# Patient Record
Sex: FEMALE | Race: WHITE | Employment: OTHER | URBAN - METROPOLITAN AREA
[De-identification: names, ages, dates, MRNs, and addresses within clinical notes are randomized per-mention and may not be internally consistent; named-entity substitution may affect disease eponyms.]

---

## 2017-01-02 DIAGNOSIS — N39.0 URINARY TRACT INFECTION, SITE UNSPECIFIED: Primary | ICD-10-CM

## 2017-01-02 RX ORDER — NITROFURANTOIN 25; 75 MG/1; MG/1
100 CAPSULE ORAL 2 TIMES DAILY
Qty: 14 CAPSULE | Refills: 0 | Status: SHIPPED | OUTPATIENT
Start: 2017-01-02 | End: 2017-08-09

## 2017-01-05 ENCOUNTER — TELEPHONE (OUTPATIENT)
Dept: FAMILY MEDICINE | Facility: CLINIC | Age: 77
End: 2017-01-05

## 2017-01-05 DIAGNOSIS — N39.0 URINARY TRACT INFECTION: Primary | ICD-10-CM

## 2017-01-05 RX ORDER — AMOXICILLIN 500 MG/1
1000 CAPSULE ORAL 2 TIMES DAILY
Qty: 28 CAPSULE | Refills: 0 | Status: SHIPPED | OUTPATIENT
Start: 2017-01-05 | End: 2017-01-12

## 2017-01-05 NOTE — TELEPHONE ENCOUNTER
Pt reports being seen for bladder infection and is getting very sick to their stomach, has barely eaten anything since Monday.     Pt vomited the medication out today. Pt started Macrobid on Monday.    Pt notes intermittent nausea as well.     [phone], ok to leave message  [facility] pharmacy.    Will huddle with provider today.  Zena Powell RN

## 2017-01-05 NOTE — TELEPHONE ENCOUNTER
Attempt #1.    Huddle with JV. Advised amoxicillin 1000mg BID x 7 days.    The patient indicates understanding of these issues and agrees with the plan.  Zena Powell RN

## 2017-01-31 ENCOUNTER — TELEPHONE (OUTPATIENT)
Dept: FAMILY MEDICINE | Facility: CLINIC | Age: 77
End: 2017-01-31

## 2017-01-31 DIAGNOSIS — R30.0 DYSURIA: Primary | ICD-10-CM

## 2017-01-31 NOTE — TELEPHONE ENCOUNTER
.  Name of caller:   Aruna  Relationship to pt:  self  Reason for call:   Pt calling she is wondering if she can have recheck of her ua/uc  Best phone number to reach pt at is:   [phone]  Ok to leave a message with medical info?   yes

## 2017-01-31 NOTE — TELEPHONE ENCOUNTER
Called # below     Wanted to make sure her urine is clear of the infection   She has been very tired and running fevers and she is worried the UTI is back     Orders  Placed     Waleska Shah RN, BSN  [facility]

## 2017-02-01 DIAGNOSIS — R30.0 DYSURIA: ICD-10-CM

## 2017-02-01 LAB
ALBUMIN UR-MCNC: NEGATIVE MG/DL
APPEARANCE UR: CLEAR
BILIRUB UR QL STRIP: NEGATIVE
COLOR UR AUTO: YELLOW
GLUCOSE UR STRIP-MCNC: NEGATIVE MG/DL
HGB UR QL STRIP: NEGATIVE
KETONES UR STRIP-MCNC: NEGATIVE MG/DL
LEUKOCYTE ESTERASE UR QL STRIP: NEGATIVE
NITRATE UR QL: NEGATIVE
NON-SQ EPI CELLS #/AREA URNS LPF: NORMAL /LPF
PH UR STRIP: 7 PH (ref 5–7)
RBC #/AREA URNS AUTO: NORMAL /HPF (ref 0–2)
SP GR UR STRIP: 1.01 (ref 1–1.03)
URN SPEC COLLECT METH UR: NORMAL
UROBILINOGEN UR STRIP-ACNC: 0.2 EU/DL (ref 0.2–1)
WBC #/AREA URNS AUTO: NORMAL /HPF (ref 0–2)

## 2017-02-01 PROCEDURE — 81001 URINALYSIS AUTO W/SCOPE: CPT | Performed by: FAMILY MEDICINE

## 2017-02-01 PROCEDURE — 87086 URINE CULTURE/COLONY COUNT: CPT | Performed by: FAMILY MEDICINE

## 2017-02-02 LAB
BACTERIA SPEC CULT: NORMAL
MICRO REPORT STATUS: NORMAL
SPECIMEN SOURCE: NORMAL

## 2017-02-20 DIAGNOSIS — I10 ESSENTIAL HYPERTENSION WITH GOAL BLOOD PRESSURE LESS THAN 140/90: ICD-10-CM

## 2017-02-20 DIAGNOSIS — E03.8 OTHER SPECIFIED HYPOTHYROIDISM: ICD-10-CM

## 2017-02-20 RX ORDER — TRIAMTERENE/HYDROCHLOROTHIAZID 37.5-25 MG
TABLET ORAL
Qty: 90 TABLET | Refills: 0 | Status: SHIPPED | OUTPATIENT
Start: 2017-02-20 | End: 2017-07-10

## 2017-02-20 RX ORDER — LEVOTHYROXINE SODIUM 75 UG/1
TABLET ORAL
Qty: 90 TABLET | Refills: 1 | Status: SHIPPED | OUTPATIENT
Start: 2017-02-20 | End: 2017-08-09

## 2017-02-20 NOTE — TELEPHONE ENCOUNTER
Reason for Call: Patient called to get a refill for her levothyroxine (SYNTHROID, LEVOTHROID) 75 MCG tablet, triamterene-hydrochlorothiazide (MAXZIDE-25) 37.5-25 MG per tablet    Best phone number to reach pt at is: [phone]   Ok to leave a message with medical info? YES    Pharmacy preferred (if calling for a refill): [facility]    Saige Clancy [employer] [facility]-Patient Representative

## 2017-02-20 NOTE — TELEPHONE ENCOUNTER
Levothyroxine     Last Written Prescription Date: 07/28/2016  Last Quantity: 90, # refills: 3  Last Office Visit with [facility], [facility] or [facility] prescribing provider: 12/29/2016   Next 5 appointments (look out 90 days)     Mar 03, 2017 11:15 AM CST   [facility]    [address] [place] [id number]   [phone]                   TSH   Date Value Ref Range Status   07/25/2016 3.17 0.40 - 4.00 mU/L Final       Maxide      Last Written Prescription Date: 07/28/2016  Last Fill Quantity: 90, # refills: 1  Last Office Visit with [facility] or [facility] prescribing provider: 12/29/2016  Next 5 appointments (look out 90 days)     Mar 03, 2017 11:15 AM CST   [facility]   [facility]    [address] [place] [id number] [phone]                   Potassium   Date Value Ref Range Status   07/29/2016 3.7 3.4 - 5.3 mmol/L Final     Creatinine   Date Value Ref Range Status   07/29/2016 1.02 0.52 - 1.04 mg/dL Final     BP Readings from Last 3 Encounters:   12/29/16 122/64   07/29/16 143/63   07/28/16 130/70

## 2017-07-10 DIAGNOSIS — I10 ESSENTIAL HYPERTENSION WITH GOAL BLOOD PRESSURE LESS THAN 140/90: ICD-10-CM

## 2017-07-10 NOTE — TELEPHONE ENCOUNTER
Name of caller: Aruna  Relationship of Patient: Self    Reason for Call: Patient would like a refill on her triamterene-hydrochlorothiazide (MAXZIDE-25) 37.5-25 MG per tablet, and metoprolol (TOPROL-XL) 50 MG 24 hr tablet    Best phone number to reach pt at is: [phone]  Ok to leave a message with medical info? Yes    Pharmacy preferred (if calling for a refill): [facility]    Saige Clancy [employer] [facility]-Patient Representative

## 2017-07-10 NOTE — TELEPHONE ENCOUNTER
Traimterene-Hydrochlorothiazide      Last Written Prescription Date: 2/20/2017  Last Fill Quantity: 90, # refills: 0  Last Office Visit with [facility] prescribing provider: 12/29/2016  Next 5 appointments (look out 90 days)     Aug 09, 2017  9:45 AM CDT   [facility]   [facility]    [address] [place] [id number] [phone]                   Potassium   Date Value Ref Range Status   07/29/2016 3.7 3.4 - 5.3 mmol/L Final     Creatinine   Date Value Ref Range Status   07/29/2016 1.02 0.52 - 1.04 mg/dL Final     BP Readings from Last 3 Encounters:   12/29/16 122/64   07/29/16 143/63   07/28/16 130/70       Metoprolol      Last Written Prescription Date: 7/28/2016  Last Fill Quantity: 90, # refills: 3    Last Office Visit with [facility] or  Health prescribing provider:  12/29/2016   Future Office Visit:    Next 5 appointments (look out 90 days)     Aug 09, 2017  9:45 AM CDT   [facility]   [facility]    [address] [place] [id number] [phone]                    BP Readings from Last 3 Encounters:   12/29/16 122/64   07/29/16 143/63   07/28/16 130/70     Elma Knowles [facility]

## 2017-07-13 RX ORDER — TRIAMTERENE/HYDROCHLOROTHIAZID 37.5-25 MG
TABLET ORAL
Qty: 90 TABLET | Refills: 0 | Status: SHIPPED | OUTPATIENT
Start: 2017-07-13 | End: 2017-08-09

## 2017-07-13 RX ORDER — METOPROLOL SUCCINATE 50 MG/1
TABLET, EXTENDED RELEASE ORAL
Qty: 90 TABLET | Refills: 3 | Status: SHIPPED | OUTPATIENT
Start: 2017-07-13 | End: 2018-04-25

## 2017-07-28 DIAGNOSIS — I10 ESSENTIAL HYPERTENSION WITH GOAL BLOOD PRESSURE LESS THAN 140/90: ICD-10-CM

## 2017-07-28 DIAGNOSIS — E78.5 HYPERLIPIDEMIA LDL GOAL <130: ICD-10-CM

## 2017-07-28 DIAGNOSIS — M85.80 OSTEOPENIA, UNSPECIFIED LOCATION: ICD-10-CM

## 2017-07-28 DIAGNOSIS — R31.9 HEMATURIA: ICD-10-CM

## 2017-07-28 DIAGNOSIS — E03.8 OTHER SPECIFIED HYPOTHYROIDISM: ICD-10-CM

## 2017-07-28 LAB
ALBUMIN SERPL-MCNC: 3.6 G/DL (ref 3.4–5)
ALBUMIN UR-MCNC: NEGATIVE MG/DL
ALP SERPL-CCNC: 42 U/L (ref 40–150)
ALT SERPL W P-5'-P-CCNC: 26 U/L (ref 0–50)
ANION GAP SERPL CALCULATED.3IONS-SCNC: 7 MMOL/L (ref 3–14)
APPEARANCE UR: CLEAR
AST SERPL W P-5'-P-CCNC: 17 U/L (ref 0–45)
BACTERIA #/AREA URNS HPF: ABNORMAL /HPF
BASOPHILS # BLD AUTO: 0 10E9/L (ref 0–0.2)
BASOPHILS NFR BLD AUTO: 0.6 %
BILIRUB SERPL-MCNC: 0.6 MG/DL (ref 0.2–1.3)
BILIRUB UR QL STRIP: NEGATIVE
BUN SERPL-MCNC: 23 MG/DL (ref 7–30)
CALCIUM SERPL-MCNC: 9.6 MG/DL (ref 8.5–10.1)
CHLORIDE SERPL-SCNC: 107 MMOL/L (ref 94–109)
CHOLEST SERPL-MCNC: 198 MG/DL
CO2 SERPL-SCNC: 27 MMOL/L (ref 20–32)
COLOR UR AUTO: YELLOW
CREAT SERPL-MCNC: 1.13 MG/DL (ref 0.52–1.04)
CREAT UR-MCNC: 39 MG/DL
DIFFERENTIAL METHOD BLD: NORMAL
EOSINOPHIL # BLD AUTO: 0.3 10E9/L (ref 0–0.7)
EOSINOPHIL NFR BLD AUTO: 4.6 %
ERYTHROCYTE [DISTWIDTH] IN BLOOD BY AUTOMATED COUNT: 12.5 % (ref 10–15)
GFR SERPL CREATININE-BSD FRML MDRD: 47 ML/MIN/1.7M2
GLUCOSE SERPL-MCNC: 96 MG/DL (ref 70–99)
GLUCOSE UR STRIP-MCNC: NEGATIVE MG/DL
HCT VFR BLD AUTO: 37.2 % (ref 35–47)
HDLC SERPL-MCNC: 48 MG/DL
HGB BLD-MCNC: 12.5 G/DL (ref 11.7–15.7)
HGB UR QL STRIP: NEGATIVE
KETONES UR STRIP-MCNC: NEGATIVE MG/DL
LDLC SERPL CALC-MCNC: 85 MG/DL
LEUKOCYTE ESTERASE UR QL STRIP: ABNORMAL
LYMPHOCYTES # BLD AUTO: 2.4 10E9/L (ref 0.8–5.3)
LYMPHOCYTES NFR BLD AUTO: 33.3 %
MCH RBC QN AUTO: 31.6 PG (ref 26.5–33)
MCHC RBC AUTO-ENTMCNC: 33.6 G/DL (ref 31.5–36.5)
MCV RBC AUTO: 94 FL (ref 78–100)
MICROALBUMIN UR-MCNC: <5 MG/L
MICROALBUMIN/CREAT UR: NORMAL MG/G CR (ref 0–25)
MONOCYTES # BLD AUTO: 0.6 10E9/L (ref 0–1.3)
MONOCYTES NFR BLD AUTO: 8 %
NEUTROPHILS # BLD AUTO: 3.9 10E9/L (ref 1.6–8.3)
NEUTROPHILS NFR BLD AUTO: 53.5 %
NITRATE UR QL: NEGATIVE
NONHDLC SERPL-MCNC: 150 MG/DL
PH UR STRIP: 6.5 PH (ref 5–7)
PLATELET # BLD AUTO: 211 10E9/L (ref 150–450)
POTASSIUM SERPL-SCNC: 5 MMOL/L (ref 3.4–5.3)
PROT SERPL-MCNC: 7 G/DL (ref 6.8–8.8)
RBC # BLD AUTO: 3.95 10E12/L (ref 3.8–5.2)
RBC #/AREA URNS AUTO: ABNORMAL /HPF (ref 0–2)
SODIUM SERPL-SCNC: 141 MMOL/L (ref 133–144)
SP GR UR STRIP: 1.01 (ref 1–1.03)
T3 SERPL-MCNC: 87 NG/DL (ref 60–181)
T4 FREE SERPL-MCNC: 1.02 NG/DL (ref 0.76–1.46)
TRIGL SERPL-MCNC: 323 MG/DL
TSH SERPL DL<=0.05 MIU/L-ACNC: 3.75 MU/L (ref 0.4–4)
URN SPEC COLLECT METH UR: ABNORMAL
UROBILINOGEN UR STRIP-ACNC: 0.2 EU/DL (ref 0.2–1)
WBC # BLD AUTO: 7.2 10E9/L (ref 4–11)
WBC #/AREA URNS AUTO: ABNORMAL /HPF (ref 0–2)

## 2017-07-28 PROCEDURE — 81001 URINALYSIS AUTO W/SCOPE: CPT | Performed by: FAMILY MEDICINE

## 2017-07-28 PROCEDURE — 84480 ASSAY TRIIODOTHYRONINE (T3): CPT | Performed by: FAMILY MEDICINE

## 2017-07-28 PROCEDURE — 80061 LIPID PANEL: CPT | Performed by: FAMILY MEDICINE

## 2017-07-28 PROCEDURE — 82043 UR ALBUMIN QUANTITATIVE: CPT | Performed by: FAMILY MEDICINE

## 2017-07-28 PROCEDURE — 84439 ASSAY OF FREE THYROXINE: CPT | Performed by: FAMILY MEDICINE

## 2017-07-28 PROCEDURE — 36415 COLL VENOUS BLD VENIPUNCTURE: CPT | Performed by: FAMILY MEDICINE

## 2017-07-28 PROCEDURE — 80050 GENERAL HEALTH PANEL: CPT | Performed by: FAMILY MEDICINE

## 2017-08-09 ENCOUNTER — OFFICE VISIT (OUTPATIENT)
Dept: FAMILY MEDICINE | Facility: CLINIC | Age: 77
End: 2017-08-09
Payer: COMMERCIAL

## 2017-08-09 VITALS
TEMPERATURE: 98 F | BODY MASS INDEX: 26.34 KG/M2 | HEART RATE: 60 BPM | SYSTOLIC BLOOD PRESSURE: 134 MMHG | HEIGHT: 67 IN | OXYGEN SATURATION: 100 % | DIASTOLIC BLOOD PRESSURE: 58 MMHG | WEIGHT: 167.8 LBS

## 2017-08-09 DIAGNOSIS — N89.8 ITCHING IN THE VAGINAL AREA: ICD-10-CM

## 2017-08-09 DIAGNOSIS — Z00.00 MEDICARE ANNUAL WELLNESS VISIT, SUBSEQUENT: ICD-10-CM

## 2017-08-09 DIAGNOSIS — R94.4 DECREASED GFR: ICD-10-CM

## 2017-08-09 DIAGNOSIS — M85.80 OSTEOPENIA, UNSPECIFIED LOCATION: ICD-10-CM

## 2017-08-09 DIAGNOSIS — I10 ESSENTIAL HYPERTENSION WITH GOAL BLOOD PRESSURE LESS THAN 140/90: ICD-10-CM

## 2017-08-09 DIAGNOSIS — E78.5 HYPERLIPIDEMIA LDL GOAL <130: ICD-10-CM

## 2017-08-09 DIAGNOSIS — R79.89 ELEVATED SERUM CREATININE: ICD-10-CM

## 2017-08-09 DIAGNOSIS — Z12.31 VISIT FOR SCREENING MAMMOGRAM: ICD-10-CM

## 2017-08-09 DIAGNOSIS — L29.2 VULVAR ITCHING: ICD-10-CM

## 2017-08-09 DIAGNOSIS — Z00.01 ENCOUNTER FOR ROUTINE ADULT HEALTH EXAMINATION WITH ABNORMAL FINDINGS: Primary | ICD-10-CM

## 2017-08-09 DIAGNOSIS — E03.8 OTHER SPECIFIED HYPOTHYROIDISM: ICD-10-CM

## 2017-08-09 LAB
ANION GAP SERPL CALCULATED.3IONS-SCNC: 7 MMOL/L (ref 3–14)
BUN SERPL-MCNC: 28 MG/DL (ref 7–30)
CALCIUM SERPL-MCNC: 9.8 MG/DL (ref 8.5–10.1)
CHLORIDE SERPL-SCNC: 105 MMOL/L (ref 94–109)
CO2 SERPL-SCNC: 28 MMOL/L (ref 20–32)
CREAT SERPL-MCNC: 1.16 MG/DL (ref 0.52–1.04)
GFR SERPL CREATININE-BSD FRML MDRD: 46 ML/MIN/1.7M2
GLUCOSE SERPL-MCNC: 86 MG/DL (ref 70–99)
POTASSIUM SERPL-SCNC: 4.3 MMOL/L (ref 3.4–5.3)
SODIUM SERPL-SCNC: 140 MMOL/L (ref 133–144)

## 2017-08-09 PROCEDURE — 80048 BASIC METABOLIC PNL TOTAL CA: CPT | Performed by: FAMILY MEDICINE

## 2017-08-09 PROCEDURE — 36415 COLL VENOUS BLD VENIPUNCTURE: CPT | Performed by: FAMILY MEDICINE

## 2017-08-09 PROCEDURE — 99397 PER PM REEVAL EST PAT 65+ YR: CPT | Performed by: FAMILY MEDICINE

## 2017-08-09 RX ORDER — LISINOPRIL 30 MG/1
TABLET ORAL
Qty: 90 TABLET | Refills: 2 | Status: SHIPPED | OUTPATIENT
Start: 2017-08-09 | End: 2018-01-04

## 2017-08-09 RX ORDER — SIMVASTATIN 40 MG
40 TABLET ORAL AT BEDTIME
Qty: 90 TABLET | Refills: 3 | Status: SHIPPED | OUTPATIENT
Start: 2017-08-09 | End: 2018-06-28

## 2017-08-09 RX ORDER — LEVOTHYROXINE SODIUM 75 UG/1
TABLET ORAL
Qty: 90 TABLET | Refills: 3 | Status: SHIPPED | OUTPATIENT
Start: 2017-08-09 | End: 2018-06-28

## 2017-08-09 RX ORDER — CLOBETASOL PROPIONATE 0.5 MG/G
OINTMENT TOPICAL
Qty: 60 G | Refills: 1 | Status: SHIPPED | OUTPATIENT
Start: 2017-08-09 | End: 2018-09-27

## 2017-08-09 RX ORDER — TRIAMTERENE/HYDROCHLOROTHIAZID 37.5-25 MG
TABLET ORAL
Qty: 90 TABLET | Refills: 1 | Status: SHIPPED | OUTPATIENT
Start: 2017-08-09 | End: 2018-01-04

## 2017-08-09 ASSESSMENT — PATIENT HEALTH QUESTIONNAIRE - PHQ9
SUM OF ALL RESPONSES TO PHQ QUESTIONS 1-9: 1
5. POOR APPETITE OR OVEREATING: NOT AT ALL

## 2017-08-09 ASSESSMENT — ANXIETY QUESTIONNAIRES
1. FEELING NERVOUS, ANXIOUS, OR ON EDGE: NOT AT ALL
6. BECOMING EASILY ANNOYED OR IRRITABLE: NOT AT ALL
GAD7 TOTAL SCORE: 0
5. BEING SO RESTLESS THAT IT IS HARD TO SIT STILL: NOT AT ALL
2. NOT BEING ABLE TO STOP OR CONTROL WORRYING: NOT AT ALL
3. WORRYING TOO MUCH ABOUT DIFFERENT THINGS: NOT AT ALL
7. FEELING AFRAID AS IF SOMETHING AWFUL MIGHT HAPPEN: NOT AT ALL

## 2017-08-09 NOTE — NURSING NOTE
"Chief Complaint   Patient presents with     Wellness Visit       Initial /70 (BP Location: Right arm, Patient Position: Chair, Cuff Size: Adult Large)  Pulse 60  Temp 98  F (36.7  C) (Oral)  Ht 5' 7\" (1.702 m)  Wt 167 lb 12.8 oz (76.1 kg)  SpO2 100%  BMI 26.28 kg/m2 Estimated body mass index is 26.28 kg/(m^2) as calculated from the following:    Height as of this encounter: 5' 7\" (1.702 m).    Weight as of this encounter: 167 lb 12.8 oz (76.1 kg).  Medication Reconciliation: complete   Elma Knowles CMA  "

## 2017-08-09 NOTE — LETTER
[facility]  [address] [place] [address], [place] [address]                  [phone]   August 14, 2017    Aruna Santos  [address] [facility] [address]      Dear Aruna,    Here is a summary of your recent test results:    -Kidney function (GFR) is decreased in generical , but stable from the last 2 years.  ADVISE: recheck in 3 months (BMP, DX: 593.9 - unspecified disorder of kidney )   -Sodium is normal.   -Potassium is normal.   -Glucose (diabetic screening test) is normal.     Your test results are enclosed.      Please contact me if you have any questions.    In addition, here is a list of due or overdue Health Maintenance reminders.    Health Maintenance Due   Topic Date Due     Mammogram - yearly  08/31/2017       Please call us at [phone] (or use [employer]) to address the above recommendations.            Thank you very much for trusting [facility]..     Healthy regards,       Lisbet Simmons M.D.          Results for orders placed or performed in visit on 08/09/17   Basic metabolic panel   Result Value Ref Range    Sodium 140 133 - 144 mmol/L    Potassium 4.3 3.4 - 5.3 mmol/L    Chloride 105 94 - 109 mmol/L    Carbon Dioxide 28 20 - 32 mmol/L    Anion Gap 7 3 - 14 mmol/L    Glucose 86 70 - 99 mg/dL    Urea Nitrogen 28 7 - 30 mg/dL    Creatinine 1.16 (H) 0.52 - 1.04 mg/dL    GFR Estimate 46 (L) >60 mL/min/1.7m2    GFR Estimate If Black 55 (L) >60 mL/min/1.7m2    Calcium 9.8 8.5 - 10.1 mg/dL

## 2017-08-09 NOTE — PATIENT INSTRUCTIONS
The triglycerides are high. Lowering  the amount of sugar ,alcohol and sweets in the diet helps to control this.Exercise and weight loss helps.  They are not high enough to consider therapy with medicine at this time.   You can also  try citrucel per otc directions or zandra fiber (which you can get at [employer] stores)  for daily fiber therapy to help lower triglycerides.   Exercise and and weight loss can also help.          Recheck blood pressure with me again in 6 months.  Lower triglycerides as well. Recheck fasting labs again in 6 months and see me again right after that.     Preventive Health Recommendations    Female Ages 65 +    Yearly exam:     See your health care provider every year in order to  o Review health changes.   o Discuss preventive care.    o Review your medicines if your doctor has prescribed any.      You no longer need a yearly Pap test unless you've had an abnormal Pap test in the past 10 years. If you have vaginal symptoms, such as bleeding or discharge, be sure to talk with your provider about a Pap test.      Every 1 to 2 years, have a mammogram.  If you are over 69, talk with your health care provider about whether or not you want to continue having screening mammograms.      Every 10 years, have a colonoscopy. Or, have a yearly FIT test (stool test). These exams will check for colon cancer.       Have a cholesterol test every 5 years, or more often if your doctor advises it.       Have a diabetes test (fasting glucose) every three years. If you are at risk for diabetes, you should have this test more often.       At age 65, have a bone density scan (DEXA) to check for osteoporosis (brittle bone disease).    Shots:    Get a flu shot each year.    Get a tetanus shot every 10 years.    Talk to your doctor about your pneumonia vaccines. There are now two you should receive - Pneumovax (PPSV 23) and Prevnar (PCV 13).    Talk to your doctor about the shingles vaccine.    Talk to your  doctor about the hepatitis B vaccine.    Nutrition:     Eat at least 5 servings of fruits and vegetables each day.      Eat whole-grain bread, whole-wheat pasta and brown rice instead of white grains and rice.      Talk to your provider about Calcium and Vitamin D.     Lifestyle    Exercise at least 150 minutes a week (30 minutes a day, 5 days a week). This will help you control your weight and prevent disease.      Limit alcohol to one drink per day.      No smoking.       Wear sunscreen to prevent skin cancer.       See your dentist twice a year for an exam and cleaning.      See your eye doctor every 1 to 2 years to screen for conditions such as glaucoma, macular degeneration and cataracts.               Thank you for choosing [facility]  for your Health Care. It was a pleasure seeing you at your visit today. Please contact us with any questions or concerns you may have.                   Lisbet Simmons MD                                  To reach your [facility] care team after hours call:   [phone]    Our clinic hours are:     Monday- 7:30 am - 7:00 pm                             Tuesday through Friday- 7:30 am - 5:00 pm                                        Saturday- 8:00 am - 12:00 pm                  Phone:  [phone]    Our pharmacy hours are:     Monday  8:00 am to 7:00 pm      Tuesday through Friday 8:00am to 6:00pm                        Saturday - 9:00 am to 1:00 pm      Sunday : Closed.              Phone:  [phone]      There is also information available at our web site:  [email or web address]    If your provider ordered any lab tests and you do not receive the results within 10 business days, please call the clinic.    If you need a medication refill please contact your pharmacy.  Please allow 2 business days for your refill to be completed.    Our clinic offers telephone visits and e visits.  Please ask one of your team members to explain more.       Use [employer] (secure email communication and access to your chart) to send your primary care provider a message or make an appointment. Ask someone on your Team how to sign up for [employer].

## 2017-08-09 NOTE — MR AVS SNAPSHOT
After Visit Summary   8/9/2017    Aruna Santos    MRN: [id number]           Patient Information     Date Of Birth          1940        Visit Information        Provider Department      8/9/2017 9:45 AM Lsibet Simmons MD [facility]        Today's Diagnoses     Encounter for routine adult health examination with abnormal findings    -  1    Medicare annual wellness visit, subsequent        Other specified hypothyroidism        Essential hypertension with goal blood pressure less than 140/90- fairly good control today         Vulvar itching - ? early lichen sclerosis vs. other - superior vulva        Visit for screening mammogram        Osteopenia, unspecified location - was on fosamax, then Boniva secondary to hot flashes - off boniva since 2012        Essential hypertension with goal blood pressure less than 140/90        Hyperlipidemia LDL goal <130        Itching in the vaginal area          Care Instructions    The triglycerides are high. Lowering  the amount of sugar ,alcohol and sweets in the diet helps to control this.Exercise and weight loss helps.  They are not high enough to consider therapy with medicine at this time.   You can also  try citrucel per otc directions or zandra fiber (which you can get at [employer] stores)  for daily fiber therapy to help lower triglycerides.   Exercise and and weight loss can also help.          Recheck blood pressure with me again in 6 months.  Lower triglycerides as well. Recheck fasting labs again in 6 months and see me again right after that.     Preventive Health Recommendations    Female Ages 65 +    Yearly exam:     See your health care provider every year in order to  o Review health changes.   o Discuss preventive care.    o Review your medicines if your doctor has prescribed any.      You no longer need a yearly Pap test unless you've had an abnormal Pap test in the past 10 years. If you have vaginal symptoms, such as  bleeding or discharge, be sure to talk with your provider about a Pap test.      Every 1 to 2 years, have a mammogram.  If you are over 69, talk with your health care provider about whether or not you want to continue having screening mammograms.      Every 10 years, have a colonoscopy. Or, have a yearly FIT test (stool test). These exams will check for colon cancer.       Have a cholesterol test every 5 years, or more often if your doctor advises it.       Have a diabetes test (fasting glucose) every three years. If you are at risk for diabetes, you should have this test more often.       At age 65, have a bone density scan (DEXA) to check for osteoporosis (brittle bone disease).    Shots:    Get a flu shot each year.    Get a tetanus shot every 10 years.    Talk to your doctor about your pneumonia vaccines. There are now two you should receive - Pneumovax (PPSV 23) and Prevnar (PCV 13).    Talk to your doctor about the shingles vaccine.    Talk to your doctor about the hepatitis B vaccine.    Nutrition:     Eat at least 5 servings of fruits and vegetables each day.      Eat whole-grain bread, whole-wheat pasta and brown rice instead of white grains and rice.      Talk to your provider about Calcium and Vitamin D.     Lifestyle    Exercise at least 150 minutes a week (30 minutes a day, 5 days a week). This will help you control your weight and prevent disease.      Limit alcohol to one drink per day.      No smoking.       Wear sunscreen to prevent skin cancer.       See your dentist twice a year for an exam and cleaning.      See your eye doctor every 1 to 2 years to screen for conditions such as glaucoma, macular degeneration and cataracts.               Thank you for choosing [facility]  for your Health Care. It was a pleasure seeing you at your visit today. Please contact us with any questions or concerns you may have.                   Lisbet Simmons MD                                   To reach your [facility] care team after hours call:   [phone]    Our clinic hours are:     Monday- 7:30 am - 7:00 pm                             Tuesday through Friday- 7:30 am - 5:00 pm                                        Saturday- 8:00 am - 12:00 pm                  Phone:  [phone]    Our pharmacy hours are:     Monday  8:00 am to 7:00 pm      Tuesday through Friday 8:00am to 6:00pm                        Saturday - 9:00 am to 1:00 pm      Sunday : Closed.              Phone:  [phone]      There is also information available at our web site:  [email or web address]    If your provider ordered any lab tests and you do not receive the results within 10 business days, please call the clinic.    If you need a medication refill please contact your pharmacy.  Please allow 2 business days for your refill to be completed.    Our clinic offers telephone visits and e visits.  Please ask one of your team members to explain more.      Use PayPlugt (secure email communication and access to your chart) to send your primary care provider a message or make an appointment. Ask someone on your Team how to sign up for [email or web address].                       Follow-ups after your visit        Your next 10 appointments already scheduled     Sep 11, 2017 10:30 AM CDT   MA SCREENING DIGITAL BILATERAL with [id number]   [facility] ([facility])    [address], [address]  [facility] [id number] [phone]           Do not use any powder, lotion or deodorant under your arms or on your breast. If you do, we will ask you to remove it before your exam.  Wear comfortable, two-piece clothing.  If you have any allergies, tell your care team.  Bring any previous mammograms from other facilities or have them mailed to the breast center. Three-dimensional (3D) mammograms are available at [facility] locations in [facility], [facility], and [place]. [place]  "locations include [facility] in [place]. Benefits of 3D mammograms include: - Improved rate of cancer detection - Decreases your chance of having to go back for more tests, which means fewer: - \"False-positive\" results (This means that there is an abnormal area but it isn't cancer.) - Invasive testing procedures, such as a biopsy or surgery - Can provide clearer images of the breast if you have dense breast tissue. 3D mammography is an optional exam that anyone can have with a 2D mammogram. It doesn't replace or take the place of a 2D mammogram. 2D mammograms remain an effective screening test for all women.  Not all insurance companies cover the cost of a 3D mammogram. Check with your insurance.              Future tests that were ordered for you today     Open Future Orders        Priority Expected Expires Ordered    MA SCREENING DIGITAL BILAT - Future  (s+30) Routine  8/9/2018 8/9/2017            Who to contact     If you have questions or need follow up information about today's clinic visit or your schedule please contact [facility] directly at [phone].  Normal or non-critical lab and imaging results will be communicated to you by [employer], letter or phone within 4 business days after the clinic has received the results. If you do not hear from us within 7 days, please contact the clinic through [employer] or phone. If you have a critical or abnormal lab result, we will notify you by phone as soon as possible.  Submit refill requests through Mofang or call your pharmacy and they will forward the refill request to us. Please allow 3 business days for your refill to be completed.          Additional Information About Your Visit        [employer] Information     Mofang lets you send messages to your doctor, view your test results, renew your prescriptions, schedule appointments and more. To sign up, go to [email or web address] . Click on \"Log in\" on the left " "side of the screen, which will take you to the Welcome page. Then click on \"Sign up Now\" on the right side of the page.     You will be asked to enter the access code listed below, as well as some personal information. Please follow the directions to create your username and password.     Your access code is: [email or web address]  Expires: 2017 11:06 AM     Your access code will  in 90 days. If you need help or a new code, please call your [place] clinic or [phone].        Care EveryWhere ID     This is your Care EveryWhere ID. This could be used by other organizations to access your [facility] medical records  [id number]        Your Vitals Were     Pulse Temperature Height Pulse Oximetry BMI (Body Mass Index)       60 98  F (36.7  C) (Oral) 5' 7\" (1.702 m) 100% 26.28 kg/m2        Blood Pressure from Last 3 Encounters:   17 134/58   16 122/64   16 143/63    Weight from Last 3 Encounters:   17 167 lb 12.8 oz (76.1 kg)   16 168 lb (76.2 kg)   16 176 lb (79.8 kg)                 Today's Medication Changes          These changes are accurate as of: 17 11:07 AM.  If you have any questions, ask your nurse or doctor.               These medicines have changed or have updated prescriptions.        Dose/Directions    lisinopril 30 MG tablet   Commonly known as:  PRINIVIL,ZESTRIL   This may have changed:  See the new instructions.   Used for:  Essential hypertension with goal blood pressure less than 140/90   Changed by:  Lisbet Simmons MD        TAKE 1 TABLET EVERY DAY   Quantity:  90 tablet   Refills:  2       simvastatin 40 MG tablet   Commonly known as:  ZOCOR   This may have changed:  See the new instructions.   Used for:  Hyperlipidemia LDL goal <130   Changed by:  Lisbet Simmons MD        Dose:  40 mg   Take 1 tablet (40 mg) by mouth At Bedtime   Quantity:  90 tablet   Refills:  3            Where to get your medicines      These medications were sent to " [facility] - [place], [place] - [address] [place] [address]  [address] [facility] [address]     Phone:  [phone]     clobetasol 0.05 % ointment    levothyroxine 75 MCG tablet    lisinopril 30 MG tablet    simvastatin 40 MG tablet    triamterene-hydrochlorothiazide 37.5-25 MG per tablet                Primary Care Provider Office Phone # Fax #    Lisbet Simmons [phone]       [facility] [address]        Equal Access to Services     [facility]: Hadii aad ku hadasho Soomaali, waaxda luqadaha, qaybta kaalmada adeegyarosa, maura lopez . So [facility] [phone].    ATENCIÓN: Si habla español, tiene a wolf disposición servicios gratuitos de asistencia lingüística. [facility] [phone].    We comply with applicable federal civil rights laws and [place] laws. We do not discriminate on the basis of race, color, national origin, age, disability sex, sexual orientation or gender identity.            Thank you!     Thank you for choosing [facility]  for your care. Our goal is always to provide you with excellent care. Hearing back from our patients is one way we can continue to improve our services. Please take a few minutes to complete the written survey that you may receive in the mail after your visit with us. Thank you!             Your Updated Medication List - Protect others around you: Learn how to safely use, store and throw away your medicines at www.disposemymeds.org.          This list is accurate as of: 8/9/17 11:07 AM.  Always use your most recent med list.                   Brand Name Dispense Instructions for use Diagnosis    aspirin 81 MG tablet      1 TABLET DAILY        brimonidine 0.1 % ophthalmic solution    ALPHAGAN P     Place 1 drop into both eyes 2 times daily        CALCIUM /VITAMIN D TABS   OR      2 qd        clobetasol 0.05 % ointment    TEMOVATE    60 g    Apply sparingly to affected  area of genitals twice weekly at nighttime    Vulvar itching, Itching in the vaginal area       levobunolol 0.5 % ophthalmic solution    BETAGAN     one drop each day        levothyroxine 75 MCG tablet    SYNTHROID/LEVOTHROID    90 tablet    TAKE 1 TABLET EVERY MORNING    Other specified hypothyroidism       lisinopril 30 MG tablet    PRINIVIL,ZESTRIL    90 tablet    TAKE 1 TABLET EVERY DAY    Essential hypertension with goal blood pressure less than 140/90       LORazepam 0.5 MG tablet    ATIVAN    10 tablet    Take 1 tablet (0.5 mg) by mouth 2 times daily Take 30 minutes prior to departure.  Do not operate a vehicle after taking this medication    Anxiety       metoprolol 50 MG 24 hr tablet    TOPROL-XL    90 tablet    TAKE 1 TABLET EVERY DAY    Essential hypertension with goal blood pressure less than 140/90       Multi-vitamin Tabs tablet   Generic drug:  multivitamin, therapeutic with minerals      1 qd        simvastatin 40 MG tablet    ZOCOR    90 tablet    Take 1 tablet (40 mg) by mouth At Bedtime    Hyperlipidemia LDL goal <130       triamterene-hydrochlorothiazide 37.5-25 MG per tablet    MAXZIDE-25    90 tablet    TAKE 1 TABLET EVERY DAY    Essential hypertension with goal blood pressure less than 140/90

## 2017-08-09 NOTE — PROGRESS NOTES
SUBJECTIVE:   Aruna Santos is a 76 year old female who presents for Preventive Visit.  Are you in the first 12 months of your Medicare Part B coverage?  No    Healthy Habits:    Do you get at least three servings of calcium containing foods daily (dairy, green leafy vegetables, etc.)? yes    Amount of exercise or daily activities, outside of work: no routine    Problems taking medications regularly No    Medication side effects: No    Have you had an eye exam in the past two years? yes    Do you see a dentist twice per year? No - once    Do you have sleep apnea, excessive snoring or daytime drowsiness?yes - sleep apnea    COGNITIVE SCREEN  1) Repeat 3 items (Banana, Sunrise, Chair)  2) Clock draw: NORMAL  3) 3 item recall: Recalls 3 objects  Results: 3 items recalled: COGNITIVE IMPAIRMENT LESS LIKELY    Mini-CogTM Copyright S [employer]. Licensed by the author for use in [employer]; reprinted with permission (octavia@[facility]). All rights reserved.      Hyperlipidemia Follow-Up      Rate your low fat/cholesterol diet?: fair    Taking statin?  Yes, no muscle aches from statin    Other lipid medications/supplements?:  none    Cholesterol   Date Value Ref Range Status   07/28/2017 198 <200 mg/dL Final       Hypertension Follow-up      Outpatient blood pressures are being checked at home.  Results are within normal range.    Low Salt Diet: no added salt    Pt did take her medication this am.     BP Readings from Last 5 Encounters:   08/09/17 134/58   12/29/16 122/64   07/29/16 143/63   07/28/16 130/70   05/25/16 130/64   recheck of bp was 134/58 today.   Last Basic Metabolic Panel:  Lab Results   Component Value Date     07/28/2017      Lab Results   Component Value Date    POTASSIUM 5.0 07/28/2017     Lab Results   Component Value Date    CHLORIDE 107 07/28/2017     Lab Results   Component Value Date    KHALIDA 9.6 07/28/2017     Lab Results   Component Value Date    CO2 27 07/28/2017     Lab Results    Component Value Date    BUN 23 07/28/2017     Lab Results   Component Value Date    CR 1.13 07/28/2017     Lab Results   Component Value Date    GLC 96 07/28/2017     GFR Estimate   Date Value Ref Range Status   07/28/2017 47 (L) >60 mL/min/1.7m2 Final     Comment:     Non  GFR Calc   07/29/2016 53 (L) >60 mL/min/1.7m2 Final     Comment:     Non  GFR Calc   07/25/2016 51 (L) >60 mL/min/1.7m2 Final     Comment:     Non  GFR Calc     Need to recheck creatinine and gfr today to recheck kidney function nonfasting today - discussed in detail with pt today.     Anxiety Follow-Up    Status since last visit: No change    Other associated symptoms:None    Complicating factors:   Significant life event: No   Current substance abuse: None  Depression symptoms: No  RISA-7 SCORE 8/9/2017   Total Score 0       GAD7      Hypothyroidism Follow-up      Since last visit, patient describes the following symptoms: Weight stable, no hair loss, no skin changes, no constipation, no loose stools      TSH   Date Value Ref Range Status   07/28/2017 3.75 0.40 - 4.00 mU/L Final   07/25/2016 3.17 0.40 - 4.00 mU/L Final   07/23/2015 2.94 0.40 - 4.00 mU/L Final   06/27/2014 3.10 0.4 - 5.0 mU/L Final   06/24/2013 2.59 0.4 - 5.0 mU/L Final     T4 Free   Date Value Ref Range Status   07/28/2017 1.02 0.76 - 1.46 ng/dL Final   07/25/2016 1.08 0.76 - 1.46 ng/dL Final   07/23/2015 1.21 0.76 - 1.46 ng/dL Final   06/27/2014 1.13 0.70 - 1.85 ng/dL Final   06/24/2013 1.07 0.70 - 1.85 ng/dL Final       Reviewed and updated as needed this visit by clinical staff  Tobacco  Allergies  Meds  Med Hx  Surg Hx  Fam Hx  Soc Hx        Reviewed and updated as needed this visit by Provider  Tobacco        Social History   Substance Use Topics     Smoking status: Never Smoker     Smokeless tobacco: Never Used     Alcohol use No       The patient does not drink >3 drinks per day nor >7 drinks per week.    Today's  PHQ-2 Score:   PHQ-2 ( 1999 [employer]) 8/9/2017 7/28/2016   Q1: Little interest or pleasure in doing things 0 0   Q2: Feeling down, depressed or hopeless 0 0   PHQ-2 Score 0 0       Do you feel safe in your environment - No    Do you have a Health Care Directive?: No: Advance care planning was reviewed with patient; patient declined at this time.      Current providers sharing in care for this patient include: Patient Care Team:  Lisbet Simmons MD as PCP - General (Family Practice)      Hearing impairment: No    Ability to successfully perform activities of daily living: Yes, no assistance needed     Fall risk:  Fallen 2 or more times in the past year?: No  Any fall with injury in the past year?: No      Home safety:  none identified      The following health maintenance items are reviewed in Epic and correct as of today:  Health Maintenance   Topic Date Due     FALL RISK ASSESSMENT  07/28/2017     WELLNESS VISIT Q1 YR  07/28/2017     MAMMO Q1 YR  08/31/2017     INFLUENZA VACCINE (SYSTEM ASSIGNED)  09/01/2017     TSH Q1 YEAR  07/28/2018     BMP Q1 YR  07/28/2018     MICROALBUMIN Q1 YEAR  07/28/2018     ADVANCE DIRECTIVE PLANNING Q5 YRS  07/23/2020     LIPID SCREEN Q5 YR FEMALE (SYSTEM ASSIGNED)  07/28/2022     TETANUS IMMUNIZATION (SYSTEM ASSIGNED)  06/24/2023     COLON CANCER SCREEN (SYSTEM ASSIGNED)  05/16/2024     DEXA SCAN SCREENING (SYSTEM ASSIGNED)  Completed     PNEUMOCOCCAL  Completed     BP Readings from Last 3 Encounters:   08/09/17 134/58   12/29/16 122/64   07/29/16 143/63    Wt Readings from Last 3 Encounters:   08/09/17 167 lb 12.8 oz (76.1 kg)   12/29/16 168 lb (76.2 kg)   07/28/16 176 lb (79.8 kg)                  Patient Active Problem List   Diagnosis     Hematuria     Dyspnea and respiratory abnormality     Hyperlipidemia LDL goal <130     Advanced directives, counseling/discussion     Itching in the vaginal area     Essential hypertension with goal blood pressure less than 140/90     Family  history of colon cancer- mother in her 40's-colonoscopies every 5 years- next one 2019     Osteopenia, unspecified location - was on fosamax, then Boniva secondary to hot flashes - off boniva since 2012     Urgency incontinence     Vulvar itching - ? early lichen sclerosis vs. other - superior vulva     Glaucoma     Anxiety     Other specified hypothyroidism     Tubular adenomas of colon- 2014 - repeat in 2019 - q 5 years      Environmental allergies     Decreased GFR     Elevated serum creatinine - 1.13 -7/28/2017      Past Surgical History:   Procedure Laterality Date     ARTHROSCOPY SHOULDER  1/2013    [facility]     COLONOSCOPY  9/22/94,     Colonoscopies q 5 year -next 2019     CYSTOCELE REPAIR  10/31/1984     HYSTERECTOMY, PAP NO LONGER INDICATED  10/31/84    Hysterectomy, Total Abdominal w ovaries left intact     SURGICAL HISTORY OF -   5/17/73    Cholecystectomy & Incidental appendectomy       Social History   Substance Use Topics     Smoking status: Never Smoker     Smokeless tobacco: Never Used     Alcohol use No     Family History   Problem Relation Age of Onset     Cancer - colorectal Mother      Hypertension Father      CEREBROVASCULAR DISEASE Maternal Grandfather      CEREBROVASCULAR DISEASE Paternal Grandmother          Current Outpatient Prescriptions   Medication Sig Dispense Refill     triamterene-hydrochlorothiazide (MAXZIDE-25) 37.5-25 MG per tablet TAKE 1 TABLET EVERY DAY 90 tablet 1     levothyroxine (SYNTHROID/LEVOTHROID) 75 MCG tablet TAKE 1 TABLET EVERY MORNING 90 tablet 3     simvastatin (ZOCOR) 40 MG tablet Take 1 tablet (40 mg) by mouth At Bedtime 90 tablet 3     lisinopril (PRINIVIL,ZESTRIL) 30 MG tablet TAKE 1 TABLET EVERY DAY 90 tablet 2     clobetasol (TEMOVATE) 0.05 % ointment Apply sparingly to affected area of genitals twice weekly at nighttime 60 g 1     metoprolol (TOPROL-XL) 50 MG 24 hr tablet TAKE 1 TABLET EVERY DAY 90 tablet 3     brimonidine (ALPHAGAN P) 0.1 %  ophthalmic solution Place 1 drop into both eyes 2 times daily        LORazepam (ATIVAN) 0.5 MG tablet Take 1 tablet (0.5 mg) by mouth 2 times daily Take 30 minutes prior to departure.  Do not operate a vehicle after taking this medication 10 tablet 0     LEVOBUNOLOL HCL 0.5 % OP SOLN one drop each day       CALCIUM /VITAMIN D TABS   OR 2 qd       MULTI-VITAMIN OR TABS 1 qd       ASPIRIN 81 MG OR TABS 1 TABLET DAILY       [DISCONTINUED] triamterene-hydrochlorothiazide (MAXZIDE-25) 37.5-25 MG per tablet TAKE 1 TABLET EVERY DAY 90 tablet 0     [DISCONTINUED] levothyroxine (SYNTHROID/LEVOTHROID) 75 MCG tablet TAKE 1 TABLET EVERY MORNING 90 tablet 1     [DISCONTINUED] simvastatin (ZOCOR) 40 MG tablet TAKE 1 TABLET AT BEDTIME 90 tablet 0     [DISCONTINUED] lisinopril (PRINIVIL,ZESTRIL) 30 MG tablet TAKE 1 TABLET EVERY DAY 90 tablet 0     Allergies   Allergen Reactions     Macrobid [Nitrofurantoin] GI Disturbance     Prednisone      Sulfa Drugs      Recent Labs   Lab Test  07/28/17   0733  07/29/16   1150  07/25/16   0730  07/23/15   1016   LDL  85   --   83  91   HDL  48*   --   49*  46*   TRIG  323*   --   284*  285*   ALT  26   --   35  34   CR  1.13*  1.02  1.05*  1.17*   GFRESTIMATED  47*  53*  51*  45*   GFRESTBLACK  57*  64  62  55*   POTASSIUM  5.0  3.7  4.7  4.5   TSH  3.75   --   3.17  2.94      Discussed in detail the above results today.     Pneumonia Vaccine:Adults age 65+ who received Pneumovax (PPSV23) at 65 years or older: Should be given PCV13 > 1 year after their most recent PPSV23    Mammogram Screening: Patient over age 50, mutual decision to screen reflected in health maintenance.  Ma Screening Digital Bilateral    Result Date: 8/20/2015  Narrative: SCREENING MAMMOGRAM, BILATERAL, DIGITAL w/CAD - 8/20/2015 10:21 AM. BREAST SYMPTOMS: No current breast complaints. COMPARISON:  08/18/2014, 08/09/2011. BREAST DENSITY: Scattered fibroglandular densities. COMMENTS: No findings of suspicion for malignancy.           Ma Screening Digital Bilateral    Result Date: 8/18/2014  Narrative: SCREENING MAMMOGRAM, BILATERAL, DIGITAL w/CAD - 8/18/2014 10:16 AM. BREAST SYMPTOMS: No current breast complaints. COMPARISON:  08/14/2013, 08/10/2010. BREAST DENSITY: Scattered fibroglandular densities. COMMENTS: No findings of suspicion for malignancy.   Scattered and loosely clustered benign-appearing calcifications in both breasts. Annual screening mammography will be necessary to confirm stability.     Ma Screening Digital Bilateral    Result Date: 8/14/2013  Narrative:  SCREENING MAMMOGRAM, BILATERAL, DIGITAL w/CAD  BREAST SYMPTOMS/COMPARISON: 8/13/2012, 8/6/2009  BREAST PARENCHYMAL PATTERN: Heterogeneously dense. which could obscure detection of small masses  FINDINGS: Negative. Screening exam in one year recommended.      History of abnormal Pap smear: YES - updated in Problem List and Health Maintenance accordingly    Lab Results   Component Value Date    PAP NIL 05/19/2008     Health Maintenance   Topic Date Due     FALL RISK ASSESSMENT  07/28/2017     WELLNESS VISIT Q1 YR  07/28/2017     MAMMO Q1 YR  08/31/2017     INFLUENZA VACCINE (SYSTEM ASSIGNED)  09/01/2017     TSH Q1 YEAR  07/28/2018     BMP Q1 YR  07/28/2018     MICROALBUMIN Q1 YEAR  07/28/2018     ADVANCE DIRECTIVE PLANNING Q5 YRS  07/23/2020     LIPID SCREEN Q5 YR FEMALE (SYSTEM ASSIGNED)  07/28/2022     TETANUS IMMUNIZATION (SYSTEM ASSIGNED)  06/24/2023     COLON CANCER SCREEN (SYSTEM ASSIGNED)  05/16/2024     DEXA SCAN SCREENING (SYSTEM ASSIGNED)  Completed     PNEUMOCOCCAL  Completed       Patient Active Problem List   Diagnosis     Hematuria     Dyspnea and respiratory abnormality     Hyperlipidemia LDL goal <130     Advanced directives, counseling/discussion     Itching in the vaginal area     Essential hypertension with goal blood pressure less than 140/90     Family history of colon cancer- mother in her 40's-colonoscopies every 5 years- next one 2019      Osteopenia, unspecified location - was on fosamax, then Boniva secondary to hot flashes - off boniva since      Urgency incontinence     Vulvar itching - ? early lichen sclerosis vs. other - superior vulva     Glaucoma     Anxiety     Other specified hypothyroidism     Tubular adenomas of colon-  - repeat in 2019 - q 5 years      Environmental allergies     Decreased GFR     Elevated serum creatinine - 1.13 -2017        Past Medical History:   Diagnosis Date     Acquired cyst of kidney      Diverticulosis of colon (without mention of hemorrhage)      Family history of colon cancer     mother  of colon cancer in her 40's     Hematuria      Hypertension goal BP (blood pressure) < 140/90     difficult to control in past      Osteoporosis, unspecified      Tubular adenoma of colon     2014 - repeat in 2019 - q 5 years      Urgency incontinence        Past Surgical History:   Procedure Laterality Date     ARTHROSCOPY SHOULDER  2013    [facility]     COLONOSCOPY  94,     Colonoscopies q 5 year -next      CYSTOCELE REPAIR  10/31/1984     HYSTERECTOMY, PAP NO LONGER INDICATED  10/31/84    Hysterectomy, Total Abdominal w ovaries left intact     SURGICAL HISTORY OF -   75    Cholecystectomy & Incidental appendectomy       Social History     Social History     Marital status:      Spouse name: Valentin     Number of children: 2     Years of education: N/A     Occupational History     Not on file.     Social History Main Topics     Smoking status: Never Smoker     Smokeless tobacco: Never Used     Alcohol use No     Drug use: No     Sexual activity: Yes     Partners: Male     Other Topics Concern     Parent/Sibling W/ Cabg, Mi Or Angioplasty Before 65f 55m? No     Social History Narrative       Family History   Problem Relation Age of Onset     Cancer - colorectal Mother      Hypertension Father      CEREBROVASCULAR DISEASE Maternal Grandfather      CEREBROVASCULAR DISEASE Paternal  Grandmother      Current Outpatient Prescriptions   Medication Sig Dispense Refill     triamterene-hydrochlorothiazide (MAXZIDE-25) 37.5-25 MG per tablet TAKE 1 TABLET EVERY DAY 90 tablet 1     levothyroxine (SYNTHROID/LEVOTHROID) 75 MCG tablet TAKE 1 TABLET EVERY MORNING 90 tablet 3     simvastatin (ZOCOR) 40 MG tablet Take 1 tablet (40 mg) by mouth At Bedtime 90 tablet 3     lisinopril (PRINIVIL,ZESTRIL) 30 MG tablet TAKE 1 TABLET EVERY DAY 90 tablet 2     clobetasol (TEMOVATE) 0.05 % ointment Apply sparingly to affected area of genitals twice weekly at nighttime 60 g 1     metoprolol (TOPROL-XL) 50 MG 24 hr tablet TAKE 1 TABLET EVERY DAY 90 tablet 3     brimonidine (ALPHAGAN P) 0.1 % ophthalmic solution Place 1 drop into both eyes 2 times daily        LORazepam (ATIVAN) 0.5 MG tablet Take 1 tablet (0.5 mg) by mouth 2 times daily Take 30 minutes prior to departure.  Do not operate a vehicle after taking this medication 10 tablet 0     LEVOBUNOLOL HCL 0.5 % OP SOLN one drop each day       CALCIUM /VITAMIN D TABS   OR 2 qd       MULTI-VITAMIN OR TABS 1 qd       ASPIRIN 81 MG OR TABS 1 TABLET DAILY       [DISCONTINUED] triamterene-hydrochlorothiazide (MAXZIDE-25) 37.5-25 MG per tablet TAKE 1 TABLET EVERY DAY 90 tablet 0     [DISCONTINUED] levothyroxine (SYNTHROID/LEVOTHROID) 75 MCG tablet TAKE 1 TABLET EVERY MORNING 90 tablet 1     [DISCONTINUED] simvastatin (ZOCOR) 40 MG tablet TAKE 1 TABLET AT BEDTIME 90 tablet 0     [DISCONTINUED] lisinopril (PRINIVIL,ZESTRIL) 30 MG tablet TAKE 1 TABLET EVERY DAY 90 tablet 0        Allergies   Allergen Reactions     Macrobid [Nitrofurantoin] GI Disturbance     Prednisone      Sulfa Drugs       ROS:  Constitutional, HEENT, cardiovascular, pulmonary, GI, , musculoskeletal, neuro, skin, endocrine and psych systems are negative, except as otherwise noted.    Had some recent low back spasms after bending over to pick something up from the lower part of the refrigerator- better with  "ice, stretching, and chiropractic care for 1-2 weeks.       OBJECTIVE:   /58  Pulse 60  Temp 98  F (36.7  C) (Oral)  Ht 5' 7\" (1.702 m)  Wt 167 lb 12.8 oz (76.1 kg)  SpO2 100%  BMI 26.28 kg/m2 Estimated body mass index is 26.28 kg/(m^2) as calculated from the following:    Height as of this encounter: 5' 7\" (1.702 m).    Weight as of this encounter: 167 lb 12.8 oz (76.1 kg).  EXAM:   GENERAL APPEARANCE: healthy, alert and no distress  EYES: Eyes grossly normal to inspection, PERRL and conjunctivae and sclerae normal  HENT: ear canals and TM's normal, nose and mouth without ulcers or lesions, oropharynx clear and oral mucous membranes moist  NECK: no adenopathy, no asymmetry, masses, or scars and thyroid normal to palpation  RESP: lungs clear to auscultation - no rales, rhonchi or wheezes  BREAST: normal without masses, tenderness or nipple discharge and no palpable axillary masses or adenopathy  CV: regular rate and rhythm, normal S1 S2, no S3 or S4, no murmur, click or rub, no peripheral edema and peripheral pulses strong.   ABDOMEN: soft, nontender, no hepatosplenomegaly, no masses and bowel sounds normal  Vagina and vulva are normal have some mild signs of stable lichen sclerosus compared with previous ;  no discharge is noted.  No further exam done today secondary to age. MS: no musculoskeletal defects are noted and gait is age appropriate without ataxia  SKIN: no suspicious lesions or rashes  NEURO: Normal strength and tone, sensory exam grossly normal, mentation intact and speech normal  PSYCH: mentation appears normal and affect normal/bright.     ASSESSMENT / PLAN:       ICD-10-CM    1. Encounter for routine adult health examination with abnormal findings Z00.01    2. Medicare annual wellness visit, subsequent Z00.00    3. Other specified hypothyroidism E03.8 levothyroxine (SYNTHROID/LEVOTHROID) 75 MCG tablet   4. Essential hypertension with goal blood pressure less than 140/90- fairly good " "control today  I10    5. Vulvar itching - ? early lichen sclerosis vs. other - superior vulva L29.2 clobetasol (TEMOVATE) 0.05 % ointment   6. Visit for screening mammogram Z12.31 MA SCREENING DIGITAL BILAT - Future  (s+30)   7. Osteopenia, unspecified location - was on fosamax, then Boniva secondary to hot flashes - off boniva since 2012 M85.80    8. Essential hypertension with goal blood pressure less than 140/90 I10 triamterene-hydrochlorothiazide (MAXZIDE-25) 37.5-25 MG per tablet     lisinopril (PRINIVIL,ZESTRIL) 30 MG tablet   9. Hyperlipidemia LDL goal <130 E78.5 simvastatin (ZOCOR) 40 MG tablet   10. Itching in the vaginal area L29.8 clobetasol (TEMOVATE) 0.05 % ointment   11. Decreased GFR R94.4 Basic metabolic panel   12. Elevated serum creatinine R79.89 Basic metabolic panel       End of Life Planning:  Patient currently has an advanced directive: Yes.  Practitioner is supportive of decision.    COUNSELING:  Reviewed preventive health counseling, as reflected in patient instructions    BP Screening:   Last 3 BP Readings:    BP Readings from Last 3 Encounters:   08/09/17 134/58   12/29/16 122/64   07/29/16 143/63       The following was recommended to the patient:  Re-screen BP within a year and recommended lifestyle modifications    Estimated body mass index is 26.28 kg/(m^2) as calculated from the following:    Height as of this encounter: 5' 7\" (1.702 m).    Weight as of this encounter: 167 lb 12.8 oz (76.1 kg).     reports that she has never smoked. She has never used smokeless tobacco.        Appropriate preventive services were discussed with this patient, including applicable screening as appropriate for cardiovascular disease, diabetes, osteopenia/osteoporosis, and glaucoma.  As appropriate for age/gender, discussed screening for colorectal cancer, prostate cancer, breast cancer, and cervical cancer. Checklist reviewing preventive services available has been given to the patient.    Reviewed " patients plan of care and provided an AVS. The Intermediate Care Plan ( asthma action plan, low back pain action plan, and migraine action plan) for Aruna meets the Care Plan requirement. This Care Plan has been established and reviewed with the Patient.  The triglycerides are high. Lowering  the amount of sugar ,alcohol and sweets in the diet helps to control this.Exercise and weight loss helps.  They are not high enough to consider therapy with medicine.    Recheck blood pressure with me again in 6 months.  Lower triglycerides as well. Recheck fasting labs again in 6 months and see me again right after that.         Counseling Resources:  ATP IV Guidelines  Pooled Cohorts Equation Calculator  Breast Cancer Risk Calculator  FRAX Risk Assessment  ICSI Preventive Guidelines  Dietary Guidelines for Americans, 2010  USDA's MyPlate  ASA Prophylaxis  Lung CA Screening    Lisbet Simmons MD  [facility]

## 2017-08-10 ASSESSMENT — ANXIETY QUESTIONNAIRES: GAD7 TOTAL SCORE: 0

## 2017-08-14 DIAGNOSIS — R94.4 DECREASED GFR: Primary | ICD-10-CM

## 2017-09-07 ENCOUNTER — TELEPHONE (OUTPATIENT)
Dept: FAMILY MEDICINE | Facility: CLINIC | Age: 77
End: 2017-09-07

## 2017-09-07 NOTE — TELEPHONE ENCOUNTER
Called [facility] and spoke with Any, she stated the prescriptions are on file but were placed on hold as they were waiting for a call from the patient.  Any stated she would go ahead and process the prescriptions, but to let her know that when she needs refills to call [facility] to have them filled.    Called patient and informed her of the above, no questions or concerns.    Elma Knowles, CMA

## 2017-09-07 NOTE — TELEPHONE ENCOUNTER
Reason for Call:  Medication or medication refill:    Do you use a [facility]?  Name of the pharmacy and phone number for the current request: [facility]    Name of the medication requested: Patient just had a physical and says she hasn't received her prescriptions through the mail yet. Did they not get sent through? Simvastatin, metoprolol, a blood pressure med and her thyroid medications (she didn't remember the names of the last two).    Can we leave a detailed message on this number? YES    Phone number patient can be reached at: Home number on file [phone] (home)    Best Time: Anytime    Call taken on 9/7/2017 at 1:23 PM by Safia Garcia

## 2017-09-11 ENCOUNTER — HOSPITAL ENCOUNTER (OUTPATIENT)
Dept: MAMMOGRAPHY | Facility: CLINIC | Age: 77
Discharge: HOME | End: 2017-09-11
Attending: FAMILY MEDICINE | Admitting: FAMILY MEDICINE
Payer: MEDICARE

## 2017-09-11 DIAGNOSIS — Z12.31 VISIT FOR SCREENING MAMMOGRAM: ICD-10-CM

## 2017-09-11 PROCEDURE — G0202 SCR MAMMO BI INCL CAD: HCPCS

## 2017-11-14 DIAGNOSIS — R94.4 DECREASED GFR: ICD-10-CM

## 2017-11-14 LAB
ANION GAP SERPL CALCULATED.3IONS-SCNC: 12 MMOL/L (ref 3–14)
BUN SERPL-MCNC: 24 MG/DL (ref 7–30)
CALCIUM SERPL-MCNC: 9.8 MG/DL (ref 8.5–10.1)
CHLORIDE SERPL-SCNC: 101 MMOL/L (ref 94–109)
CO2 SERPL-SCNC: 25 MMOL/L (ref 20–32)
CREAT SERPL-MCNC: 1.11 MG/DL (ref 0.52–1.04)
GFR SERPL CREATININE-BSD FRML MDRD: 48 ML/MIN/1.7M2
GLUCOSE SERPL-MCNC: 112 MG/DL (ref 70–99)
POTASSIUM SERPL-SCNC: 4 MMOL/L (ref 3.4–5.3)
SODIUM SERPL-SCNC: 138 MMOL/L (ref 133–144)

## 2017-11-14 PROCEDURE — 80048 BASIC METABOLIC PNL TOTAL CA: CPT | Performed by: FAMILY MEDICINE

## 2017-11-14 PROCEDURE — 36415 COLL VENOUS BLD VENIPUNCTURE: CPT | Performed by: FAMILY MEDICINE

## 2018-01-04 ENCOUNTER — OFFICE VISIT (OUTPATIENT)
Dept: FAMILY MEDICINE | Facility: CLINIC | Age: 78
End: 2018-01-04
Payer: COMMERCIAL

## 2018-01-04 VITALS
SYSTOLIC BLOOD PRESSURE: 136 MMHG | DIASTOLIC BLOOD PRESSURE: 58 MMHG | HEART RATE: 71 BPM | OXYGEN SATURATION: 98 % | TEMPERATURE: 97.8 F | WEIGHT: 167 LBS | BODY MASS INDEX: 26.21 KG/M2 | HEIGHT: 67 IN

## 2018-01-04 DIAGNOSIS — L98.9 SCALP LESION: ICD-10-CM

## 2018-01-04 DIAGNOSIS — R79.89 ELEVATED SERUM CREATININE: ICD-10-CM

## 2018-01-04 DIAGNOSIS — N18.30 CKD (CHRONIC KIDNEY DISEASE) STAGE 3, GFR 30-59 ML/MIN (H): ICD-10-CM

## 2018-01-04 DIAGNOSIS — I10 ESSENTIAL HYPERTENSION WITH GOAL BLOOD PRESSURE LESS THAN 140/90: ICD-10-CM

## 2018-01-04 DIAGNOSIS — E78.5 HYPERLIPIDEMIA LDL GOAL <130: ICD-10-CM

## 2018-01-04 DIAGNOSIS — F41.9 ANXIETY: ICD-10-CM

## 2018-01-04 DIAGNOSIS — L57.0 ACTINIC KERATOSIS: Primary | ICD-10-CM

## 2018-01-04 PROCEDURE — 99214 OFFICE O/P EST MOD 30 MIN: CPT | Performed by: FAMILY MEDICINE

## 2018-01-04 RX ORDER — LORAZEPAM 0.5 MG/1
0.5 TABLET ORAL 2 TIMES DAILY
Qty: 10 TABLET | Refills: 0 | Status: SHIPPED | OUTPATIENT
Start: 2018-01-04 | End: 2021-03-23

## 2018-01-04 RX ORDER — LISINOPRIL 30 MG/1
TABLET ORAL
Qty: 90 TABLET | Refills: 2 | Status: SHIPPED | OUTPATIENT
Start: 2018-01-04 | End: 2018-09-27

## 2018-01-04 RX ORDER — TRIAMTERENE/HYDROCHLOROTHIAZID 37.5-25 MG
TABLET ORAL
Qty: 90 TABLET | Refills: 1 | Status: SHIPPED | OUTPATIENT
Start: 2018-01-04 | End: 2018-07-02

## 2018-01-04 NOTE — PROGRESS NOTES
SUBJECTIVE:                                                    Aruna Santos is a 77 year old female who presents to clinic today for the following health issues:    Rash  Onset: for years    Description:   Location: left side of nose  Character: crusty feeling and red on left bridge of nose- we froze it previously, but it has come back - gets rough and crusty. Doesn't weep at all.   Itching (Pruritis): no     Progression of Symptoms:  Sometimes improving and sometimes worsening    Accompanying Signs & Symptoms:  Fever: no   Body aches or joint pain: no   Sore throat symptoms: no   Recent cold symptoms: no     History:   Previous similar rash: YES- years ago    Precipitating factors:   Exposure to similar rash: no   New exposures: None   Recent travel: no     Alleviating factors:  None    Noticed a sore bump on her scalp - hurts if she bumps it with her comb.   Therapies Tried and outcome: face wash - doesn't notice a difference    Also has a bump on scalp that is sore, hurts when brushing hair, notices a few months ago.      Problem list and histories reviewed & adjusted, as indicated.  Additional history: as documented    Reviewed and updated as needed this visit by clinical staffTobacco  Allergies  Meds  Med Hx  Surg Hx  Fam Hx  Soc Hx      Reviewed and updated as needed this visit by Provider        Patient Active Problem List   Diagnosis     Hematuria     Dyspnea and respiratory abnormality     Hyperlipidemia LDL goal <130     Advanced directives, counseling/discussion     Itching in the vaginal area     Essential hypertension with goal blood pressure less than 140/90     Family history of colon cancer- mother in her 40's-colonoscopies every 5 years- next one 2019     Osteopenia, unspecified location - was on fosamax, then Boniva secondary to hot flashes - off boniva since 2012     Urgency incontinence     Vulvar itching - ? early lichen sclerosis vs. other - superior vulva     Glaucoma     Anxiety      "Other specified hypothyroidism     Tubular adenomas of colon- 2014 - repeat in 2019 - q 5 years      Environmental allergies     Decreased GFR     Elevated serum creatinine - 1.13 -7/28/2017        Current Outpatient Prescriptions   Medication Sig Dispense Refill     triamterene-hydrochlorothiazide (MAXZIDE-25) 37.5-25 MG per tablet TAKE 1 TABLET EVERY DAY 90 tablet 1     levothyroxine (SYNTHROID/LEVOTHROID) 75 MCG tablet TAKE 1 TABLET EVERY MORNING 90 tablet 3     simvastatin (ZOCOR) 40 MG tablet Take 1 tablet (40 mg) by mouth At Bedtime 90 tablet 3     lisinopril (PRINIVIL,ZESTRIL) 30 MG tablet TAKE 1 TABLET EVERY DAY 90 tablet 2     clobetasol (TEMOVATE) 0.05 % ointment Apply sparingly to affected area of genitals twice weekly at nighttime 60 g 1     metoprolol (TOPROL-XL) 50 MG 24 hr tablet TAKE 1 TABLET EVERY DAY 90 tablet 3     brimonidine (ALPHAGAN P) 0.1 % ophthalmic solution Place 1 drop into both eyes 2 times daily        LORazepam (ATIVAN) 0.5 MG tablet Take 1 tablet (0.5 mg) by mouth 2 times daily Take 30 minutes prior to departure.  Do not operate a vehicle after taking this medication 10 tablet 0     LEVOBUNOLOL HCL 0.5 % OP SOLN one drop each day       CALCIUM /VITAMIN D TABS   OR 2 qd       MULTI-VITAMIN OR TABS 1 qd       ASPIRIN 81 MG OR TABS 1 TABLET DAILY            Allergies   Allergen Reactions     Macrobid [Nitrofurantoin] GI Disturbance     Prednisone      Sulfa Drugs        ROS:   ROS: 12 point ROS neg other than the symptoms noted above.     OBJECTIVE:                                                    /70 (BP Location: Right arm, Patient Position: Chair, Cuff Size: Adult Large)  Pulse 71  Temp 97.8  F (36.6  C) (Oral)  Ht 5' 7\" (1.702 m)  Wt 167 lb (75.8 kg)  SpO2 98%  BMI 26.16 kg/m2  Body mass index is 26.16 kg/(m^2).   GENERAL: healthy, alert, well nourished, well hydrated, no distress  HENT: ear canals- normal; TMs- normal; Nose- normal; Mouth- no ulcers, no lesions  HEENT: " PERRL, EOMI.  TM's clear. Nose normal. Throat normal. Neck supple without lymphadenopathy.   Lungs: CTA bilaterally. No wheezes, rales or rhonchi.   Heart: RRR without murmur, gallop, or rub.  Abdomen: positive bowel sounds, nontender, non-distended, no HSM, no masses.    Skin: 7mm nodular reddish- purple tender lesion on left posterior upper parietal scalp.   Rough, hyperkeratotic/actinic flat red macule left bridge of nose - 1.5cm x 8mm.     Creatinine   Date Value Ref Range Status   11/14/2017 1.11 (H) 0.52 - 1.04 mg/dL Final   08/09/2017 1.16 (H) 0.52 - 1.04 mg/dL Final   07/28/2017 1.13 (H) 0.52 - 1.04 mg/dL Final   07/29/2016 1.02 0.52 - 1.04 mg/dL Final   07/25/2016 1.05 (H) 0.52 - 1.04 mg/dL Final     GFR Estimate   Date Value Ref Range Status   11/14/2017 48 (L) >60 mL/min/1.7m2 Final     Comment:     Non  GFR Calc   08/09/2017 46 (L) >60 mL/min/1.7m2 Final     Comment:     Non  GFR Calc   07/28/2017 47 (L) >60 mL/min/1.7m2 Final     Comment:     Non  GFR Calc     Recent Labs   Lab Test  07/28/17   0733  07/25/16   0730  07/23/15   1016  06/27/14   0929   CHOL  198  189  194  212*   HDL  48*  49*  46*  48*   LDL  85  83  91  114   TRIG  323*  284*  285*  251*   CHOLHDLRATIO   --    --   4.2  4.4          Diagnostic test results:  [facility]        ASSESSMENT/PLAN:                                                        ICD-10-CM    1. Actinic keratosis L57.0 DERMATOLOGY REFERRAL   2. Scalp lesion- ? nodule vs. cyst - can't see central pore L98.9 DERMATOLOGY REFERRAL   3. CKD (chronic kidney disease) stage 3, GFR 30-59 ml/min N18.3    4. Elevated serum creatinine - 1.13 -7/28/2017  R79.89    5. Hyperlipidemia LDL goal <130 E78.5 Lipid panel reflex to direct LDL Fasting     Hepatic panel   6. Essential hypertension with goal blood pressure less than 140/90 I10 triamterene-hydrochlorothiazide (MAXZIDE-25) 37.5-25 MG per tablet     lisinopril  (PRINIVIL,ZESTRIL) 30 MG tablet   7. Anxiety- fear of flying F41.9 LORazepam (ATIVAN) 0.5 MG tablet     Please, call or return to clinic or go to the ER immediately if signs or symptoms worsen or fail to improve as anticipated.   Avoid Ibuprofen , take tylenol only for your joint pains.   BP Readings from Last 6 Encounters:   01/04/18 142/70   08/09/17 134/58   12/29/16 122/64   07/29/16 143/63   07/28/16 130/70   05/25/16 130/64     CBC RESULTS:   Recent Labs   Lab Test  07/28/17   0733   WBC  7.2   RBC  3.95   HGB  12.5   HCT  37.2   MCV  94   MCH  31.6   MCHC  33.6   RDW  12.5   PLT  211     Please, call or return to clinic or go to the ER immediately if signs or symptoms worsen or fail to improve as anticipated.   See Patient Instructions         Lisbet Simmons MD  [facility]

## 2018-01-04 NOTE — MR AVS SNAPSHOT
After Visit Summary   1/4/2018    Aruna Santos    MRN: [id number]           Patient Information     Date Of Birth          1940        Visit Information        Provider Department      1/4/2018 11:15 AM Lisbet Simmons MD [facility]        Today's Diagnoses     Actinic keratosis    -  1    Scalp lesion- ? nodule vs. cyst - can't see central pore          Care Instructions    Call primary care skin clinic to be seen in the next 2-4 weeks.                Thank you for choosing [facility]  for your Health Care. It was a pleasure seeing you at your visit today. Please contact us with any questions or concerns you may have.                   Lisbet Simmons MD                                  To reach your [facility] care team after hours call:   [phone]    Our clinic hours are:     Monday- 7:30 am - 7:00 pm                             Tuesday through Friday- 7:30 am - 5:00 pm                                        Saturday- 8:00 am - 12:00 pm                  Phone:  [phone]    Our pharmacy hours are:     Monday  8:00 am to 7:00 pm      Tuesday through Friday 8:00am to 6:00pm                        Saturday - 9:00 am to 1:00 pm      Sunday : Closed.              Phone:  [phone]      There is also information available at our web site:  [email or web address]    If your provider ordered any lab tests and you do not receive the results within 10 business days, please call the clinic.    If you need a medication refill please contact your pharmacy.  Please allow 2 business days for your refill to be completed.    Our clinic offers telephone visits and e visits.  Please ask one of your team members to explain more.      Use Pinterestt (secure email communication and access to your chart) to send your primary care provider a message or make an appointment. Ask someone on your Team how to sign up for readfy.                        "Follow-ups after your visit        Additional Services     DERMATOLOGY REFERRAL       Your provider has referred you to: [facility]: [facility] [place] [phone]   [email or web address]    Please be aware that coverage of these services is subject to the terms and limitations of your health insurance plan.  Call member services at your health plan with any benefit or coverage questions.      Please bring the following with you to your appointment:    (1) Any X-Rays, CTs or MRIs which have been performed.  Contact the facility where they were done to arrange for  prior to your scheduled appointment.  Any new CT, MRI or other procedures ordered by your specialist must be performed at a [place] facility or coordinated by your clinic's referral office.  (2) List of current medications  (3) This referral request   (4) Any documents/labs given to you for this referral                  Who to contact     If you have questions or need follow up information about today's clinic visit or your schedule please contact [facility] directly at [phone].  Normal or non-critical lab and imaging results will be communicated to you by MyChart, letter or phone within 4 business days after the clinic has received the results. If you do not hear from us within 7 days, please contact the clinic through MyChart or phone. If you have a critical or abnormal lab result, we will notify you by phone as soon as possible.  Submit refill requests through Pikanote or call your pharmacy and they will forward the refill request to us. Please allow 3 business days for your refill to be completed.          Additional Information About Your Visit        Fresh Dish[employer] Information     Pikanote lets you send messages to your doctor, view your test results, renew your prescriptions, schedule appointments and more. To sign up, go to [email or web address] . Click on \"Log in\" on the " "left side of the screen, which will take you to the Welcome page. Then click on \"Sign up Now\" on the right side of the page.     You will be asked to enter the access code listed below, as well as some personal information. Please follow the directions to create your username and password.     Your access code is: [email or web address]  Expires: 2018 11:53 AM     Your access code will  in 90 days. If you need help or a new code, please call your [place] clinic or [phone].        Care EveryWhere ID     This is your Care EveryWhere ID. This could be used by other organizations to access your [place] medical records  [id number]        Your Vitals Were     Pulse Temperature Height Pulse Oximetry BMI (Body Mass Index)       71 97.8  F (36.6  C) (Oral) 5' 7\" (1.702 m) 98% 26.16 kg/m2        Blood Pressure from Last 3 Encounters:   18 142/70   17 134/58   16 122/64    Weight from Last 3 Encounters:   18 167 lb (75.8 kg)   17 167 lb 12.8 oz (76.1 kg)   16 168 lb (76.2 kg)              We Performed the Following     DERMATOLOGY REFERRAL        Primary Care Provider Office Phone # Fax #    Lisbet Simmons [phone]       [address] [place] [address]        Equal Access to Services     [facility] HospitalJENNYFER AH: Hadii benjamin tejadao Sopaul, waaxda luqadaha, qaybta kaalmada salomon, maura barker. So [facility] [phone].    ATENCIÓN: Si habla español, tiene a wolf disposición servicios gratuitos de asistencia lingüística. Gerardo al [phone].    We comply with applicable federal civil rights laws and [place] laws. We do not discriminate on the basis of race, color, national origin, age, disability, sex, sexual orientation, or gender identity.            Thank you!     Thank you for choosing [facility]  for your care. Our goal is always to provide you with excellent care. Hearing back from our patients is one " way we can continue to improve our services. Please take a few minutes to complete the written survey that you may receive in the mail after your visit with us. Thank you!             Your Updated Medication List - Protect others around you: Learn how to safely use, store and throw away your medicines at www.disposemymeds.org.          This list is accurate as of: 1/4/18 11:53 AM.  Always use your most recent med list.                   Brand Name Dispense Instructions for use Diagnosis    aspirin 81 MG tablet      1 TABLET DAILY        brimonidine 0.1 % ophthalmic solution    ALPHAGAN P     Place 1 drop into both eyes 2 times daily        CALCIUM /VITAMIN D TABS   OR      2 qd        clobetasol 0.05 % ointment    TEMOVATE    60 g    Apply sparingly to affected area of genitals twice weekly at nighttime    Vulvar itching, Itching in the vaginal area       levobunolol 0.5 % ophthalmic solution    BETAGAN     one drop each day        levothyroxine 75 MCG tablet    SYNTHROID/LEVOTHROID    90 tablet    TAKE 1 TABLET EVERY MORNING    Other specified hypothyroidism       lisinopril 30 MG tablet    PRINIVIL,ZESTRIL    90 tablet    TAKE 1 TABLET EVERY DAY    Essential hypertension with goal blood pressure less than 140/90       LORazepam 0.5 MG tablet    ATIVAN    10 tablet    Take 1 tablet (0.5 mg) by mouth 2 times daily Take 30 minutes prior to departure.  Do not operate a vehicle after taking this medication    Anxiety       metoprolol 50 MG 24 hr tablet    TOPROL-XL    90 tablet    TAKE 1 TABLET EVERY DAY    Essential hypertension with goal blood pressure less than 140/90       Multi-vitamin Tabs tablet   Generic drug:  multivitamin, therapeutic with minerals      1 qd        simvastatin 40 MG tablet    ZOCOR    90 tablet    Take 1 tablet (40 mg) by mouth At Bedtime    Hyperlipidemia LDL goal <130       triamterene-hydrochlorothiazide 37.5-25 MG per tablet    MAXZIDE-25    90 tablet    TAKE 1 TABLET EVERY DAY     Essential hypertension with goal blood pressure less than 140/90

## 2018-01-04 NOTE — PATIENT INSTRUCTIONS
Call primary care skin clinic to be seen in the next 2-4 weeks.                Thank you for choosing [facility]  for your Health Care. It was a pleasure seeing you at your visit today. Please contact us with any questions or concerns you may have.                   Lisbet Simmons MD                                  To reach your [facility] care team after hours call:   [phone]    Our clinic hours are:     Monday- 7:30 am - 7:00 pm                             Tuesday through Friday- 7:30 am - 5:00 pm                                        Saturday- 8:00 am - 12:00 pm                  Phone:  [phone]    Our pharmacy hours are:     Monday  8:00 am to 7:00 pm      Tuesday through Friday 8:00am to 6:00pm                        Saturday - 9:00 am to 1:00 pm      Sunday : Closed.              Phone:  [phone]      There is also information available at our web site:  [email or web address]    If your provider ordered any lab tests and you do not receive the results within 10 business days, please call the clinic.    If you need a medication refill please contact your pharmacy.  Please allow 2 business days for your refill to be completed.    Our clinic offers telephone visits and e visits.  Please ask one of your team members to explain more.      Use Embedsterhart (secure email communication and access to your chart) to send your primary care provider a message or make an appointment. Ask someone on your Team how to sign up for Best Before Mediat.

## 2018-01-04 NOTE — NURSING NOTE
"Chief Complaint   Patient presents with     Derm Problem       Initial /70 (BP Location: Right arm, Patient Position: Chair, Cuff Size: Adult Large)  Pulse 71  Temp 97.8  F (36.6  C) (Oral)  Ht 5' 7\" (1.702 m)  Wt 167 lb (75.8 kg)  SpO2 98%  BMI 26.16 kg/m2 Estimated body mass index is 26.16 kg/(m^2) as calculated from the following:    Height as of this encounter: 5' 7\" (1.702 m).    Weight as of this encounter: 167 lb (75.8 kg).  Medication Reconciliation: complete   Elma Knowles CMA  "

## 2018-01-30 ENCOUNTER — OFFICE VISIT (OUTPATIENT)
Dept: FAMILY MEDICINE | Facility: CLINIC | Age: 78
End: 2018-01-30
Payer: COMMERCIAL

## 2018-01-30 DIAGNOSIS — D48.5 NEOPLASM OF UNCERTAIN BEHAVIOR OF SKIN: Primary | ICD-10-CM

## 2018-01-30 DIAGNOSIS — Z80.8 FAMILY HISTORY OF BASAL CELL CARCINOMA: ICD-10-CM

## 2018-01-30 PROCEDURE — 11311 SHAVE SKIN LESION 0.6-1.0 CM: CPT | Mod: 51 | Performed by: FAMILY MEDICINE

## 2018-01-30 PROCEDURE — 99000 SPECIMEN HANDLING OFFICE-LAB: CPT | Performed by: FAMILY MEDICINE

## 2018-01-30 PROCEDURE — 88305 TISSUE EXAM BY PATHOLOGIST: CPT | Performed by: FAMILY MEDICINE

## 2018-01-30 PROCEDURE — 11306 SHAVE SKIN LESION 0.6-1.0 CM: CPT | Performed by: FAMILY MEDICINE

## 2018-01-30 NOTE — PROGRESS NOTES
[facility] - PRIMARY CARE SKIN    CC : Lesion(s)  SUBJECTIVE:                                                    Aruna Santos is a 77 year old female who presents to clinic today with  because of changing lesions on the nose and scalp    Bothersome lesions noticed by the patient or other skin concerns :  Issue One : Scaly patch on left side of nose has been treated with cryotherapy 2-3 years ago. This texture has recurred intermittently. She denies any formation of sore nor weeping from the lesion. It has frequently been irritated by use of eyeglasses.    Issue Two : A bump on scalp has been painful, especially when combing, and has been present for at least 1 year.    Personal history of skin cancer : NO - history of actinic keratoses.  Family history of skin cancer : ?YES - basal cell carcinoma in sister.    Sun Exposure History  Previous history of significant sun exposure:  Blistering sunburns : YES - a few times  Tanning beds : NO.  Sunscreen Use : YES, SPF : 30.  Sun-protective clothing use : No  Wide-brimmed hats : No  Sunglasses : Yes  Seeks shade : No    Refer to electronic medical record (EMR) for past medical history and medications.    INTEGUMENTARY/SKIN: POSITIVE for changing lesion and lumps or bumps  ROS : 14 point review of systems was negative except the symptoms listed above in the HPI.    This document serves as a record of the services and decisions personally performed and made by Argelia Metzger MD. It was created on her behalf by Cam Johnson, a trained medical scribe.  The creation of this document is based on the scribe's personal observations and the provider's statements to the medical scribe.  Cam Johnson, January 30, 2018 10:50 AM      OBJECTIVE:                                                    GENERAL: healthy, alert and no distress  SKIN: Chaudhari Skin Type - II.  Scalp and Face were examined. The dermatoscope was used to help evaluate pigmented lesions.  Skin Pertinent  "Findings:  Left upper bridge of nose : 10 mm in size scaly erythematous sighlty indurated lesion. ? Actinic keratosis ? Squamous cell carcinoma ? Other      Left parietal scalp : 7 x 5 mm in size smooth raised erythematous lesion. ? Cyst ? Syringoma ? Other      Diagnostic Test Results:  none           ASSESSMENT:                                                      Encounter Diagnoses   Name Primary?     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    WOUND CARE INSTRUCTIONS  1. After 24 hours, change dressing daily.  2. Wash hands before every dressing change.  3. Wash the wound area with a mild soap, then rinse  4. Gently pat dry with a sterile gauze or Q-tip.  5. Apply Vaseline or Aquaphor only over entire wound. Do NOT use Neosporin - as many people react to neomycin.  6. Finally, cover with a bandage or sterile non-stick gauze with micropore paper tape.  7. Repeat once daily until wound has healed.    Do not let the wound dry out.  The wound will heal faster and with better cosmetic results if routinely kept moist with step #5. It is a false belief that a wound heals better when it is exposed to air and allowed to dry out.      Soap, water and shampoo will not hurt this area.    Do not go swimming or take baths, but showering is encouraged.    Limit use of the area where the procedure was done for a few days to allow for optimal healing.    If you experience bleeding:  Repeat steps #2-5 and hold firm pressure on the area for 10 minutes without checking to see if the bleeding has stopped. \"Checking\" pulls off the protective wound clot and restarts the bleeding all over again. Re-apply pressure for 10 minutes if necessary to stop bleeding.  Use additional sterile gauze and tape to maintain pressure once bleeding has " stopped.    Signs of Infection:  Infection can occur in any area where skin has been disrupted.  If you notice persistent redness, swelling, colored drainage, increasing pain, fever or other signs of infection, please call us at: [phone] and ask to have me or my colleague paged. We will call you back to discuss.    Pathology Results:  You will be notified, generally via letter or MyChart, in approximately 10 days. If there is anything we need to discuss or further treatment needed, I will call you to discuss it.    Skin tissue can be some of the most challenging tissue for pathologists to examine, therefore we may use a specialist outside the [facility] system to read certain submitted tissue samples. When submitted to these outside specialists, [facility] will notify you that your tissue sample result has returned. However, this only means that the tissue sample has been sent to the specialist. These results are not available in [facility], so I will contact you when I receive the final report.    PATIENT INFORMATION : WOUNDS  During the healing process you will notice a number of changes. All wounds develop a small halo of redness surrounding the wound.  This means healing is occurring. Severe itching with extensive redness usually indicates sensitivity to the ointment or bandage tape used to dress the wound.  You should call our office if this develops.      Swelling  and/or discoloration around your surgical site is common, particularly when performed around the eye.    All wounds normally drain.  The larger the wound the more drainage there will be.  After 7-10 days, you will notice the wound beginning to shrink and new skin will begin to grow.  The wound is healed when you can see skin has formed over the entire area.  A healed wound has a healthy, shiny look to the surface and is red to dark pink in color to normalize.  Wounds may take approximately 4-6 weeks to heal.  Larger wounds may take 6-8 weeks. After  the wound is healed you may discontinue dressing changes.    You may experience a sensation of tightness as your wound heals. This is normal and will gradually subside.    Your healed wound may be sensitive to temperature changes. This sensitivity improves with time, but if you re having a lot of discomfort, try to avoid temperature extremes.    Patients frequently experience itching after their wound appears to have healed because of the continue healing under the skin.  Plain Vaseline will help relieve the itching.        PROCEDURES:                                                    Name : Shave Excision  Indication : Excision of tissue for pathology evaluation.  Location(s) : Left upper bridge of nose : 10 mm in size scaly erythematous sighlty indurated lesion. ? Actinic keratosis ? Squamous cell carcinoma ? Other.  Completed by : Argelia Metzger MD  Photo Taken : yes.  Anesthesia : Patient was anesthetized by infiltrating the area surrounding the lesion with 1% lidocaine.   epinephrine [id number] : Yes.  Buffered with bicarbonate : Yes.  Note : Discussed the risk of pain, infection, scarring, hypo- or hyperpigmentation and recurrence or need for re-treatment. The benefits of treatment and alternative treatments were also discussed.    During this procedure, the universal protocol was utilized. The patient's identity was confirmed by no less than two patient identifiers, correct procedure was verified, correct site was verified and marked as applicable and a final pause was completed.    Sterile technique was used throughout the procedure. The skin was cleaned and prepped with surgical cleanser. Once adequate anesthesia was obtained, the lesion was removed with a deep scallop shave procedure. The specimen was sent to pathology.    Direct pressure and aluminum chloride and monopolar cautery was applied for hemostasis. No bleeding was present upon the completion of the procedure. The wound was coated with  antibacterial ointment. A dry sterile dressing was applied. Patient tolerated the procedure well and left in satisfactory condition.    Primary provider and referring provider will be informed regarding the tissue report when it returns.    Name : Shave Excision  Indication : Excision of tissue for pathology evaluation.  Location(s) : Left parietal scalp : 7 x 5 mm in size smooth raised erythematous lesion. ? Cyst ? cylindroma ? Other.  Completed by : Argelia Metzger MD  Photo Taken : yes.  Anesthesia : Patient was anesthetized by infiltrating the area surrounding the lesion with 1% lidocaine.   epinephrine [id number] : Yes.  Buffered with bicarbonate : Yes.  Note : Discussed the risk of pain, infection, scarring, hypo- or hyperpigmentation and recurrence or need for re-treatment. The benefits of treatment and alternative treatments were also discussed.    During this procedure, the universal protocol was utilized. The patient's identity was confirmed by no less than two patient identifiers, correct procedure was verified, correct site was verified and marked as applicable and a final pause was completed.    Sterile technique was used throughout the procedure. The skin was cleaned and prepped with surgical cleanser. Once adequate anesthesia was obtained, the lesion was removed with a deep scallop shave procedure. The specimen was sent to pathology.    Direct pressure and monsels's was applied for hemostasis. No bleeding was present upon the completion of the procedure. The wound was coated with antibacterial ointment. A dry sterile dressing was applied. Patient tolerated the procedure well and left in satisfactory condition.    Primary provider and referring provider will be informed regarding the tissue report when it returns.        The information in this document, created by the medical scribe for me, accurately reflects the services I personally performed and the decisions made by me. I have reviewed and approved  this document for accuracy prior to leaving the patient care area.  Argelia Metzger MD January 30, 2018 10:50 AM  [facility] [place]

## 2018-01-30 NOTE — LETTER
1/30/2018         RE: Aruna Santos  [address] [facility] [id number]        Dear Colleague,    Thank you for referring your patient, Aruna Santos, to the [facility] [place]. Please see a copy of my visit note below.    [facility] - PRIMARY CARE SKIN    CC : Lesion(s)  SUBJECTIVE:                                                    Aruna Santos is a 77 year old female who presents to clinic today with  because of changing lesions on the nose and scalp    Bothersome lesions noticed by the patient or other skin concerns :  Issue One : Scaly patch on left side of nose has been treated with cryotherapy 2-3 years ago. This texture has recurred intermittently. She denies any formation of sore nor weeping from the lesion. It has frequently been irritated by use of eyeglasses.    Issue Two : A bump on scalp has been painful, especially when combing, and has been present for at least 1 year.    Personal history of skin cancer : NO - history of actinic keratoses.  Family history of skin cancer : ?YES - basal cell carcinoma in sister.    Sun Exposure History  Previous history of significant sun exposure:  Blistering sunburns : YES - a few times  Tanning beds : NO.  Sunscreen Use : YES, SPF : 30.  Sun-protective clothing use : No  Wide-brimmed hats : No  Sunglasses : Yes  Seeks shade : No    Refer to electronic medical record (EMR) for past medical history and medications.    INTEGUMENTARY/SKIN: POSITIVE for changing lesion and lumps or bumps  ROS : 14 point review of systems was negative except the symptoms listed above in the HPI.    This document serves as a record of the services and decisions personally performed and made by Argelia Metzger MD. It was created on her behalf by Cam Johnson, a trained medical scribe.  The creation of this document is based on the scribe's personal observations and the provider's statements to the medical scribe.  Cam Johnson, January 30, 2018 10:50  AM      OBJECTIVE:                                                    GENERAL: healthy, alert and no distress  SKIN: Chaudhari Skin Type - II.  Scalp and Face were examined. The dermatoscope was used to help evaluate pigmented lesions.  Skin Pertinent Findings:  Left upper bridge of nose : 10 mm in size scaly erythematous sighlty indurated lesion. ? Actinic keratosis ? Squamous cell carcinoma ? Other      Left parietal scalp : 7 x 5 mm in size smooth raised erythematous lesion. ? Cyst ? Syringoma ? Other      Diagnostic Test Results:  none           ASSESSMENT:                                                      Encounter Diagnoses   Name Primary?     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    WOUND CARE INSTRUCTIONS  1. After 24 hours, change dressing daily.  2. Wash hands before every dressing change.  3. Wash the wound area with a mild soap, then rinse  4. Gently pat dry with a sterile gauze or Q-tip.  5. Apply Vaseline or Aquaphor only over entire wound. Do NOT use Neosporin - as many people react to neomycin.  6. Finally, cover with a bandage or sterile non-stick gauze with micropore paper tape.  7. Repeat once daily until wound has healed.    Do not let the wound dry out.  The wound will heal faster and with better cosmetic results if routinely kept moist with step #5. It is a false belief that a wound heals better when it is exposed to air and allowed to dry out.      Soap, water and shampoo will not hurt this area.    Do not go swimming or take baths, but showering is encouraged.    Limit use of the area where the procedure was done for a few days to allow for optimal healing.    If you experience bleeding:  Repeat steps #2-5 and hold firm pressure on the area for 10 minutes without checking to see if the bleeding has  "stopped. \"Checking\" pulls off the protective wound clot and restarts the bleeding all over again. Re-apply pressure for 10 minutes if necessary to stop bleeding.  Use additional sterile gauze and tape to maintain pressure once bleeding has stopped.    Signs of Infection:  Infection can occur in any area where skin has been disrupted.  If you notice persistent redness, swelling, colored drainage, increasing pain, fever or other signs of infection, please call us at: [phone] and ask to have me or my colleague paged. We will call you back to discuss.    Pathology Results:  You will be notified, generally via letter or MyChart, in approximately 10 days. If there is anything we need to discuss or further treatment needed, I will call you to discuss it.    Skin tissue can be some of the most challenging tissue for pathologists to examine, therefore we may use a specialist outside the [facility] system to read certain submitted tissue samples. When submitted to these outside specialists, [facility] will notify you that your tissue sample result has returned. However, this only means that the tissue sample has been sent to the specialist. These results are not available in [facility], so I will contact you when I receive the final report.    PATIENT INFORMATION : WOUNDS  During the healing process you will notice a number of changes. All wounds develop a small halo of redness surrounding the wound.  This means healing is occurring. Severe itching with extensive redness usually indicates sensitivity to the ointment or bandage tape used to dress the wound.  You should call our office if this develops.      Swelling  and/or discoloration around your surgical site is common, particularly when performed around the eye.    All wounds normally drain.  The larger the wound the more drainage there will be.  After 7-10 days, you will notice the wound beginning to shrink and new skin will begin to grow.  The wound is healed when you " can see skin has formed over the entire area.  A healed wound has a healthy, shiny look to the surface and is red to dark pink in color to normalize.  Wounds may take approximately 4-6 weeks to heal.  Larger wounds may take 6-8 weeks. After the wound is healed you may discontinue dressing changes.    You may experience a sensation of tightness as your wound heals. This is normal and will gradually subside.    Your healed wound may be sensitive to temperature changes. This sensitivity improves with time, but if you re having a lot of discomfort, try to avoid temperature extremes.    Patients frequently experience itching after their wound appears to have healed because of the continue healing under the skin.  Plain Vaseline will help relieve the itching.        PROCEDURES:                                                    Name : Shave Excision  Indication : Excision of tissue for pathology evaluation.  Location(s) : Left upper bridge of nose : 10 mm in size scaly erythematous sighlty indurated lesion. ? Actinic keratosis ? Squamous cell carcinoma ? Other.  Completed by : Argelia Metzger MD  Photo Taken : yes.  Anesthesia : Patient was anesthetized by infiltrating the area surrounding the lesion with 1% lidocaine.   epinephrine 1:370343 : Yes.  Buffered with bicarbonate : Yes.  Note : Discussed the risk of pain, infection, scarring, hypo- or hyperpigmentation and recurrence or need for re-treatment. The benefits of treatment and alternative treatments were also discussed.    During this procedure, the universal protocol was utilized. The patient's identity was confirmed by no less than two patient identifiers, correct procedure was verified, correct site was verified and marked as applicable and a final pause was completed.    Sterile technique was used throughout the procedure. The skin was cleaned and prepped with surgical cleanser. Once adequate anesthesia was obtained, the lesion was removed with a deep scallop  shave procedure. The specimen was sent to pathology.    Direct pressure and aluminum chloride and monopolar cautery was applied for hemostasis. No bleeding was present upon the completion of the procedure. The wound was coated with antibacterial ointment. A dry sterile dressing was applied. Patient tolerated the procedure well and left in satisfactory condition.    Primary provider and referring provider will be informed regarding the tissue report when it returns.    Name : Shave Excision  Indication : Excision of tissue for pathology evaluation.  Location(s) : Left parietal scalp : 7 x 5 mm in size smooth raised erythematous lesion. ? Cyst ? cylindroma ? Other.  Completed by : Argelia Metzger MD  Photo Taken : yes.  Anesthesia : Patient was anesthetized by infiltrating the area surrounding the lesion with 1% lidocaine.   epinephrine [id number] : Yes.  Buffered with bicarbonate : Yes.  Note : Discussed the risk of pain, infection, scarring, hypo- or hyperpigmentation and recurrence or need for re-treatment. The benefits of treatment and alternative treatments were also discussed.    During this procedure, the universal protocol was utilized. The patient's identity was confirmed by no less than two patient identifiers, correct procedure was verified, correct site was verified and marked as applicable and a final pause was completed.    Sterile technique was used throughout the procedure. The skin was cleaned and prepped with surgical cleanser. Once adequate anesthesia was obtained, the lesion was removed with a deep scallop shave procedure. The specimen was sent to pathology.    Direct pressure and monsels's was applied for hemostasis. No bleeding was present upon the completion of the procedure. The wound was coated with antibacterial ointment. A dry sterile dressing was applied. Patient tolerated the procedure well and left in satisfactory condition.    Primary provider and referring provider will be informed  regarding the tissue report when it returns.        The information in this document, created by the medical scribe for me, accurately reflects the services I personally performed and the decisions made by me. I have reviewed and approved this document for accuracy prior to leaving the patient care area.  Argelia Metzger MD January 30, 2018 10:50 AM  [facility] [place]    Again, thank you for allowing me to participate in the care of your patient.        Sincerely,        Maeve Metzger MD

## 2018-01-30 NOTE — PATIENT INSTRUCTIONS
"FUTURE APPOINTMENTS  Follow up per pathology report.    WOUND CARE INSTRUCTIONS  1. After 24 hours, change dressing daily.  2. Wash hands before every dressing change.  3. Wash the wound area with a mild soap, then rinse  4. Gently pat dry with a sterile gauze or Q-tip.  5. Apply Vaseline or Aquaphor only over entire wound. Do NOT use Neosporin - as many people react to neomycin.  6. Finally, cover with a bandage or sterile non-stick gauze with micropore paper tape.  7. Repeat once daily until wound has healed.    Do not let the wound dry out.  The wound will heal faster and with better cosmetic results if routinely kept moist with step #5. It is a false belief that a wound heals better when it is exposed to air and allowed to dry out.      Soap, water and shampoo will not hurt this area.    Do not go swimming or take baths, but showering is encouraged.    Limit use of the area where the procedure was done for a few days to allow for optimal healing.    If you experience bleeding:  Repeat steps #2-5 and hold firm pressure on the area for 10 minutes without checking to see if the bleeding has stopped. \"Checking\" pulls off the protective wound clot and restarts the bleeding all over again. Re-apply pressure for 10 minutes if necessary to stop bleeding.  Use additional sterile gauze and tape to maintain pressure once bleeding has stopped.    Signs of Infection:  Infection can occur in any area where skin has been disrupted.  If you notice persistent redness, swelling, colored drainage, increasing pain, fever or other signs of infection, please call us at: [phone] and ask to have me or my colleague paged. We will call you back to discuss.    Pathology Results:  You will be notified, generally via letter or MyChart, in approximately 10 days. If there is anything we need to discuss or further treatment needed, I will call you to discuss it.    Skin tissue can be some of the most challenging tissue for pathologists " to examine, therefore we may use a specialist outside the [employer] system to read certain submitted tissue samples. When submitted to these outside specialists, [employer] will notify you that your tissue sample result has returned. However, this only means that the tissue sample has been sent to the specialist. These results are not available in [employer], so I will contact you when I receive the final report.    PATIENT INFORMATION : WOUNDS  During the healing process you will notice a number of changes. All wounds develop a small halo of redness surrounding the wound.  This means healing is occurring. Severe itching with extensive redness usually indicates sensitivity to the ointment or bandage tape used to dress the wound.  You should call our office if this develops.      Swelling  and/or discoloration around your surgical site is common, particularly when performed around the eye.    All wounds normally drain.  The larger the wound the more drainage there will be.  After 7-10 days, you will notice the wound beginning to shrink and new skin will begin to grow.  The wound is healed when you can see skin has formed over the entire area.  A healed wound has a healthy, shiny look to the surface and is red to dark pink in color to normalize.  Wounds may take approximately 4-6 weeks to heal.  Larger wounds may take 6-8 weeks. After the wound is healed you may discontinue dressing changes.    You may experience a sensation of tightness as your wound heals. This is normal and will gradually subside.    Your healed wound may be sensitive to temperature changes. This sensitivity improves with time, but if you re having a lot of discomfort, try to avoid temperature extremes.    Patients frequently experience itching after their wound appears to have healed because of the continue healing under the skin.  Plain Vaseline will help relieve the itching.

## 2018-01-30 NOTE — MR AVS SNAPSHOT
"              After Visit Summary   1/30/2018    Aruna Santos    MRN: [id number]           Patient Information     Date Of Birth          1940        Visit Information        Provider Department      1/30/2018 11:20 AM Maeve Metzger MD [facility]        Today's Diagnoses     Neoplasm of uncertain behavior of skin    -  1    Family history of basal cell carcinoma          Care Instructions    FUTURE APPOINTMENTS  Follow up per pathology report.    WOUND CARE INSTRUCTIONS  1. After 24 hours, change dressing daily.  2. Wash hands before every dressing change.  3. Wash the wound area with a mild soap, then rinse  4. Gently pat dry with a sterile gauze or Q-tip.  5. Apply Vaseline or Aquaphor only over entire wound. Do NOT use Neosporin - as many people react to neomycin.  6. Finally, cover with a bandage or sterile non-stick gauze with micropore paper tape.  7. Repeat once daily until wound has healed.    Do not let the wound dry out.  The wound will heal faster and with better cosmetic results if routinely kept moist with step #5. It is a false belief that a wound heals better when it is exposed to air and allowed to dry out.      Soap, water and shampoo will not hurt this area.    Do not go swimming or take baths, but showering is encouraged.    Limit use of the area where the procedure was done for a few days to allow for optimal healing.    If you experience bleeding:  Repeat steps #2-5 and hold firm pressure on the area for 10 minutes without checking to see if the bleeding has stopped. \"Checking\" pulls off the protective wound clot and restarts the bleeding all over again. Re-apply pressure for 10 minutes if necessary to stop bleeding.  Use additional sterile gauze and tape to maintain pressure once bleeding has stopped.    Signs of Infection:  Infection can occur in any area where skin has been disrupted.  If you notice persistent redness, swelling, colored drainage, " increasing pain, fever or other signs of infection, please call us at: [phone] and ask to have me or my colleague paged. We will call you back to discuss.    Pathology Results:  You will be notified, generally via letter or MyChart, in approximately 10 days. If there is anything we need to discuss or further treatment needed, I will call you to discuss it.    Skin tissue can be some of the most challenging tissue for pathologists to examine, therefore we may use a specialist outside the [employer] system to read certain submitted tissue samples. When submitted to these outside specialists, [employer] will notify you that your tissue sample result has returned. However, this only means that the tissue sample has been sent to the specialist. These results are not available in [employer], so I will contact you when I receive the final report.    PATIENT INFORMATION : WOUNDS  During the healing process you will notice a number of changes. All wounds develop a small halo of redness surrounding the wound.  This means healing is occurring. Severe itching with extensive redness usually indicates sensitivity to the ointment or bandage tape used to dress the wound.  You should call our office if this develops.      Swelling  and/or discoloration around your surgical site is common, particularly when performed around the eye.    All wounds normally drain.  The larger the wound the more drainage there will be.  After 7-10 days, you will notice the wound beginning to shrink and new skin will begin to grow.  The wound is healed when you can see skin has formed over the entire area.  A healed wound has a healthy, shiny look to the surface and is red to dark pink in color to normalize.  Wounds may take approximately 4-6 weeks to heal.  Larger wounds may take 6-8 weeks. After the wound is healed you may discontinue dressing changes.    You may experience a sensation of tightness as your wound heals. This is normal and will gradually  "subside.    Your healed wound may be sensitive to temperature changes. This sensitivity improves with time, but if you re having a lot of discomfort, try to avoid temperature extremes.    Patients frequently experience itching after their wound appears to have healed because of the continue healing under the skin.  Plain Vaseline will help relieve the itching.          Follow-ups after your visit        Your next 10 appointments already scheduled     Jan 30, 2018 11:20 AM CST   Office Visit with Maeve Metzger MD   [facility] ([facility])    [address] [place] [id number]   [phone]           Bring a current list of meds and any records pertaining to this visit. For Physicals, please bring immunization records and any forms needing to be filled out. Please arrive 10 minutes early to complete paperwork.              Who to contact     If you have questions or need follow up information about today's clinic visit or your schedule please contact [facility] directly at [phone].  Normal or non-critical lab and imaging results will be communicated to you by MyChart, letter or phone within 4 business days after the clinic has received the results. If you do not hear from us within 7 days, please contact the clinic through [employer] or phone. If you have a critical or abnormal lab result, we will notify you by phone as soon as possible.  Submit refill requests through [facility] or call your pharmacy and they will forward the refill request to us. Please allow 3 business days for your refill to be completed.          Additional Information About Your Visit        [employer] Information     [facility] lets you send messages to your doctor, view your test results, renew your prescriptions, schedule appointments and more. To sign up, go to [email or web address] . Click on \"Log in\" on the left side of the screen, which will take you to " "the Welcome page. Then click on \"Sign up Now\" on the right side of the page.     You will be asked to enter the access code listed below, as well as some personal information. Please follow the directions to create your username and password.     Your access code is: [email or web address]  Expires: 2018 11:53 AM     Your access code will  in 90 days. If you need help or a new code, please call your [facility] clinic or [phone].        Care EveryWhere ID     This is your Care EveryWhere ID. This could be used by other organizations to access your [facility] medical records  [id number]         Blood Pressure from Last 3 Encounters:   18 136/58   17 134/58   16 122/64    Weight from Last 3 Encounters:   18 167 lb (75.8 kg)   17 167 lb 12.8 oz (76.1 kg)   16 168 lb (76.2 kg)              We Performed the Following     CL SURG PATH [facility] DERM     SHAV SKIN LESION FACE/EARS 0.6-1.0 CM     SHAV SKIN LESION SCLP/NCK/HNDS/FEET/GEN 0.6-1.0 CM        Primary Care Provider Office Phone # Fax #    Lisbetbetsy Simmons [phone]       [address] [place] [address]        Equal Access to Services     [facility] : Hadii benjamin ku hadasho Sopaul, waaxda luqadaha, qaybta kaalmada salomon, maura lopez . So [facility] [phone].    ATENCIÓN: Si habla español, tiene a wolf disposición servicios gratuitos de asistencia lingüística. Llame al [phone].    We comply with applicable federal civil rights laws and [place] laws. We do not discriminate on the basis of race, color, national origin, age, disability, sex, sexual orientation, or gender identity.            Thank you!     Thank you for choosing [facility]  for your care. Our goal is always to provide you with excellent care. Hearing back from our patients is one way we can continue to improve our services. Please take a few minutes to complete the written " survey that you may receive in the mail after your visit with us. Thank you!             Your Updated Medication List - Protect others around you: Learn how to safely use, store and throw away your medicines at www.disposemymeds.org.          This list is accurate as of 1/30/18 11:16 AM.  Always use your most recent med list.                   Brand Name Dispense Instructions for use Diagnosis    aspirin 81 MG tablet      1 TABLET DAILY        brimonidine 0.1 % ophthalmic solution    ALPHAGAN P     Place 1 drop into both eyes 2 times daily        CALCIUM /VITAMIN D TABS   OR      2 qd        clobetasol 0.05 % ointment    TEMOVATE    60 g    Apply sparingly to affected area of genitals twice weekly at nighttime    Vulvar itching, Itching in the vaginal area       levobunolol 0.5 % ophthalmic solution    BETAGAN     one drop each day        levothyroxine 75 MCG tablet    SYNTHROID/LEVOTHROID    90 tablet    TAKE 1 TABLET EVERY MORNING    Other specified hypothyroidism       lisinopril 30 MG tablet    PRINIVIL,ZESTRIL    90 tablet    TAKE 1 TABLET EVERY DAY    Essential hypertension with goal blood pressure less than 140/90       LORazepam 0.5 MG tablet    ATIVAN    10 tablet    Take 1 tablet (0.5 mg) by mouth 2 times daily Take 30 minutes prior to departure.  Do not operate a vehicle after taking this medication    Anxiety       metoprolol succinate 50 MG 24 hr tablet    TOPROL-XL    90 tablet    TAKE 1 TABLET EVERY DAY    Essential hypertension with goal blood pressure less than 140/90       Multi-vitamin Tabs tablet   Generic drug:  multivitamin, therapeutic with minerals      1 qd        simvastatin 40 MG tablet    ZOCOR    90 tablet    Take 1 tablet (40 mg) by mouth At Bedtime    Hyperlipidemia LDL goal <130       triamterene-hydrochlorothiazide 37.5-25 MG per tablet    MAXZIDE-25    90 tablet    TAKE 1 TABLET EVERY DAY    Essential hypertension with goal blood pressure less than 140/90

## 2018-02-12 ENCOUNTER — TELEPHONE (OUTPATIENT)
Dept: FAMILY MEDICINE | Facility: CLINIC | Age: 78
End: 2018-02-12

## 2018-02-12 DIAGNOSIS — C44.90 PRIMARY ADNEXAL CARCINOMA OF SKIN: Primary | ICD-10-CM

## 2018-02-12 NOTE — TELEPHONE ENCOUNTER
Encounter : phonecall  Discussed lab results :       Pathology report : ? Primary adnexal skin cancer or possible metastatic lesion from unknown source      Recommendations :               Referral for Mohs surgery with Dr. Hansen             Referral for Heme- onc for metastatic work up . Her son sees Dr. Farris in [place] at [facility] so she would like to be referred to him .

## 2018-02-12 NOTE — LETTER
[facility]  [address]  [address]  [phone]        2/12/2018       Aruna Santos  [address] [place] [address]      Dear Aruna:    You are scheduled for Mohs Surgery on: Thursday Feb 22 nd 7:30 am.    Please check in at [facility], [address].     You don't need to arrive more than 5-10 minutes prior to your appointment time.     Be sure to eat a good breakfast and bathe and wash your hair prior to surgery.     If you are taking any anti-coagulants that are prescribed by your Doctor (such as Coumadin/Warfarin, Plavix, Aspirin, Ibuprofen), please continue taking them.     However, if you are taking anti-coagulants over the counter without a Doctor's order for a medical condition, please discontinue them 10 days prior to surgery.     Please wear loose comfortable clothing as it could possibly be 4-6 hours until your surgery is completed depending upon how many layers of tissue need to be removed.      Thank you,    SCAR Hansen MD

## 2018-02-12 NOTE — TELEPHONE ENCOUNTER
Appointment scheduled for mohs on 2/22/18  Letter faxed to oncology Dr. Farris at [phone].    Michelle HERNANDEZ RN  [facility]  [phone]  [facility]   [phone]

## 2018-02-14 ENCOUNTER — TELEPHONE (OUTPATIENT)
Dept: FAMILY MEDICINE | Facility: CLINIC | Age: 78
End: 2018-02-14

## 2018-02-14 NOTE — TELEPHONE ENCOUNTER
patient called and states that oncology did not receive records.  re faxed records to oncology  at [phone]    Michelle Alejo RN  [facility]  [phone]

## 2018-02-22 ENCOUNTER — OFFICE VISIT (OUTPATIENT)
Dept: DERMATOLOGY | Facility: CLINIC | Age: 78
End: 2018-02-22
Payer: COMMERCIAL

## 2018-02-22 VITALS — OXYGEN SATURATION: 100 % | HEART RATE: 60 BPM | SYSTOLIC BLOOD PRESSURE: 144 MMHG | DIASTOLIC BLOOD PRESSURE: 73 MMHG

## 2018-02-22 DIAGNOSIS — L82.1 SK (SEBORRHEIC KERATOSIS): Primary | ICD-10-CM

## 2018-02-22 DIAGNOSIS — D18.00 ANGIOMA: ICD-10-CM

## 2018-02-22 DIAGNOSIS — L81.4 LENTIGO: ICD-10-CM

## 2018-02-22 DIAGNOSIS — C44.99 APOCRINE ADENOCARCINOMA OF SKIN: ICD-10-CM

## 2018-02-22 PROCEDURE — 17311 MOHS 1 STAGE H/N/HF/G: CPT | Performed by: DERMATOLOGY

## 2018-02-22 PROCEDURE — 99203 OFFICE O/P NEW LOW 30 MIN: CPT | Mod: 25 | Performed by: DERMATOLOGY

## 2018-02-22 PROCEDURE — 13121 CMPLX RPR S/A/L 2.6-7.5 CM: CPT | Mod: 51 | Performed by: DERMATOLOGY

## 2018-02-22 NOTE — LETTER
2018         RE: Aruna Santos  [address] [facility] [address]        Dear Colleague,    Thank you for referring your patient, Aruna Santos, to the [facility]. Please see a copy of my visit note below.    Aruna Santos , a 77 year old year old female patient, I was asked to see by Dr. Metzger for primary adenocarcinoma of the skin.  Patient states this has been present for ?.  Patient reports the following symptoms:  growing .  Patient reports the following previous treatments none.  Patient reports the following modifying factors none.  Associated symptoms: none.  Last colonoscopy , up todate on mammogram, she has not seen oncology as of yet.   .  Patient has no other skin complaints today.  Remainder of the HPI, Meds, PMH, Allergies, FH, and SH was reviewed in chart.      Past Medical History:   Diagnosis Date     Acquired cyst of kidney      Diverticulosis of colon (without mention of hemorrhage)      Family history of colon cancer     mother  of colon cancer in her 40's     Hematuria      Hypertension goal BP (blood pressure) < 140/90     difficult to control in past      Osteoporosis, unspecified      Skin cancer      Tubular adenoma of colon      - repeat in 2019 - q 5 years      Urgency incontinence        Past Surgical History:   Procedure Laterality Date     ARTHROSCOPY SHOULDER  2013    [facility]     COLONOSCOPY  89,     Colonoscopies q 5 year -next 2019     CYSTOCELE REPAIR  10/31/1984     HYSTERECTOMY, PAP NO LONGER INDICATED  10/31/84    Hysterectomy, Total Abdominal w ovaries left intact     SURGICAL HISTORY OF -   75    Cholecystectomy & Incidental appendectomy        Family History   Problem Relation Age of Onset     Cancer - colorectal Mother      Hypertension Father      CEREBROVASCULAR DISEASE Maternal Grandfather      CEREBROVASCULAR DISEASE Paternal Grandmother        Social History     Social History     Marital status:       Spouse name: Valentin     Number of children: 2     Years of education: N/A     Occupational History      None      Social History Main Topics     Smoking status: Never Smoker     Smokeless tobacco: Never Used     Alcohol use No     Drug use: No     Sexual activity: Yes     Partners: Male     Other Topics Concern     Parent/Sibling W/ Cabg, Mi Or Angioplasty Before 65f 55m? No     Social History Narrative       Outpatient Encounter Prescriptions as of 2/22/2018   Medication Sig Dispense Refill     LORazepam (ATIVAN) 0.5 MG tablet Take 1 tablet (0.5 mg) by mouth 2 times daily Take 30 minutes prior to departure.  Do not operate a vehicle after taking this medication 10 tablet 0     triamterene-hydrochlorothiazide (MAXZIDE-25) 37.5-25 MG per tablet TAKE 1 TABLET EVERY DAY 90 tablet 1     lisinopril (PRINIVIL,ZESTRIL) 30 MG tablet TAKE 1 TABLET EVERY DAY 90 tablet 2     levothyroxine (SYNTHROID/LEVOTHROID) 75 MCG tablet TAKE 1 TABLET EVERY MORNING 90 tablet 3     simvastatin (ZOCOR) 40 MG tablet Take 1 tablet (40 mg) by mouth At Bedtime 90 tablet 3     clobetasol (TEMOVATE) 0.05 % ointment Apply sparingly to affected area of genitals twice weekly at nighttime 60 g 1     metoprolol (TOPROL-XL) 50 MG 24 hr tablet TAKE 1 TABLET EVERY DAY 90 tablet 3     brimonidine (ALPHAGAN P) 0.1 % ophthalmic solution Place 1 drop into both eyes 2 times daily        LEVOBUNOLOL HCL 0.5 % OP SOLN one drop each day       CALCIUM /VITAMIN D TABS   OR 2 qd       MULTI-VITAMIN OR TABS 1 qd       ASPIRIN 81 MG OR TABS 1 TABLET DAILY       No facility-administered encounter medications on file as of 2/22/2018.              Review Of Systems  Skin: As above  Eyes: negative  Ears/Nose/Throat: negative  Respiratory: No shortness of breath, dyspnea on exertion, cough, or hemoptysis  Cardiovascular: negative  Gastrointestinal: negative  Genitourinary: negative  Musculoskeletal: negative  Neurologic: negative  Psychiatric:  negative  Hematologic/Lymphatic/Immunologic: negative  Endocrine: negative      O:   NAD, WDWN, Alert & Oriented, Mood & Affect wnl, Vitals stable   Here today alone   /73  Pulse 60  SpO2 100%   General appearance kelly ii   Vitals stable   Alert, oriented and in no acute distress      Following lymph nodes palpated: Occipital, Cervical, Supraclavicular , axillary no lad   Stuck on papules and brown macules on trunk and ext    Red papules on trunk    L parietal scalp 1.3cm plaque         The remainder of the full exam was unremarkable; the following areas were examined:  conjunctiva/lids, oral mucosa, neck, peripheral vascular system, abdomen, lymph nodes, digits/nails, eccrine and apocrine glands, scalp/hair, face, neck, chest, abdomen, buttocks, back, RUE, LUE, RLE, LLE       Eyes: Conjunctivae/lids:Normal     ENT: Lips, buccal mucosa, tongue: normal    MSK:Normal    Cardiovascular: peripheral edema none    Pulm: Breathing Normal    Lymph Nodes: No Head and Neck Lymphadenopathy     Neuro/Psych: Orientation:Normal; Mood/Affect:Normal      A/P:  1. Seborrheic keratosis, letnigo, angioma, adenocarcinoma of skin   Pathophysiology discussed with pateint   Primary adenocarcinoma and/or mucinous carcinoma very rare  More than 50% of reported cases are located in the tete-orbital region and the hair-bearing scalp  Metastatic lesions from the breast and colon are most likely to mimic mucinous carcinoma of the skin, knowing the fact that 19% of men with colon cancer and 6% of women with breast cancer have metastatic skin disease  I discussed with patient and encrouaged her to get colonoscopy this month  Referralt oncology of second opinion  Lymph node exams discussed with patient  Return to clinic 2 months    BENIGN LESIONS DISCUSSED WITH PATIENT:  I discussed the specifics of tumor, prognosis, and genetics of benign lesions.  I explained that treatment of these lesions would be purely cosmetic and not medically  neccessary.  I discussed with patient different removal options including excision, cautery and /or laser.      Nature and genetics of benign skin lesions dicussed with patient.  Signs and Symptoms of skin cancer discussed with patient.  Patient encouraged to perform monthly skin exams.  UV precautions reviewed with patient.  Skin care regimen reviewed with patient: Eliminate harsh soaps, i.e. Dial, zest, irsih spring; Mild soaps such as Cetaphil or Dove sensitive skin, avoid hot or cold showers, aggressive use of emollients including vanicream, cetaphil or cerave discussed with patient.    Risks of non-melanoma skin cancer discussed with patient   Return to clinic 2 months    PROCEDURE NOTE  Adenocarcinoma of skin  MOHS:   Aggressive histology    After PGACAC discussed with patient, decision for Mohs surgery was made. Indication for Mohs was Aggressive histology. Patient confirmed the site with Dr. Hansen.  After anesthesia with LEC, the tumor was excised using standard Mohs technique in 1 stages(s).  CLEAR MARGINS OBTAINED and Final defect size was 2 cm.       REPAIR COMPLEX: Because of the tightness of the surrounding skin and Because of the size and full thickness nature of the defect, a complex closure was planned. After LEC anesthesia and prep, Burow's triangles were excised in the relaxed skin tension lines. The wound edges were widely undermined by dissection in the subcutaneous plane until adequate tissue mobility was obtained. Hemostasis was obtained. The wound edges were closed in a layered fashion using Vicryl and Fast Absorbing Plain Gut sutures. Postoperative length was 4.3 cm.   EBL minimal; complications none; wound care routine.  The patient was discharged in good condition and will return in one week for wound evaluation.        Again, thank you for allowing me to participate in the care of your patient.        Sincerely,        Anthony Hansen MD

## 2018-02-22 NOTE — NURSING NOTE
Surgical Office Location:  [facility]  [address] [place] [id number]

## 2018-02-22 NOTE — PATIENT INSTRUCTIONS
[facility]   [phone]    [facility] [phone]      Stapled Wound Care      ? No strenuous activity for 48 hours. Resume moderate activity in 48 hours. No heavy exercising until you are seen for follow up in one week.    ? Take Tylenol as needed for discomfort.                                        ? Do not drink alcoholic beverages for 48 hours.    ? Keep the pressure bandage in place for 24 hours. If the bandage becomes blood tinged or loose, reinforce it with gauze and tape.   ? Remove bandage in 24 hours and begin wound care as follows:     1. Rinse the stapled area with tap water. (shower / bathe / shampoo normally)  2. Dry wound with Q tip or gauze pad  3. Apply Polysporin or Bacitracin Ointment to the staples.    Do NOT use Neosporin Ointment *  4. Cover the wound with a bandaid or nonstick gauze pad and paper tape.  5. Repeat wound care once a day until staples are removed.    In case of emergency call: [phone]

## 2018-02-22 NOTE — MR AVS SNAPSHOT
After Visit Summary   2/22/2018    Aruna Santos    MRN: [id number]           Patient Information     Date Of Birth          1940        Visit Information        Provider Department      2/22/2018 7:30 AM Anthony Hansen MD [facility]        Care Instructions    [facility]   [phone]    [facility] [phone]      Stapled Wound Care      ? No strenuous activity for 48 hours. Resume moderate activity in 48 hours. No heavy exercising until you are seen for follow up in one week.    ? Take Tylenol as needed for discomfort.                                        ? Do not drink alcoholic beverages for 48 hours.    ? Keep the pressure bandage in place for 24 hours. If the bandage becomes blood tinged or loose, reinforce it with gauze and tape.   ? Remove bandage in 24 hours and begin wound care as follows:     1. Rinse the stapled area with tap water. (shower / bathe / shampoo normally)  2. Dry wound with Q tip or gauze pad  3. Apply Polysporin or Bacitracin Ointment to the staples.    Do NOT use Neosporin Ointment *  4. Cover the wound with a bandaid or nonstick gauze pad and paper tape.  5. Repeat wound care once a day until staples are removed.    In case of emergency call: [phone]                Follow-ups after your visit        Who to contact     If you have questions or need follow up information about today's clinic visit or your schedule please contact [facility] directly at [phone].  Normal or non-critical lab and imaging results will be communicated to you by Cor[employer], letter or phone within 4 business days after the clinic has received the results. If you do not hear from us within 7 days, please contact the clinic through [employer] or phone. If you have a critical or abnormal lab result, we will notify you by phone as soon as possible.  Submit refill requests through [employer] or call your pharmacy and they will  "forward the refill request to us. Please allow 3 business days for your refill to be completed.          Additional Information About Your Visit        MyChart Information     [employer] lets you send messages to your doctor, view your test results, renew your prescriptions, schedule appointments and more. To sign up, go to [email or web address] . Click on \"Log in\" on the left side of the screen, which will take you to the Welcome page. Then click on \"Sign up Now\" on the right side of the page.     You will be asked to enter the access code listed below, as well as some personal information. Please follow the directions to create your username and password.     Your access code is: [email or web address]  Expires: 2018 11:53 AM     Your access code will  in 90 days. If you need help or a new code, please call your [place] clinic or [phone].        Care EveryWhere ID     This is your Care EveryWhere ID. This could be used by other organizations to access your [place] medical records  [id number]        Your Vitals Were     Pulse Pulse Oximetry                60 100%           Blood Pressure from Last 3 Encounters:   18 144/73   18 136/58   17 134/58    Weight from Last 3 Encounters:   18 75.8 kg (167 lb)   17 76.1 kg (167 lb 12.8 oz)   16 76.2 kg (168 lb)              Today, you had the following     No orders found for display       Primary Care Provider Office Phone # Fax #    Lisbet Simmons [phone] [address] [facility] [address]        Equal Access to Services     LAURIE CARLSON : Hadsandro Faith, maura hodges. So [facility] [phone].    ATENCIÓN: Si habla español, tiene a wolf disposición servicios gratuitos de asistencia lingüística. Llame al [phone].    We comply with applicable federal civil rights laws and [place] laws. We do not " discriminate on the basis of race, color, national origin, age, disability, sex, sexual orientation, or gender identity.            Thank you!     Thank you for choosing [facility]  for your care. Our goal is always to provide you with excellent care. Hearing back from our patients is one way we can continue to improve our services. Please take a few minutes to complete the written survey that you may receive in the mail after your visit with us. Thank you!             Your Updated Medication List - Protect others around you: Learn how to safely use, store and throw away your medicines at www.disposemymeds.org.          This list is accurate as of 2/22/18  9:44 AM.  Always use your most recent med list.                   Brand Name Dispense Instructions for use Diagnosis    aspirin 81 MG tablet      1 TABLET DAILY        brimonidine 0.1 % ophthalmic solution    ALPHAGAN P     Place 1 drop into both eyes 2 times daily        CALCIUM /VITAMIN D TABS   OR      2 qd        clobetasol 0.05 % ointment    TEMOVATE    60 g    Apply sparingly to affected area of genitals twice weekly at nighttime    Vulvar itching, Itching in the vaginal area       levobunolol 0.5 % ophthalmic solution    BETAGAN     one drop each day        levothyroxine 75 MCG tablet    SYNTHROID/LEVOTHROID    90 tablet    TAKE 1 TABLET EVERY MORNING    Other specified hypothyroidism       lisinopril 30 MG tablet    PRINIVIL,ZESTRIL    90 tablet    TAKE 1 TABLET EVERY DAY    Essential hypertension with goal blood pressure less than 140/90       LORazepam 0.5 MG tablet    ATIVAN    10 tablet    Take 1 tablet (0.5 mg) by mouth 2 times daily Take 30 minutes prior to departure.  Do not operate a vehicle after taking this medication    Anxiety       metoprolol succinate 50 MG 24 hr tablet    TOPROL-XL    90 tablet    TAKE 1 TABLET EVERY DAY    Essential hypertension with goal blood pressure less than 140/90       Multi-vitamin Tabs  tablet   Generic drug:  multivitamin, therapeutic with minerals      1 qd        simvastatin 40 MG tablet    ZOCOR    90 tablet    Take 1 tablet (40 mg) by mouth At Bedtime    Hyperlipidemia LDL goal <130       triamterene-hydrochlorothiazide 37.5-25 MG per tablet    MAXZIDE-25    90 tablet    TAKE 1 TABLET EVERY DAY    Essential hypertension with goal blood pressure less than 140/90

## 2018-02-22 NOTE — PROGRESS NOTES
Aruna Santos , a 77 year old year old female patient, I was asked to see by Dr. Metzger for primary adenocarcinoma of the skin.  Patient states this has been present for ?.  Patient reports the following symptoms:  growing .  Patient reports the following previous treatments none.  Patient reports the following modifying factors none.  Associated symptoms: none.  Last colonoscopy , up todate on mammogram, she has not seen oncology as of yet.   .  Patient has no other skin complaints today.  Remainder of the HPI, Meds, PMH, Allergies, FH, and SH was reviewed in chart.      Past Medical History:   Diagnosis Date     Acquired cyst of kidney      Diverticulosis of colon (without mention of hemorrhage)      Family history of colon cancer     mother  of colon cancer in her 40's     Hematuria      Hypertension goal BP (blood pressure) < 140/90     difficult to control in past      Osteoporosis, unspecified      Skin cancer      Tubular adenoma of colon      - repeat in 2019 - q 5 years      Urgency incontinence        Past Surgical History:   Procedure Laterality Date     ARTHROSCOPY SHOULDER  2013    [facility]     COLONOSCOPY  89,     Colonoscopies q 5 year -next      CYSTOCELE REPAIR  10/31/1984     HYSTERECTOMY, PAP NO LONGER INDICATED  10/31/84    Hysterectomy, Total Abdominal w ovaries left intact     SURGICAL HISTORY OF -   75    Cholecystectomy & Incidental appendectomy        Family History   Problem Relation Age of Onset     Cancer - colorectal Mother      Hypertension Father      CEREBROVASCULAR DISEASE Maternal Grandfather      CEREBROVASCULAR DISEASE Paternal Grandmother        Social History     Social History     Marital status:      Spouse name: Valentin     Number of children: 2     Years of education: N/A     Occupational History      None      Social History Main Topics     Smoking status: Never Smoker     Smokeless tobacco: Never Used     Alcohol use No     Drug  use: No     Sexual activity: Yes     Partners: Male     Other Topics Concern     Parent/Sibling W/ Cabg, Mi Or Angioplasty Before 65f 55m? No     Social History Narrative       Outpatient Encounter Prescriptions as of 2/22/2018   Medication Sig Dispense Refill     LORazepam (ATIVAN) 0.5 MG tablet Take 1 tablet (0.5 mg) by mouth 2 times daily Take 30 minutes prior to departure.  Do not operate a vehicle after taking this medication 10 tablet 0     triamterene-hydrochlorothiazide (MAXZIDE-25) 37.5-25 MG per tablet TAKE 1 TABLET EVERY DAY 90 tablet 1     lisinopril (PRINIVIL,ZESTRIL) 30 MG tablet TAKE 1 TABLET EVERY DAY 90 tablet 2     levothyroxine (SYNTHROID/LEVOTHROID) 75 MCG tablet TAKE 1 TABLET EVERY MORNING 90 tablet 3     simvastatin (ZOCOR) 40 MG tablet Take 1 tablet (40 mg) by mouth At Bedtime 90 tablet 3     clobetasol (TEMOVATE) 0.05 % ointment Apply sparingly to affected area of genitals twice weekly at nighttime 60 g 1     metoprolol (TOPROL-XL) 50 MG 24 hr tablet TAKE 1 TABLET EVERY DAY 90 tablet 3     brimonidine (ALPHAGAN P) 0.1 % ophthalmic solution Place 1 drop into both eyes 2 times daily        LEVOBUNOLOL HCL 0.5 % OP SOLN one drop each day       CALCIUM /VITAMIN D TABS   OR 2 qd       MULTI-VITAMIN OR TABS 1 qd       ASPIRIN 81 MG OR TABS 1 TABLET DAILY       No facility-administered encounter medications on file as of 2/22/2018.              Review Of Systems  Skin: As above  Eyes: negative  Ears/Nose/Throat: negative  Respiratory: No shortness of breath, dyspnea on exertion, cough, or hemoptysis  Cardiovascular: negative  Gastrointestinal: negative  Genitourinary: negative  Musculoskeletal: negative  Neurologic: negative  Psychiatric: negative  Hematologic/Lymphatic/Immunologic: negative  Endocrine: negative      O:   NAD, WDWN, Alert & Oriented, Mood & Affect wnl, Vitals stable   Here today alone   /73  Pulse 60  SpO2 100%   General appearance kelly ii   Vitals stable   Alert, oriented  and in no acute distress      Following lymph nodes palpated: Occipital, Cervical, Supraclavicular , axillary no lad   Stuck on papules and brown macules on trunk and ext    Red papules on trunk    L parietal scalp 1.3cm plaque         The remainder of the full exam was unremarkable; the following areas were examined:  conjunctiva/lids, oral mucosa, neck, peripheral vascular system, abdomen, lymph nodes, digits/nails, eccrine and apocrine glands, scalp/hair, face, neck, chest, abdomen, buttocks, back, RUE, LUE, RLE, LLE       Eyes: Conjunctivae/lids:Normal     ENT: Lips, buccal mucosa, tongue: normal    MSK:Normal    Cardiovascular: peripheral edema none    Pulm: Breathing Normal    Lymph Nodes: No Head and Neck Lymphadenopathy     Neuro/Psych: Orientation:Normal; Mood/Affect:Normal      A/P:  1. Seborrheic keratosis, letnigo, angioma, adenocarcinoma of skin   Pathophysiology discussed with pateint   Primary adenocarcinoma and/or mucinous carcinoma very rare  More than 50% of reported cases are located in the tete-orbital region and the hair-bearing scalp  Metastatic lesions from the breast and colon are most likely to mimic mucinous carcinoma of the skin, knowing the fact that 19% of men with colon cancer and 6% of women with breast cancer have metastatic skin disease  I discussed with patient and encrouaged her to get colonoscopy this month  Referralt oncology of second opinion  Lymph node exams discussed with patient  Return to clinic 2 months    BENIGN LESIONS DISCUSSED WITH PATIENT:  I discussed the specifics of tumor, prognosis, and genetics of benign lesions.  I explained that treatment of these lesions would be purely cosmetic and not medically neccessary.  I discussed with patient different removal options including excision, cautery and /or laser.      Nature and genetics of benign skin lesions dicussed with patient.  Signs and Symptoms of skin cancer discussed with patient.  Patient encouraged to perform  monthly skin exams.  UV precautions reviewed with patient.  Skin care regimen reviewed with patient: Eliminate harsh soaps, i.e. Dial, zest, irsih spring; Mild soaps such as Cetaphil or Dove sensitive skin, avoid hot or cold showers, aggressive use of emollients including vanicream, cetaphil or cerave discussed with patient.    Risks of non-melanoma skin cancer discussed with patient   Return to clinic 2 months    PROCEDURE NOTE  Adenocarcinoma of skin  MOHS:   Aggressive histology    After PGACAC discussed with patient, decision for Mohs surgery was made. Indication for Mohs was Aggressive histology. Patient confirmed the site with Dr. Hansen.  After anesthesia with LEC, the tumor was excised using standard Mohs technique in 1 stages(s).  CLEAR MARGINS OBTAINED and Final defect size was 2 cm.       REPAIR COMPLEX: Because of the tightness of the surrounding skin and Because of the size and full thickness nature of the defect, a complex closure was planned. After LEC anesthesia and prep, Burow's triangles were excised in the relaxed skin tension lines. The wound edges were widely undermined by dissection in the subcutaneous plane until adequate tissue mobility was obtained. Hemostasis was obtained. The wound edges were closed in a layered fashion using Vicryl and Fast Absorbing Plain Gut sutures. Postoperative length was 4.3 cm.   EBL minimal; complications none; wound care routine.  The patient was discharged in good condition and will return in one week for wound evaluation.

## 2018-02-22 NOTE — NURSING NOTE
"Initial /73  Pulse 60  SpO2 100% Estimated body mass index is 26.16 kg/(m^2) as calculated from the following:    Height as of 1/4/18: 1.702 m (5' 7\").    Weight as of 1/4/18: 75.8 kg (167 lb). .      "

## 2018-03-02 ENCOUNTER — ALLIED HEALTH/NURSE VISIT (OUTPATIENT)
Dept: DERMATOLOGY | Facility: CLINIC | Age: 78
End: 2018-03-02
Payer: COMMERCIAL

## 2018-03-02 DIAGNOSIS — Z48.02 REMOVAL OF STAPLES: Primary | ICD-10-CM

## 2018-03-02 PROCEDURE — 99207 ZZC NO CHARGE NURSE ONLY: CPT

## 2018-03-02 NOTE — NURSING NOTE
Pt returned to clinic for post surgery 1 week follow up bandage change. Pt has no complaints, denies pain. Staples removed from left scalp, area cleansed with normal saline. Site is healing and wound edges approximating well except in the middle of the wound. Advised per PRADEEP Miranda, to leave the last 2 staples in place in the center of the wound and pt should return next Tuesday, 03/06/18 to remove the last 2 to give it a little more time to heal. Pt agreed.     Advised to watch for signs/sx of infection; spreading redness, drainage, odor, fever. Call or report promptly to clinic. Pt given written instructions and informed to rtc as needed. Patient verbalized understanding.

## 2018-03-02 NOTE — MR AVS SNAPSHOT
After Visit Summary   3/2/2018    Aruna Santos    MRN: [id number]           Patient Information     Date Of Birth          1940        Visit Information        Provider Department      3/2/2018 10:00 AM [facility] NURSE [facility]        Care Instructions    ONE WEEK DRESSING CHANGE  For  STAPLE REMOVAL     The following information has been compiled to offer assistance with the dressing change or wound evaluation. Please feel free to call our office to speak with one of the nurses if you have any questions or concerns about the progress of the wound healing process especially if there are any signs of flap necrosis or infection. We will be happy to answer any questions you might have.                                                 AFTER 24 HOURS YOU SHOULD REMOVE THE BANDAGE AND BEGIN DAILY DRESSING CHANGES AS FOLLOWS:     1) Remove Dressing.     2) Clean and dry the area with tap water using a Q-tip or sterile gauze pad.     3) Apply Vaseline, Polysporin ointment, Aquaphor or Bacitracin ointment over entire wound.  Do NOT use Neosporin ointment.     4) Cover the wound with a band-aid, or a sterile non-stick gauze pad and micropore paper tape      REPEAT THESE INSTRUCTIONS AT LEAST ONCE A DAY UNTIL THE WOUND HAS COMPLETELY HEALED. DO NOT LET THE WOUND SCAB OVER.    It is an old wives tale that a wound heals better when it is exposed to air and allowed to dry out. The wound will heal faster with a better cosmetic result if it is kept moist with ointment and covered with a bandage.     Massaging the wound site hastens the healing process by softening the scar tissue and fading the scar. Begin massaging the area one month after surgery as often as possible. Continue to massage the area for 2-3 months or until they feel the scar tissue has softened. Moisturizers can be used during the massaging but are not necessary. Ultimately it takes six months for the scar to soften and  "blend into the surrounding skin.           Follow-ups after your visit        Your next 10 appointments already scheduled     Mar 02, 2018 10:00 AM CST   Nurse Only with [facility] DERM NURSE   [facility]    [address] [place] [address] [phone]            May 17, 2018 10:00 AM CDT   Return Visit with Anthony Hansen MD   [facility]    [address] [place] [address] [phone]              Who to contact     If you have questions or need follow up information about today's clinic visit or your schedule please contact [facility] directly at [phone].  Normal or non-critical lab and imaging results will be communicated to you by MyChart, letter or phone within 4 business days after the clinic has received the results. If you do not hear from us within 7 days, please contact the clinic through MyChart or phone. If you have a critical or abnormal lab result, we will notify you by phone as soon as possible.  Submit refill requests through [place] [facility] or call your pharmacy and they will forward the refill request to us. Please allow 3 business days for your refill to be completed.          Additional Information About Your Visit        [employer] Information     [facility] lets you send messages to your doctor, view your test results, renew your prescriptions, schedule appointments and more. To sign up, go to [email or web address] . Click on \"Log in\" on the left side of the screen, which will take you to the Welcome page. Then click on \"Sign up Now\" on the right side of the page.     You will be asked to enter the access code listed below, as well as some personal information. Please follow the directions to create your username and password.     Your access code is: [email or web address]  Expires: 4/4/2018 11:53 AM     Your access code will "  in 90 days. If you need help or a new code, please call your [place] clinic or [phone].        Care EveryWhere ID     This is your Care EveryWhere ID. This could be used by other organizations to access your [place] medical records  [id number]         Blood Pressure from Last 3 Encounters:   18 144/73   18 136/58   17 134/58    Weight from Last 3 Encounters:   18 75.8 kg (167 lb)   17 76.1 kg (167 lb 12.8 oz)   16 76.2 kg (168 lb)              Today, you had the following     No orders found for display       Primary Care Provider Office Phone # Fax #    Lisbet Simmons [phone] [address] [facility] [address]        Equal Access to Services     [facility]: Hadii aad ku hadasho Soomaali, waaxda luqadaha, qaybta kaalmada adeegyada, waxay johnin hayaan jerson lopez . So [facility] [phone].    ATENCIÓN: Si habla español, tiene a wolf disposición servicios gratuitos de asistencia lingüística. Llame al [phone].    We comply with applicable federal civil rights laws and [place] laws. We do not discriminate on the basis of race, color, national origin, age, disability, sex, sexual orientation, or gender identity.            Thank you!     Thank you for choosing [facility]  for your care. Our goal is always to provide you with excellent care. Hearing back from our patients is one way we can continue to improve our services. Please take a few minutes to complete the written survey that you may receive in the mail after your visit with us. Thank you!             Your Updated Medication List - Protect others around you: Learn how to safely use, store and throw away your medicines at www.disposemymeds.org.          This list is accurate as of 3/2/18  9:52 AM.  Always use your most recent med list.                   Brand Name Dispense Instructions for use Diagnosis    aspirin 81 MG tablet       1 TABLET DAILY        brimonidine 0.1 % ophthalmic solution    ALPHAGAN P     Place 1 drop into both eyes 2 times daily        CALCIUM /VITAMIN D TABS   OR      2 qd        clobetasol 0.05 % ointment    TEMOVATE    60 g    Apply sparingly to affected area of genitals twice weekly at nighttime    Vulvar itching, Itching in the vaginal area       levobunolol 0.5 % ophthalmic solution    BETAGAN     one drop each day        levothyroxine 75 MCG tablet    SYNTHROID/LEVOTHROID    90 tablet    TAKE 1 TABLET EVERY MORNING    Other specified hypothyroidism       lisinopril 30 MG tablet    PRINIVIL,ZESTRIL    90 tablet    TAKE 1 TABLET EVERY DAY    Essential hypertension with goal blood pressure less than 140/90       LORazepam 0.5 MG tablet    ATIVAN    10 tablet    Take 1 tablet (0.5 mg) by mouth 2 times daily Take 30 minutes prior to departure.  Do not operate a vehicle after taking this medication    Anxiety       metoprolol succinate 50 MG 24 hr tablet    TOPROL-XL    90 tablet    TAKE 1 TABLET EVERY DAY    Essential hypertension with goal blood pressure less than 140/90       Multi-vitamin Tabs tablet   Generic drug:  multivitamin, therapeutic with minerals      1 qd        simvastatin 40 MG tablet    ZOCOR    90 tablet    Take 1 tablet (40 mg) by mouth At Bedtime    Hyperlipidemia LDL goal <130       triamterene-hydrochlorothiazide 37.5-25 MG per tablet    MAXZIDE-25    90 tablet    TAKE 1 TABLET EVERY DAY    Essential hypertension with goal blood pressure less than 140/90

## 2018-03-06 ENCOUNTER — ALLIED HEALTH/NURSE VISIT (OUTPATIENT)
Dept: DERMATOLOGY | Facility: CLINIC | Age: 78
End: 2018-03-06
Payer: COMMERCIAL

## 2018-03-06 DIAGNOSIS — Z48.02 REMOVAL OF STAPLES: Primary | ICD-10-CM

## 2018-03-06 PROCEDURE — 99207 ZZC NO CHARGE NURSE ONLY: CPT

## 2018-03-06 NOTE — MR AVS SNAPSHOT
"              After Visit Summary   3/6/2018    Aruna Santos    MRN: [id number]           Patient Information     Date Of Birth          1940        Visit Information        Provider Department      3/6/2018 1:00 PM OX DERM NURSE [facility]        Today's Diagnoses     Removal of staples    -  1       Follow-ups after your visit        Your next 10 appointments already scheduled     May 17, 2018 10:00 AM CDT   Return Visit with Anthony Hansen MD   [facility] ([facility])    [address] [address] [phone]              Who to contact     If you have questions or need follow up information about today's clinic visit or your schedule please contact [facility] directly at [phone].  Normal or non-critical lab and imaging results will be communicated to you by GoNogginghart, letter or phone within 4 business days after the clinic has received the results. If you do not hear from us within 7 days, please contact the clinic through MyChart or phone. If you have a critical or abnormal lab result, we will notify you by phone as soon as possible.  Submit refill requests through Acertiv or call your pharmacy and they will forward the refill request to us. Please allow 3 business days for your refill to be completed.          Additional Information About Your Visit        MyChart Information     Acertiv lets you send messages to your doctor, view your test results, renew your prescriptions, schedule appointments and more. To sign up, go to [email or web address] . Click on \"Log in\" on the left side of the screen, which will take you to the Welcome page. Then click on \"Sign up Now\" on the right side of the page.     You will be asked to enter the access code listed below, as well as some personal information. Please follow the directions to create your username and password.     Your " access code is: [email or web address]  Expires: 2018 11:53 AM     Your access code will  in 90 days. If you need help or a new code, please call your [facility] clinic or [phone].        Care EveryWhere ID     This is your Care EveryWhere ID. This could be used by other organizations to access your [facility] medical records  [id number]         Blood Pressure from Last 3 Encounters:   18 144/73   18 136/58   17 134/58    Weight from Last 3 Encounters:   18 75.8 kg (167 lb)   17 76.1 kg (167 lb 12.8 oz)   16 76.2 kg (168 lb)              Today, you had the following     No orders found for display       Primary Care Provider Office Phone # Fax #    Lisbet Simmons [phone]       [address] [place] [address]        Equal Access to Services     [facility] Center[facility]ENNYFER : Hadii aad ku hadasho Soomaali, waaxda luqadaha, qaybta kaalmada adeegyada, waxay idiin hayaan adeeg kharaosmany lasabiha . So [facility] [phone].    ATENCIÓN: Si habla español, tiene a wolf disposición servicios gratuitos de asistencia lingüística. Llame al [phone].    We comply with applicable federal civil rights laws and [place] laws. We do not discriminate on the basis of race, color, national origin, age, disability, sex, sexual orientation, or gender identity.            Thank you!     Thank you for choosing [facility]  for your care. Our goal is always to provide you with excellent care. Hearing back from our patients is one way we can continue to improve our services. Please take a few minutes to complete the written survey that you may receive in the mail after your visit with us. Thank you!             Your Updated Medication List - Protect others around you: Learn how to safely use, store and throw away your medicines at www.disposemymeds.org.          This list is accurate as of 3/6/18  4:20 PM.  Always use your most recent med list.                    Brand Name Dispense Instructions for use Diagnosis    aspirin 81 MG tablet      1 TABLET DAILY        brimonidine 0.1 % ophthalmic solution    ALPHAGAN P     Place 1 drop into both eyes 2 times daily        CALCIUM /VITAMIN D TABS   OR      2 qd        clobetasol 0.05 % ointment    TEMOVATE    60 g    Apply sparingly to affected area of genitals twice weekly at nighttime    Vulvar itching, Itching in the vaginal area       levobunolol 0.5 % ophthalmic solution    BETAGAN     one drop each day        levothyroxine 75 MCG tablet    SYNTHROID/LEVOTHROID    90 tablet    TAKE 1 TABLET EVERY MORNING    Other specified hypothyroidism       lisinopril 30 MG tablet    PRINIVIL,ZESTRIL    90 tablet    TAKE 1 TABLET EVERY DAY    Essential hypertension with goal blood pressure less than 140/90       LORazepam 0.5 MG tablet    ATIVAN    10 tablet    Take 1 tablet (0.5 mg) by mouth 2 times daily Take 30 minutes prior to departure.  Do not operate a vehicle after taking this medication    Anxiety       metoprolol succinate 50 MG 24 hr tablet    TOPROL-XL    90 tablet    TAKE 1 TABLET EVERY DAY    Essential hypertension with goal blood pressure less than 140/90       Multi-vitamin Tabs tablet   Generic drug:  multivitamin, therapeutic with minerals      1 qd        simvastatin 40 MG tablet    ZOCOR    90 tablet    Take 1 tablet (40 mg) by mouth At Bedtime    Hyperlipidemia LDL goal <130       triamterene-hydrochlorothiazide 37.5-25 MG per tablet    MAXZIDE-25    90 tablet    TAKE 1 TABLET EVERY DAY    Essential hypertension with goal blood pressure less than 140/90

## 2018-03-06 NOTE — NURSING NOTE
Pt returned to clinic for post surgery for follow up staple removal. Pt has no complaints, denies pain. Area cleansed with normal saline. Site is healing and wound edges approximating well except has a small open area under where the last 2 staples were removed today. Will do open wound cares until area heals completely. The rest of the scar line is closed. Reapplied ointment to open area.    Advised to watch for signs/sx of infection; spreading redness, drainage, odor, fever. Call or report promptly to clinic. Pt given written instructions and informed to rtc as needed. Patient verbalized understanding.

## 2018-03-16 ENCOUNTER — TRANSFERRED RECORDS (OUTPATIENT)
Dept: HEALTH INFORMATION MANAGEMENT | Facility: CLINIC | Age: 78
End: 2018-03-16

## 2018-03-20 DIAGNOSIS — E78.5 HYPERLIPIDEMIA LDL GOAL <130: ICD-10-CM

## 2018-03-20 LAB
CHOLEST SERPL-MCNC: 220 MG/DL
HDLC SERPL-MCNC: 52 MG/DL
LDLC SERPL CALC-MCNC: 113 MG/DL
NONHDLC SERPL-MCNC: 168 MG/DL
TRIGL SERPL-MCNC: 276 MG/DL

## 2018-03-20 PROCEDURE — 80061 LIPID PANEL: CPT | Performed by: FAMILY MEDICINE

## 2018-03-20 PROCEDURE — 80076 HEPATIC FUNCTION PANEL: CPT | Performed by: FAMILY MEDICINE

## 2018-03-20 PROCEDURE — 36415 COLL VENOUS BLD VENIPUNCTURE: CPT | Performed by: FAMILY MEDICINE

## 2018-03-21 LAB
ALBUMIN SERPL-MCNC: 4.1 G/DL (ref 3.4–5)
ALP SERPL-CCNC: 49 U/L (ref 40–150)
ALT SERPL W P-5'-P-CCNC: 24 U/L (ref 0–50)
AST SERPL W P-5'-P-CCNC: 21 U/L (ref 0–45)
BILIRUB DIRECT SERPL-MCNC: 0.2 MG/DL (ref 0–0.2)
BILIRUB SERPL-MCNC: 0.7 MG/DL (ref 0.2–1.3)
PROT SERPL-MCNC: 7.5 G/DL (ref 6.8–8.8)

## 2018-03-22 ENCOUNTER — TRANSFERRED RECORDS (OUTPATIENT)
Dept: HEALTH INFORMATION MANAGEMENT | Facility: CLINIC | Age: 78
End: 2018-03-22

## 2018-04-25 DIAGNOSIS — I10 ESSENTIAL HYPERTENSION WITH GOAL BLOOD PRESSURE LESS THAN 140/90: ICD-10-CM

## 2018-04-25 RX ORDER — METOPROLOL SUCCINATE 50 MG/1
TABLET, EXTENDED RELEASE ORAL
Qty: 90 TABLET | Refills: 1 | Status: SHIPPED | OUTPATIENT
Start: 2018-04-25 | End: 2018-09-27

## 2018-04-25 NOTE — TELEPHONE ENCOUNTER
"Requested Prescriptions   Pending Prescriptions Disp Refills     metoprolol succinate (TOPROL-XL) 50 MG 24 hr tablet [Pharmacy Med Name: METOPROLOL SUCCINATE ER 50 MG Tablet Extended Release 24 Hour] 90 tablet 3      Last Written Prescription Date:  07/13/2017  Last Fill Quantity: 90,  # refills: 3   Last office visit: 1/30/2018   Next 5 appointments (look out 90 days)     May 17, 2018 10:00 AM CDT   [facility]   [facility]    [address] [address] [phone]                  Sig: TAKE 1 TABLET EVERY DAY    Beta-Blockers Protocol Failed    4/25/2018  2:46 PM       Failed - Blood pressure under 140/90 in past 12 months    BP Readings from Last 3 Encounters:   02/22/18 144/73   01/04/18 136/58   08/09/17 134/58                Passed - Patient is age 6 or older       Passed - Recent (12 mo) or future (30 days) visit within the authorizing provider's specialty    Patient had office visit in the last 12 months or has a visit in the next 30 days with authorizing provider or within the authorizing provider's specialty.  See \"Patient Info\" tab in inbasket, or \"Choose Columns\" in Meds & Orders section of the refill encounter.                "

## 2018-04-25 NOTE — TELEPHONE ENCOUNTER
Routing refill request to provider for review/approval because:      BP is not at goal.    FABIANA Layne, RN, N  [facility]) [phone]

## 2018-04-25 NOTE — TELEPHONE ENCOUNTER
BP Readings from Last 6 Encounters:   02/22/18 144/73   01/04/18 136/58   08/09/17 134/58   12/29/16 122/64   07/29/16 143/63   07/28/16 130/70     Recheck your blood pressure in our pharmacy in 1 week or sooner if needed.  Have pharmacy send me their note.    Refilled x 1 .

## 2018-05-01 ENCOUNTER — TRANSFERRED RECORDS (OUTPATIENT)
Dept: HEALTH INFORMATION MANAGEMENT | Facility: CLINIC | Age: 78
End: 2018-05-01

## 2018-05-11 ENCOUNTER — ALLIED HEALTH/NURSE VISIT (OUTPATIENT)
Dept: FAMILY MEDICINE | Facility: CLINIC | Age: 78
End: 2018-05-11
Payer: COMMERCIAL

## 2018-05-11 VITALS — SYSTOLIC BLOOD PRESSURE: 122 MMHG | DIASTOLIC BLOOD PRESSURE: 58 MMHG

## 2018-05-11 DIAGNOSIS — I10 ESSENTIAL HYPERTENSION WITH GOAL BLOOD PRESSURE LESS THAN 140/90: Primary | ICD-10-CM

## 2018-05-11 PROCEDURE — 99207 ZZC NO CHARGE NURSE ONLY: CPT | Performed by: FAMILY MEDICINE

## 2018-05-11 NOTE — PROGRESS NOTES
Aruna Santos is enrolled/participating in the retail pharmacy Blood Pressure Goals Achievement Program (BPGAP).  Aruna Santos was evaluated at [facility] on May 11, 2018 at which time her blood pressure was:    BP Readings from Last 3 Encounters:   05/11/18 122/58   02/22/18 144/73   01/04/18 136/58     Reviewed lifestyle modifications for blood pressure control and reduction: including making healthy food choices, managing weight, getting regular exercise, smoking cessation, reducing alcohol consumption, monitoring blood pressure regularly.     Aruna Santos is not experiencing symptoms.    Follow-Up: BP is at goal of < 140/90mmHg (patient 18+ years of age with or without diabetes).  Recommended follow-up at pharmacy in 6 months.     Recommendation to Provider: No change    Aruna Santos was evaluated for enrollment into the PGEN study today.    Patient eligible for enrollment:  No  Patient interested in enrollment:  No    Completed by: Thank you,  Nichol Harley RPh, Mgr [facility] [phone]

## 2018-05-11 NOTE — MR AVS SNAPSHOT
"              After Visit Summary   5/11/2018    Aruna Santos    MRN: [id number]           Patient Information     Date Of Birth          1940        Visit Information        Provider Department      5/11/2018 2:58 PM Lisbet Simmons MD [facility]        Today's Diagnoses     Essential hypertension with goal blood pressure less than 140/90    -  1       Follow-ups after your visit        Your next 10 appointments already scheduled     May 17, 2018 10:00 AM CDT   Return Visit with Anthony Hansen MD   [facility]    [address] [place] [address] [phone]              Who to contact     If you have questions or need follow up information about today's clinic visit or your schedule please contact [facility] directly at [phone].  Normal or non-critical lab and imaging results will be communicated to you by MyChart, letter or phone within 4 business days after the clinic has received the results. If you do not hear from us within 7 days, please contact the clinic through MyChart or phone. If you have a critical or abnormal lab result, we will notify you by phone as soon as possible.  Submit refill requests through Mindjet or call your pharmacy and they will forward the refill request to us. Please allow 3 business days for your refill to be completed.          Additional Information About Your Visit        MyChart Information     Mindjet lets you send messages to your doctor, view your test results, renew your prescriptions, schedule appointments and more. To sign up, go to [email or web address] . Click on \"Log in\" on the left side of the screen, which will take you to the Welcome page. Then click on \"Sign up Now\" on the right side of the page.     You will be asked to enter the access code listed below, as well as some personal information. Please follow the directions to " create your username and password.     Your access code is: [email or web address]  Expires: 2018  2:59 PM     Your access code will  in 90 days. If you need help or a new code, please call your [facility] clinic or [phone].        Care EveryWhere ID     This is your Care EveryWhere ID. This could be used by other organizations to access your [facility] medical records  [id number]         Blood Pressure from Last 3 Encounters:   18 122/58   18 144/73   18 136/58    Weight from Last 3 Encounters:   18 167 lb (75.8 kg)   17 167 lb 12.8 oz (76.1 kg)   16 168 lb (76.2 kg)              Today, you had the following     No orders found for display       Primary Care Provider Office Phone # Fax #    Lisbet Simmons [phone]       [address] [place] [id number]        Equal Access to Services     [facility] Center[facility]ENNYFER : Hadii benjamin ku hadasho Soomaali, waaxda luqadaha, qaybta kaalmada adeegyada, maura lopez . So [facility] [phone].    ATENCIÓN: Si habla español, tiene a wolf disposición servicios gratuitos de asistencia lingüística. Llame al [phone].    We comply with applicable federal civil rights laws and [place] laws. We do not discriminate on the basis of race, color, national origin, age, disability, sex, sexual orientation, or gender identity.            Thank you!     Thank you for choosing [facility]  for your care. Our goal is always to provide you with excellent care. Hearing back from our patients is one way we can continue to improve our services. Please take a few minutes to complete the written survey that you may receive in the mail after your visit with us. Thank you!             Your Updated Medication List - Protect others around you: Learn how to safely use, store and throw away your medicines at www.disposemymeds.org.          This list is accurate as of 18  2:59 PM.  Always use your  most recent med list.                   Brand Name Dispense Instructions for use Diagnosis    aspirin 81 MG tablet      1 TABLET DAILY        brimonidine 0.1 % ophthalmic solution    ALPHAGAN P     Place 1 drop into both eyes 2 times daily        CALCIUM /VITAMIN D TABS   OR      2 qd        clobetasol 0.05 % ointment    TEMOVATE    60 g    Apply sparingly to affected area of genitals twice weekly at nighttime    Vulvar itching, Itching in the vaginal area       levobunolol 0.5 % ophthalmic solution    BETAGAN     one drop each day        levothyroxine 75 MCG tablet    SYNTHROID/LEVOTHROID    90 tablet    TAKE 1 TABLET EVERY MORNING    Other specified hypothyroidism       lisinopril 30 MG tablet    PRINIVIL,ZESTRIL    90 tablet    TAKE 1 TABLET EVERY DAY    Essential hypertension with goal blood pressure less than 140/90       LORazepam 0.5 MG tablet    ATIVAN    10 tablet    Take 1 tablet (0.5 mg) by mouth 2 times daily Take 30 minutes prior to departure.  Do not operate a vehicle after taking this medication    Anxiety       metoprolol succinate 50 MG 24 hr tablet    TOPROL-XL    90 tablet    TAKE 1 TABLET EVERY DAY    Essential hypertension with goal blood pressure less than 140/90       Multi-vitamin Tabs tablet   Generic drug:  multivitamin, therapeutic with minerals      1 qd        simvastatin 40 MG tablet    ZOCOR    90 tablet    Take 1 tablet (40 mg) by mouth At Bedtime    Hyperlipidemia LDL goal <130       triamterene-hydrochlorothiazide 37.5-25 MG per tablet    MAXZIDE-25    90 tablet    TAKE 1 TABLET EVERY DAY    Essential hypertension with goal blood pressure less than 140/90

## 2018-05-17 ENCOUNTER — OFFICE VISIT (OUTPATIENT)
Dept: DERMATOLOGY | Facility: CLINIC | Age: 78
End: 2018-05-17
Payer: COMMERCIAL

## 2018-05-17 VITALS
TEMPERATURE: 97.8 F | OXYGEN SATURATION: 99 % | HEART RATE: 55 BPM | DIASTOLIC BLOOD PRESSURE: 71 MMHG | SYSTOLIC BLOOD PRESSURE: 137 MMHG

## 2018-05-17 DIAGNOSIS — C44.40 MALIGNANT NEOPLASM OF SKIN OF SCALP: Primary | ICD-10-CM

## 2018-05-17 PROCEDURE — 99212 OFFICE O/P EST SF 10 MIN: CPT | Performed by: DERMATOLOGY

## 2018-05-17 NOTE — PROGRESS NOTES
Aruna Santos is a 77 year old year old female patient here today for f/u primary adeno ca of scalp.  PET scan negative, colonoscopy negative.  No issues.  Patient reports the following previous treatments none.  Patient reports the following modifying factors none.  Associated symptoms: none.  Patient has no other skin complaints today.  Remainder of the HPI, Meds, PMH, Allergies, FH, and SH was reviewed in chart.      Past Medical History:   Diagnosis Date     Acquired cyst of kidney      Diverticulosis of colon (without mention of hemorrhage)      Family history of colon cancer     mother  of colon cancer in her 40's     Hematuria      Hypertension goal BP (blood pressure) < 140/90     difficult to control in past      Osteoporosis, unspecified      Skin cancer      Tubular adenoma of colon      - repeat in 2019 - q 5 years      Urgency incontinence        Past Surgical History:   Procedure Laterality Date     ARTHROSCOPY SHOULDER  2013    [facility]     COLONOSCOPY  89,     Colonoscopies q 5 year -next      CYSTOCELE REPAIR  10/31/1984     HYSTERECTOMY, PAP NO LONGER INDICATED  10/31/84    Hysterectomy, Total Abdominal w ovaries left intact     SURGICAL HISTORY OF -   75    Cholecystectomy & Incidental appendectomy        Family History   Problem Relation Age of Onset     Cancer - colorectal Mother      Hypertension Father      CEREBROVASCULAR DISEASE Maternal Grandfather      CEREBROVASCULAR DISEASE Paternal Grandmother        Social History     Social History     Marital status:      Spouse name: Valentin     Number of children: 2     Years of education: N/A     Occupational History      None      Social History Main Topics     Smoking status: Never Smoker     Smokeless tobacco: Never Used     Alcohol use No     Drug use: No     Sexual activity: Yes     Partners: Male     Other Topics Concern     Parent/Sibling W/ Cabg, Mi Or Angioplasty Before 65f 55m? No     Social History  Narrative       Outpatient Encounter Prescriptions as of 5/17/2018   Medication Sig Dispense Refill     ASPIRIN 81 MG OR TABS 1 TABLET DAILY       brimonidine (ALPHAGAN P) 0.1 % ophthalmic solution Place 1 drop into both eyes 2 times daily        CALCIUM /VITAMIN D TABS   OR 2 qd       clobetasol (TEMOVATE) 0.05 % ointment Apply sparingly to affected area of genitals twice weekly at nighttime 60 g 1     LEVOBUNOLOL HCL 0.5 % OP SOLN one drop each day       levothyroxine (SYNTHROID/LEVOTHROID) 75 MCG tablet TAKE 1 TABLET EVERY MORNING 90 tablet 3     lisinopril (PRINIVIL,ZESTRIL) 30 MG tablet TAKE 1 TABLET EVERY DAY 90 tablet 2     LORazepam (ATIVAN) 0.5 MG tablet Take 1 tablet (0.5 mg) by mouth 2 times daily Take 30 minutes prior to departure.  Do not operate a vehicle after taking this medication 10 tablet 0     metoprolol succinate (TOPROL-XL) 50 MG 24 hr tablet TAKE 1 TABLET EVERY DAY 90 tablet 1     MULTI-VITAMIN OR TABS 1 qd       simvastatin (ZOCOR) 40 MG tablet Take 1 tablet (40 mg) by mouth At Bedtime 90 tablet 3     triamterene-hydrochlorothiazide (MAXZIDE-25) 37.5-25 MG per tablet TAKE 1 TABLET EVERY DAY 90 tablet 1     No facility-administered encounter medications on file as of 5/17/2018.              Review Of Systems  Skin: As above  Eyes: negative  Ears/Nose/Throat: negative  Respiratory: No shortness of breath, dyspnea on exertion, cough, or hemoptysis  Cardiovascular: negative  Gastrointestinal: negative  Genitourinary: negative  Musculoskeletal: negative  Neurologic: negative  Psychiatric: negative  Hematologic/Lymphatic/Immunologic: negative  Endocrine: negative      O:   NAD, WDWN, Alert & Oriented, Mood & Affect wnl, Vitals stable   Here today alone   /71 (Cuff Size: Adult Regular)  Pulse 55  Temp 97.8  F (36.6  C) (Oral)  SpO2 99%   General appearance normal   Vitals stable   Alert, oriented and in no acute distress      Following lymph nodes palpated: Occipital, Cervical,  Supraclavicular no lad   Scalp well helaed        The remainder of expanded problem focused exam was unremarkable; the following areas were examined:  scalp/hair, conjunctiva/lids, face, neck, lips, chest, digits/nails, RUE, LUE.      Eyes: Conjunctivae/lids:Normal     ENT: Lips, buccal mucosa, tongue: normal    MSK:Normal    Cardiovascular: peripheral edema none    Pulm: Breathing Normal    Lymph Nodes: No Head and Neck Lymphadenopathy     Neuro/Psych: Orientation:Normal; Mood/Affect:Normal      A/P:  1. Primary adeno ca of skin   High riks discussed with patient   Lymph node exams discussed with patient   Return to clinic 3 months  Skin care regimen reviewed with patient: Eliminate harsh soaps, i.e. Dial, zest, irsih spring; Mild soaps such as Cetaphil or Dove sensitive skin, avoid hot or cold showers, aggressive use of emollients including vanicream, cetaphil or cerave discussed with patient.

## 2018-05-17 NOTE — LETTER
2018         RE: Aruna Santos  [address] [facility] [address]        Dear Colleague,    Thank you for referring your patient, Aruna Santos, to the [facility]. Please see a copy of my visit note below.    Aruna Santos is a 77 year old year old female patient here today for f/u primary adeno ca of scalp.  PET scan negative, colonoscopy negative.  No issues.  Patient reports the following previous treatments none.  Patient reports the following modifying factors none.  Associated symptoms: none.  Patient has no other skin complaints today.  Remainder of the HPI, Meds, PMH, Allergies, FH, and SH was reviewed in chart.      Past Medical History:   Diagnosis Date     Acquired cyst of kidney      Diverticulosis of colon (without mention of hemorrhage)      Family history of colon cancer     mother  of colon cancer in her 40's     Hematuria      Hypertension goal BP (blood pressure) < 140/90     difficult to control in past      Osteoporosis, unspecified      Skin cancer      Tubular adenoma of colon      - repeat in 2019 - q 5 years      Urgency incontinence        Past Surgical History:   Procedure Laterality Date     ARTHROSCOPY SHOULDER  2013    [facility]     COLONOSCOPY  89,     Colonoscopies q 5 year -next 2019     CYSTOCELE REPAIR  10/31/1984     HYSTERECTOMY, PAP NO LONGER INDICATED  10/31/84    Hysterectomy, Total Abdominal w ovaries left intact     SURGICAL HISTORY OF -   75    Cholecystectomy & Incidental appendectomy        Family History   Problem Relation Age of Onset     Cancer - colorectal Mother      Hypertension Father      CEREBROVASCULAR DISEASE Maternal Grandfather      CEREBROVASCULAR DISEASE Paternal Grandmother        Social History     Social History     Marital status:      Spouse name: Valentin     Number of children: 2     Years of education: N/A     Occupational History      None      Social History Main Topics      Smoking status: Never Smoker     Smokeless tobacco: Never Used     Alcohol use No     Drug use: No     Sexual activity: Yes     Partners: Male     Other Topics Concern     Parent/Sibling W/ Cabg, Mi Or Angioplasty Before 65f 55m? No     Social History Narrative       Outpatient Encounter Prescriptions as of 5/17/2018   Medication Sig Dispense Refill     ASPIRIN 81 MG OR TABS 1 TABLET DAILY       brimonidine (ALPHAGAN P) 0.1 % ophthalmic solution Place 1 drop into both eyes 2 times daily        CALCIUM /VITAMIN D TABS   OR 2 qd       clobetasol (TEMOVATE) 0.05 % ointment Apply sparingly to affected area of genitals twice weekly at nighttime 60 g 1     LEVOBUNOLOL HCL 0.5 % OP SOLN one drop each day       levothyroxine (SYNTHROID/LEVOTHROID) 75 MCG tablet TAKE 1 TABLET EVERY MORNING 90 tablet 3     lisinopril (PRINIVIL,ZESTRIL) 30 MG tablet TAKE 1 TABLET EVERY DAY 90 tablet 2     LORazepam (ATIVAN) 0.5 MG tablet Take 1 tablet (0.5 mg) by mouth 2 times daily Take 30 minutes prior to departure.  Do not operate a vehicle after taking this medication 10 tablet 0     metoprolol succinate (TOPROL-XL) 50 MG 24 hr tablet TAKE 1 TABLET EVERY DAY 90 tablet 1     MULTI-VITAMIN OR TABS 1 qd       simvastatin (ZOCOR) 40 MG tablet Take 1 tablet (40 mg) by mouth At Bedtime 90 tablet 3     triamterene-hydrochlorothiazide (MAXZIDE-25) 37.5-25 MG per tablet TAKE 1 TABLET EVERY DAY 90 tablet 1     No facility-administered encounter medications on file as of 5/17/2018.              Review Of Systems  Skin: As above  Eyes: negative  Ears/Nose/Throat: negative  Respiratory: No shortness of breath, dyspnea on exertion, cough, or hemoptysis  Cardiovascular: negative  Gastrointestinal: negative  Genitourinary: negative  Musculoskeletal: negative  Neurologic: negative  Psychiatric: negative  Hematologic/Lymphatic/Immunologic: negative  Endocrine: negative      O:   NAD, WDWN, Alert & Oriented, Mood & Affect wnl, Vitals stable   Here today  alone   /71 (Cuff Size: Adult Regular)  Pulse 55  Temp 97.8  F (36.6  C) (Oral)  SpO2 99%   General appearance normal   Vitals stable   Alert, oriented and in no acute distress      Following lymph nodes palpated: Occipital, Cervical, Supraclavicular no lad   Scalp well helaed        The remainder of expanded problem focused exam was unremarkable; the following areas were examined:  scalp/hair, conjunctiva/lids, face, neck, lips, chest, digits/nails, RUE, LUE.      Eyes: Conjunctivae/lids:Normal     ENT: Lips, buccal mucosa, tongue: normal    MSK:Normal    Cardiovascular: peripheral edema none    Pulm: Breathing Normal    Lymph Nodes: No Head and Neck Lymphadenopathy     Neuro/Psych: Orientation:Normal; Mood/Affect:Normal      A/P:  1. Primary adeno ca of skin   High riks discussed with patient   Lymph node exams discussed with patient   Return to clinic 3 months  Skin care regimen reviewed with patient: Eliminate harsh soaps, i.e. Dial, zest, irsih spring; Mild soaps such as Cetaphil or Dove sensitive skin, avoid hot or cold showers, aggressive use of emollients including vanicream, cetaphil or cerave discussed with patient.        Again, thank you for allowing me to participate in the care of your patient.        Sincerely,        Anthony Hansen MD

## 2018-05-17 NOTE — MR AVS SNAPSHOT
"              After Visit Summary   5/17/2018    Aruna Santos    MRN: [id number]           Patient Information     Date Of Birth          1940        Visit Information        Provider Department      5/17/2018 10:00 AM Anthony Hansen MD [facility]        Today's Diagnoses     Malignant neoplasm of skin of scalp    -  1       Follow-ups after your visit        Your next 10 appointments already scheduled     Oct 04, 2018  9:45 AM CDT   [facility] with [facility]   [facility]    [address] [place] [id number] [phone]              Who to contact     If you have questions or need follow up information about today's clinic visit or your schedule please contact [facility] directly at [phone].  Normal or non-critical lab and imaging results will be communicated to you by MyChart, letter or phone within 4 business days after the clinic has received the results. If you do not hear from us within 7 days, please contact the clinic through MyChart or phone. If you have a critical or abnormal lab result, we will notify you by phone as soon as possible.  Submit refill requests through [employer] or call your pharmacy and they will forward the refill request to us. Please allow 3 business days for your refill to be completed.          Additional Information About Your Visit        MyChart Information     [employer] lets you send messages to your doctor, view your test results, renew your prescriptions, schedule appointments and more. To sign up, go to [email or web address] . Click on \"Log in\" on the left side of the screen, which will take you to the Welcome page. Then click on \"Sign up Now\" on the right side of the page.     You will be asked to enter the access code listed below, as well as some personal information. Please follow the directions to create your " username and password.     Your access code is: [email or web address]  Expires: 2018  2:59 PM     Your access code will  in 90 days. If you need help or a new code, please call your [place] clinic or [phone].        Care EveryWhere ID     This is your Care EveryWhere ID. This could be used by other organizations to access your [facility] medical records  [id number]        Your Vitals Were     Pulse Temperature Pulse Oximetry             55 97.8  F (36.6  C) (Oral) 99%          Blood Pressure from Last 3 Encounters:   18 137/71   18 122/58   18 144/73    Weight from Last 3 Encounters:   18 75.8 kg (167 lb)   17 76.1 kg (167 lb 12.8 oz)   16 76.2 kg (168 lb)              Today, you had the following     No orders found for display       Primary Care Provider Office Phone # Fax #    Lisbetbetsy Simmnos [phone]       [address] [place] [id number]        Equal Access to Services     [facility]: Hadii aad ku hadasho Soomaali, waaxda luqadaha, qaybta kaalmada adeegyada, maura harrington hayjody lopez . So [facility] [phone].    ATENCIÓN: Si habla español, tiene a wolf disposición servicios gratuitos de asistencia lingüística. Llame al [phone].    We comply with applicable federal civil rights laws and [place] laws. We do not discriminate on the basis of race, color, national origin, age, disability, sex, sexual orientation, or gender identity.            Thank you!     Thank you for choosing [facility]  for your care. Our goal is always to provide you with excellent care. Hearing back from our patients is one way we can continue to improve our services. Please take a few minutes to complete the written survey that you may receive in the mail after your visit with us. Thank you!             Your Updated Medication List - Protect others around you: Learn how to safely use, store and throw away your  medicines at www.disposemymeds.org.          This list is accurate as of 5/17/18 10:35 AM.  Always use your most recent med list.                   Brand Name Dispense Instructions for use Diagnosis    aspirin 81 MG tablet      1 TABLET DAILY        brimonidine 0.1 % ophthalmic solution    ALPHAGAN P     Place 1 drop into both eyes 2 times daily        CALCIUM /VITAMIN D TABS   OR      2 qd        clobetasol 0.05 % ointment    TEMOVATE    60 g    Apply sparingly to affected area of genitals twice weekly at nighttime    Vulvar itching, Itching in the vaginal area       levobunolol 0.5 % ophthalmic solution    BETAGAN     one drop each day        levothyroxine 75 MCG tablet    SYNTHROID/LEVOTHROID    90 tablet    TAKE 1 TABLET EVERY MORNING    Other specified hypothyroidism       lisinopril 30 MG tablet    PRINIVIL,ZESTRIL    90 tablet    TAKE 1 TABLET EVERY DAY    Essential hypertension with goal blood pressure less than 140/90       LORazepam 0.5 MG tablet    ATIVAN    10 tablet    Take 1 tablet (0.5 mg) by mouth 2 times daily Take 30 minutes prior to departure.  Do not operate a vehicle after taking this medication    Anxiety       metoprolol succinate 50 MG 24 hr tablet    TOPROL-XL    90 tablet    TAKE 1 TABLET EVERY DAY    Essential hypertension with goal blood pressure less than 140/90       Multi-vitamin Tabs tablet   Generic drug:  multivitamin, therapeutic with minerals      1 qd        simvastatin 40 MG tablet    ZOCOR    90 tablet    Take 1 tablet (40 mg) by mouth At Bedtime    Hyperlipidemia LDL goal <130       triamterene-hydrochlorothiazide 37.5-25 MG per tablet    MAXZIDE-25    90 tablet    TAKE 1 TABLET EVERY DAY    Essential hypertension with goal blood pressure less than 140/90

## 2018-06-27 ENCOUNTER — TELEPHONE (OUTPATIENT)
Dept: FAMILY MEDICINE | Facility: CLINIC | Age: 78
End: 2018-06-27

## 2018-06-27 NOTE — TELEPHONE ENCOUNTER
Reason for Call:  Same Day Appointment, Requested Provider:  Lisbet Simmons MD    PCP: Lisbet Simmons    Reason for visit: Pre op for caterac Surgery @ [facility] with Dr Pierson on 7/16    Duration of symptoms: ongoing    Have you been treated for this in the past? Yes    Additional comments: Please work her in anytime except July 13th    Can we leave a detailed message on this number? YES    Phone number patient can be reached at: Home number on file [phone] (home)    Best Time: any    Call taken on 6/27/2018 at 10:10 AM by Pili Pavon

## 2018-06-27 NOTE — TELEPHONE ENCOUNTER
Called patient to see if she would be willing to see another provider as Dr. Simmons had limited options for appointments on her schedule.  She was okay with seeing Dr. Mahan as she has seen him in the past.  Scheduled her for a pre-op with Dr. Mahan on 7/10/2018 at 9:20 AM.    Elma Knowles CMA

## 2018-06-28 DIAGNOSIS — E03.8 OTHER SPECIFIED HYPOTHYROIDISM: ICD-10-CM

## 2018-06-28 DIAGNOSIS — E78.5 HYPERLIPIDEMIA LDL GOAL <130: ICD-10-CM

## 2018-06-28 NOTE — TELEPHONE ENCOUNTER
"Requested Prescriptions   Pending Prescriptions Disp Refills     levothyroxine (SYNTHROID/LEVOTHROID) 75 MCG tablet [Pharmacy Med Name: LEVOTHYROXINE SODIUM 75 MCG Tablet]  Last Written Prescription Date:  08/09/2017  Last Fill Quantity: 90 tablet,  # refills: 3   Last office visit: 1/30/2018 with prescribing provider:  Maeve Metzger MD    Future Office Visit:   Next 5 appointments (look out 90 days)     Jul 10, 2018  9:20 AM CDT   Pre-Op physical with Tomas Mahan MD   [facility]    [address] [place] [id number]   [phone]                  90 tablet 3     Sig: TAKE 1 TABLET EVERY MORNING    Thyroid Protocol Passed    6/28/2018  2:59 AM       Passed - Patient is 12 years or older       Passed - Recent (12 mo) or future (30 days) visit within the authorizing provider's specialty    Patient had office visit in the last 12 months or has a visit in the next 30 days with authorizing provider or within the authorizing provider's specialty.  See \"Patient Info\" tab in inbasket, or \"Choose Columns\" in Meds & Orders section of the refill encounter.           Passed - Normal TSH on file in past 12 months    Recent Labs   Lab Test  07/28/17   0733   TSH  3.75             Passed - No active pregnancy on record    If patient is pregnant or has had a positive pregnancy test, please check TSH.         Passed - No positive pregnancy test in past 12 months    If patient is pregnant or has had a positive pregnancy test, please check TSH.          simvastatin (ZOCOR) 40 MG tablet [Pharmacy Med Name: SIMVASTATIN 40 MG Tablet]  Last Written Prescription Date:  08/09/2017  Last Fill Quantity: 90 tablet,  # refills: 3   Last office visit: 1/30/2018 with prescribing provider:  Maeve Metzger MD    Future Office Visit:   Next 5 appointments (look out 90 days)     Jul 10, 2018  9:20 AM CDT   Pre-Op physical with Tomas Mahan MD " "  [facility]    [address] [facility] [phone] [phone]                  90 tablet 3     Sig: TAKE 1 TABLET AT BEDTIME    Statins Protocol Passed    6/28/2018  2:59 AM       Passed - LDL on file in past 12 months    Recent Labs   Lab Test  03/20/18   0929   LDL  113*            Passed - No abnormal creatine kinase in past 12 months    No lab results found.            Passed - Recent (12 mo) or future (30 days) visit within the authorizing provider's specialty    Patient had office visit in the last 12 months or has a visit in the next 30 days with authorizing provider or within the authorizing provider's specialty.  See \"Patient Info\" tab in inbasket, or \"Choose Columns\" in Meds & Orders section of the refill encounter.           Passed - Patient is age 18 or older       Passed - No active pregnancy on record       Passed - No positive pregnancy test in past 12 months          "

## 2018-06-29 RX ORDER — SIMVASTATIN 40 MG
TABLET ORAL
Qty: 90 TABLET | Refills: 0 | Status: SHIPPED | OUTPATIENT
Start: 2018-06-29 | End: 2018-09-27

## 2018-06-29 RX ORDER — LEVOTHYROXINE SODIUM 75 UG/1
TABLET ORAL
Qty: 90 TABLET | Refills: 0 | Status: SHIPPED | OUTPATIENT
Start: 2018-06-29 | End: 2018-09-27

## 2018-06-29 NOTE — TELEPHONE ENCOUNTER
Prescription approved per [facility] Refill Protocol.  Zena Powell RN  [facility]

## 2018-07-02 DIAGNOSIS — I10 ESSENTIAL HYPERTENSION WITH GOAL BLOOD PRESSURE LESS THAN 140/90: ICD-10-CM

## 2018-07-02 RX ORDER — TRIAMTERENE/HYDROCHLOROTHIAZID 37.5-25 MG
TABLET ORAL
Qty: 90 TABLET | Refills: 0 | Status: SHIPPED | OUTPATIENT
Start: 2018-07-02 | End: 2018-09-27

## 2018-07-02 NOTE — TELEPHONE ENCOUNTER
"Requested Prescriptions   Pending Prescriptions Disp Refills     triamterene-hydrochlorothiazide (MAXZIDE-25) 37.5-25 MG per tablet [Pharmacy Med Name: TRIAMTERENE/HYDROCHLOROTHIAZIDE 37.5-25 MG Tablet] 90 tablet 1     Sig: TAKE 1 TABLET EVERY DAY    Diuretics (Including Combos) Protocol Failed    7/2/2018  5:51 PM       Failed - Normal serum creatinine on file in past 12 months    Recent Labs   Lab Test  11/14/17   0906   CR  1.11*             Passed - Blood pressure under 140/90 in past 12 months    BP Readings from Last 3 Encounters:   05/17/18 137/71   05/11/18 122/58   02/22/18 144/73                Passed - Recent (12 mo) or future (30 days) visit within the authorizing provider's specialty    Patient had office visit in the last 12 months or has a visit in the next 30 days with authorizing provider or within the authorizing provider's specialty.  See \"Patient Info\" tab in inbasket, or \"Choose Columns\" in Meds & Orders section of the refill encounter.           Passed - Patient is age 18 or older       Passed - No active pregancy on record       Passed - Normal serum potassium on file in past 12 months    Recent Labs   Lab Test  11/14/17   0906   POTASSIUM  4.0                   Passed - Normal serum sodium on file in past 12 months    Recent Labs   Lab Test  11/14/17   0906   NA  138             Passed - No positive pregnancy test in past 12 months        Per labs on 11/15/17, Cr stable from previous.    Prescription approved per [facility] Refill Protocol.  Zena Powell RN  [facility]    "

## 2018-07-09 NOTE — PROGRESS NOTES
[address] [address]  [phone]  Dept: [phone]    PRE-OP EVALUATION:  Today's date: 7/10/2018    Aruna Santos (: 1940) presents for pre-operative evaluation assessment as requested by Dr. Justin Puentes.  She requires evaluation and anesthesia risk assessment prior to undergoing surgery/procedure for treatment of Cataract left eye .    Proposed Surgery/ Procedure: Cataract Left eye   Date of Surgery/ Procedure: 18  Time of Surgery/ Procedure: 7:30AM  Hospital/Surgical Facility: [facility]   Fax number for surgical facility: [phone]  Primary Physician: Lisbet Simmons  Type of Anesthesia Anticipated: to be determined    Patient has a Health Care Directive or Living Will:  YES     1. NO - Do you have a history of heart attack, stroke, stent, bypass or surgery on an artery in the head, neck, heart or legs?  2. NO - Do you ever have any pain or discomfort in your chest?  3. NO - Do you have a history of  Heart Failure?  4. NO - Are you troubled by shortness of breath when: walking on the level, up a slight hill or at night?  5. NO - Do you currently have a cold, bronchitis or other respiratory infection?  6. NO - Do you have a cough, shortness of breath or wheezing?  7. NO - Do you sometimes get pains in the calves of your legs when you walk?  8. NO - Do you or anyone in your family have previous history of blood clots?  9. NO - Do you or does anyone in your family have a serious bleeding problem such as prolonged bleeding following surgeries or cuts?  10. NO - Have you ever had problems with anemia or been told to take iron pills?  11. NO - Have you had any abnormal blood loss such as black, tarry or bloody stools, or abnormal vaginal bleeding?  12. YES - HAVE YOU EVER HAD A BLOOD TRANSFUSION? Patient in   13. NO - Have you or any of your relatives ever had problems with anesthesia?  14. YES - DO YOU HAVE  SLEEP APNEA, EXCESSIVE SNORING OR DAYTIME DROWSINESS? Sleep apnea, excessive snoring   15. NO - Do you have any prosthetic heart valves?  16. NO - Do you have prosthetic joints?  17. NO - Is there any chance that you may be pregnant?      HPI:     HPI related to upcoming procedure: Pt will be undergoing cataract surgery for treatment of a cataract in her left eye.      HYPERLIPIDEMIA - Patient has a long history of significant Hyperlipidemia requiring medication for treatment with recent good control. Patient reports no problems or side effects with the medication.                                                                                                                                                       .  HYPERTENSION - Patient has longstanding history of HTN , currently denies any symptoms referable to elevated blood pressure. Specifically denies chest pain, palpitations, dyspnea, orthopnea, PND or peripheral edema. Blood pressure readings have been in normal range. Current medication regimen is lisinopril 30 mg & metoprolol 50 mg. Patient denies any side effects of medication.                                                                                                                                                                                          .  HYPOTHYROIDISM - Patient has a longstanding history of chronic Hypothyroidism. Patient has been doing well, noting no tremor, insomnia, hair loss or changes in skin texture. Continues to take medications [levothyroxine] as directed, without adverse reactions or side effects. Last TSH:  Lab Results   Component Value Date    TSH 3.75 07/28/2017                                                                                                                                                   MEDICAL HISTORY:     Patient Active Problem List    Diagnosis Date Noted     Hyperlipidemia LDL goal <130 10/31/2010     Priority: High     Essential hypertension  with goal blood pressure less than 140/90 2005     Priority: High     CKD (chronic kidney disease) stage 3, GFR 30-59 ml/min 2018     Priority: Medium     Decreased GFR 2017     Priority: Medium     Elevated serum creatinine - 1.13 -2017     Priority: Medium     Environmental allergies 2016     Priority: Medium     Tubular adenomas of colon-  - repeat in 2019 - q 5 years       Priority: Medium     Anxiety 2014     Priority: Medium     Glaucoma 2014     Priority: Medium     Vulvar itching - ? early lichen sclerosis vs. other - superior vulva 2014     Priority: Medium     Itching in the vaginal area 2013     Priority: Medium     Osteopenia, unspecified location - was on fosamax, then Boniva secondary to hot flashes - off boniva since 2013     Priority: Medium     Family history of colon cancer- mother in her 40's-colonoscopies every 5 years- next one 2019      Priority: Medium     mother  of colon cancer in her 40's       Urgency incontinence      Priority: Medium     Advanced directives, counseling/discussion 10/24/2011     Priority: Medium     Advance Directive Problem List Overview:   Name Relationship Phone    Primary Health Care Agent            Alternative Health Care Agent          Discussed advance care planning with patient; information given to patient to review. However pt did decline at this time. 10/24/2011          Other specified hypothyroidism 2008     Priority: Medium     Hematuria 2005     Priority: Medium     Problem list name updated by automated process. Provider to review and confirm  [employer] Update utility       Dyspnea and respiratory abnormality 2005     Priority: Medium     Problem list name updated by automated process. 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     Skin cancer      Tubular adenoma of colon     2014 - repeat in 2019 - q 5 years      Urgency incontinence      Past Surgical History:   Procedure Laterality Date     ARTHROSCOPY SHOULDER  2013    [facility]     COLONOSCOPY  89,     Colonoscopies q 5 year -next 2019     CYSTOCELE REPAIR  10/31/1984     HYSTERECTOMY, PAP NO LONGER INDICATED  10/31/84    Hysterectomy, Total Abdominal w ovaries left intact     SURGICAL HISTORY OF -   75    Cholecystectomy & Incidental appendectomy     Current Outpatient Prescriptions   Medication Sig Dispense Refill     ASPIRIN 81 MG OR TABS 1 TABLET DAILY       brimonidine (ALPHAGAN P) 0.1 % ophthalmic solution Place 1 drop into both eyes 2 times daily        CALCIUM /VITAMIN D TABS   OR 2 qd       levothyroxine (SYNTHROID/LEVOTHROID) 75 MCG tablet TAKE 1 TABLET EVERY MORNING 90 tablet 0     lisinopril (PRINIVIL,ZESTRIL) 30 MG tablet TAKE 1 TABLET EVERY DAY 90 tablet 2     metoprolol succinate (TOPROL-XL) 50 MG 24 hr tablet TAKE 1 TABLET EVERY DAY 90 tablet 1     MULTI-VITAMIN OR TABS 1 qd       simvastatin (ZOCOR) 40 MG tablet TAKE 1 TABLET AT BEDTIME 90 tablet 0     triamterene-hydrochlorothiazide (MAXZIDE-25) 37.5-25 MG per tablet TAKE 1 TABLET EVERY DAY 90 tablet 0     clobetasol (TEMOVATE) 0.05 % ointment Apply sparingly to affected area of genitals twice weekly at nighttime 60 g 1     LEVOBUNOLOL HCL 0.5 % OP SOLN one drop each day       LORazepam (ATIVAN) 0.5 MG tablet Take 1 tablet (0.5 mg) by mouth 2 times daily Take 30 minutes prior to departure.  Do not operate a vehicle after taking this medication 10 tablet 0     OTC products: None, except as noted above    Allergies   Allergen Reactions      "Macrobid [Nitrofurantoin] GI Disturbance     Prednisone      Sulfa Drugs       Latex Allergy: NO    Social History   Substance Use Topics     Smoking status: Never Smoker     Smokeless tobacco: Never Used     Alcohol use No     History   Drug Use No       REVIEW OF SYSTEMS:   Constitutional, HEENT, cardiovascular, pulmonary, GI, , musculoskeletal, neuro, skin, endocrine and psych systems are negative, except as otherwise noted.    This document serves as a record of the services and decisions personally performed and made by Tomas Mahan MD. It was created on his behalf by Malia Avilez, a trained medical scribe. The creation of this document is based the provider's statements to the medical scribe.  Scribe Malia Avilez 10:02 AM, July 10, 2018    EXAM:   /68  Pulse 67  Temp 98.2  F (36.8  C) (Oral)  Ht 1.702 m (5' 7\")  Wt 76.2 kg (168 lb)  SpO2 98%  BMI 26.31 kg/m2    GENERAL APPEARANCE: healthy, alert and no distress     EYES: EOMI, PERRL     HENT: ear canals and TM's normal and nose and mouth without ulcers or lesions     NECK: no adenopathy, no asymmetry, masses, or scars and thyroid normal to palpation     RESP: lungs clear to auscultation - no rales, rhonchi or wheezes     CV: regular rates and rhythm, normal S1 S2, no S3 or S4 and no murmur, click or rub     ABDOMEN:  soft, nontender, no HSM or masses and bowel sounds normal     MS: extremities normal- no gross deformities noted, no evidence of inflammation in joints, FROM in all extremities.     SKIN: no suspicious lesions or rashes     NEURO: Normal strength and tone, sensory exam grossly normal, mentation intact and speech normal     PSYCH: mentation appears normal. and affect normal/bright     LYMPHATICS: No cervical adenopathy    DIAGNOSTICS:   No EKG required for low risk surgery (cataract, skin procedure, breast biopsy, etc)    Recent Labs   Lab Test  11/14/17   0906  08/09/17   1111  07/28/17   0733  12/29/16   1520   HGB   --    --   12.5 "  14.1   PLT   --    --   211  276   NA  138  140  141   --    POTASSIUM  4.0  4.3  5.0   --    CR  1.11*  1.16*  1.13*   --     TSH and BMP are pending and in EPIC  IMPRESSION:   Reason for surgery/procedure: Pt will be undergoing cataract surgery for treatment of a cataract in her left eye.    The proposed surgical procedure is considered LOW risk.    REVISED CARDIAC RISK INDEX  The patient has the following serious cardiovascular risks for perioperative complications such as (MI, PE, VFib and 3  AV Block):  No serious cardiac risks  INTERPRETATION: 0 risks: Class I (very low risk - 0.4% complication rate)    The patient has the following additional risks for perioperative complications:  No identified additional risks      ICD-10-CM    1. Preop general physical exam Z01.818    2. Cataract of left eye, unspecified cataract type H26.9    3. Essential hypertension with goal blood pressure less than 140/90 I10    4. Other specified hypothyroidism E03.8        RECOMMENDATIONS:         --Patient is to take all scheduled medications on the day of surgery    APPROVAL GIVEN to proceed with proposed procedure, without further diagnostic evaluation           The information in this document, created by the medical scribe for me, accurately reflects the services I personally performed and the decisions made by me. I have reviewed and approved this document for accuracy prior to leaving the patient care area.  10:02 AM, 07/10/18    Signed Electronically by: Tomas Mahan MD    Copy of this evaluation report is provided to requesting physician.    [facility] Preop Guidelines    Revised Cardiac Risk Index

## 2018-07-10 ENCOUNTER — OFFICE VISIT (OUTPATIENT)
Dept: FAMILY MEDICINE | Facility: CLINIC | Age: 78
End: 2018-07-10
Payer: COMMERCIAL

## 2018-07-10 VITALS
SYSTOLIC BLOOD PRESSURE: 112 MMHG | BODY MASS INDEX: 26.37 KG/M2 | HEIGHT: 67 IN | HEART RATE: 67 BPM | TEMPERATURE: 98.2 F | DIASTOLIC BLOOD PRESSURE: 68 MMHG | OXYGEN SATURATION: 98 % | WEIGHT: 168 LBS

## 2018-07-10 DIAGNOSIS — E03.8 OTHER SPECIFIED HYPOTHYROIDISM: ICD-10-CM

## 2018-07-10 DIAGNOSIS — Z01.818 PREOP GENERAL PHYSICAL EXAM: Primary | ICD-10-CM

## 2018-07-10 DIAGNOSIS — I10 ESSENTIAL HYPERTENSION WITH GOAL BLOOD PRESSURE LESS THAN 140/90: ICD-10-CM

## 2018-07-10 DIAGNOSIS — N18.30 CKD (CHRONIC KIDNEY DISEASE) STAGE 3, GFR 30-59 ML/MIN (H): ICD-10-CM

## 2018-07-10 DIAGNOSIS — H26.9 CATARACT OF LEFT EYE, UNSPECIFIED CATARACT TYPE: ICD-10-CM

## 2018-07-10 PROBLEM — C44.99: Status: RESOLVED | Noted: 2018-07-10 | Resolved: 2018-07-10

## 2018-07-10 PROBLEM — R94.4 DECREASED GFR: Status: RESOLVED | Noted: 2017-08-09 | Resolved: 2018-07-10

## 2018-07-10 PROBLEM — C44.99: Status: ACTIVE | Noted: 2018-07-10

## 2018-07-10 PROBLEM — R79.89 ELEVATED SERUM CREATININE: Status: RESOLVED | Noted: 2017-08-09 | Resolved: 2018-07-10

## 2018-07-10 LAB
ANION GAP SERPL CALCULATED.3IONS-SCNC: 10 MMOL/L (ref 3–14)
BUN SERPL-MCNC: 28 MG/DL (ref 7–30)
CALCIUM SERPL-MCNC: 9.5 MG/DL (ref 8.5–10.1)
CHLORIDE SERPL-SCNC: 105 MMOL/L (ref 94–109)
CO2 SERPL-SCNC: 25 MMOL/L (ref 20–32)
CREAT SERPL-MCNC: 1.19 MG/DL (ref 0.52–1.04)
GFR SERPL CREATININE-BSD FRML MDRD: 44 ML/MIN/1.7M2
GLUCOSE SERPL-MCNC: 104 MG/DL (ref 70–99)
POTASSIUM SERPL-SCNC: 4.4 MMOL/L (ref 3.4–5.3)
SODIUM SERPL-SCNC: 140 MMOL/L (ref 133–144)
TSH SERPL DL<=0.005 MIU/L-ACNC: 2.86 MU/L (ref 0.4–4)

## 2018-07-10 PROCEDURE — 99214 OFFICE O/P EST MOD 30 MIN: CPT | Performed by: FAMILY MEDICINE

## 2018-07-10 PROCEDURE — 80048 BASIC METABOLIC PNL TOTAL CA: CPT | Performed by: FAMILY MEDICINE

## 2018-07-10 PROCEDURE — 36415 COLL VENOUS BLD VENIPUNCTURE: CPT | Performed by: FAMILY MEDICINE

## 2018-07-10 PROCEDURE — 84443 ASSAY THYROID STIM HORMONE: CPT | Performed by: FAMILY MEDICINE

## 2018-07-10 NOTE — MR AVS SNAPSHOT
After Visit Summary   7/10/2018    Aruna Santos    MRN: [id number]           Patient Information     Date Of Birth          1940        Visit Information        Provider Department      7/10/2018 9:20 AM Tomas Mahan MD [facility]        Today's Diagnoses     Preop general physical exam    -  1    Cataract of left eye, unspecified cataract type        Essential hypertension with goal blood pressure less than 140/90        Other specified hypothyroidism        CKD (chronic kidney disease) stage 3, GFR 30-59 ml/min          Care Instructions      Before Your Surgery      Call your surgeon if there is any change in your health. This includes signs of a cold or flu (such as a sore throat, runny nose, cough, rash or fever).    Do not smoke, drink alcohol or take over the counter medicine (unless your surgeon or primary care doctor tells you to) for the 24 hours before and after surgery.    If you take prescribed drugs: Follow your doctor s orders about which medicines to take and which to stop until after surgery.    Eating and drinking prior to surgery: follow the instructions from your surgeon    Take a shower or bath the night before surgery. Use the soap your surgeon gave you to gently clean your skin. If you do not have soap from your surgeon, use your regular soap. Do not shave or scrub the surgery site.  Wear clean pajamas and have clean sheets on your bed.           Follow-ups after your visit        Your next 10 appointments already scheduled     Oct 04, 2018  9:45 AM CDT   [facility]    [address] [place] [address] [phone]              Who to contact     If you have questions or need follow up information about today's clinic visit or your schedule please contact [facility] directly at [phone].  Normal or  "non-critical lab and imaging results will be communicated to you by MyChart, letter or phone within 4 business days after the clinic has received the results. If you do not hear from us within 7 days, please contact the clinic through [email or web address] or phone. If you have a critical or abnormal lab result, we will notify you by phone as soon as possible.  Submit refill requests through [employer] or call your pharmacy and they will forward the refill request to us. Please allow 3 business days for your refill to be completed.          Additional Information About Your Visit        [employer] Information     [employer] lets you send messages to your doctor, view your test results, renew your prescriptions, schedule appointments and more. To sign up, go to [email or web address] . Click on \"Log in\" on the left side of the screen, which will take you to the Welcome page. Then click on \"Sign up Now\" on the right side of the page.     You will be asked to enter the access code listed below, as well as some personal information. Please follow the directions to create your username and password.     Your access code is: [email or web address]  Expires: 2018  2:59 PM     Your access code will  in 90 days. If you need help or a new code, please call your [place] clinic or [phone].        Care EveryWhere ID     This is your Care EveryWhere ID. This could be used by other organizations to access your [place] medical records  [id number]        Your Vitals Were     Pulse Temperature Height Pulse Oximetry BMI (Body Mass Index)       67 98.2  F (36.8  C) (Oral) 5' 7\" (1.702 m) 98% 26.31 kg/m2        Blood Pressure from Last 3 Encounters:   07/10/18 112/68   18 137/71   18 122/58    Weight from Last 3 Encounters:   07/10/18 168 lb (76.2 kg)   18 167 lb (75.8 kg)   17 167 lb 12.8 oz (76.1 kg)              We Performed the Following     Basic metabolic panel  (Ca, Cl, CO2, Creat, Gluc, K, Na, BUN)     TSH with free " T4 reflex        Primary Care Provider Office Phone # Fax #    Lisbet Simmons [phone]       [facility] [address]        Equal Access to Services     JERMAIN CARLSON : Juancho adams terrello Sopaul, walucida luqadaha, qaybta kaalmada adearmando, maura cruz kenjimilvia sparks laAlbertjody barker. So [facility] [phone].    ATENCIÓN: Si habla español, tiene a wolf disposición servicios gratuitos de asistencia lingüística. [facility] [phone].    We comply with applicable federal civil rights laws and [place] laws. We do not discriminate on the basis of race, color, national origin, age, disability, sex, sexual orientation, or gender identity.            Thank you!     Thank you for choosing [facility]  for your care. Our goal is always to provide you with excellent care. Hearing back from our patients is one way we can continue to improve our services. Please take a few minutes to complete the written survey that you may receive in the mail after your visit with us. Thank you!             Your Updated Medication List - Protect others around you: Learn how to safely use, store and throw away your medicines at www.disposemymeds.org.          This list is accurate as of 7/10/18 10:17 AM.  Always use your most recent med list.                   Brand Name Dispense Instructions for use Diagnosis    aspirin 81 MG tablet      1 TABLET DAILY        brimonidine 0.1 % ophthalmic solution    ALPHAGAN P     Place 1 drop into both eyes 2 times daily        CALCIUM /VITAMIN D TABS   OR      2 qd        clobetasol 0.05 % ointment    TEMOVATE    60 g    Apply sparingly to affected area of genitals twice weekly at nighttime    Vulvar itching, Itching in the vaginal area       levobunolol 0.5 % ophthalmic solution    BETAGAN     one drop each day        levothyroxine 75 MCG tablet    SYNTHROID/LEVOTHROID    90 tablet    TAKE 1 TABLET EVERY MORNING    Other specified hypothyroidism        lisinopril 30 MG tablet    PRINIVIL,ZESTRIL    90 tablet    TAKE 1 TABLET EVERY DAY    Essential hypertension with goal blood pressure less than 140/90       LORazepam 0.5 MG tablet    ATIVAN    10 tablet    Take 1 tablet (0.5 mg) by mouth 2 times daily Take 30 minutes prior to departure.  Do not operate a vehicle after taking this medication    Anxiety       metoprolol succinate 50 MG 24 hr tablet    TOPROL-XL    90 tablet    TAKE 1 TABLET EVERY DAY    Essential hypertension with goal blood pressure less than 140/90       Multi-vitamin Tabs tablet   Generic drug:  multivitamin, therapeutic with minerals      1 qd        simvastatin 40 MG tablet    ZOCOR    90 tablet    TAKE 1 TABLET AT BEDTIME    Hyperlipidemia LDL goal <130       triamterene-hydrochlorothiazide 37.5-25 MG per tablet    MAXZIDE-25    90 tablet    TAKE 1 TABLET EVERY DAY    Essential hypertension with goal blood pressure less than 140/90

## 2018-07-10 NOTE — LETTER
[address] [address]                  [phone]   July 18, 2018    Aruna LEONOR Santos  [address] [facility] [address]      Dear Aruna,    Here is a summary of your recent test results:    Kidney function (GFR) is decreased but stable.  ADVISE: recheck in 3 months (BMP, DX: 593.9 - unspecified disorder of kidney )   -Sodium is normal.   -Potassium is normal.   -Glucose is slight elevated and may be sign of early diabetes (prediabetes). ADVISE:: low carbohydrate diet, exercise, try to lose weight (if necessary) and recheck glucose in 12 months.   -TSH (thyroid stimulating hormone) level is normal which indicates appropriate thyroid replacement dosing.  ADVISE: continuing same replacement dose and recheck in 12 months (TSH w/ T4 reflex, DX: hypothyroidism.)     For additional lab test information, labtestsonline.org is an excellent reference.    Your test results are enclosed.      Please contact me if you have any questions.    In addition, here is a list of due or overdue Health Maintenance reminders.    Health Maintenance Due   Topic Date Due     Microalbumin Lab - yearly  07/28/2018     Wellness Visit with your Primary Provider - yearly  08/09/2018       Please call us at [phone] (or use [employer]) to address the above recommendations.            Thank you very much for trusting [facility]..     Healthy regards,       Lisbet Simmons M.D.          Results for orders placed or performed in visit on 07/10/18   Basic metabolic panel  (Ca, Cl, CO2, Creat, Gluc, K, Na, BUN)   Result Value Ref Range    Sodium 140 133 - 144 mmol/L    Potassium 4.4 3.4 - 5.3 mmol/L    Chloride 105 94 - 109 mmol/L    Carbon Dioxide 25 20 - 32 mmol/L    Anion Gap 10 3 - 14 mmol/L    Glucose 104 (H) 70 - 99 mg/dL    Urea Nitrogen 28 7 - 30 mg/dL    Creatinine 1.19 (H) 0.52 - 1.04 mg/dL    GFR Estimate 44 (L) >60 mL/min/1.7m2    GFR  Estimate If Black 53 (L) >60 mL/min/1.7m2    Calcium 9.5 8.5 - 10.1 mg/dL   TSH with free T4 reflex   Result Value Ref Range    TSH 2.86 0.40 - 4.00 mU/L

## 2018-07-18 NOTE — PROGRESS NOTES
Note to Staff: please send a result letter    -Kidney function (GFR) is decreased but stable.  ADVISE: recheck in 3 months (BMP, DX: 593.9 - unspecified disorder of kidney )  -Sodium is normal.  -Potassium is normal.  -Glucose is slight elevated and may be sign of early diabetes (prediabetes). ADVISE:: low carbohydrate diet, exercise, try to lose weight (if necessary) and recheck glucose in 12 months.  -TSH (thyroid stimulating hormone) level is normal which indicates appropriate thyroid replacement dosing.  ADVISE: continuing same replacement dose and recheck in 12 months (TSH w/ T4 reflex, DX: hypothyroidism.)     For additional lab test information, [email or web address].org is an excellent reference.

## 2018-08-27 ENCOUNTER — OFFICE VISIT (OUTPATIENT)
Dept: FAMILY MEDICINE | Facility: CLINIC | Age: 78
End: 2018-08-27
Payer: COMMERCIAL

## 2018-08-27 VITALS
BODY MASS INDEX: 26.37 KG/M2 | HEIGHT: 67 IN | WEIGHT: 168 LBS | HEART RATE: 62 BPM | DIASTOLIC BLOOD PRESSURE: 66 MMHG | SYSTOLIC BLOOD PRESSURE: 132 MMHG | OXYGEN SATURATION: 95 % | TEMPERATURE: 98.4 F

## 2018-08-27 DIAGNOSIS — J02.9 SORE THROAT: ICD-10-CM

## 2018-08-27 DIAGNOSIS — N18.30 CKD (CHRONIC KIDNEY DISEASE) STAGE 3, GFR 30-59 ML/MIN (H): ICD-10-CM

## 2018-08-27 DIAGNOSIS — I10 ESSENTIAL HYPERTENSION WITH GOAL BLOOD PRESSURE LESS THAN 140/90: ICD-10-CM

## 2018-08-27 DIAGNOSIS — Z51.81 MEDICATION MONITORING ENCOUNTER: ICD-10-CM

## 2018-08-27 DIAGNOSIS — J06.9 ACUTE URI: Primary | ICD-10-CM

## 2018-08-27 LAB
DEPRECATED S PYO AG THROAT QL EIA: NORMAL
SPECIMEN SOURCE: NORMAL

## 2018-08-27 PROCEDURE — 87081 CULTURE SCREEN ONLY: CPT | Performed by: FAMILY MEDICINE

## 2018-08-27 PROCEDURE — 99214 OFFICE O/P EST MOD 30 MIN: CPT | Performed by: FAMILY MEDICINE

## 2018-08-27 PROCEDURE — 87880 STREP A ASSAY W/OPTIC: CPT | Performed by: FAMILY MEDICINE

## 2018-08-27 RX ORDER — AZITHROMYCIN 250 MG/1
TABLET, FILM COATED ORAL
Qty: 6 TABLET | Refills: 0 | Status: SHIPPED | OUTPATIENT
Start: 2018-08-27 | End: 2018-09-27

## 2018-08-27 NOTE — PROGRESS NOTES
SUBJECTIVE:   Aruna Santos is a 77 year old female who presents to clinic today for the following health issues:    Acute Illness     Acute illness concerns: cough    Onset: 6 days - worst the last 3 days      Fever: no    Chills/Sweats: no    Headache (location?): no    Sinus Pressure:no    Conjunctivitis:  no    Ear Pain: no    Rhinorrhea: YES - yellow - no pnd    Congestion: YES - nasal, chest    Sore Throat: YES     Cough: YES-non-productive - dry - sounds productives    Wheeze: no    Decreased Appetite: YES    Nausea: no    Vomiting: no    Diarrhea:  no    Dysuria/Freq.: no    Fatigue/Achiness: YES    Sick/Strep Exposure: no    No allergy sx     Therapies Tried and outcome: OTC sinus and cold decongestant    Htn/CKD    BP Readings from Last 3 Encounters:   08/27/18 132/66   07/10/18 112/68   05/17/18 137/71     Creatinine   Date Value Ref Range Status   07/10/2018 1.19 (H) 0.52 - 1.04 mg/dL Final     Recent cataract surgery went well    Problem list and histories reviewed & adjusted, as indicated.  Additional history: as documented    Reviewed and updated as needed this visit by clinical staff  Tobacco  Allergies  Meds  Med Hx  Surg Hx  Fam Hx  Soc Hx      Reviewed and updated as needed this visit by Provider       body mass index is 26.31 kg/(m^2).    Wt Readings from Last 4 Encounters:   08/27/18 168 lb (76.2 kg)   07/10/18 168 lb (76.2 kg)   01/04/18 167 lb (75.8 kg)   08/09/17 167 lb 12.8 oz (76.1 kg)       Health Maintenance    Health Maintenance Due   Topic Date Due     MICROALBUMIN Q1 YEAR  07/28/2018     WELLNESS VISIT Q1 YR  08/09/2018     MAMMO Q1 YR  09/11/2018       Current Problem List    Patient Active Problem List   Diagnosis     Hematuria     Dyspnea and respiratory abnormality     Hyperlipidemia LDL goal <130     Advanced directives, counseling/discussion     Essential hypertension with goal blood pressure less than 140/90     Family history of colon cancer- mother in her  40's-colonoscopies every 5 years- next one      Osteopenia, unspecified location - was on fosamax, then Boniva secondary to hot flashes - off boniva since      Urgency incontinence     Vulvar itching - ? early lichen sclerosis vs. other - superior vulva     Glaucoma     Anxiety     Other specified hypothyroidism     Tubular adenomas of colon- 2014 - repeat in 2019 - q 5 years      Environmental allergies     CKD (chronic kidney disease) stage 3, GFR 30-59 ml/min       Past Medical History    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     Skin cancer      Tubular adenoma of colon     2014 - repeat in 2019 - q 5 years      Urgency incontinence        Past Surgical History    Past Surgical History:   Procedure Laterality Date     ARTHROSCOPY SHOULDER  2013    [facility]     COLONOSCOPY  89,     Colonoscopies q 5 year -next      CYSTOCELE REPAIR  10/31/1984     HYSTERECTOMY, PAP NO LONGER INDICATED  10/31/84    Hysterectomy, Total Abdominal w ovaries left intact     SURGICAL HISTORY OF -   75    Cholecystectomy & Incidental appendectomy       Current Medications    Current Outpatient Prescriptions   Medication Sig Dispense Refill     ASPIRIN 81 MG OR TABS 1 TABLET DAILY       azithromycin (ZITHROMAX) 250 MG tablet 2 tabs day 1 and the 1 tab daily for 4 more days 6 tablet 0     brimonidine (ALPHAGAN P) 0.1 % ophthalmic solution Place 1 drop into both eyes 2 times daily        CALCIUM /VITAMIN D TABS   OR 2 qd       clobetasol (TEMOVATE) 0.05 % ointment Apply sparingly to affected area of genitals twice weekly at nighttime 60 g 1      LEVOBUNOLOL HCL 0.5 % OP SOLN one drop each day       levothyroxine (SYNTHROID/LEVOTHROID) 75 MCG tablet TAKE 1 TABLET EVERY MORNING 90 tablet 0     lisinopril (PRINIVIL,ZESTRIL) 30 MG tablet TAKE 1 TABLET EVERY DAY 90 tablet 2     LORazepam (ATIVAN) 0.5 MG tablet Take 1 tablet (0.5 mg) by mouth 2 times daily Take 30 minutes prior to departure.  Do not operate a vehicle after taking this medication 10 tablet 0     metoprolol succinate (TOPROL-XL) 50 MG 24 hr tablet TAKE 1 TABLET EVERY DAY 90 tablet 1     MULTI-VITAMIN OR TABS 1 qd       simvastatin (ZOCOR) 40 MG tablet TAKE 1 TABLET AT BEDTIME 90 tablet 0     triamterene-hydrochlorothiazide (MAXZIDE-25) 37.5-25 MG per tablet TAKE 1 TABLET EVERY DAY 90 tablet 0       Allergies    Allergies   Allergen Reactions     Macrobid [Nitrofurantoin] GI Disturbance     Prednisone      Sulfa Drugs        Immunizations    Immunization History   Administered Date(s) Administered     Influenza (High Dose) 3 valent vaccine 10/09/2012, 10/22/2013, 10/21/2014, 11/09/2015, 10/25/2016, 11/14/2017     Influenza (IIV3) PF 11/09/2007, 11/26/2008, 11/27/2009, 10/13/2010, 10/24/2011     Pneumo Conj 13-V (2010&after) 07/23/2015     Pneumococcal 23 valent 10/24/2011     TDAP Vaccine (Boostrix) 06/24/2013     Zoster vaccine, live 06/03/2011       Family History    Family History   Problem Relation Age of Onset     Cancer - colorectal Mother      Hypertension Father      Cerebrovascular Disease Maternal Grandfather      Cerebrovascular Disease Paternal Grandmother        Social History    Social History     Social History     Marital status:      Spouse name: Valentin     Number of children: 2     Years of education: N/A     Occupational History      None      Social History Main Topics     Smoking status: Never Smoker     Smokeless tobacco: Never Used     Alcohol use No     Drug use: No     Sexual activity: Yes     Partners: Male     Other Topics Concern     Parent/Sibling W/ Cabg, Mi  "Or Angioplasty Before 65f 55m? No     Social History Narrative       All above reviewed and updated, all stable unless otherwise noted    Recent labs reviewed    ROS:  CONSTITUTIONAL: NEGATIVE for fever, chills, change in weight  INTEGUMENTARY/SKIN: NEGATIVE for worrisome rashes, moles or lesions  EYES: NEGATIVE for vision changes or irritation  ENT/MOUTH: NEGATIVE for ear, mouth and throat problems  RESP: NEGATIVE for significant cough or SOB  CV: NEGATIVE for chest pain, palpitations or peripheral edema  GI: NEGATIVE for nausea, abdominal pain, heartburn, or change in bowel habits  : NEGATIVE for frequency, dysuria, or hematuria  MUSCULOSKELETAL: NEGATIVE for significant arthralgias or myalgia  NEURO: NEGATIVE for weakness, dizziness or paresthesias  ENDOCRINE: NEGATIVE for temperature intolerance, skin/hair changes  HEME: NEGATIVE for bleeding problems  PSYCHIATRIC: NEGATIVE for changes in mood or affect    OBJECTIVE:                                                    /66  Pulse 62  Temp 98.4  F (36.9  C) (Oral)  Ht 5' 7\" (1.702 m)  Wt 168 lb (76.2 kg)  SpO2 95%  BMI 26.31 kg/m2  Body mass index is 26.31 kg/(m^2).  GENERAL: healthy, alert and no distress  EYES: Eyes grossly normal to inspection, extraocular movements - intact, and PERRL  HENT: ear canals- normal; TMs- normal; Nose- normal; Mouth- no ulcers, no lesions  NECK: no tenderness, no adenopathy, no asymmetry, no masses, no stiffness; thyroid- normal to palpation  RESP: lungs clear to auscultation - no rales, no rhonchi, no wheezes  CV: regular rates and rhythm, normal S1 S2, no S3 or S4 and no murmur, no click or rub -  ABDOMEN: soft, no tenderness, no  hepatosplenomegaly, no masses, normal bowel sounds  MS: extremities- no gross deformities noted, no edema  SKIN: no suspicious lesions, no rashes  NEURO: strength and tone- normal, sensory exam- grossly normal, mentation- intact, speech- normal, reflexes- symmetric  BACK: no CVA tenderness, " no paralumbar tenderness  PSYCH: Alert and oriented times 3; speech- coherent , normal rate and volume; able to articulate logical thoughts, able to abstract reason, no tangential thoughts, no hallucinations or delusions, affect- normal  LYMPHATICS: no cervical adenopathy    DIAGNOSTICS/PROCEDURES:                                                      Strep screen - Negative     ASSESSMENT/PLAN:                                                        ICD-10-CM    1. Acute URI J06.9 azithromycin (ZITHROMAX) 250 MG tablet   2. Sore throat J02.9 Rapid strep screen     Beta strep group A culture   3. CKD (chronic kidney disease) stage 3, GFR 30-59 ml/min N18.3    4. Essential hypertension with goal blood pressure less than 140/90 I10    5. Medication monitoring encounter Z51.81        Discussed treatment/modality options, including risk and benefits, she desires zithromax, sx cares, cpx scheduled with [facility]ON[facility]. All diagnosis above reviewed and noted above, otherwise stable.  See [facility] orders for further details.  Follow up in 6 week(s) and as needed.    Return in about 6 weeks (around 10/8/2018), or if symptoms worsen or fail to improve, for Complete Physical.    Health Maintenance Due   Topic Date Due     MICROALBUMIN Q1 YEAR  07/28/2018     WELLNESS VISIT Q1 YR  08/09/2018     MAMMO Q1 YR  09/11/2018       See Patient Instructions           Michael Santos MD [address]  [address] [phone] Fax

## 2018-08-27 NOTE — MR AVS SNAPSHOT
After Visit Summary   8/27/2018    Aruna Santos    MRN: [id number]           Patient Information     Date Of Birth          1940        Visit Information        Provider Department      8/27/2018 4:00 PM Michael Santos MD [facility]        Today's Diagnoses     Acute URI    -  1    Sore throat        CKD (chronic kidney disease) stage 3, GFR 30-59 ml/min        Essential hypertension with goal blood pressure less than 140/90        Medication monitoring encounter          Care Instructions    mucinex dm    zithromax    [facility]                        To reach your care team during and after hours:   [phone]  To reach our pharmacy:        [phone]    Clinic Hours                        Our clinic hours are:    Monday   7:30 am to 7:00 pm                  Tuesday through Friday 7:30 am to 5:00 pm                             Saturday   8:00 am to 12:00 pm      Sunday   Closed      Pharmacy Hours                        Our pharmacy hours are:    Monday   8:30 am to 7:00 pm       Tuesday to Friday  8:30 am to 6:00 pm                       Saturday    9:00 am to 1:00 pm              Sunday    Closed              There is also information available at our web site:  [email or web address]    If your provider ordered any lab tests and you do not receive the results within 10 business days, please call the clinic.    If you need a medication refill please contact your pharmacy.  Please allow 2-3 business days for your refill to be completed.    Our clinic offers telephone visits and e visits.  Please ask one of your team members to explain more.      Use [facility] (secure email communication and access to your chart) to send your primary care provider a message or make an appointment. Ask someone on your Team how to sign up for [employer].  Immunizations                      Immunization History   Administered Date(s) Administered     Influenza (High Dose) 3 valent vaccine  10/09/2012, 10/22/2013, 10/21/2014, 11/09/2015, 10/25/2016, 11/14/2017     Influenza (IIV3) PF 11/09/2007, 11/26/2008, 11/27/2009, 10/13/2010, 10/24/2011     Pneumo Conj 13-V (2010&after) 07/23/2015     Pneumococcal 23 valent 10/24/2011     TDAP Vaccine (Boostrix) 06/24/2013     Zoster vaccine, live 06/03/2011        Health Maintenance                         Health Maintenance Due   Topic Date Due     Microalbumin Lab - yearly  07/28/2018     Wellness Visit with your Primary Provider - yearly  08/09/2018     Mammogram - yearly  09/11/2018               Follow-ups after your visit        Follow-up notes from your care team     Return in about 6 weeks (around 10/8/2018), or if symptoms worsen or fail to improve, for Complete Physical.      Your next 10 appointments already scheduled     Sep 27, 2018  1:40 PM CDT   PHYSICAL with Lisbet Simmons MD   [facility] ([facility])    [address] [place] [id number] [phone]            Oct 04, 2018  9:45 AM CDT   [facility]    [address] [place] [id number] [phone]              Who to contact     If you have questions or need follow up information about today's clinic visit or your schedule please contact [facility] directly at [phone].  Normal or non-critical lab and imaging results will be communicated to you by MyChart, letter or phone within 4 business days after the clinic has received the results. If you do not hear from us within 7 days, please contact the clinic through MyChart or phone. If you have a critical or abnormal lab result, we will notify you by phone as soon as possible.  Submit refill requests through dooyoo or call your pharmacy and they will forward the refill request to us. Please allow 3 business days for your refill to be  "completed.          Additional Information About Your Visit        [employer] Information     Plug Apps lets you send messages to your doctor, view your test results, renew your prescriptions, schedule appointments and more. To sign up, go to [email or web address] . Click on \"Log in\" on the left side of the screen, which will take you to the Welcome page. Then click on \"Sign up Now\" on the right side of the page.     You will be asked to enter the access code listed below, as well as some personal information. Please follow the directions to create your username and password.     Your access code is: [email or web address]  Expires: 2018  5:08 PM     Your access code will  in 90 days. If you need help or a new code, please call your [facility] clinic or [phone].        Care EveryWhere ID     This is your Care EveryWhere ID. This could be used by other organizations to access your [facility] medical records  [id number]        Your Vitals Were     Pulse Temperature Height Pulse Oximetry BMI (Body Mass Index)       62 98.4  F (36.9  C) (Oral) 5' 7\" (1.702 m) 95% 26.31 kg/m2        Blood Pressure from Last 3 Encounters:   18 132/66   07/10/18 112/68   18 137/71    Weight from Last 3 Encounters:   18 168 lb (76.2 kg)   07/10/18 168 lb (76.2 kg)   18 167 lb (75.8 kg)              We Performed the Following     Beta strep group A culture     Rapid strep screen          Today's Medication Changes          These changes are accurate as of 18  5:08 PM.  If you have any questions, ask your nurse or doctor.               Start taking these medicines.        Dose/Directions    azithromycin 250 MG tablet   Commonly known as:  ZITHROMAX   Used for:  Acute URI   Started by:  Michael Santos MD        2 tabs day 1 and the 1 tab daily for 4 more days   Quantity:  6 tablet   Refills:  0            Where to get your medicines      These medications were sent to [facility] - [place], [place] - " [address], [facility] [address]     Phone:  [phone]     azithromycin 250 MG tablet                Primary Care Provider Office Phone # Fax #    Lisbet Simmons [phone]       [address] [place] [id number]        Equal Access to Services     JERMAIN CARLSON : Hadii aad ku hadasho Soomaali, waaxda luqadaha, qaybta kaalmada adeegyada, waxay idiin hayaan adeeg kharash la'aan . So [facility] [phone].    ATENCIÓN: Si habla español, tiene a wolf disposición servicios gratuitos de asistencia lingüística. Llame al [phone].    We comply with applicable federal civil rights laws and [place] laws. We do not discriminate on the basis of race, color, national origin, age, disability, sex, sexual orientation, or gender identity.            Thank you!     Thank you for choosing [facility]  for your care. Our goal is always to provide you with excellent care. Hearing back from our patients is one way we can continue to improve our services. Please take a few minutes to complete the written survey that you may receive in the mail after your visit with us. Thank you!             Your Updated Medication List - Protect others around you: Learn how to safely use, store and throw away your medicines at www.disposemymeds.org.          This list is accurate as of 8/27/18  5:08 PM.  Always use your most recent med list.                   Brand Name Dispense Instructions for use Diagnosis    aspirin 81 MG tablet      1 TABLET DAILY        azithromycin 250 MG tablet    ZITHROMAX    6 tablet    2 tabs day 1 and the 1 tab daily for 4 more days    Acute URI       brimonidine 0.1 % ophthalmic solution    ALPHAGAN P     Place 1 drop into both eyes 2 times daily        CALCIUM /VITAMIN D TABS   OR      2 qd        clobetasol 0.05 % ointment    TEMOVATE    60 g    Apply sparingly to affected area of genitals twice weekly at nighttime    Vulvar  itching, Itching in the vaginal area       levobunolol 0.5 % ophthalmic solution    BETAGAN     one drop each day        levothyroxine 75 MCG tablet    SYNTHROID/LEVOTHROID    90 tablet    TAKE 1 TABLET EVERY MORNING    Other specified hypothyroidism       lisinopril 30 MG tablet    PRINIVIL,ZESTRIL    90 tablet    TAKE 1 TABLET EVERY DAY    Essential hypertension with goal blood pressure less than 140/90       LORazepam 0.5 MG tablet    ATIVAN    10 tablet    Take 1 tablet (0.5 mg) by mouth 2 times daily Take 30 minutes prior to departure.  Do not operate a vehicle after taking this medication    Anxiety       metoprolol succinate 50 MG 24 hr tablet    TOPROL-XL    90 tablet    TAKE 1 TABLET EVERY DAY    Essential hypertension with goal blood pressure less than 140/90       Multi-vitamin Tabs tablet   Generic drug:  multivitamin, therapeutic with minerals      1 qd        simvastatin 40 MG tablet    ZOCOR    90 tablet    TAKE 1 TABLET AT BEDTIME    Hyperlipidemia LDL goal <130       triamterene-hydrochlorothiazide 37.5-25 MG per tablet    MAXZIDE-25    90 tablet    TAKE 1 TABLET EVERY DAY    Essential hypertension with goal blood pressure less than 140/90

## 2018-08-27 NOTE — PATIENT INSTRUCTIONS
mucinex dm    zithromax    [facility]                        To reach your care team during and after hours:   [phone]  To reach our pharmacy:        [phone]    Clinic Hours                        Our clinic hours are:    Monday   7:30 am to 7:00 pm                  Tuesday through Friday 7:30 am to 5:00 pm                             Saturday   8:00 am to 12:00 pm      Sunday   Closed      Pharmacy Hours                        Our pharmacy hours are:    Monday   8:30 am to 7:00 pm       Tuesday to Friday  8:30 am to 6:00 pm                       Saturday    9:00 am to 1:00 pm              Sunday    Closed              There is also information available at our web site:  [email or web address]    If your provider ordered any lab tests and you do not receive the results within 10 business days, please call the clinic.    If you need a medication refill please contact your pharmacy.  Please allow 2-3 business days for your refill to be completed.    Our clinic offers telephone visits and e visits.  Please ask one of your team members to explain more.      Use [email or web address] (secure email communication and access to your chart) to send your primary care provider a message or make an appointment. Ask someone on your Team how to sign up for [email or web address].  Immunizations                      Immunization History   Administered Date(s) Administered     Influenza (High Dose) 3 valent vaccine 10/09/2012, 10/22/2013, 10/21/2014, 11/09/2015, 10/25/2016, 11/14/2017     Influenza (IIV3) PF 11/09/2007, 11/26/2008, 11/27/2009, 10/13/2010, 10/24/2011     Pneumo Conj 13-V (2010&after) 07/23/2015     Pneumococcal 23 valent 10/24/2011     TDAP Vaccine (Boostrix) 06/24/2013     Zoster vaccine, live 06/03/2011        Health Maintenance                         Health Maintenance Due   Topic Date Due     Microalbumin Lab - yearly  07/28/2018     Wellness Visit with your Primary Provider - yearly  08/09/2018     Mammogram -  yearly  09/11/2018

## 2018-08-28 LAB
BACTERIA SPEC CULT: NORMAL
SPECIMEN SOURCE: NORMAL

## 2018-09-27 ENCOUNTER — OFFICE VISIT (OUTPATIENT)
Dept: FAMILY MEDICINE | Facility: CLINIC | Age: 78
End: 2018-09-27
Payer: COMMERCIAL

## 2018-09-27 VITALS
DIASTOLIC BLOOD PRESSURE: 62 MMHG | TEMPERATURE: 98.1 F | WEIGHT: 168.8 LBS | SYSTOLIC BLOOD PRESSURE: 122 MMHG | BODY MASS INDEX: 26.49 KG/M2 | HEIGHT: 67 IN | OXYGEN SATURATION: 100 % | HEART RATE: 59 BPM

## 2018-09-27 DIAGNOSIS — L29.2 VULVAR ITCHING: ICD-10-CM

## 2018-09-27 DIAGNOSIS — C44.90 PRIMARY ADNEXAL CARCINOMA OF SKIN: ICD-10-CM

## 2018-09-27 DIAGNOSIS — N18.30 CKD (CHRONIC KIDNEY DISEASE) STAGE 3, GFR 30-59 ML/MIN (H): ICD-10-CM

## 2018-09-27 DIAGNOSIS — N89.8 ITCHING IN THE VAGINAL AREA: ICD-10-CM

## 2018-09-27 DIAGNOSIS — Z00.01 ENCOUNTER FOR GENERAL ADULT MEDICAL EXAMINATION WITH ABNORMAL FINDINGS: Primary | ICD-10-CM

## 2018-09-27 DIAGNOSIS — Z23 NEED FOR SHINGLES VACCINE: ICD-10-CM

## 2018-09-27 DIAGNOSIS — E78.5 HYPERLIPIDEMIA LDL GOAL <130: ICD-10-CM

## 2018-09-27 DIAGNOSIS — I10 ESSENTIAL HYPERTENSION WITH GOAL BLOOD PRESSURE LESS THAN 140/90: ICD-10-CM

## 2018-09-27 DIAGNOSIS — F41.9 ANXIETY: ICD-10-CM

## 2018-09-27 DIAGNOSIS — E03.8 OTHER SPECIFIED HYPOTHYROIDISM: ICD-10-CM

## 2018-09-27 DIAGNOSIS — M85.80 OSTEOPENIA, UNSPECIFIED LOCATION: ICD-10-CM

## 2018-09-27 DIAGNOSIS — D12.6 TUBULAR ADENOMA OF COLON: ICD-10-CM

## 2018-09-27 DIAGNOSIS — Z00.00 MEDICARE ANNUAL WELLNESS VISIT, SUBSEQUENT: ICD-10-CM

## 2018-09-27 DIAGNOSIS — Z23 NEED FOR PROPHYLACTIC VACCINATION AND INOCULATION AGAINST INFLUENZA: ICD-10-CM

## 2018-09-27 DIAGNOSIS — Z12.31 VISIT FOR SCREENING MAMMOGRAM: ICD-10-CM

## 2018-09-27 DIAGNOSIS — Z78.0 ASYMPTOMATIC MENOPAUSAL STATE: ICD-10-CM

## 2018-09-27 DIAGNOSIS — Z80.0 FAMILY HISTORY OF COLON CANCER: ICD-10-CM

## 2018-09-27 PROCEDURE — G0439 PPPS, SUBSEQ VISIT: HCPCS | Performed by: FAMILY MEDICINE

## 2018-09-27 PROCEDURE — 90662 IIV NO PRSV INCREASED AG IM: CPT | Performed by: FAMILY MEDICINE

## 2018-09-27 PROCEDURE — G0008 ADMIN INFLUENZA VIRUS VAC: HCPCS | Performed by: FAMILY MEDICINE

## 2018-09-27 PROCEDURE — 99214 OFFICE O/P EST MOD 30 MIN: CPT | Mod: 25 | Performed by: FAMILY MEDICINE

## 2018-09-27 RX ORDER — LEVOTHYROXINE SODIUM 75 UG/1
TABLET ORAL
Qty: 90 TABLET | Refills: 3 | Status: SHIPPED | OUTPATIENT
Start: 2018-09-27 | End: 2019-05-30

## 2018-09-27 RX ORDER — TRIAMTERENE/HYDROCHLOROTHIAZID 37.5-25 MG
TABLET ORAL
Qty: 90 TABLET | Refills: 1 | Status: SHIPPED | OUTPATIENT
Start: 2018-09-27 | End: 2019-05-13

## 2018-09-27 RX ORDER — CLOBETASOL PROPIONATE 0.5 MG/G
OINTMENT TOPICAL
Qty: 60 G | Refills: 1 | Status: SHIPPED | OUTPATIENT
Start: 2018-09-27 | End: 2018-12-12

## 2018-09-27 RX ORDER — METOPROLOL SUCCINATE 50 MG/1
TABLET, EXTENDED RELEASE ORAL
Qty: 90 TABLET | Refills: 1 | Status: SHIPPED | OUTPATIENT
Start: 2018-09-27 | End: 2019-05-13

## 2018-09-27 RX ORDER — SIMVASTATIN 40 MG
40 TABLET ORAL AT BEDTIME
Qty: 90 TABLET | Refills: 3 | Status: SHIPPED | OUTPATIENT
Start: 2018-09-27 | End: 2019-10-02

## 2018-09-27 RX ORDER — LISINOPRIL 30 MG/1
TABLET ORAL
Qty: 90 TABLET | Refills: 3 | Status: SHIPPED | OUTPATIENT
Start: 2018-09-27 | End: 2019-10-02

## 2018-09-27 ASSESSMENT — ANXIETY QUESTIONNAIRES
GAD7 TOTAL SCORE: 0
5. BEING SO RESTLESS THAT IT IS HARD TO SIT STILL: NOT AT ALL
7. FEELING AFRAID AS IF SOMETHING AWFUL MIGHT HAPPEN: NOT AT ALL
6. BECOMING EASILY ANNOYED OR IRRITABLE: NOT AT ALL
3. WORRYING TOO MUCH ABOUT DIFFERENT THINGS: NOT AT ALL
2. NOT BEING ABLE TO STOP OR CONTROL WORRYING: NOT AT ALL
1. FEELING NERVOUS, ANXIOUS, OR ON EDGE: NOT AT ALL

## 2018-09-27 ASSESSMENT — PATIENT HEALTH QUESTIONNAIRE - PHQ9: 5. POOR APPETITE OR OVEREATING: NOT AT ALL

## 2018-09-27 NOTE — NURSING NOTE

## 2018-09-27 NOTE — PROGRESS NOTES
SUBJECTIVE:   Aruna Santos is a 78 year old female who presents for Preventive Visit.  Are you in the first 12 months of your Medicare Part B coverage?  No    Healthy Habits:    Do you get at least three servings of calcium containing foods daily (dairy, green leafy vegetables, etc.)? Yes and taking supplements    Amount of exercise or daily activities, outside of work: gardening and cleaning    Problems taking medications regularly No    Medication side effects: No    Have you had an eye exam in the past two years? yes    Do you see a dentist twice per year? Once per year    Do you have sleep apnea, excessive snoring or daytime drowsiness? Sleep apnea      Ability to successfully perform activities of daily living: Yes, no assistance needed    Home safety:  none identified     Hearing impairment: No    Fall risk:  Fallen 2 or more times in the past year?: No  Any fall with injury in the past year?: No    COGNITIVE SCREEN  1) Repeat 3 items (Leader, Season, Table)  2) Clock draw: NORMAL  3) 3 item recall: Recalls 3 objects  Results: 3 items recalled: COGNITIVE IMPAIRMENT LESS LIKELY    Mini-CogTM Copyright ISAAK Glaser. Licensed by the author for use in [employer]; reprinted with permission (sogriselda@[facility]). All rights reserved.      Over the last couple weeks she has had more soreness in her hands.  She has gone to [facility] in the past.      Hyperlipidemia Follow-Up      Rate your low fat/cholesterol diet?: fair    Taking statin?  Yes, no muscle aches from statin    Other lipid medications/supplements?:  None    Recent Labs   Lab Test  03/20/18   0929  07/28/17   0733   07/23/15   1016  06/27/14   0929   CHOL  220*  198   < >  194  212*   HDL  52  48*   < >  46*  48*   LDL  113*  85   < >  91  114   TRIG  276*  323*   < >  285*  251*   CHOLHDLRATIO   --    --    --   4.2  4.4    < > = values in this interval not displayed.          Lab Results   Component Value Date    CHOL 220 03/20/2018    CHOL 198  07/28/2017     Lab Results   Component Value Date    HDL 52 03/20/2018    HDL 48 07/28/2017     Lab Results   Component Value Date     03/20/2018    LDL 85 07/28/2017     Lab Results   Component Value Date    TRIG 276 03/20/2018    TRIG 323 07/28/2017     Lab Results   Component Value Date    CHOLHDLRATIO 4.2 07/23/2015    CHOLHDLRATIO 4.4 06/27/2014       Hypertension Follow-up      Outpatient blood pressures are not being checked.    Low Salt Diet: low salt    BP Readings from Last 5 Encounters:   09/27/18 122/62   08/27/18 132/66   07/10/18 112/68   05/17/18 137/71   05/11/18 122/58     Wt Readings from Last 5 Encounters:   09/27/18 168 lb 12.8 oz (76.6 kg)   08/27/18 168 lb (76.2 kg)   07/10/18 168 lb (76.2 kg)   01/04/18 167 lb (75.8 kg)   08/09/17 167 lb 12.8 oz (76.1 kg)       Anxiety Follow-Up    Status since last visit: No change    Other associated symptoms:None    Complicating factors:   Significant life event: No   Current substance abuse: None  Depression symptoms: No  RISA-7 SCORE 8/9/2017 9/27/2018   Total Score 0 0   RISA-7      Chronic Kidney Disease Follow-up:       Current NSAID use?  Yes:   Aspirin 81 mg Daily    Lab Results   Component Value Date    CR 1.19 07/10/2018     GFR Estimate   Date Value Ref Range Status   07/10/2018 44 (L) >60 mL/min/1.7m2 Final     Comment:     Non  GFR Calc   11/14/2017 48 (L) >60 mL/min/1.7m2 Final     Comment:     Non  GFR Calc   08/09/2017 46 (L) >60 mL/min/1.7m2 Final     Comment:     Non  GFR Calc         Hypothyroidism Follow-up:       Since last visit, patient describes the following symptoms: fatigue    TSH   Date Value Ref Range Status   07/10/2018 2.86 0.40 - 4.00 mU/L Final   07/28/2017 3.75 0.40 - 4.00 mU/L Final   07/25/2016 3.17 0.40 - 4.00 mU/L Final   07/23/2015 2.94 0.40 - 4.00 mU/L Final   06/27/2014 3.10 0.4 - 5.0 mU/L Final     T4 Free   Date Value Ref Range Status   07/28/2017 1.02 0.76 - 1.46  ng/dL Final   07/25/2016 1.08 0.76 - 1.46 ng/dL Final   07/23/2015 1.21 0.76 - 1.46 ng/dL Final   06/27/2014 1.13 0.70 - 1.85 ng/dL Final   06/24/2013 1.07 0.70 - 1.85 ng/dL Final       Reviewed and updated as needed this visit by clinical staff  Tobacco  Allergies  Meds  Problems  Med Hx  Surg Hx  Fam Hx  Soc Hx          Reviewed and updated as needed this visit by Provider  Problems        Social History   Substance Use Topics     Smoking status: Never Smoker     Smokeless tobacco: Never Used     Alcohol use No       If you drink alcohol do you typically have >3 drinks per day or >7 drinks per week? No                        Today's PHQ-2 Score:   PHQ-2 ( 1999 [employer]) 9/27/2018 7/10/2018   Q1: Little interest or pleasure in doing things 0 0   Q2: Feeling down, depressed or hopeless 0 0   PHQ-2 Score 0 0       Do you feel safe in your environment - Yes    Do you have a Health Care Directive?: Yes: Advance Directive has been received and scanned.    Current providers sharing in care for this patient include:   Patient Care Team:  Lisbet Simmons MD as PCP - General (Family Practice)    The following health maintenance items are reviewed in Epic and correct as of today:  Health Maintenance   Topic Date Due     MICROALBUMIN Q1 YEAR  07/28/2018     WELLNESS VISIT Q1 YR  08/09/2018     INFLUENZA VACCINE (1) 09/01/2018     MAMMO Q1 YR  09/11/2018     TSH Q1 YEAR  07/10/2019     BMP Q1 YR  07/10/2019     FALL RISK ASSESSMENT  07/10/2019     PHQ-2 Q1 YR  07/10/2019     ADVANCE DIRECTIVE PLANNING Q5 YRS  07/23/2020     LIPID SCREEN Q5 YR FEMALE (SYSTEM ASSIGNED)  03/20/2023     TETANUS IMMUNIZATION (SYSTEM ASSIGNED)  06/24/2023     COLON CANCER SCREEN (SYSTEM ASSIGNED)  05/01/2028     DEXA SCAN SCREENING (SYSTEM ASSIGNED)  Completed     PNEUMOCOCCAL  Completed     BP Readings from Last 3 Encounters:   09/27/18 122/62   08/27/18 132/66   07/10/18 112/68    Wt Readings from Last 3 Encounters:   09/27/18 168 lb  12.8 oz (76.6 kg)   08/27/18 168 lb (76.2 kg)   07/10/18 168 lb (76.2 kg)                  Patient Active Problem List   Diagnosis     Hematuria     Dyspnea and respiratory abnormality     Hyperlipidemia LDL goal <130     Advanced directives, counseling/discussion     Essential hypertension with goal blood pressure less than 140/90     Family history of colon cancer- mother in her 40's-colonoscopies every 5 years- next one 2023     Osteopenia, unspecified location - was on fosamax, then Boniva secondary to hot flashes - off boniva since 2012     Urgency incontinence     Vulvar itching - ? early lichen sclerosis vs. 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      Past Surgical History:   Procedure Laterality Date     ARTHROSCOPY SHOULDER  1/2013    [facility]     COLONOSCOPY  9/22/94,     Colonoscopies q 5 year -next 2019     CYSTOCELE REPAIR  10/31/1984     HYSTERECTOMY, PAP NO LONGER INDICATED  10/31/84    Hysterectomy, Total Abdominal w ovaries left intact     SURGICAL HISTORY OF -   5/17/73    Cholecystectomy & Incidental appendectomy       Social History   Substance Use Topics     Smoking status: Never Smoker     Smokeless tobacco: Never Used     Alcohol use No     Family History   Problem Relation Age of Onset     Cancer - colorectal Mother      Hypertension Father      Cerebrovascular Disease Maternal Grandfather      Cerebrovascular Disease Paternal Grandmother          Current Outpatient Prescriptions   Medication Sig Dispense Refill     ASPIRIN 81 MG OR TABS 1 TABLET DAILY       brimonidine (ALPHAGAN P) 0.1 % ophthalmic solution Place 1 drop into both eyes 2 times daily        CALCIUM /VITAMIN D  TABS   OR 2 qd       clobetasol (TEMOVATE) 0.05 % ointment Apply sparingly to affected area of genitals twice weekly at nighttime 60 g 1     LEVOBUNOLOL HCL 0.5 % OP SOLN one drop each day       levothyroxine (SYNTHROID/LEVOTHROID) 75 MCG tablet TAKE 1 TABLET EVERY MORNING 90 tablet 3     lisinopril (PRINIVIL,ZESTRIL) 30 MG tablet TAKE 1 TABLET EVERY DAY 90 tablet 3     LORazepam (ATIVAN) 0.5 MG tablet Take 1 tablet (0.5 mg) by mouth 2 times daily Take 30 minutes prior to departure.  Do not operate a vehicle after taking this medication 10 tablet 0     metoprolol succinate (TOPROL-XL) 50 MG 24 hr tablet TAKE 1 TABLET EVERY DAY 90 tablet 1     MULTI-VITAMIN OR TABS 1 qd       simvastatin (ZOCOR) 40 MG tablet Take 1 tablet (40 mg) by mouth At Bedtime 90 tablet 3     triamterene-hydrochlorothiazide (MAXZIDE-25) 37.5-25 MG per tablet TAKE 1 TABLET EVERY DAY 90 tablet 1     zoster vaccine recombinant adjuvanted (SHINGRIX) injection Inject 0.5 mLs into the muscle once for 1 dose 0.5 mL 1     [DISCONTINUED] levothyroxine (SYNTHROID/LEVOTHROID) 75 MCG tablet TAKE 1 TABLET EVERY MORNING 90 tablet 0     [DISCONTINUED] lisinopril (PRINIVIL,ZESTRIL) 30 MG tablet TAKE 1 TABLET EVERY DAY 90 tablet 2     [DISCONTINUED] metoprolol succinate (TOPROL-XL) 50 MG 24 hr tablet TAKE 1 TABLET EVERY DAY 90 tablet 1     [DISCONTINUED] simvastatin (ZOCOR) 40 MG tablet TAKE 1 TABLET AT BEDTIME 90 tablet 0     [DISCONTINUED] triamterene-hydrochlorothiazide (MAXZIDE-25) 37.5-25 MG per tablet TAKE 1 TABLET EVERY DAY 90 tablet 0     Allergies   Allergen Reactions     Macrobid [Nitrofurantoin] GI Disturbance     Prednisone      Sulfa Drugs      Recent Labs   Lab Test  07/10/18   1019  03/20/18   1621  03/20/18   0929  11/14/17   0906   07/28/17   0733   07/25/16   0730   LDL   --    --   113*   --    --   85   --   83   HDL   --    --   52   --    --   48*   --   49*   TRIG   --    --   276*   --    --   323*   --   284*   ALT   --   24   --    --   "  --   26   --   35   CR  1.19*   --    --   1.11*   < >  1.13*   < >  1.05*   GFRESTIMATED  44*   --    --   48*   < >  47*   < >  51*   GFRESTBLACK  53*   --    --   58*   < >  57*   < >  62   POTASSIUM  4.4   --    --   4.0   < >  5.0   < >  4.7   TSH  2.86   --    --    --    --   3.75   --   3.17    < > = values in this interval not displayed.      Pneumonia Vaccine:Adults age 65+ who received Pneumovax (PPSV23) at 65 years or older: Should be given PCV13 > 1 year after their most recent PPSV23      Mammogram Screening: Patient over age 75, has elected to continue with mammography screening.    Ma Screening Digital Bilateral    Result Date: 8/20/2015  Narrative: SCREENING MAMMOGRAM, BILATERAL, DIGITAL w/CAD - 8/20/2015 10:21 AM. BREAST SYMPTOMS: No current breast complaints. COMPARISON:  08/18/2014, 08/09/2011. BREAST DENSITY: Scattered fibroglandular densities. COMMENTS: No findings of suspicion for malignancy.            History of abnormal Pap smear: Status post benign hysterectomy. Health Maintenance and Surgical History updated.  Last 3 Pap and HPV Results:   PAP / HPV 5/19/2008   PAP NIL       ROS:  Constitutional, HEENT, cardiovascular, pulmonary, GI, , musculoskeletal, neuro, skin, endocrine and psych systems are negative, except as otherwise noted.    OBJECTIVE:   /62 (BP Location: Left arm, Patient Position: Chair, Cuff Size: Adult Regular)  Pulse 59  Temp 98.1  F (36.7  C) (Oral)  Ht 5' 7\" (1.702 m)  Wt 168 lb 12.8 oz (76.6 kg)  SpO2 100%  BMI 26.44 kg/m2 Estimated body mass index is 26.44 kg/(m^2) as calculated from the following:    Height as of this encounter: 5' 7\" (1.702 m).    Weight as of this encounter: 168 lb 12.8 oz (76.6 kg).  EXAM:   GENERAL APPEARANCE: healthy, alert and no distress  EYES: Eyes grossly normal to inspection, PERRL and conjunctivae and sclerae normal  HENT: ear canals and TM's normal, nose and mouth without ulcers or lesions, oropharynx clear and oral mucous " membranes moist  NECK: no adenopathy, no asymmetry, masses, or scars and thyroid normal to palpation  RESP: lungs clear to auscultation - no rales, rhonchi or wheezes  BREAST: normal without masses, tenderness or nipple discharge and no palpable axillary masses or adenopathy  CV: regular rate and rhythm, normal S1 S2, no S3 or S4, no murmur, click or rub, no peripheral edema and peripheral pulses strong  ABDOMEN: soft, nontender, no hepatosplenomegaly, no masses and bowel sounds normal  MS: no musculoskeletal defects are noted and gait is age appropriate without ataxia  SKIN: no suspicious lesions or rashes  NEURO: Normal strength and tone, sensory exam grossly normal, mentation intact and speech normal  PSYCH: mentation appears normal and affect normal/bright    Diagnostic Test Results:  See [facility] orders.     ASSESSMENT / PLAN:       ICD-10-CM    1. Encounter for general adult medical examination with abnormal findings Z00.01    2. Medicare annual wellness visit, subsequent Z00.00    3. Hyperlipidemia LDL goal <130- needs fasting labs done - lab only appt made  E78.5 Lipid panel reflex to direct LDL Fasting     Comprehensive metabolic panel     CBC with platelets     OFFICE/OUTPT VISIT,EST,LEVL IV   4. Essential hypertension with goal blood pressure less than 140/90- well controlled - needs lab f/u  I10 Albumin Random Urine Quantitative with Creat Ratio     lisinopril (PRINIVIL,ZESTRIL) 30 MG tablet     metoprolol succinate (TOPROL-XL) 50 MG 24 hr tablet     triamterene-hydrochlorothiazide (MAXZIDE-25) 37.5-25 MG per tablet     OFFICE/OUTPT VISIT,EST,LEVL IV   5. Anxiety F41.9 Comprehensive metabolic panel     OFFICE/OUTPT VISIT,EST,LEVL IV   6. CKD (chronic kidney disease) stage 3, GFR 30-59 ml/min- needs lab f/u  N18.3 OFFICE/OUTPT VISIT,EST,LEVL IV   7. Other specified hypothyroidism E03.8 TSH     T4 free     T3, total     levothyroxine (SYNTHROID/LEVOTHROID) 75 MCG tablet     OFFICE/OUTPT VISIT,EST,LEVL IV  "  8. Visit for screening mammogram Z12.31    9. Need for prophylactic vaccination and inoculation against influenza Z23 FLU VACCINE, INCREASED ANTIGEN, PRESV FREE, AGE 65+ [11236]          ADMIN VACCINE, FIRST [62076]   10. 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 Tubular adenomas of colon- 2014 - repeat in 5/2018 s/p skin ca = 2 more tubular adenomas - repeat in 2023  D12.6    12. Family history of colon cancer- mother in her 40's-colonoscopies every 5 years- next one 2023 Z80.0    13. Osteopenia, unspecified location - was on fosamax, then Boniva secondary to hot flashes - off boniva since 2012 M85.80 OFFICE/OUTPT VISIT,EST,LEVL IV   14. Asymptomatic menopausal state Z78.0 DX Hip/Pelvis/Spine   15. Need for shingles vaccine Z23 zoster vaccine recombinant adjuvanted (SHINGRIX) injection   16. Hyperlipidemia LDL goal <130 E78.5 simvastatin (ZOCOR) 40 MG tablet   17. Vulvar itching - ? early lichen sclerosis vs. other - superior vulva L29.2 clobetasol (TEMOVATE) 0.05 % ointment     OFFICE/OUTPT VISIT,EST,LEVL IV   18. Itching in the vaginal area L29.8 clobetasol (TEMOVATE) 0.05 % ointment     OFFICE/OUTPT VISIT,EST,LEVL IV       End of Life Planning:  Patient currently has an advanced directive: Yes.  Practitioner is supportive of decision. Full code status desired.     COUNSELING:  Reviewed preventive health counseling, as reflected in patient instructions    BP Readings from Last 1 Encounters:   09/27/18 122/62     Estimated body mass index is 26.44 kg/(m^2) as calculated from the following:    Height as of this encounter: 5' 7\" (1.702 m).    Weight as of this encounter: 168 lb 12.8 oz (76.6 kg).    Recheck bp and cholesterol with me again in 6 months or sooner, if needed.     See above for lab orders and medications for her other medical problems.      reports that she has never smoked. She has never used smokeless tobacco.      Appropriate " "preventive services were discussed with this patient, including applicable screening as appropriate for cardiovascular disease, diabetes, osteopenia/osteoporosis, and glaucoma.  As appropriate for age/gender, discussed screening for colorectal cancer, prostate cancer, breast cancer, and cervical cancer. Checklist reviewing preventive services available has been given to the patient.    Reviewed patients plan of care and provided an AVS. The Complex Care Plan (for patients with higher acuity and needing more deliberate coordination of services) for Aruna meets the Care Plan requirement. This Care Plan has been established and reviewed with the Patient.    Seeing Dr. Hansen , dermatology next week and oncology - Dr Selby for primary adnexal carcinoma of skin tomorrow. Had a PET scan = negative.     Establish an exercise regimen. Activity goal: 45 minutes 5 days a week. New exercise routine: cardiovascular workout on exercise equipment, walking and weightlifting. Diet regimen was discussed and plan is self-directed dieting: reduce calories, reduce portions, reduce processed  carbs, increase fruits/vegetables and avoid sweets and supervised diet program. Avoid artificial sweeteners and \"diet\" drinks and sodas.       Counseling Resources:  ATP IV Guidelines  Pooled Cohorts Equation Calculator  Breast Cancer Risk Calculator  FRAX Risk Assessment  ICSI Preventive Guidelines  Dietary Guidelines for Americans, 2010  USDA's MyPlate  ASA Prophylaxis  Lung CA Screening    Lisbet Simmons MD  [facility]    "

## 2018-09-27 NOTE — PATIENT INSTRUCTIONS
"Recheck bp and cholesterol with me again in 6 months with fasting lab work or sooner, if needed.       Establish an exercise regimen. Activity goal: 45 minutes 5 days a week. New exercise routine: cardiovascular workout on exercise equipment, walking and weightlifting. Diet regimen was discussed and plan is self-directed dieting: reduce calories, reduce portions, reduce processed  carbs, increase fruits/vegetables and avoid sweets and supervised diet program. Avoid artificial sweeteners and \"diet\" drinks and sodas.       Preventive Health Recommendations    Female Ages 65 +    Yearly exam:     See your health care provider every year in order to  o Review health changes.   o Discuss preventive care.    o Review your medicines if your doctor has prescribed any.      You no longer need a yearly Pap test unless you've had an abnormal Pap test in the past 10 years. If you have vaginal symptoms, such as bleeding or discharge, be sure to talk with your provider about a Pap test.      Every 1 to 2 years, have a mammogram.  If you are over 69, talk with your health care provider about whether or not you want to continue having screening mammograms.      Every 10 years, have a colonoscopy. Or, have a yearly FIT test (stool test). These exams will check for colon cancer.       Have a cholesterol test every 5 years, or more often if your doctor advises it.       Have a diabetes test (fasting glucose) every three years. If you are at risk for diabetes, you should have this test more often.       At age 65, have a bone density scan (DEXA) to check for osteoporosis (brittle bone disease).    Shots:    Get a flu shot each year.    Get a tetanus shot every 10 years.    Talk to your doctor about your pneumonia vaccines. There are now two you should receive - Pneumovax (PPSV 23) and Prevnar (PCV 13).    Talk to your pharmacist about the shingles vaccine.    Talk to your doctor about the hepatitis B vaccine.    Nutrition:     Eat at " least 5 servings of fruits and vegetables each day.      Eat whole-grain bread, whole-wheat pasta and brown rice instead of white grains and rice.      Get adequate Calcium and Vitamin D.     Lifestyle    Exercise at least 150 minutes a week (30 minutes a day, 5 days a week). This will help you control your weight and prevent disease.      Limit alcohol to one drink per day.      No smoking.       Wear sunscreen to prevent skin cancer.       See your dentist twice a year for an exam and cleaning.      See your eye doctor every 1 to 2 years to screen for conditions such as glaucoma, macular degeneration and cataracts.               Thank you for choosing [facility]  for your Health Care. It was a pleasure seeing you at your visit today. Please contact us with any questions or concerns you may have.                   Lisbet Simmons MD                                  To reach your [facility] care team after hours call:   [phone]    Our clinic hours are:     Monday- 7:30 am - 7:00 pm                             Tuesday through Friday- 7:30 am - 5:00 pm                                        Saturday- 8:00 am - 12:00 pm                  Phone:  [phone]    Our pharmacy hours are:     Monday  8:00 am to 7:00 pm      Tuesday through Friday 8:00am to 6:00pm                        Saturday - 9:00 am to 1:00 pm      Sunday : Closed.              Phone:  [phone]      There is also information available at our web site:  [email or web address]    If your provider ordered any lab tests and you do not receive the results within 10 business days, please call the clinic.    If you need a medication refill please contact your pharmacy.  Please allow 2 business days for your refill to be completed.    Our clinic offers telephone visits and e visits.  Please ask one of your team members to explain more.      Use [employer] (secure email communication and access to your chart) to send  your primary care provider a message or make an appointment. Ask someone on your Team how to sign up for MyChart.

## 2018-09-27 NOTE — MR AVS SNAPSHOT
After Visit Summary   9/27/2018    Aruna Santos    MRN: [id number]           Patient Information     Date Of Birth          1940        Visit Information        Provider Department      9/27/2018 1:40 PM Lisbet Simmons MD [facility] [place] [facility]        Today's Diagnoses     Encounter for general adult medical examination with abnormal findings    -  1    Medicare annual wellness visit, subsequent        Hyperlipidemia LDL goal <130- needs fasting labs done - lab only appt made         Essential hypertension with goal blood pressure less than 140/90- well controlled - needs lab f/u         Anxiety        CKD (chronic kidney disease) stage 3, GFR 30-59 ml/min- needs lab f/u         Other specified hypothyroidism        Visit for screening mammogram        Need for prophylactic vaccination and inoculation against influenza        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 early lichen sclerosis vs. other - superior vulva        Itching in the vaginal area          Care Instructions    Recheck bp and cholesterol with me again in 6 months with fasting lab work or sooner, if needed.       Establish an exercise regimen. Activity goal: 45 minutes 5 days a week. New exercise routine: cardiovascular workout on exercise equipment, walking and weightlifting. Diet regimen was discussed and plan is self-directed dieting: reduce calories, reduce portions, reduce processed  carbs, increase fruits/vegetables and avoid  "sweets and supervised diet program. Avoid artificial sweeteners and \"diet\" drinks and sodas.       Preventive Health Recommendations    Female Ages 65 +    Yearly exam:     See your health care provider every year in order to  o Review health changes.   o Discuss preventive care.    o Review your medicines if your doctor has prescribed any.      You no longer need a yearly Pap test unless you've had an abnormal Pap test in the past 10 years. If you have vaginal symptoms, such as bleeding or discharge, be sure to talk with your provider about a Pap test.      Every 1 to 2 years, have a mammogram.  If you are over 69, talk with your health care provider about whether or not you want to continue having screening mammograms.      Every 10 years, have a colonoscopy. Or, have a yearly FIT test (stool test). These exams will check for colon cancer.       Have a cholesterol test every 5 years, or more often if your doctor advises it.       Have a diabetes test (fasting glucose) every three years. If you are at risk for diabetes, you should have this test more often.       At age 65, have a bone density scan (DEXA) to check for osteoporosis (brittle bone disease).    Shots:    Get a flu shot each year.    Get a tetanus shot every 10 years.    Talk to your doctor about your pneumonia vaccines. There are now two you should receive - Pneumovax (PPSV 23) and Prevnar (PCV 13).    Talk to your pharmacist about the shingles vaccine.    Talk to your doctor about the hepatitis B vaccine.    Nutrition:     Eat at least 5 servings of fruits and vegetables each day.      Eat whole-grain bread, whole-wheat pasta and brown rice instead of white grains and rice.      Get adequate Calcium and Vitamin D.     Lifestyle    Exercise at least 150 minutes a week (30 minutes a day, 5 days a week). This will help you control your weight and prevent disease.      Limit alcohol to one drink per day.      No smoking.       Wear sunscreen to prevent " skin cancer.       See your dentist twice a year for an exam and cleaning.      See your eye doctor every 1 to 2 years to screen for conditions such as glaucoma, macular degeneration and cataracts.               Thank you for choosing [facility]  for your Health Care. It was a pleasure seeing you at your visit today. Please contact us with any questions or concerns you may have.                   Lisbet Simmons MD                                  To reach your [facility] care team after hours call:   [phone]    Our clinic hours are:     Monday- 7:30 am - 7:00 pm                             Tuesday through Friday- 7:30 am - 5:00 pm                                        Saturday- 8:00 am - 12:00 pm                  Phone:  [phone]    Our pharmacy hours are:     Monday  8:00 am to 7:00 pm      Tuesday through Friday 8:00am to 6:00pm                        Saturday - 9:00 am to 1:00 pm      Sunday : Closed.              Phone:  [phone]      There is also information available at our web site:  [email or web address]    If your provider ordered any lab tests and you do not receive the results within 10 business days, please call the clinic.    If you need a medication refill please contact your pharmacy.  Please allow 2 business days for your refill to be completed.    Our clinic offers telephone visits and e visits.  Please ask one of your team members to explain more.      Use [employer] (secure email communication and access to your chart) to send your primary care provider a message or make an appointment. Ask someone on your Team how to sign up for [employer].                       Follow-ups after your visit        Follow-up notes from your care team     Return in about 6 months (around 3/27/2019) for BP Recheck, cholesterol recheck.      Your next 10 appointments already scheduled     Oct 01, 2018  7:45 AM CDT   LAB with RV LAB   [facility]  ([facility])    [address] [address]   [phone]           Please do not eat 10-12 hours before your appointment if you are coming in fasting for labs on lipids, cholesterol, or glucose (sugar). This does not apply to pregnant women. Water, hot tea and black coffee (with nothing added) are okay. Do not drink other fluids, diet soda or chew gum.            Oct 04, 2018  9:45 AM CDT   [facility]    [address] [id number] [phone]            Oct 16, 2018 10:20 AM CDT   MA SCREENING DIGITAL BILATERAL with [id number]   [facility] ([facility])    [address]  [facility] [id number] [phone]           How do I prepare for my exam? (Food and drink instructions) No Food and Drink Restrictions.  How do I prepare for my exam? (Other instructions) Do not use any powder, lotion or deodorant under your arms or on your breast. If you do, we will ask you to remove it before your exam.  What should I wear: Wear comfortable, two-piece clothing.  How long does the exam take: Most scans will take 15 minutes.  What should I bring: Bring any previous mammograms from other facilities or have them mailed to the breast center.  Do I need a :  No  is needed.  What do I need to tell my doctor: If you have any allergies, tell your care team.  What should I do after the exam: No restrictions, You may resume normal activities.  What is this test: This test is an x-ray of the breast to look for breast disease. The breast is pressed between two plates to flatten and spread the tissue. An X-ray is taken of the breast from different angles.  Who should I call with questions: If you have any questions, please call the Imaging Department where you will have your exam. Directions, parking  "instructions, and other information is available on our website, [email or web address].  Other information about my exam Three-dimensional (3D) mammograms are available at [facility] locations in [facility], [facility], [place], [facility] and [place].  Health locations include [place] and [facility] in [place].  Benefits of 3D mammograms include * Improved rate of cancer detection * Decreases your chance of having to go back for more tests, which means fewer: * \"False-positive\" results (This means that there is an abnormal area but it isn't cancer.) * Invasive testing procedures, such as a biopsy or surgery * Can provide clearer images of the breast if you have dense breast tissue.  *3D mammography is an optional exam that anyone can have with a 2D mammogram. It doesn't replace or take the place of a 2D mammogram. 2D mammograms remain an effective screening test for all women.  Not all insurance companies cover the cost of a 3D mammogram. Check with your insurance. Three-dimensional (3D) mammograms are available at [facility] locations in [facility], [facility], [facility], and [place]. Health locations include [place] and [facility] in [place]. Benefits of 3D mammograms include: - Improved rate of cancer detection - Decreases your chance of having to go back for more tests, which means fewer: - \"False-positive\" results (This means that there is an abnormal area but it isn't cancer.) - Invasive testing procedures, such as a biopsy or surgery - Can provide clearer images of the breast if you have dense breast tissue. 3D mammography is an optional exam that anyone can have with a 2D mammogram. It doesn't replace or take the place of a 2D mammogram. 2D mammograms remain an effective screening test for all women.  Not all insurance companies cover the cost of a 3D mammogram. Check with your " "insurance.              Future tests that were ordered for you today     Open Future Orders        Priority Expected Expires Ordered    DX Hip/Pelvis/Spine Routine  9/27/2019 9/27/2018    Albumin Random Urine Quantitative with Creat Ratio Routine  9/27/2019 9/27/2018    Lipid panel reflex to direct LDL Fasting Routine  9/27/2019 9/27/2018    Comprehensive metabolic panel Routine  9/27/2019 9/27/2018    CBC with platelets Routine  9/27/2019 9/27/2018    TSH Routine  9/27/2019 9/27/2018    T4 free Routine  9/27/2019 9/27/2018    T3, total Routine  9/27/2019 9/27/2018    MA Screening Digital Bilateral Routine  9/26/2019 9/26/2018            Who to contact     If you have questions or need follow up information about today's clinic visit or your schedule please contact [facility] directly at [phone].  Normal or non-critical lab and imaging results will be communicated to you by [employer], letter or phone within 4 business days after the clinic has received the results. If you do not hear from us within 7 days, please contact the clinic through [employer] or phone. If you have a critical or abnormal lab result, we will notify you by phone as soon as possible.  Submit refill requests through [employer] or call your pharmacy and they will forward the refill request to us. Please allow 3 business days for your refill to be completed.          Additional Information About Your Visit        [employer] Information     [employer] lets you send messages to your doctor, view your test results, renew your prescriptions, schedule appointments and more. To sign up, go to [email or web address] . Click on \"Log in\" on the left side of the screen, which will take you to the Welcome page. Then click on \"Sign up Now\" on the right side of the page.     You will be asked to enter the access code listed below, as well as some personal information. Please follow the directions to create your username and password.     Your access " "code is: [email or web address]  Expires: 2018  5:08 PM     Your access code will  in 90 days. If you need help or a new code, please call your [facility] clinic or [phone].        Care EveryWhere ID     This is your Care EveryWhere ID. This could be used by other organizations to access your [facility] medical records  [id number]        Your Vitals Were     Pulse Temperature Height Pulse Oximetry BMI (Body Mass Index)       59 98.1  F (36.7  C) (Oral) 5' 7\" (1.702 m) 100% 26.44 kg/m2        Blood Pressure from Last 3 Encounters:   18 122/62   18 132/66   07/10/18 112/68    Weight from Last 3 Encounters:   18 168 lb 12.8 oz (76.6 kg)   18 168 lb (76.2 kg)   07/10/18 168 lb (76.2 kg)              We Performed the Following          ADMIN VACCINE, FIRST [50484]     FLU VACCINE, INCREASED ANTIGEN, PRESV FREE, AGE 65+ [61855]     OFFICE/OUTPT VISIT,EST,LEVL IV          Today's Medication Changes          These changes are accurate as of 18  2:34 PM.  If you have any questions, ask your nurse or doctor.               Start taking these medicines.        Dose/Directions    zoster vaccine recombinant adjuvanted injection   Commonly known as:  SHINGRIX   Used for:  Need for shingles vaccine   Started by:  Lisbet Simmons MD        Dose:  1 each   Inject 0.5 mLs into the muscle once for 1 dose   Quantity:  0.5 mL   Refills:  1         These medicines have changed or have updated prescriptions.        Dose/Directions    simvastatin 40 MG tablet   Commonly known as:  ZOCOR   This may have changed:  See the new instructions.   Used for:  Hyperlipidemia LDL goal <130   Changed by:  Lisbet Simmons MD        Dose:  40 mg   Take 1 tablet (40 mg) by mouth At Bedtime   Quantity:  90 tablet   Refills:  3            Where to get your medicines      These medications were sent to [facility] - [place], [place] - [address]  [address], [facility] [address] "     Phone:  [phone]     clobetasol 0.05 % ointment    levothyroxine 75 MCG tablet    lisinopril 30 MG tablet    metoprolol succinate 50 MG 24 hr tablet    simvastatin 40 MG tablet    triamterene-hydrochlorothiazide 37.5-25 MG per tablet         Some of these will need a paper prescription and others can be bought over the counter.  Ask your nurse if you have questions.     Bring a paper prescription for each of these medications     zoster vaccine recombinant adjuvanted injection                Primary Care Provider Office Phone # Fax #    Lisbet Simmons [phone] [address] [facility] [address]        Equal Access to Services     [facility] ROBYN : Hadii benjamin Tidwell, wacorina crowder, pasha castanedamarosa latif, maura barker. So [facility] [phone].    ATENCIÓN: Si habla español, tiene a wolf disposición servicios gratuitos de asistencia lingüística. [facility] [phone].    We comply with applicable federal civil rights laws and [place] laws. We do not discriminate on the basis of race, color, national origin, age, disability, sex, sexual orientation, or gender identity.            Thank you!     Thank you for choosing [facility]  for your care. Our goal is always to provide you with excellent care. Hearing back from our patients is one way we can continue to improve our services. Please take a few minutes to complete the written survey that you may receive in the mail after your visit with us. Thank you!             Your Updated Medication List - Protect others around you: Learn how to safely use, store and throw away your medicines at www.disposemymeds.org.          This list is accurate as of 9/27/18  2:34 PM.  Always use your most recent med list.                   Brand Name Dispense Instructions for use Diagnosis    aspirin 81 MG tablet      1 TABLET DAILY        brimonidine 0.1 % ophthalmic solution     ALPHAGAN P     Place 1 drop into both eyes 2 times daily        CALCIUM /VITAMIN D TABS   OR      2 qd        clobetasol 0.05 % ointment    TEMOVATE    60 g    Apply sparingly to affected area of genitals twice weekly at nighttime    Vulvar itching, Itching in the vaginal area       levobunolol 0.5 % ophthalmic solution    BETAGAN     one drop each day        levothyroxine 75 MCG tablet    SYNTHROID/LEVOTHROID    90 tablet    TAKE 1 TABLET EVERY MORNING    Other specified hypothyroidism       lisinopril 30 MG tablet    PRINIVIL,ZESTRIL    90 tablet    TAKE 1 TABLET EVERY DAY    Essential hypertension with goal blood pressure less than 140/90       LORazepam 0.5 MG tablet    ATIVAN    10 tablet    Take 1 tablet (0.5 mg) by mouth 2 times daily Take 30 minutes prior to departure.  Do not operate a vehicle after taking this medication    Anxiety       metoprolol succinate 50 MG 24 hr tablet    TOPROL-XL    90 tablet    TAKE 1 TABLET EVERY DAY    Essential hypertension with goal blood pressure less than 140/90       Multi-vitamin Tabs tablet   Generic drug:  multivitamin, therapeutic with minerals      1 qd        simvastatin 40 MG tablet    ZOCOR    90 tablet    Take 1 tablet (40 mg) by mouth At Bedtime    Hyperlipidemia LDL goal <130       triamterene-hydrochlorothiazide 37.5-25 MG per tablet    MAXZIDE-25    90 tablet    TAKE 1 TABLET EVERY DAY    Essential hypertension with goal blood pressure less than 140/90       zoster vaccine recombinant adjuvanted injection    SHINGRIX    0.5 mL    Inject 0.5 mLs into the muscle once for 1 dose    Need for shingles vaccine

## 2018-09-28 ENCOUNTER — TRANSFERRED RECORDS (OUTPATIENT)
Dept: HEALTH INFORMATION MANAGEMENT | Facility: CLINIC | Age: 78
End: 2018-09-28

## 2018-09-28 ASSESSMENT — PATIENT HEALTH QUESTIONNAIRE - PHQ9: SUM OF ALL RESPONSES TO PHQ QUESTIONS 1-9: 1

## 2018-09-28 ASSESSMENT — ANXIETY QUESTIONNAIRES: GAD7 TOTAL SCORE: 0

## 2018-10-01 DIAGNOSIS — E03.8 OTHER SPECIFIED HYPOTHYROIDISM: ICD-10-CM

## 2018-10-01 DIAGNOSIS — E78.5 HYPERLIPIDEMIA LDL GOAL <130: ICD-10-CM

## 2018-10-01 DIAGNOSIS — F41.9 ANXIETY: ICD-10-CM

## 2018-10-01 DIAGNOSIS — I10 ESSENTIAL HYPERTENSION WITH GOAL BLOOD PRESSURE LESS THAN 140/90: ICD-10-CM

## 2018-10-01 LAB
ALBUMIN SERPL-MCNC: 3.6 G/DL (ref 3.4–5)
ALP SERPL-CCNC: 46 U/L (ref 40–150)
ALT SERPL W P-5'-P-CCNC: 27 U/L (ref 0–50)
ANION GAP SERPL CALCULATED.3IONS-SCNC: 10 MMOL/L (ref 3–14)
AST SERPL W P-5'-P-CCNC: 26 U/L (ref 0–45)
BILIRUB SERPL-MCNC: 0.7 MG/DL (ref 0.2–1.3)
BUN SERPL-MCNC: 28 MG/DL (ref 7–30)
CALCIUM SERPL-MCNC: 9.2 MG/DL (ref 8.5–10.1)
CHLORIDE SERPL-SCNC: 105 MMOL/L (ref 94–109)
CHOLEST SERPL-MCNC: 177 MG/DL
CO2 SERPL-SCNC: 26 MMOL/L (ref 20–32)
CREAT SERPL-MCNC: 1.15 MG/DL (ref 0.52–1.04)
CREAT UR-MCNC: 79 MG/DL
ERYTHROCYTE [DISTWIDTH] IN BLOOD BY AUTOMATED COUNT: 12.6 % (ref 10–15)
GFR SERPL CREATININE-BSD FRML MDRD: 46 ML/MIN/1.7M2
GLUCOSE SERPL-MCNC: 92 MG/DL (ref 70–99)
HCT VFR BLD AUTO: 40.7 % (ref 35–47)
HDLC SERPL-MCNC: 53 MG/DL
HGB BLD-MCNC: 12.9 G/DL (ref 11.7–15.7)
LDLC SERPL CALC-MCNC: 89 MG/DL
MCH RBC QN AUTO: 30.4 PG (ref 26.5–33)
MCHC RBC AUTO-ENTMCNC: 31.7 G/DL (ref 31.5–36.5)
MCV RBC AUTO: 96 FL (ref 78–100)
MICROALBUMIN UR-MCNC: 38 MG/L
MICROALBUMIN/CREAT UR: 47.67 MG/G CR (ref 0–25)
NONHDLC SERPL-MCNC: 124 MG/DL
PLATELET # BLD AUTO: 224 10E9/L (ref 150–450)
POTASSIUM SERPL-SCNC: 4.5 MMOL/L (ref 3.4–5.3)
PROT SERPL-MCNC: 7.1 G/DL (ref 6.8–8.8)
RBC # BLD AUTO: 4.25 10E12/L (ref 3.8–5.2)
SODIUM SERPL-SCNC: 141 MMOL/L (ref 133–144)
T4 FREE SERPL-MCNC: 1.06 NG/DL (ref 0.76–1.46)
TRIGL SERPL-MCNC: 173 MG/DL
TSH SERPL DL<=0.005 MIU/L-ACNC: 2.89 MU/L (ref 0.4–4)
WBC # BLD AUTO: 6 10E9/L (ref 4–11)

## 2018-10-01 PROCEDURE — 85027 COMPLETE CBC AUTOMATED: CPT | Performed by: FAMILY MEDICINE

## 2018-10-01 PROCEDURE — 36415 COLL VENOUS BLD VENIPUNCTURE: CPT | Performed by: FAMILY MEDICINE

## 2018-10-01 PROCEDURE — 80053 COMPREHEN METABOLIC PANEL: CPT | Performed by: FAMILY MEDICINE

## 2018-10-01 PROCEDURE — 82043 UR ALBUMIN QUANTITATIVE: CPT | Performed by: FAMILY MEDICINE

## 2018-10-01 PROCEDURE — 84439 ASSAY OF FREE THYROXINE: CPT | Performed by: FAMILY MEDICINE

## 2018-10-01 PROCEDURE — 80061 LIPID PANEL: CPT | Performed by: FAMILY MEDICINE

## 2018-10-01 PROCEDURE — 84443 ASSAY THYROID STIM HORMONE: CPT | Performed by: FAMILY MEDICINE

## 2018-10-04 ENCOUNTER — OFFICE VISIT (OUTPATIENT)
Dept: DERMATOLOGY | Facility: CLINIC | Age: 78
End: 2018-10-04
Payer: COMMERCIAL

## 2018-10-04 VITALS — HEART RATE: 59 BPM | DIASTOLIC BLOOD PRESSURE: 60 MMHG | SYSTOLIC BLOOD PRESSURE: 121 MMHG | OXYGEN SATURATION: 97 %

## 2018-10-04 DIAGNOSIS — C44.40 MALIGNANT NEOPLASM OF SKIN OF SCALP: Primary | ICD-10-CM

## 2018-10-04 DIAGNOSIS — L82.1 SEBORRHEIC KERATOSIS: ICD-10-CM

## 2018-10-04 DIAGNOSIS — L81.4 LENTIGO: ICD-10-CM

## 2018-10-04 PROCEDURE — 99213 OFFICE O/P EST LOW 20 MIN: CPT | Performed by: DERMATOLOGY

## 2018-10-04 NOTE — LETTER
10/4/2018         RE: Aruna Santos  [address] [facility] [address]        Dear Colleague,    Thank you for referring your patient, Aruna Santos, to the [facility]. Please see a copy of my visit note below.    Aruna Santos is a 78 year old year old female patient here today for f/u hx of adenocarcinoma of skin. No issues.  Cleared by Onc.  She has no concerning skin issues today.  Patient reports the following symptoms:  none .  Patient reports the following previous treatments none.  Patient reports the following modifying factors none.  Associated symptoms: none.  Patient has no other skin complaints today.  Remainder of the HPI, Meds, PMH, Allergies, FH, and SH was reviewed in chart.      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     Skin cancer      Tubular adenoma of colon      - repeat in 2019 - q 5 years      Urgency incontinence        Past Surgical History:   Procedure Laterality Date     adenocarcinoma  2018    Primary adenocarcinoma of scalp     ARTHROSCOPY SHOULDER  2013    [facility]     COLONOSCOPY  94,     Colonoscopies q 5 year -next 2019     CYSTOCELE REPAIR  10/31/1984     HYSTERECTOMY, PAP NO LONGER INDICATED  10/31/84    Hysterectomy, Total Abdominal w ovaries left intact     SURGICAL HISTORY OF -   75    Cholecystectomy & Incidental appendectomy        Family History   Problem Relation Age of Onset     Cancer - colorectal Mother      Hypertension Father      Cerebrovascular Disease Maternal Grandfather      Cerebrovascular Disease Paternal Grandmother        Social  History     Social History     Marital status:      Spouse name: Valentin     Number of children: 2     Years of education: N/A     Occupational History      None      Social History Main Topics     Smoking status: Never Smoker     Smokeless tobacco: Never Used     Alcohol use No     Drug use: No     Sexual activity: Yes     Partners: Male     Other Topics Concern     Parent/Sibling W/ Cabg, Mi Or Angioplasty Before 65f 55m? No     Social History Narrative       Outpatient Encounter Prescriptions as of 10/4/2018   Medication Sig Dispense Refill     ASPIRIN 81 MG OR TABS 1 TABLET DAILY       brimonidine (ALPHAGAN P) 0.1 % ophthalmic solution Place 1 drop into both eyes 2 times daily        CALCIUM /VITAMIN D TABS   OR 2 qd       clobetasol (TEMOVATE) 0.05 % ointment Apply sparingly to affected area of genitals twice weekly at nighttime 60 g 1     LEVOBUNOLOL HCL 0.5 % OP SOLN one drop each day       levothyroxine (SYNTHROID/LEVOTHROID) 75 MCG tablet TAKE 1 TABLET EVERY MORNING 90 tablet 3     lisinopril (PRINIVIL,ZESTRIL) 30 MG tablet TAKE 1 TABLET EVERY DAY 90 tablet 3     LORazepam (ATIVAN) 0.5 MG tablet Take 1 tablet (0.5 mg) by mouth 2 times daily Take 30 minutes prior to departure.  Do not operate a vehicle after taking this medication 10 tablet 0     metoprolol succinate (TOPROL-XL) 50 MG 24 hr tablet TAKE 1 TABLET EVERY DAY 90 tablet 1     MULTI-VITAMIN OR TABS 1 qd       simvastatin (ZOCOR) 40 MG tablet Take 1 tablet (40 mg) by mouth At Bedtime 90 tablet 3     triamterene-hydrochlorothiazide (MAXZIDE-25) 37.5-25 MG per tablet TAKE 1 TABLET EVERY DAY 90 tablet 1     No facility-administered encounter medications on file as of 10/4/2018.              Review Of Systems  Skin: As above  Eyes: negative  Ears/Nose/Throat: negative  Respiratory: No shortness of breath, dyspnea on exertion, cough, or hemoptysis  Cardiovascular: negative  Gastrointestinal: negative  Genitourinary: negative  Musculoskeletal:  negative  Neurologic: negative  Psychiatric: negative  Hematologic/Lymphatic/Immunologic: negative  Endocrine: negative      O:   NAD, WDWN, Alert & Oriented, Mood & Affect wnl, Vitals stable   Here today alone   /60  Pulse 59  SpO2 97%   General appearance normal   Vitals stable   Alert, oriented and in no acute distress      Following lymph nodes palpated: Occipital, Cervical, Supraclavicular no lad   Scalp well healed   Stuck on papules and brown macules on trunk and ext           The remainder of the full exam was unremarkable; the following areas were examined:  conjunctiva/lids, oral mucosa, neck, peripheral vascular system, abdomen, lymph nodes, digits/nails, eccrine and apocrine glands, scalp/hair, face, neck, chest, abdomen, buttocks, back, RUE, LUE, RLE, LLE       Eyes: Conjunctivae/lids:Normal     ENT: Lips, buccal mucosa, tongue: normal    MSK:Normal    Cardiovascular: peripheral edema none    Pulm: Breathing Normal    Lymph Nodes: No Head and Neck Lymphadenopathy     Neuro/Psych: Orientation:Normal; Mood/Affect:Normal      A/P:  1. Primary adenocarcinoma of skin no evidence of recurrence, seborrheic keratosis, eltnigo  BENIGN LESIONS DISCUSSED WITH PATIENT:  I discussed the specifics of tumor, prognosis, and genetics of benign lesions.  I explained that treatment of these lesions would be purely cosmetic and not medically neccessary.  I discussed with patient different removal options including excision, cautery and /or laser.      Nature and genetics of benign skin lesions dicussed with patient.  Signs and Symptoms of skin cancer discussed with patient.  ABCDEs of melanoma reviewed with patient.  Patient encouraged to perform monthly skin exams.  UV precautions reviewed with patient.  Patient to follow up with Primary Care provider regarding elevated blood pressure.  Skin care regimen reviewed with patient: Eliminate harsh soaps, i.e. Dial, zest, irsih spring; Mild soaps such as Cetaphil or  Dove sensitive skin, avoid hot or cold showers, aggressive use of emollients including vanicream, cetaphil or cerave discussed with patient.    Risks of non-melanoma skin cancer discussed with patient   Return to clinic 6 months  Lymph node exams discussed with patient         Again, thank you for allowing me to participate in the care of your patient.        Sincerely,        Anthony Hansen MD

## 2018-10-04 NOTE — PROGRESS NOTES
Aruna Santos is a 78 year old year old female patient here today for f/u hx of adenocarcinoma of skin. No issues.  Cleared by Onc.  She has no concerning skin issues today.  Patient reports the following symptoms:  none .  Patient reports the following previous treatments none.  Patient reports the following modifying factors none.  Associated symptoms: none.  Patient has no other skin complaints today.  Remainder of the HPI, Meds, PMH, Allergies, FH, and SH was reviewed in chart.      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     Skin cancer      Tubular adenoma of colon      - repeat in 2019 - q 5 years      Urgency incontinence        Past Surgical History:   Procedure Laterality Date     adenocarcinoma  2018    Primary adenocarcinoma of scalp     ARTHROSCOPY SHOULDER  2013    [facility]     COLONOSCOPY  89,     Colonoscopies q 5 year -next      CYSTOCELE REPAIR  10/31/1984     HYSTERECTOMY, PAP NO LONGER INDICATED  10/31/84    Hysterectomy, Total Abdominal w ovaries left intact     SURGICAL HISTORY OF -   75    Cholecystectomy & Incidental appendectomy        Family History   Problem Relation Age of Onset     Cancer - colorectal Mother      Hypertension Father      Cerebrovascular Disease Maternal Grandfather      Cerebrovascular Disease Paternal Grandmother        Social History     Social History     Marital status:      Spouse name: Valentin     Number of children: 2     Years of education: N/A     Occupational History      None      Social History Main Topics     Smoking status: Never Smoker     Smokeless  tobacco: Never Used     Alcohol use No     Drug use: No     Sexual activity: Yes     Partners: Male     Other Topics Concern     Parent/Sibling W/ Cabg, Mi Or Angioplasty Before 65f 55m? No     Social History Narrative       Outpatient Encounter Prescriptions as of 10/4/2018   Medication Sig Dispense Refill     ASPIRIN 81 MG OR TABS 1 TABLET DAILY       brimonidine (ALPHAGAN P) 0.1 % ophthalmic solution Place 1 drop into both eyes 2 times daily        CALCIUM /VITAMIN D TABS   OR 2 qd       clobetasol (TEMOVATE) 0.05 % ointment Apply sparingly to affected area of genitals twice weekly at nighttime 60 g 1     LEVOBUNOLOL HCL 0.5 % OP SOLN one drop each day       levothyroxine (SYNTHROID/LEVOTHROID) 75 MCG tablet TAKE 1 TABLET EVERY MORNING 90 tablet 3     lisinopril (PRINIVIL,ZESTRIL) 30 MG tablet TAKE 1 TABLET EVERY DAY 90 tablet 3     LORazepam (ATIVAN) 0.5 MG tablet Take 1 tablet (0.5 mg) by mouth 2 times daily Take 30 minutes prior to departure.  Do not operate a vehicle after taking this medication 10 tablet 0     metoprolol succinate (TOPROL-XL) 50 MG 24 hr tablet TAKE 1 TABLET EVERY DAY 90 tablet 1     MULTI-VITAMIN OR TABS 1 qd       simvastatin (ZOCOR) 40 MG tablet Take 1 tablet (40 mg) by mouth At Bedtime 90 tablet 3     triamterene-hydrochlorothiazide (MAXZIDE-25) 37.5-25 MG per tablet TAKE 1 TABLET EVERY DAY 90 tablet 1     No facility-administered encounter medications on file as of 10/4/2018.              Review Of Systems  Skin: As above  Eyes: negative  Ears/Nose/Throat: negative  Respiratory: No shortness of breath, dyspnea on exertion, cough, or hemoptysis  Cardiovascular: negative  Gastrointestinal: negative  Genitourinary: negative  Musculoskeletal: negative  Neurologic: negative  Psychiatric: negative  Hematologic/Lymphatic/Immunologic: negative  Endocrine: negative      O:   NAD, WDWN, Alert & Oriented, Mood & Affect wnl, Vitals stable   Here today alone   /60  Pulse 59  SpO2  97%   General appearance normal   Vitals stable   Alert, oriented and in no acute distress      Following lymph nodes palpated: Occipital, Cervical, Supraclavicular no lad   Scalp well healed   Stuck on papules and brown macules on trunk and ext           The remainder of the full exam was unremarkable; the following areas were examined:  conjunctiva/lids, oral mucosa, neck, peripheral vascular system, abdomen, lymph nodes, digits/nails, eccrine and apocrine glands, scalp/hair, face, neck, chest, abdomen, buttocks, back, RUE, LUE, RLE, LLE       Eyes: Conjunctivae/lids:Normal     ENT: Lips, buccal mucosa, tongue: normal    MSK:Normal    Cardiovascular: peripheral edema none    Pulm: Breathing Normal    Lymph Nodes: No Head and Neck Lymphadenopathy     Neuro/Psych: Orientation:Normal; Mood/Affect:Normal      A/P:  1. Primary adenocarcinoma of skin no evidence of recurrence, seborrheic keratosis, eltnigo  BENIGN LESIONS DISCUSSED WITH PATIENT:  I discussed the specifics of tumor, prognosis, and genetics of benign lesions.  I explained that treatment of these lesions would be purely cosmetic and not medically neccessary.  I discussed with patient different removal options including excision, cautery and /or laser.      Nature and genetics of benign skin lesions dicussed with patient.  Signs and Symptoms of skin cancer discussed with patient.  ABCDEs of melanoma reviewed with patient.  Patient encouraged to perform monthly skin exams.  UV precautions reviewed with patient.  Patient to follow up with Primary Care provider regarding elevated blood pressure.  Skin care regimen reviewed with patient: Eliminate harsh soaps, i.e. Dial, zest, irsih spring; Mild soaps such as Cetaphil or Dove sensitive skin, avoid hot or cold showers, aggressive use of emollients including vanicream, cetaphil or cerave discussed with patient.    Risks of non-melanoma skin cancer discussed with patient   Return to clinic 6 months  Lymph node  exams discussed with patient

## 2018-10-04 NOTE — MR AVS SNAPSHOT
After Visit Summary   10/4/2018    Aruna Santos    MRN: [id number]           Patient Information     Date Of Birth          1940        Visit Information        Provider Department      10/4/2018 9:45 AM Anthony Hansen MD [facility]        Today's Diagnoses     Malignant neoplasm of skin of scalp    -  1    Lentigo        Seborrheic keratosis           Follow-ups after your visit        Your next 10 appointments already scheduled     Oct 16, 2018  8:00 AM CDT   DX HIP/PELVIS/SPINE with RIDX1   [facility]    [address]  [address]  [facility] [address] [phone]           How do I prepare for my exam? (Food and drink instructions) No Food and Drink Restrictions.  How do I prepare for my exam? (Other instructions) Please do not take any of the following 24 hours prior to the day of your exam: vitamins, calcium tablets, antacids.  What should I wear: If possible, please wear clothes without metal (snaps, zippers). A sweat suit works well.  How long does the exam take: The exam takes about 20 minutes.  What should I bring: Bring a list of your current medicines to your exam (including vitamins, minerals and over-the-counter drugs).  Do I need a :  No  is needed.  What should I do after the exam: No restrictions, You may resume normal activities.  How do I prepare for my exam? (Food and drink instructions) A DEXA scan is a bone-density scan. It uses a low level of radiation to check the strength of your bones. As you lie on a padded table, a machine will take X-rays. We most often scan the hips and lower spine.  Who should I call with questions: If you have any questions, please call the Imaging Department where you will have your exam. Directions, parking instructions, and other information is available on our website, [email or web address].            Oct 16, 2018 10:20 AM IDALIA MOHR  "SCREENING DIGITAL BILATERAL with [id number]   [facility] ([facility])    [address], [address]  [facility] [address] [phone]           How do I prepare for my exam? (Food and drink instructions) No Food and Drink Restrictions.  How do I prepare for my exam? (Other instructions) Do not use any powder, lotion or deodorant under your arms or on your breast. If you do, we will ask you to remove it before your exam.  What should I wear: Wear comfortable, two-piece clothing.  How long does the exam take: Most scans will take 15 minutes.  What should I bring: Bring any previous mammograms from other facilities or have them mailed to the breast center.  Do I need a :  No  is needed.  What do I need to tell my doctor: If you have any allergies, tell your care team.  What should I do after the exam: No restrictions, You may resume normal activities.  What is this test: This test is an x-ray of the breast to look for breast disease. The breast is pressed between two plates to flatten and spread the tissue. An X-ray is taken of the breast from different angles.  Who should I call with questions: If you have any questions, please call the Imaging Department where you will have your exam. Directions, parking instructions, and other information is available on our website, [email or web address].  Other information about my exam Three-dimensional (3D) mammograms are available at [facility] locations in [facility], [place], [place], [facility], [place], [facility] and [place].  Health locations include [place] and the [facility] [place] [facility] in [place].  Benefits of 3D mammograms include * Improved rate of cancer detection * Decreases your chance of having to go back for more tests, which means fewer: * \"False-positive\" results (This means that there is an abnormal area but it isn't cancer.) * Invasive testing procedures, such as a biopsy or surgery * " "Can provide clearer images of the breast if you have dense breast tissue.  *3D mammography is an optional exam that anyone can have with a 2D mammogram. It doesn't replace or take the place of a 2D mammogram. 2D mammograms remain an effective screening test for all women.  Not all insurance companies cover the cost of a 3D mammogram. Check with your insurance. Three-dimensional (3D) mammograms are available at [facility] locations in [facility], [facility], [facility], and [place]. Health locations include [place] and [facility] in [place]. Benefits of 3D mammograms include: - Improved rate of cancer detection - Decreases your chance of having to go back for more tests, which means fewer: - \"False-positive\" results (This means that there is an abnormal area but it isn't cancer.) - Invasive testing procedures, such as a biopsy or surgery - Can provide clearer images of the breast if you have dense breast tissue. 3D mammography is an optional exam that anyone can have with a 2D mammogram. It doesn't replace or take the place of a 2D mammogram. 2D mammograms remain an effective screening test for all women.  Not all insurance companies cover the cost of a 3D mammogram. Check with your insurance.            Apr 02, 2019 10:00 AM CDT   Return Visit with Mary Miranda PA-C   [facility] ([facility]    [address] [place] [address] [phone]              Who to contact     If you have questions or need follow up information about today's clinic visit or your schedule please contact [facility] directly at [phone].  Normal or non-critical lab and imaging results will be communicated to you by MyChart, letter or phone within 4 business days after the clinic has received the results. If you do not hear from us within 7 days, please contact the clinic through " [email or web address] or phone. If you have a critical or abnormal lab result, we will notify you by phone as soon as possible.  Submit refill requests through SSEV or call your pharmacy and they will forward the refill request to us. Please allow 3 business days for your refill to be completed.          Additional Information About Your Visit        [email or web address] Information     SSEV lets you send messages to your doctor, view your test results, renew your prescriptions, schedule appointments and more. To sign up, go to [email or web address] . Click on \"Log in\" on the left side of the screen, which will take you to the Welcome page. Then click on \"Sign up Now\" on the right side of the page.     You will be asked to enter the access code listed below, as well as some personal information. Please follow the directions to create your username and password.     Your access code is: [email or web address]  Expires: 2018  5:08 PM     Your access code will  in 90 days. If you need help or a new code, please call your [facility] clinic or [phone].        Care EveryWhere ID     This is your Care EveryWhere ID. This could be used by other organizations to access your [facility] medical records  [id number]        Your Vitals Were     Pulse Pulse Oximetry                59 97%           Blood Pressure from Last 3 Encounters:   10/04/18 121/60   18 122/62   18 132/66    Weight from Last 3 Encounters:   18 76.6 kg (168 lb 12.8 oz)   18 76.2 kg (168 lb)   07/10/18 76.2 kg (168 lb)              Today, you had the following     No orders found for display       Primary Care Provider Office Phone # Fax #    Lisbet Simmons [phone]       [facility] [address]        Equal Access to Services     JERMAIN CARLSON : Juancho Tidwell, sandro crowder, qasilvina kamaura louis. [facility] [phone].    ATENCIÓN: Si habla " español, tiene a wolf disposición servicios gratuitos de asistencia lingüística. Gerardo ward [phone].    We comply with applicable federal civil rights laws and [place] laws. We do not discriminate on the basis of race, color, national origin, age, disability, sex, sexual orientation, or gender identity.            Thank you!     Thank you for choosing [facility]  for your care. Our goal is always to provide you with excellent care. Hearing back from our patients is one way we can continue to improve our services. Please take a few minutes to complete the written survey that you may receive in the mail after your visit with us. Thank you!             Your Updated Medication List - Protect others around you: Learn how to safely use, store and throw away your medicines at www.disposemymeds.org.          This list is accurate as of 10/4/18  9:54 AM.  Always use your most recent med list.                   Brand Name Dispense Instructions for use Diagnosis    aspirin 81 MG tablet      1 TABLET DAILY        brimonidine 0.1 % ophthalmic solution    ALPHAGAN P     Place 1 drop into both eyes 2 times daily        CALCIUM /VITAMIN D TABS   OR      2 qd        clobetasol 0.05 % ointment    TEMOVATE    60 g    Apply sparingly to affected area of genitals twice weekly at nighttime    Vulvar itching, Itching in the vaginal area       levobunolol 0.5 % ophthalmic solution    BETAGAN     one drop each day        levothyroxine 75 MCG tablet    SYNTHROID/LEVOTHROID    90 tablet    TAKE 1 TABLET EVERY MORNING    Other specified hypothyroidism       lisinopril 30 MG tablet    PRINIVIL,ZESTRIL    90 tablet    TAKE 1 TABLET EVERY DAY    Essential hypertension with goal blood pressure less than 140/90       LORazepam 0.5 MG tablet    ATIVAN    10 tablet    Take 1 tablet (0.5 mg) by mouth 2 times daily Take 30 minutes prior to departure.  Do not operate a vehicle after taking this medication    Anxiety        metoprolol succinate 50 MG 24 hr tablet    TOPROL-XL    90 tablet    TAKE 1 TABLET EVERY DAY    Essential hypertension with goal blood pressure less than 140/90       Multi-vitamin Tabs tablet   Generic drug:  multivitamin, therapeutic with minerals      1 qd        simvastatin 40 MG tablet    ZOCOR    90 tablet    Take 1 tablet (40 mg) by mouth At Bedtime    Hyperlipidemia LDL goal <130       triamterene-hydrochlorothiazide 37.5-25 MG per tablet    MAXZIDE-25    90 tablet    TAKE 1 TABLET EVERY DAY    Essential hypertension with goal blood pressure less than 140/90

## 2018-10-16 ENCOUNTER — RADIANT APPOINTMENT (OUTPATIENT)
Dept: BONE DENSITY | Facility: CLINIC | Age: 78
End: 2018-10-16
Attending: FAMILY MEDICINE
Payer: COMMERCIAL

## 2018-10-16 ENCOUNTER — HOSPITAL ENCOUNTER (OUTPATIENT)
Dept: MAMMOGRAPHY | Facility: CLINIC | Age: 78
Discharge: HOME | End: 2018-10-16
Attending: FAMILY MEDICINE | Admitting: FAMILY MEDICINE
Payer: MEDICARE

## 2018-10-16 DIAGNOSIS — Z78.0 ASYMPTOMATIC MENOPAUSAL STATE: ICD-10-CM

## 2018-10-16 DIAGNOSIS — Z12.31 VISIT FOR SCREENING MAMMOGRAM: ICD-10-CM

## 2018-10-16 PROCEDURE — 77080 DXA BONE DENSITY AXIAL: CPT | Performed by: INTERNAL MEDICINE

## 2018-10-16 PROCEDURE — 77067 SCR MAMMO BI INCL CAD: CPT

## 2018-11-12 ENCOUNTER — TRANSFERRED RECORDS (OUTPATIENT)
Dept: HEALTH INFORMATION MANAGEMENT | Facility: CLINIC | Age: 78
End: 2018-11-12

## 2018-12-12 DIAGNOSIS — L29.2 VULVAR ITCHING: ICD-10-CM

## 2018-12-12 DIAGNOSIS — N89.8 ITCHING IN THE VAGINAL AREA: ICD-10-CM

## 2018-12-12 NOTE — TELEPHONE ENCOUNTER
Requested Prescriptions   Pending Prescriptions Disp Refills     clobetasol (TEMOVATE) 0.05 % external ointment [Pharmacy Med Name: CLOBETASOL PROPIONATE 0.05 % Ointment]    Last Written Prescription Date:  9.27.18  Last Fill Quantity: 60,  # refills: 1   Last Office Visit: 9/27/2018   Future Office Visit:      60 g 1     Sig: APPLY SPARINGLY TO AFFECTED AREA OF GENITALS TWICE WEEKLY AT NIGHTTIME    There is no refill protocol information for this order

## 2018-12-13 NOTE — TELEPHONE ENCOUNTER
Routing refill request to provider for review/approval because:  Drug not on the [facility] refill protocol   Zena Powell RN  [facility]

## 2018-12-14 ENCOUNTER — TELEPHONE (OUTPATIENT)
Dept: FAMILY MEDICINE | Facility: CLINIC | Age: 78
End: 2018-12-14

## 2018-12-14 RX ORDER — CLOBETASOL PROPIONATE 0.5 MG/G
OINTMENT TOPICAL
Qty: 60 G | Refills: 1 | Status: SHIPPED | OUTPATIENT
Start: 2018-12-14 | End: 2019-11-29

## 2018-12-14 NOTE — TELEPHONE ENCOUNTER
Completed forms for med clarification faxed back to [facility] at [phone].   Originals sent to be scanned.     Miladis Viramontes

## 2018-12-27 ENCOUNTER — TRANSFERRED RECORDS (OUTPATIENT)
Dept: HEALTH INFORMATION MANAGEMENT | Facility: CLINIC | Age: 78
End: 2018-12-27

## 2019-04-02 ENCOUNTER — OFFICE VISIT (OUTPATIENT)
Dept: DERMATOLOGY | Facility: CLINIC | Age: 79
End: 2019-04-02
Payer: COMMERCIAL

## 2019-04-02 VITALS — OXYGEN SATURATION: 99 % | DIASTOLIC BLOOD PRESSURE: 79 MMHG | SYSTOLIC BLOOD PRESSURE: 137 MMHG | HEART RATE: 57 BPM

## 2019-04-02 DIAGNOSIS — L81.4 LENTIGO: ICD-10-CM

## 2019-04-02 DIAGNOSIS — L57.0 AK (ACTINIC KERATOSIS): Primary | ICD-10-CM

## 2019-04-02 DIAGNOSIS — L82.1 SEBORRHEIC KERATOSIS: ICD-10-CM

## 2019-04-02 DIAGNOSIS — C80.1 ADENOCARCINOMA (H): ICD-10-CM

## 2019-04-02 DIAGNOSIS — D22.9 NEVUS: ICD-10-CM

## 2019-04-02 DIAGNOSIS — D18.00 ANGIOMA: ICD-10-CM

## 2019-04-02 PROCEDURE — 99214 OFFICE O/P EST MOD 30 MIN: CPT | Mod: 25 | Performed by: PHYSICIAN ASSISTANT

## 2019-04-02 PROCEDURE — 17000 DESTRUCT PREMALG LESION: CPT | Performed by: PHYSICIAN ASSISTANT

## 2019-04-02 NOTE — PROGRESS NOTES
HPI:   Aruna Santos is a 78 year old female who presents for Full skin cancer screening.  chief complaint  Last Skin Exam: 1 year ago      1st Baseline: no  Personal HX of Skin Cancer: Yes history of adenocarcinoma on the scalp   Personal HX of Malignant Melanoma: none   Family HX of Skin Cancer / Malignant Melanoma: none  Personal HX of Atypical Moles: none  Risk factors: sun exposure  New / Changing lesions: none  Social History: lives by [place]  On review of systems, there are no further skin complaints, patient is feeling otherwise well.  See patient intake sheet.  ROS of the following were done and are negative: Constitutional, Eyes, Ears, Nose,   Mouth, Throat, Cardiovascular, Respiratory, GI, Genitourinary, Musculoskeletal,   Psychiatric, Endocrine, Allergic/Immunologic.    This document serves as a record of the services and decisions personally performed and made by Mary Miranda, MS, PA-C. It was created on her behalf by Jaqui Belcher, a trained medical scribe. The creation of this document is based on the provider's statements to the medical scribe.  Jaqui Belcher 10:15 AM April 2, 2019    PHYSICAL EXAM:   /79   Pulse 57   SpO2 99%   Breastfeeding? No   Skin exam performed as follows: Type 2 skin. Mood appropriate  Alert and Oriented X 3. Well developed, well nourished in no distress.  General appearance: Normal  Head including face: Normal  Eyes: conjunctiva and lids: Normal  Mouth: Lips, teeth, gums: Normal  Neck: Normal  Chest-breast/axillae: Normal  Back: Normal  Spleen and liver: Normal  Cardiovascular: Exam of peripheral vascular system by observation for swelling, varicosities, edema: Normal  Genitalia: groin, buttocks: Normal  Extremities: digits/nails (clubbing): Normal  Eccrine and Apocrine glands: Normal  Right upper extremity: Normal  Left upper extremity: Normal  Right lower extremity: Normal  Left lower extremity: Normal  Skin: Scalp and body hair:  See below    Pt deferred exam of breasts, groin, buttocks: No    Other physical findings:  1. Multiple pigmented macules on extremities and trunk  2. Multiple pigmented macules on face, trunk and extremities  3. Multiple vascular papules on trunk, arms and legs  4. Multiple scattered keratotic plaques  5. Pink scaly papule located on left nasal side wall x 1       Except as noted above, no other signs of skin cancer or melanoma.     ASSESSMENT/PLAN:   Benign Full skin cancer screening today.    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    Multiple benign appearing nevi on arms, legs and trunk. Discussed ABCDEs of melanoma and sunscreen.   Multiple lentigos on arms, legs and trunk. Advised benign, no treatment needed.  Multiple scattered angiomas. Advised benign, no treatment needed.   Seborrheic keratosis on arms, legs and trunk. Advised benign, no treatment needed.  Actinic keratosis on left nasal side wall x 1. As precancerous, cryosurgery performed. Advised on blistering and post-op care. Advised if not resolved in 1-2 months to return for evaluation  History of adenocarcinoma on the scalp 1/2018. No lymphadenopathy palpated today - advised for pt to do this once monthly at home. More than 50% of reported cases are located in the tete-orbital region and the hair-bearing scalp.  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        Follow-up: Q 6 months FSE/PRN sooner     1.) Patient was asked about new and changing moles. YES  2.) Patient received a complete physical skin examination: YES  3.) Patient was counseled to perform a monthly self skin examination: YES  Scribed By: Jaqui Belcher, Medical Scribe    The information in this  document, created by the medical scribe for me, accurately reflects the services I personally performed and the decisions made by me. I have reviewed and approved this document for accuracy prior to leaving the patient care area.  April 2, 2019 10:24 AM    Mary Miranda MS, PA-C

## 2019-04-02 NOTE — LETTER
4/2/2019         RE: Aruna Santos  [address] [facility] [address]        Dear Colleague,    Thank you for referring your patient, Aruna Santos, to the [facility]. Please see a copy of my visit note below.    HPI:   Aruna Santos is a 78 year old female who presents for Full skin cancer screening.  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  See patient intake sheet.  ROS of the following were done and are negative: Constitutional, Eyes, Ears, Nose,   Mouth, Throat, Cardiovascular, Respiratory, GI, Genitourinary, Musculoskeletal,   Psychiatric, Endocrine, Allergic/Immunologic.    This document serves as a record of the services and decisions personally performed and made by Mary Miranda, MS, PA-C. It was created on her behalf by Jaqui Belcher, a trained medical scribe. The creation of this document is based on the provider's statements to the medical scribe.  Jaqui Belcher 10:15 AM April 2, 2019    PHYSICAL EXAM:   /79   Pulse 57   SpO2 99%   Breastfeeding? No   Skin exam performed as follows: Type 2 skin. Mood appropriate  Alert and Oriented X 3. Well developed, well nourished in no distress.  General appearance: Normal  Head including face: Normal  Eyes: conjunctiva and lids: Normal  Mouth: Lips, teeth, gums: Normal  Neck: Normal  Chest-breast/axillae: Normal  Back: Normal  Spleen and liver: Normal  Cardiovascular: Exam of peripheral vascular system by observation for swelling, varicosities, edema: Normal  Genitalia: groin,  buttocks: Normal  Extremities: digits/nails (clubbing): Normal  Eccrine and Apocrine glands: Normal  Right upper extremity: Normal  Left upper extremity: Normal  Right lower extremity: Normal  Left lower extremity: Normal  Skin: Scalp and body hair: See below    Pt deferred exam of breasts, groin, buttocks: No    Other physical findings:  1. Multiple pigmented macules on extremities and trunk  2. Multiple pigmented macules on face, trunk and extremities  3. Multiple vascular papules on trunk, arms and legs  4. Multiple scattered keratotic plaques  5. Pink scaly papule located on left nasal side wall x 1       Except as noted above, no other signs of skin cancer or melanoma.     ASSESSMENT/PLAN:   Benign Full skin cancer screening today.    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    Multiple benign appearing nevi on arms, legs and trunk. Discussed ABCDEs of melanoma and sunscreen.   Multiple lentigos on arms, legs and trunk. Advised benign, no treatment needed.  Multiple scattered angiomas. Advised benign, no treatment needed.   Seborrheic keratosis on arms, legs and trunk. Advised benign, no treatment needed.  Actinic keratosis on left nasal side wall x 1. As precancerous, cryosurgery performed. Advised on blistering and post-op care. Advised if not resolved in 1-2 months to return for evaluation  History of adenocarcinoma on the scalp 1/2018. No lymphadenopathy palpated today - advised for pt to do this once monthly at home. More than 50% of reported cases are located in the tete-orbital region and the hair-bearing scalp.  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        Follow-up: Q 6 months FSE/PRN sooner     1.) Patient was asked about  new and changing moles. YES  2.) Patient received a complete physical skin examination: YES  3.) Patient was counseled to perform a monthly self skin examination: YES  Scribed By: Jaqui Belcher, Medical Scribe    The information in this document, created by the medical scribe for me, accurately reflects the services I personally performed and the decisions made by me. I have reviewed and approved this document for accuracy prior to leaving the patient care area.  April 2, 2019 10:24 AM    Mary Miranda MS, PA-C      Again, thank you for allowing me to participate in the care of your patient.        Sincerely,        Mary Miranda PA-C

## 2019-04-08 ENCOUNTER — TRANSFERRED RECORDS (OUTPATIENT)
Dept: HEALTH INFORMATION MANAGEMENT | Facility: CLINIC | Age: 79
End: 2019-04-08

## 2019-05-03 ENCOUNTER — ALLIED HEALTH/NURSE VISIT (OUTPATIENT)
Dept: FAMILY MEDICINE | Facility: CLINIC | Age: 79
End: 2019-05-03
Payer: COMMERCIAL

## 2019-05-03 VITALS — DIASTOLIC BLOOD PRESSURE: 54 MMHG | SYSTOLIC BLOOD PRESSURE: 110 MMHG

## 2019-05-03 DIAGNOSIS — I10 ESSENTIAL HYPERTENSION WITH GOAL BLOOD PRESSURE LESS THAN 140/90: Primary | ICD-10-CM

## 2019-05-03 PROCEDURE — 99207 ZZC NO CHARGE NURSE ONLY: CPT | Performed by: FAMILY MEDICINE

## 2019-05-03 NOTE — PROGRESS NOTES
Aruna Santos was evaluated at [facility] on May 3, 2019 at which time her blood pressure was:    BP Readings from Last 3 Encounters:   05/03/19 110/54   04/02/19 137/79   10/04/18 121/60     Pulse Readings from Last 3 Encounters:   04/02/19 57   10/04/18 59   09/27/18 59       Reviewed lifestyle modifications for blood pressure control and reduction: including making healthy food choices, managing weight, getting regular exercise, smoking cessation, reducing alcohol consumption, monitoring blood pressure regularly.     Symptoms: None    BP Goal:< 140/90 mmHg    BP Assessment:  BP at goal    Potential Reasons for BP too high: NA - Not applicable    BP Follow-Up Plan: Recheck BP in 6 months at pharmacy    Recommendation to Provider: nabil    Note completed by: Thank you,  Nichol Harley alirio, Mgr [facility] [place] [phone]

## 2019-05-13 ENCOUNTER — TELEPHONE (OUTPATIENT)
Dept: FAMILY MEDICINE | Facility: CLINIC | Age: 79
End: 2019-05-13

## 2019-05-13 DIAGNOSIS — E03.8 OTHER SPECIFIED HYPOTHYROIDISM: ICD-10-CM

## 2019-05-13 DIAGNOSIS — I10 ESSENTIAL HYPERTENSION WITH GOAL BLOOD PRESSURE LESS THAN 140/90: ICD-10-CM

## 2019-05-13 DIAGNOSIS — E78.5 HYPERLIPIDEMIA LDL GOAL <130: Primary | ICD-10-CM

## 2019-05-13 NOTE — TELEPHONE ENCOUNTER
"Requested Prescriptions   Pending Prescriptions Disp Refills     metoprolol succinate ER (TOPROL-XL) 50 MG 24 hr tablet [Pharmacy Med Name: METOPROLOL SUCCINATE ER 50 MG Tablet Extended Release 24 Hour]      Last Written Prescription Date:  9.27.18  Last Fill Quantity: 90 tablet,  # refills: 1   Last office visit: 9/27/2018 with prescribing provider:  Lisbet Simmons MD           Future Office Visit:       90 tablet 1     Sig: TAKE 1 TABLET EVERY DAY       Beta-Blockers Protocol Passed - 5/13/2019 12:45 PM        Passed - Blood pressure under 140/90 in past 12 months     BP Readings from Last 3 Encounters:   05/03/19 110/54   04/02/19 137/79   10/04/18 121/60                 Passed - Patient is age 6 or older        Passed - Recent (12 mo) or future (30 days) visit within the authorizing provider's specialty     Patient had office visit in the last 12 months or has a visit in the next 30 days with authorizing provider or within the authorizing provider's specialty.  See \"Patient Info\" tab in inbasket, or \"Choose Columns\" in Meds & Orders section of the refill encounter.              Passed - Medication is active on med list        triamterene-HCTZ (MAXZIDE-25) 37.5-25 MG tablet [Pharmacy Med Name: TRIAMTERENE/HYDROCHLOROTHIAZIDE 37.5-25 MG Tablet]        Last Written Prescription Date:  9.27.18  Last Fill Quantity: 90 tablet,  # refills: 1   Last office visit: 9/27/2018 with prescribing provider:  Lisbet Simmons MD           Future Office Visit:       90 tablet 1     Sig: TAKE 1 TABLET EVERY DAY       Diuretics (Including Combos) Protocol Failed - 5/13/2019 12:45 PM        Failed - Normal serum creatinine on file in past 12 months     Recent Labs   Lab Test 10/01/18  0747   CR 1.15*              Passed - Blood pressure under 140/90 in past 12 months     BP Readings from Last 3 Encounters:   05/03/19 110/54   04/02/19 137/79   10/04/18 121/60                 Passed - Recent (12 mo) or future (30 days) " "visit within the authorizing provider's specialty     Patient had office visit in the last 12 months or has a visit in the next 30 days with authorizing provider or within the authorizing provider's specialty.  See \"Patient Info\" tab in inbasket, or \"Choose Columns\" in Meds & Orders section of the refill encounter.              Passed - Medication is active on med list        Passed - Patient is age 18 or older        Passed - No active pregancy on record        Passed - Normal serum potassium on file in past 12 months     Recent Labs   Lab Test 10/01/18  0747   POTASSIUM 4.5                    Passed - Normal serum sodium on file in past 12 months     Recent Labs   Lab Test 10/01/18  0747                 Passed - No positive pregnancy test in past 12 months        "

## 2019-05-14 RX ORDER — METOPROLOL SUCCINATE 50 MG/1
TABLET, EXTENDED RELEASE ORAL
Qty: 90 TABLET | Refills: 0 | Status: SHIPPED | OUTPATIENT
Start: 2019-05-14 | End: 2019-07-15

## 2019-05-14 RX ORDER — TRIAMTERENE/HYDROCHLOROTHIAZID 37.5-25 MG
TABLET ORAL
Qty: 90 TABLET | Refills: 0 | Status: SHIPPED | OUTPATIENT
Start: 2019-05-14 | End: 2019-07-15

## 2019-05-14 NOTE — TELEPHONE ENCOUNTER
Routing refill request to provider for review/approval because:  Patient needs to be seen because:  Last appointment:  Instructions         Return in about 6 months (around 3/27/2019) for BP Recheck, cholesterol recheck.     Recheck bp and cholesterol with me again in 6 months with fasting lab work or sooner, if needed.            Please give small supply and send to team for scheduling

## 2019-05-14 NOTE — TELEPHONE ENCOUNTER
done x 1 month only. Pt needs recheck office visit before more refills. Please inform pt and  Please assist pt in making appt to be seen.

## 2019-05-16 NOTE — TELEPHONE ENCOUNTER
Called patient @ [phone] -     Advised of MD GHISLAINE message below - Patient stated an understanding and agreed with plan.    Patient requesting to only see MD GHISLAINE - no available appts within the next 1 month.     Routing to [facility].  Please advise on appt date/time within the next 1 month before meds will run out       Mirian Hudson RN  [facility]

## 2019-05-17 NOTE — TELEPHONE ENCOUNTER
Called patient regarding appointment request.  Scheduled her for Thursday, June 6 at 11:401 AM with Dr. Simmons.  She also stated that she believe she was due fasting labs.  Her last cholesterol check in October showed a note stating to repeat labs in 6 months.  Scheduled her for a fasting lab appointment for Thursday, May 23rd at 7:45AM.    Routing to Dr. Simmons to place future orders for lab.    Elma Knowles CMA

## 2019-05-20 NOTE — TELEPHONE ENCOUNTER
The patient indicates understanding of these issues and agrees with the plan.  Zena Powell RN  [facility]

## 2019-05-23 DIAGNOSIS — E03.8 OTHER SPECIFIED HYPOTHYROIDISM: ICD-10-CM

## 2019-05-23 DIAGNOSIS — I10 ESSENTIAL HYPERTENSION WITH GOAL BLOOD PRESSURE LESS THAN 140/90: ICD-10-CM

## 2019-05-23 DIAGNOSIS — R82.90 NONSPECIFIC FINDING ON EXAMINATION OF URINE: Primary | ICD-10-CM

## 2019-05-23 DIAGNOSIS — E78.5 HYPERLIPIDEMIA LDL GOAL <130: ICD-10-CM

## 2019-05-23 LAB
ALBUMIN SERPL-MCNC: 3.8 G/DL (ref 3.4–5)
ALBUMIN UR-MCNC: NEGATIVE MG/DL
ALP SERPL-CCNC: 43 U/L (ref 40–150)
ALT SERPL W P-5'-P-CCNC: 36 U/L (ref 0–50)
ANION GAP SERPL CALCULATED.3IONS-SCNC: 5 MMOL/L (ref 3–14)
APPEARANCE UR: CLEAR
AST SERPL W P-5'-P-CCNC: 25 U/L (ref 0–45)
BACTERIA #/AREA URNS HPF: ABNORMAL /HPF
BASOPHILS # BLD AUTO: 0 10E9/L (ref 0–0.2)
BASOPHILS NFR BLD AUTO: 0.6 %
BILIRUB SERPL-MCNC: 0.7 MG/DL (ref 0.2–1.3)
BILIRUB UR QL STRIP: NEGATIVE
BUN SERPL-MCNC: 31 MG/DL (ref 7–30)
CALCIUM SERPL-MCNC: 10 MG/DL (ref 8.5–10.1)
CHLORIDE SERPL-SCNC: 107 MMOL/L (ref 94–109)
CHOLEST SERPL-MCNC: 200 MG/DL
CO2 SERPL-SCNC: 28 MMOL/L (ref 20–32)
COLOR UR AUTO: YELLOW
CREAT SERPL-MCNC: 1.39 MG/DL (ref 0.52–1.04)
CREAT UR-MCNC: 68 MG/DL
DIFFERENTIAL METHOD BLD: NORMAL
EOSINOPHIL # BLD AUTO: 0.4 10E9/L (ref 0–0.7)
EOSINOPHIL NFR BLD AUTO: 5.7 %
ERYTHROCYTE [DISTWIDTH] IN BLOOD BY AUTOMATED COUNT: 12.6 % (ref 10–15)
GFR SERPL CREATININE-BSD FRML MDRD: 37 ML/MIN/{1.73_M2}
GLUCOSE SERPL-MCNC: 103 MG/DL (ref 70–99)
GLUCOSE UR STRIP-MCNC: NEGATIVE MG/DL
HCT VFR BLD AUTO: 39.4 % (ref 35–47)
HDLC SERPL-MCNC: 52 MG/DL
HGB BLD-MCNC: 13.1 G/DL (ref 11.7–15.7)
HGB UR QL STRIP: ABNORMAL
KETONES UR STRIP-MCNC: NEGATIVE MG/DL
LDLC SERPL CALC-MCNC: 100 MG/DL
LEUKOCYTE ESTERASE UR QL STRIP: ABNORMAL
LYMPHOCYTES # BLD AUTO: 2.4 10E9/L (ref 0.8–5.3)
LYMPHOCYTES NFR BLD AUTO: 37.3 %
MCH RBC QN AUTO: 31.4 PG (ref 26.5–33)
MCHC RBC AUTO-ENTMCNC: 33.2 G/DL (ref 31.5–36.5)
MCV RBC AUTO: 95 FL (ref 78–100)
MICROALBUMIN UR-MCNC: 62 MG/L
MICROALBUMIN/CREAT UR: 90.18 MG/G CR (ref 0–25)
MONOCYTES # BLD AUTO: 0.6 10E9/L (ref 0–1.3)
MONOCYTES NFR BLD AUTO: 9.3 %
MUCOUS THREADS #/AREA URNS LPF: PRESENT /LPF
NEUTROPHILS # BLD AUTO: 3 10E9/L (ref 1.6–8.3)
NEUTROPHILS NFR BLD AUTO: 47.1 %
NITRATE UR QL: NEGATIVE
NON-SQ EPI CELLS #/AREA URNS LPF: ABNORMAL /LPF
NONHDLC SERPL-MCNC: 148 MG/DL
PH UR STRIP: 7 PH (ref 5–7)
PLATELET # BLD AUTO: 231 10E9/L (ref 150–450)
POTASSIUM SERPL-SCNC: 4.5 MMOL/L (ref 3.4–5.3)
PROT SERPL-MCNC: 7.4 G/DL (ref 6.8–8.8)
RBC # BLD AUTO: 4.17 10E12/L (ref 3.8–5.2)
RBC #/AREA URNS AUTO: ABNORMAL /HPF
SODIUM SERPL-SCNC: 140 MMOL/L (ref 133–144)
SOURCE: ABNORMAL
SP GR UR STRIP: 1.01 (ref 1–1.03)
T3 SERPL-MCNC: 98 NG/DL (ref 60–181)
T4 FREE SERPL-MCNC: 1.05 NG/DL (ref 0.76–1.46)
TRIGL SERPL-MCNC: 238 MG/DL
TSH SERPL DL<=0.005 MIU/L-ACNC: 5.87 MU/L (ref 0.4–4)
UROBILINOGEN UR STRIP-ACNC: 0.2 EU/DL (ref 0.2–1)
WBC # BLD AUTO: 6.4 10E9/L (ref 4–11)
WBC #/AREA URNS AUTO: ABNORMAL /HPF

## 2019-05-23 PROCEDURE — 82043 UR ALBUMIN QUANTITATIVE: CPT | Performed by: FAMILY MEDICINE

## 2019-05-23 PROCEDURE — 80061 LIPID PANEL: CPT | Performed by: FAMILY MEDICINE

## 2019-05-23 PROCEDURE — 81001 URINALYSIS AUTO W/SCOPE: CPT | Performed by: FAMILY MEDICINE

## 2019-05-23 PROCEDURE — 84443 ASSAY THYROID STIM HORMONE: CPT | Performed by: FAMILY MEDICINE

## 2019-05-23 PROCEDURE — 87088 URINE BACTERIA CULTURE: CPT | Performed by: FAMILY MEDICINE

## 2019-05-23 PROCEDURE — 36415 COLL VENOUS BLD VENIPUNCTURE: CPT | Performed by: FAMILY MEDICINE

## 2019-05-23 PROCEDURE — 84480 ASSAY TRIIODOTHYRONINE (T3): CPT | Performed by: FAMILY MEDICINE

## 2019-05-23 PROCEDURE — 87086 URINE CULTURE/COLONY COUNT: CPT | Performed by: FAMILY MEDICINE

## 2019-05-23 PROCEDURE — 84439 ASSAY OF FREE THYROXINE: CPT | Performed by: FAMILY MEDICINE

## 2019-05-23 PROCEDURE — 80053 COMPREHEN METABOLIC PANEL: CPT | Performed by: FAMILY MEDICINE

## 2019-05-23 PROCEDURE — 87186 SC STD MICRODIL/AGAR DIL: CPT | Performed by: FAMILY MEDICINE

## 2019-05-23 PROCEDURE — 85025 COMPLETE CBC W/AUTO DIFF WBC: CPT | Performed by: FAMILY MEDICINE

## 2019-05-23 NOTE — LETTER
[facility]  [address] [place] [address], [place] [address]                  [phone]   May 30, 2019    Aruna Santos  [address] [facility] [address]      Dear Aruna,    Here is a summary of your recent test results:    UTI is noted.     We advise:    Please complete course of antibiotic, omnicef.  Recheck urine in 2-3 weeks (UA/UC).    For additional lab test information, [email or web address] is an excellent reference.    Your test results are enclosed.      Please contact me if you have any questions.    In addition, here is a list of due or overdue Health Maintenance reminders.    Health Maintenance Due   Topic Date Due     Zoster (Shingles) Vaccine (2 of 3) 07/29/2011     PHQ-2  01/01/2019       Please call us at [phone] (or use [employer]) to address the above recommendations.            Thank you very much for trusting [facility]..     Healthy regards,       Lisbet Simmons M.D.          Results for orders placed or performed in visit on 05/23/19   UA reflex to Microscopic and Culture   Result Value Ref Range    Color Urine Yellow     Appearance Urine Clear     Glucose Urine Negative NEG^Negative mg/dL    Bilirubin Urine Negative NEG^Negative    Ketones Urine Negative NEG^Negative mg/dL    Specific Gravity Urine 1.015 1.003 - 1.035    Blood Urine Trace (A) NEG^Negative    pH Urine 7.0 5.0 - 7.0 pH    Protein Albumin Urine Negative NEG^Negative mg/dL    Urobilinogen Urine 0.2 0.2 - 1.0 EU/dL    Nitrite Urine Negative NEG^Negative    Leukocyte Esterase Urine Large (A) NEG^Negative    Source Midstream Urine    T4 FREE   Result Value Ref Range    T4 Free 1.05 0.76 - 1.46 ng/dL   TSH   Result Value Ref Range    TSH 5.87 (H) 0.40 - 4.00 mU/L   T3 total   Result Value Ref Range    Triiodothyronine (T3) 98 60 - 181 ng/dL   Comprehensive metabolic panel   Result Value Ref Range    Sodium 140 133 - 144 mmol/L    Potassium 4.5 3.4 - 5.3 mmol/L     Chloride 107 94 - 109 mmol/L    Carbon Dioxide 28 20 - 32 mmol/L    Anion Gap 5 3 - 14 mmol/L    Glucose 103 (H) 70 - 99 mg/dL    Urea Nitrogen 31 (H) 7 - 30 mg/dL    Creatinine 1.39 (H) 0.52 - 1.04 mg/dL    GFR Estimate 37 (L) >60 mL/min/[1.73_m2]    GFR Estimate If Black 42 (L) >60 mL/min/[1.73_m2]    Calcium 10.0 8.5 - 10.1 mg/dL    Bilirubin Total 0.7 0.2 - 1.3 mg/dL    Albumin 3.8 3.4 - 5.0 g/dL    Protein Total 7.4 6.8 - 8.8 g/dL    Alkaline Phosphatase 43 40 - 150 U/L    ALT 36 0 - 50 U/L    AST 25 0 - 45 U/L   Lipid panel reflex to direct LDL Fasting   Result Value Ref Range    Cholesterol 200 (H) <200 mg/dL    Triglycerides 238 (H) <150 mg/dL    HDL Cholesterol 52 >49 mg/dL    LDL Cholesterol Calculated 100 (H) <100 mg/dL    Non HDL Cholesterol 148 (H) <130 mg/dL   CBC with platelets differential   Result Value Ref Range    WBC 6.4 4.0 - 11.0 10e9/L    RBC Count 4.17 3.8 - 5.2 10e12/L    Hemoglobin 13.1 11.7 - 15.7 g/dL    Hematocrit 39.4 35.0 - 47.0 %    MCV 95 78 - 100 fl    MCH 31.4 26.5 - 33.0 pg    MCHC 33.2 31.5 - 36.5 g/dL    RDW 12.6 10.0 - 15.0 %    Platelet Count 231 150 - 450 10e9/L    % Neutrophils 47.1 %    % Lymphocytes 37.3 %    % Monocytes 9.3 %    % Eosinophils 5.7 %    % Basophils 0.6 %    Absolute Neutrophil 3.0 1.6 - 8.3 10e9/L    Absolute Lymphocytes 2.4 0.8 - 5.3 10e9/L    Absolute Monocytes 0.6 0.0 - 1.3 10e9/L    Absolute Eosinophils 0.4 0.0 - 0.7 10e9/L    Absolute Basophils 0.0 0.0 - 0.2 10e9/L    Diff Method Automated Method    Albumin Random Urine Quantitative with Creat Ratio   Result Value Ref Range    Creatinine Urine 68 mg/dL    Albumin Urine mg/L 62 mg/L    Albumin Urine mg/g Cr 90.18 (H) 0 - 25 mg/g Cr   Urine Microscopic   Result Value Ref Range    WBC Urine 25-50 (A) OTO5^0 - 5 /HPF    RBC Urine 2-5 (A) OTO2^O - 2 /HPF    Squamous Epithelial /LPF Urine Few FEW^Few /LPF    Bacteria Urine Moderate (A) NEG^Negative /HPF    Mucous Urine Present (A) NEG^Negative /LPF   Urine  Culture Aerobic Bacterial   Result Value Ref Range    Specimen Description Midstream Urine     Culture Micro (A)      >100,000 colonies/mL  Klebsiella oxytoca  Previously reported as:  -Klebsiella pneumoniae  CORRECTED ON:  5/26/19, JR  Preliminary identification and susceptibility testing performed on mixed culture.  Repeat   identification and susceptibility testing in progress, await final report.  Susceptibility testing in progress      Culture Micro (A)      >100,000 colonies/mL  Strain 2  Klebsiella oxytoca      Culture Micro       Report finalized too soon.  Additonal final report to follow.       Susceptibility    Klebsiella oxytoca - DRE     CEFTAZIDIME <=1 Sensitive ug/mL     NITROFURANTOIN <=16 Sensitive ug/mL    Klebsiella oxytoca - DRE     AMPICILLIN* >=32 Resistant ug/mL      * Intrinsically Resistant     CEFAZOLIN* 8 Sensitive ug/mL      * Cefazolin DRE breakpoints are for the treatment of uncomplicated urinary tract infections.  For the treatment of systemic infections, please contact the laboratory for additional testing.     CEFOXITIN <=4 Sensitive ug/mL     CEFTAZIDIME <=1 Sensitive ug/mL     CEFTRIAXONE <=1 Sensitive ug/mL     CIPROFLOXACIN <=0.25 Sensitive ug/mL     GENTAMICIN <=1 Sensitive ug/mL     LEVOFLOXACIN <=0.12 Sensitive ug/mL     NITROFURANTOIN <=16 Sensitive ug/mL     TOBRAMYCIN <=1 Sensitive ug/mL     Trimethoprim/Sulfa <=1/19 Sensitive ug/mL     AMPICILLIN/SULBACTAM 8 Sensitive ug/mL     Piperacillin/Tazo <=4 Sensitive ug/mL     CEFEPIME <=1 Sensitive ug/mL

## 2019-05-25 DIAGNOSIS — N30.00 ACUTE CYSTITIS WITHOUT HEMATURIA: Primary | ICD-10-CM

## 2019-05-25 RX ORDER — CEFDINIR 300 MG/1
300 CAPSULE ORAL 2 TIMES DAILY
Qty: 20 CAPSULE | Refills: 0 | Status: SHIPPED | OUTPATIENT
Start: 2019-05-25 | End: 2019-08-02

## 2019-05-29 LAB
BACTERIA SPEC CULT: ABNORMAL
SPECIMEN SOURCE: ABNORMAL

## 2019-05-30 DIAGNOSIS — R94.4 DECREASED GFR: Primary | ICD-10-CM

## 2019-05-30 DIAGNOSIS — E03.8 OTHER SPECIFIED HYPOTHYROIDISM: ICD-10-CM

## 2019-05-30 DIAGNOSIS — N39.0 URINARY TRACT INFECTION: ICD-10-CM

## 2019-05-30 RX ORDER — LEVOTHYROXINE SODIUM 88 UG/1
88 TABLET ORAL EVERY MORNING
Qty: 90 TABLET | Refills: 1 | Status: SHIPPED | OUTPATIENT
Start: 2019-05-30 | End: 2019-09-11

## 2019-05-30 NOTE — RESULT ENCOUNTER NOTE
Please call pt with results below:     -Normal red blood cell (hgb) levels, normal white blood cell count and normal platelet levels.  -Cholesterol levels are at your goal levels. Except for The triglycerides are high. Lowering  the amount of sugar ,alcohol and sweets in the diet helps to control this.Exercise and weight loss helps.  They are not high enough to consider therapy with medicine. ADVISE: continuing your medication, a regular exercise program with at least 150 minutes of aerobic exercise per week, and eating a low saturated fat/low carbohydrate diet.  Also, you should recheck this fasting cholesterol panel in 6 months.  -Liver and gallbladder tests (ALT,AST, Alk phos,bilirubin) are normal.  -Kidney function (GFR) is decreased.  ADVISE: rechecking this in 3 months, but come in well nourished and well hydrated. Please  enter future orders for this and pt  can do this as a lab only appointment.   Please inform patient. They can have this done at any [facility] without appt during regular outpt lab hours or by lab only appt at any [place] clinic.  Dx: decreased GFR.    -Sodium is normal.  -Potassium is normal.  -Calcium is normal.  -Glucose is normal.  -TSH (thyroid stimulating hormone) is abnormal suggesting you are currently underreplaced.  ADVISE: changing your medication dose to 88 micrograms/day from 75mcg/day  (a prescription has been sent to your pharmacy) and  Recheck tsh, free t4, total t3 in 4-6 weeks. Please  enter future orders for this and pt  can do this as a lab only appointment.   Dx: hypothyroidism, unspecified type.   -Microalbumin (urine protein) test is normal.  ADVISE: rechecking this annually.  -Urine culture is abnormal and grew out bacteria that are sensitive to the antibiotic you have been given.  Complete the medication as prescribed and if you experience new, worsening or persistent symptoms, you should call or return for a recheck.    For additional lab test information,  [email or web address] is an excellent reference.

## 2019-06-20 DIAGNOSIS — N39.0 URINARY TRACT INFECTION: ICD-10-CM

## 2019-06-20 LAB

## 2019-06-20 PROCEDURE — 87088 URINE BACTERIA CULTURE: CPT | Performed by: FAMILY MEDICINE

## 2019-06-20 PROCEDURE — 81001 URINALYSIS AUTO W/SCOPE: CPT | Performed by: FAMILY MEDICINE

## 2019-06-20 PROCEDURE — 87186 SC STD MICRODIL/AGAR DIL: CPT | Performed by: FAMILY MEDICINE

## 2019-06-20 PROCEDURE — 87086 URINE CULTURE/COLONY COUNT: CPT | Performed by: FAMILY MEDICINE

## 2019-06-22 LAB
BACTERIA SPEC CULT: ABNORMAL
SPECIMEN SOURCE: ABNORMAL

## 2019-06-24 ENCOUNTER — TELEPHONE (OUTPATIENT)
Dept: FAMILY MEDICINE | Facility: CLINIC | Age: 79
End: 2019-06-24

## 2019-06-24 DIAGNOSIS — N39.0 URINARY TRACT INFECTION: Primary | ICD-10-CM

## 2019-06-24 RX ORDER — TETRACYCLINE HYDROCHLORIDE 500 MG/1
500 CAPSULE ORAL 2 TIMES DAILY
Qty: 14 CAPSULE | Refills: 0 | Status: SHIPPED | OUTPATIENT
Start: 2019-06-24 | End: 2019-06-24

## 2019-06-24 RX ORDER — DOXYCYCLINE HYCLATE 100 MG
100 TABLET ORAL 2 TIMES DAILY
Qty: 14 TABLET | Refills: 0 | Status: SHIPPED | OUTPATIENT
Start: 2019-06-24 | End: 2019-10-01

## 2019-06-24 NOTE — TELEPHONE ENCOUNTER
[facility] advised that Tetracycline is not covered by patient's insurance.  They prefer doxycycline.      Huddled with JV and she gave verbal order for Doxycycline 100 mg, twice per day for 7 days.      Tetracycline cancelled at pharmacy and new order sent for Doxycycline.      Judit Ang, BS, RN, PHN  [facility] T[facility]  Ph) [phone]

## 2019-06-24 NOTE — RESULT ENCOUNTER NOTE
Please call pt with results below:     Looks like pt dropped off this urine late last week - please call her ? Is she having symptoms?  = needs treatment for UTI as UC positive for Coag. Negative Staph - could be skin contaminant, but need to treat , especially if having symptoms.  Recommend:  With pt's allergies to macrobid and sulfa and decreased kidney functions:  Tetracycline 500mg po tid x 7 days.     try eat one yogurt ( with active cultures) daily or oral probiotic otc such as Culturelle, Align. IbSium, or UltraFlora  Intensive Care  to help restore your natural gut bacteria to hopefully prevent yeast infections and/or diarrhea sometimes assoc. with this antibiotic.     Creatinine       Date                     Value               Ref Range           Status                05/23/2019               1.39 (H)            0.52 - 1.04 mg*     Final            ----------  GFR Estimate       Date                     Value               Ref Range           Status                05/23/2019               37 (L)              >60 mL/min/[1.*     Final                   10/01/2018               46 (L)              >60 mL/min/1.7*     Final                   7/10/2018               44 (L)              >60 mL/min/1.7*     Final           ----------      For additional lab test information, [email or web address] is an excellent reference.

## 2019-07-12 ENCOUNTER — TELEPHONE (OUTPATIENT)
Dept: FAMILY MEDICINE | Facility: CLINIC | Age: 79
End: 2019-07-12

## 2019-07-12 DIAGNOSIS — N39.0 URINARY TRACT INFECTION: ICD-10-CM

## 2019-07-12 DIAGNOSIS — N39.0 URINARY TRACT INFECTION: Primary | ICD-10-CM

## 2019-07-12 LAB
ALBUMIN UR-MCNC: NEGATIVE MG/DL
APPEARANCE UR: CLEAR
BACTERIA #/AREA URNS HPF: ABNORMAL /HPF
BILIRUB UR QL STRIP: NEGATIVE
COLOR UR AUTO: YELLOW
GLUCOSE UR STRIP-MCNC: NEGATIVE MG/DL
HGB UR QL STRIP: ABNORMAL
KETONES UR STRIP-MCNC: NEGATIVE MG/DL
LEUKOCYTE ESTERASE UR QL STRIP: ABNORMAL
NITRATE UR QL: POSITIVE
PH UR STRIP: 6 PH (ref 5–7)
RBC #/AREA URNS AUTO: ABNORMAL /HPF
SOURCE: ABNORMAL
SP GR UR STRIP: 1.01 (ref 1–1.03)
UROBILINOGEN UR STRIP-ACNC: 0.2 EU/DL (ref 0.2–1)
WBC #/AREA URNS AUTO: >100 /HPF

## 2019-07-12 PROCEDURE — 87186 SC STD MICRODIL/AGAR DIL: CPT | Performed by: FAMILY MEDICINE

## 2019-07-12 PROCEDURE — 87088 URINE BACTERIA CULTURE: CPT | Performed by: FAMILY MEDICINE

## 2019-07-12 PROCEDURE — 87086 URINE CULTURE/COLONY COUNT: CPT | Performed by: FAMILY MEDICINE

## 2019-07-12 PROCEDURE — 81001 URINALYSIS AUTO W/SCOPE: CPT | Performed by: FAMILY MEDICINE

## 2019-07-12 NOTE — TELEPHONE ENCOUNTER
Message handled by nurse triage.       PLAN: JV gave verbal order for UA with micro and UC.      Judit Ang, BS, RN, N  [facility]) [phone]

## 2019-07-12 NOTE — TELEPHONE ENCOUNTER
Reason for Call: Request for an order or referral:    Order or referral being requested: UA orders to recheck her uti    Date needed: as soon as possible    Has the patient been seen by the PCP for this problem? YES    Additional comments: Please put in new orders & let patient know so she can come in & leave a sample.    Phone number Patient can be reached at:  Home number on file [phone] (home)    Best Time:  any    Can we leave a detailed message on this number?  YES    Call taken on 7/12/2019 at 10:10 AM by Pili Pavon

## 2019-07-14 LAB
BACTERIA SPEC CULT: ABNORMAL
SPECIMEN SOURCE: ABNORMAL

## 2019-07-15 ENCOUNTER — TELEPHONE (OUTPATIENT)
Dept: FAMILY MEDICINE | Facility: CLINIC | Age: 79
End: 2019-07-15

## 2019-07-15 DIAGNOSIS — N39.0 URINARY TRACT INFECTION: Primary | ICD-10-CM

## 2019-07-15 DIAGNOSIS — N39.41 URGENCY INCONTINENCE: ICD-10-CM

## 2019-07-15 DIAGNOSIS — I10 ESSENTIAL HYPERTENSION WITH GOAL BLOOD PRESSURE LESS THAN 140/90: ICD-10-CM

## 2019-07-15 DIAGNOSIS — R33.9 INCOMPLETE BLADDER EMPTYING: ICD-10-CM

## 2019-07-15 DIAGNOSIS — R31.9 HEMATURIA: ICD-10-CM

## 2019-07-15 RX ORDER — AMOXICILLIN 500 MG/1
1000 CAPSULE ORAL 2 TIMES DAILY
Qty: 28 CAPSULE | Refills: 0 | Status: SHIPPED | OUTPATIENT
Start: 2019-07-15 | End: 2019-10-01

## 2019-07-15 NOTE — LETTER
[address] [address]  Phone: [phone]    07/15/19    Aruna GALVEZ Tom  [address] [facility] [address]      Aruna,      What is Chronic Kidney Disease?     In the last year or so, the association of cardiologists has determined that this is another potential risk factor for heart disease, such as cholesterol, smoking, family history, etc.     Chronic kidney disease (CKD) is a loss of kidney function.  When the kidneys are damaged, they do not remove wastes and extra water from the blood as well as they should.  The most common causes of CKD are high blood pressure and diabetes.   It can also run in families, so you may be at higher risk if you have a blood relative with kidney failure.    CKD is a silent condition (having few or no symptoms) and develops so slowly that most people do not realize they are sick until the disease is advanced.  Left undetected and untreated CKD can eventually lead to further problems including hypertension, anemia, weak bones, poor nutrition, nerve damage, and in severe cases kidney failure (requiring dialysis or transplant).  CKD also increases a patient s risk for developing diseases of the heart and blood vessels.      We can diagnose CKD through blood and urine tests.  By detecting CKD in its early stages we can prevent or delay the complications of CKD, including kidney failure and heart disease.    Stages of CKD:  There are five stages of chronic kidney disease.  Your stage of kidney damage is based on the presence of kidney damage (often in the form of urinary proteins) and your glomerular filtration rate (GFR), which is a measure of your level of kidney function.CKD stage 3 just means that your GFR or filtering rate of the kidneys is slightly diminished and that you have another medical diagnosis, such as high blood pressure or high cholesterol.           Things you can do to slow down or avoid  kidney failure:  If you have diabetes, control your blood sugar.  Studies show that keeping tight control of blood sugar can delay or prevent kidney failure.    If you have high blood pressure, it is important to lower your blood pressure.  Medications called ACE (angiotensin-converting enzyme) inhibitors or ARBs (angiotensin receptor blockers) are used to keep your kidney disease from getting worse.  Be active by including exercise in your daily routine.  Eat a well balanced diet.   We may have you watch the amount of protein you eat.  Too much protein can make the kidneys work too hard.  If you smoke, Quit smoking.  Smoking damages the kidneys.  It also raises blood pressure.    Discuss all of your medications, including over-the-counter medications, with your doctor.  You should  avoid certain medications, including popular medications such as Advil, Motrin, Aleve (and other  NSAIDs ) which can further damage your kidneys.  For more information on Chronic Kidney Disease, please see the National Kidney Foundation www.kidney.org.      Sincerely,         Lisbet Simmons M.D./marvin

## 2019-07-15 NOTE — TELEPHONE ENCOUNTER
Patient given lab results from 7/12/2019.  Rx to go to  [facility].    Patient concerned about kidney function and the number of positive UA's recently.  Patient states she does not feel like her bladder empties completely.    Routing to provider for review and advise as appropriate.    FABIANA RealN, RN  [facility]

## 2019-07-15 NOTE — TELEPHONE ENCOUNTER
Message handled by nurse triage.       PLAN: Huddled with Dr. Simmons and verbal order given for urology referral, [facility]. Referral pending.  Dr. Simmons advised that bladder emptying issue is not necessarily a kidney issue.    Please sign referral once patient is notified and give her scheduling number.        FABIANA Layne, RN, [facility]) [phone]

## 2019-07-15 NOTE — TELEPHONE ENCOUNTER
"Requested Prescriptions   Pending Prescriptions Disp Refills     triamterene-HCTZ (MAXZIDE-25) 37.5-25 MG tablet [Pharmacy Med Name: TRIAMTERENE/HYDROCHLOROTHIAZIDE 37.5-25 MG Tablet] 90 tablet 0     Sig: TAKE 1 TABLET EVERY DAY       Last Written Prescription Date:  5/14/2019  Last Fill Quantity: 90,  # refills: 0   Last office visit: 9/27/2018 with prescribing provider:     Future Office Visit:       Next 5 appointments (look out 90 days)    Oct 01, 2019 10:00 AM CDT  Return Visit with Mary Miranda PA-C  [facility] ([facility]) [address] [address] [phone]               Diuretics (Including Combos) Protocol Failed - 7/15/2019  6:03 PM        Failed - Normal serum creatinine on file in past 12 months     Recent Labs   Lab Test 05/23/19  0744   CR 1.39*              Passed - Blood pressure under 140/90 in past 12 months     BP Readings from Last 3 Encounters:   05/03/19 110/54   04/02/19 137/79   10/04/18 121/60                 Passed - Recent (12 mo) or future (30 days) visit within the authorizing provider's specialty     Patient had office visit in the last 12 months or has a visit in the next 30 days with authorizing provider or within the authorizing provider's specialty.  See \"Patient Info\" tab in inbasket, or \"Choose Columns\" in Meds & Orders section of the refill encounter.              Passed - Medication is active on med list        Passed - Patient is age 18 or older        Passed - No active pregancy on record        Passed - Normal serum potassium on file in past 12 months     Recent Labs   Lab Test 05/23/19  0744   POTASSIUM 4.5                    Passed - Normal serum sodium on file in past 12 months     Recent Labs   Lab Test 05/23/19  0744                 Passed - No positive pregnancy test in past 12 months        metoprolol succinate ER (TOPROL-XL) 50 MG 24 hr tablet [Pharmacy Med Name: METOPROLOL " "SUCCINATE ER 50 MG Tablet Extended Release 24 Hour] 90 tablet 0     Sig: TAKE 1 TABLET EVERY DAY       Last Written Prescription Date:  5/14/2019  Last Fill Quantity: 90,  # refills: 0   Last office visit: 9/27/2018 with prescribing provider:     Future Office Visit:     Next 5 appointments (look out 90 days)    Oct 01, 2019 10:00 AM CDT  [facility] with [facility] Miranda [facility]-C  [facility] [address] [place] [address] [phone]               Beta-Blockers Protocol Passed - 7/15/2019  6:03 PM        Passed - Blood pressure under 140/90 in past 12 months     BP Readings from Last 3 Encounters:   05/03/19 110/54   04/02/19 137/79   10/04/18 121/60                 Passed - Patient is age 6 or older        Passed - Recent (12 mo) or future (30 days) visit within the authorizing provider's specialty     Patient had office visit in the last 12 months or has a visit in the next 30 days with authorizing provider or within the authorizing provider's specialty.  See \"Patient Info\" tab in inbasket, or \"Choose Columns\" in Meds & Orders section of the refill encounter.              Passed - Medication is active on med list        "

## 2019-07-15 NOTE — TELEPHONE ENCOUNTER
Patient notified by phone.  Letter mailed with info on CKD.  Urology referral ordered and scheduling number given to patient.    Patient verbalized understanding and agreed with plan.    FABIANA Layne, RN, N  [facility]) [phone]

## 2019-07-18 RX ORDER — METOPROLOL SUCCINATE 50 MG/1
TABLET, EXTENDED RELEASE ORAL
Qty: 90 TABLET | Refills: 0 | Status: SHIPPED | OUTPATIENT
Start: 2019-07-18 | End: 2019-09-24

## 2019-07-18 NOTE — TELEPHONE ENCOUNTER
Medication is being filled for a one time refill only as patient is due for follow up lab appointment to review kidney function as advised in last labs.  Future lab orders are placed.  30 day refill given.  Please call and assist with scheduling appointment prior to next refill     Stacia BAUTISTA RN   [facility]

## 2019-07-22 RX ORDER — TRIAMTERENE/HYDROCHLOROTHIAZID 37.5-25 MG
TABLET ORAL
Qty: 90 TABLET | Refills: 0 | Status: SHIPPED | OUTPATIENT
Start: 2019-07-22 | End: 2019-09-24

## 2019-07-22 NOTE — TELEPHONE ENCOUNTER
Patient notified.  Will call back to schedule.  Only one of the requested meds filled - please fill or advise.    Miladis Viramontes

## 2019-08-02 DIAGNOSIS — N30.00 ACUTE CYSTITIS WITHOUT HEMATURIA: ICD-10-CM

## 2019-08-02 DIAGNOSIS — R82.90 NONSPECIFIC FINDING ON EXAMINATION OF URINE: Primary | ICD-10-CM

## 2019-08-02 DIAGNOSIS — N39.0 URINARY TRACT INFECTION: ICD-10-CM

## 2019-08-02 DIAGNOSIS — N39.0 URINARY TRACT INFECTION: Primary | ICD-10-CM

## 2019-08-02 LAB
ALBUMIN UR-MCNC: NEGATIVE MG/DL
AMORPH CRY #/AREA URNS HPF: ABNORMAL /HPF
APPEARANCE UR: ABNORMAL
BACTERIA #/AREA URNS HPF: ABNORMAL /HPF
BILIRUB UR QL STRIP: NEGATIVE
COLOR UR AUTO: YELLOW
GLUCOSE UR STRIP-MCNC: NEGATIVE MG/DL
HGB UR QL STRIP: ABNORMAL
KETONES UR STRIP-MCNC: NEGATIVE MG/DL
LEUKOCYTE ESTERASE UR QL STRIP: ABNORMAL
NITRATE UR QL: NEGATIVE
NON-SQ EPI CELLS #/AREA URNS LPF: ABNORMAL /LPF
PH UR STRIP: 7 PH (ref 5–7)
RBC #/AREA URNS AUTO: ABNORMAL /HPF
SOURCE: ABNORMAL
SP GR UR STRIP: 1.01 (ref 1–1.03)
UROBILINOGEN UR STRIP-ACNC: 0.2 EU/DL (ref 0.2–1)
WBC #/AREA URNS AUTO: >100 /HPF

## 2019-08-02 PROCEDURE — 87088 URINE BACTERIA CULTURE: CPT | Performed by: FAMILY MEDICINE

## 2019-08-02 PROCEDURE — 81001 URINALYSIS AUTO W/SCOPE: CPT | Performed by: FAMILY MEDICINE

## 2019-08-02 PROCEDURE — 87086 URINE CULTURE/COLONY COUNT: CPT | Performed by: FAMILY MEDICINE

## 2019-08-02 PROCEDURE — 87186 SC STD MICRODIL/AGAR DIL: CPT | Performed by: FAMILY MEDICINE

## 2019-08-02 RX ORDER — CEFDINIR 300 MG/1
300 CAPSULE ORAL 2 TIMES DAILY
Qty: 20 CAPSULE | Refills: 0 | Status: SHIPPED | OUTPATIENT
Start: 2019-08-02 | End: 2019-10-01

## 2019-08-05 DIAGNOSIS — N39.0 URINARY TRACT INFECTION: Primary | ICD-10-CM

## 2019-08-06 ENCOUNTER — OFFICE VISIT (OUTPATIENT)
Dept: UROLOGY | Facility: CLINIC | Age: 79
End: 2019-08-06
Attending: FAMILY MEDICINE
Payer: COMMERCIAL

## 2019-08-06 VITALS
DIASTOLIC BLOOD PRESSURE: 60 MMHG | WEIGHT: 168 LBS | HEIGHT: 67 IN | OXYGEN SATURATION: 100 % | BODY MASS INDEX: 26.37 KG/M2 | HEART RATE: 99 BPM | SYSTOLIC BLOOD PRESSURE: 128 MMHG

## 2019-08-06 DIAGNOSIS — N39.0 RECURRENT UTI: Primary | ICD-10-CM

## 2019-08-06 LAB
ALBUMIN UR-MCNC: NEGATIVE MG/DL
APPEARANCE UR: CLEAR
BACTERIA SPEC CULT: ABNORMAL
BACTERIA SPEC CULT: ABNORMAL
BILIRUB UR QL STRIP: NEGATIVE
COLOR UR AUTO: YELLOW
GLUCOSE UR STRIP-MCNC: NEGATIVE MG/DL
HGB UR QL STRIP: ABNORMAL
KETONES UR STRIP-MCNC: NEGATIVE MG/DL
LEUKOCYTE ESTERASE UR QL STRIP: NEGATIVE
NITRATE UR QL: NEGATIVE
PH UR STRIP: 7 PH (ref 5–7)
RBC #/AREA URNS AUTO: 1 /HPF (ref 0–2)
RESIDUAL VOLUME (RV) (EXTERNAL): 228
SOURCE: ABNORMAL
SP GR UR STRIP: 1.01 (ref 1–1.03)
SPECIMEN SOURCE: ABNORMAL
SQUAMOUS #/AREA URNS AUTO: <1 /HPF (ref 0–1)
UROBILINOGEN UR STRIP-ACNC: 0.2 EU/DL (ref 0.2–1)
WBC #/AREA URNS AUTO: 1 /HPF (ref 0–5)

## 2019-08-06 PROCEDURE — 81015 MICROSCOPIC EXAM OF URINE: CPT | Performed by: PHYSICIAN ASSISTANT

## 2019-08-06 PROCEDURE — 51798 US URINE CAPACITY MEASURE: CPT | Performed by: PHYSICIAN ASSISTANT

## 2019-08-06 PROCEDURE — 81003 URINALYSIS AUTO W/O SCOPE: CPT | Performed by: PHYSICIAN ASSISTANT

## 2019-08-06 PROCEDURE — 99214 OFFICE O/P EST MOD 30 MIN: CPT | Mod: 25 | Performed by: PHYSICIAN ASSISTANT

## 2019-08-06 RX ORDER — ESTRADIOL 0.1 MG/G
CREAM VAGINAL
Qty: 42.5 G | Refills: 3 | Status: SHIPPED | OUTPATIENT
Start: 2019-08-06 | End: 2021-06-04

## 2019-08-06 ASSESSMENT — MIFFLIN-ST. JEOR: SCORE: 1284.67

## 2019-08-06 ASSESSMENT — PAIN SCALES - GENERAL: PAINLEVEL: NO PAIN (0)

## 2019-08-06 NOTE — PROGRESS NOTES
"CC: Recurrent UTIs    HPI: It was my pleasure to meet Ms. Aruna Santos, a 78 year old year old female seen in consultation today at the request of Dr. Simmons for recurrent UTIs. She is not immunosuppressed.  Today she complains of a recent history of frequent urinary tract infections, and states has had 8 in the last 4 months.  These are typically symptomatic.  Does not have typical symptoms and often unaware of infection.  Rarely has urgency, burning an dfrequency  She has no history of stones.  She has never been hospitalized for UTIs.       With infection, Ms. Aruna Santos typically goes to  where cultures are performed (see EMR, kleb x3-4, e coli x2-3, staph, raoultella).  All have been sensitive to cefazolin.  Now on Omnicef.     Wears a pad all day.  Saw Dr. Rodriguez in the past.    Hx of hyst with \"bladder repair\" (for incontinence) in .    -Inciting events include: Patient does not hold urine beyond the urge to void  -Daily Fluid intake: water with minimal caffeine.    -Bowels: reg    PVR 225cc today. She feels as if she is emptying.     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     Skin cancer      Tubular adenoma of colon     2014 - repeat in 2019 - q 5 years      Urgency incontinence        Past Surgical History:   Procedure Laterality Date     adenocarcinoma  2018    Primary adenocarcinoma of scalp     ARTHROSCOPY SHOULDER  2013    [facility]     COLONOSCOPY  94,     Colonoscopies q 5 year -next 2019     CYSTOCELE REPAIR  10/31/1984     HYSTERECTOMY, PAP NO LONGER INDICATED  10/31/84    Hysterectomy, Total " Abdominal w ovaries left intact     SURGICAL HISTORY OF -   5/17/73    Cholecystectomy & Incidental appendectomy       FAMILY HISTORY: Denies family history of urologic cancer.     Social History     Socioeconomic History     Marital status:      Spouse name: Valentin     Number of children: 2     Years of education: Not on file     Highest education level: Not on file   Occupational History     Employer: NONE    Social Needs     Financial resource strain: Not on file     Food insecurity:     Worry: Not on file     Inability: Not on file     Transportation needs:     Medical: Not on file     Non-medical: Not on file   Tobacco Use     Smoking status: Never Smoker     Smokeless tobacco: Never Used   Substance and Sexual Activity     Alcohol use: No     Drug use: No     Sexual activity: Yes     Partners: Male   Lifestyle     Physical activity:     Days per week: Not on file     Minutes per session: Not on file     Stress: Not on file   Relationships     Social connections:     Talks on phone: Not on file     Gets together: Not on file     Attends Holiness service: Not on file     Active member of club or organization: Not on file     Attends meetings of clubs or organizations: Not on file     Relationship status: Not on file     Intimate partner violence:     Fear of current or ex partner: Not on file     Emotionally abused: Not on file     Physically abused: Not on file     Forced sexual activity: Not on file   Other Topics Concern     Parent/sibling w/ CABG, MI or angioplasty before 65F 55M? No   Social History Narrative     Not on file      reports that she has never smoked. She has never used smokeless tobacco.    Current Outpatient Medications   Medication Sig Dispense Refill     amoxicillin (AMOXIL) 500 MG capsule Take 2 capsules (1,000 mg) by mouth 2 times daily (Patient not taking: Reported on 8/6/2019) 28 capsule 0     ASPIRIN 81 MG OR TABS 1 TABLET DAILY       brimonidine (ALPHAGAN P) 0.1 % ophthalmic  "solution Place 1 drop into both eyes 2 times daily        CALCIUM /VITAMIN D TABS   OR 2 qd       cefdinir (OMNICEF) 300 MG capsule Take 1 capsule (300 mg) by mouth 2 times daily for 10 days 20 capsule 0     clobetasol (TEMOVATE) 0.05 % external ointment APPLY SPARINGLY TO AFFECTED AREA OF GENITALS TWICE WEEKLY AT NIGHTTIME 60 g 1     LEVOBUNOLOL HCL 0.5 % OP SOLN one drop each day       levothyroxine (SYNTHROID/LEVOTHROID) 88 MCG tablet Take 1 tablet (88 mcg) by mouth every morning 90 tablet 1     lisinopril (PRINIVIL,ZESTRIL) 30 MG tablet TAKE 1 TABLET EVERY DAY 90 tablet 3     LORazepam (ATIVAN) 0.5 MG tablet Take 1 tablet (0.5 mg) by mouth 2 times daily Take 30 minutes prior to departure.  Do not operate a vehicle after taking this medication 10 tablet 0     metoprolol succinate ER (TOPROL-XL) 50 MG 24 hr tablet TAKE 1 TABLET EVERY DAY 90 tablet 0     MULTI-VITAMIN OR TABS 1 qd       simvastatin (ZOCOR) 40 MG tablet Take 1 tablet (40 mg) by mouth At Bedtime 90 tablet 3     triamterene-HCTZ (MAXZIDE-25) 37.5-25 MG tablet TAKE 1 TABLET EVERY DAY 90 tablet 0       ALLERGIES: Macrobid [nitrofurantoin]; Prednisone; and Sulfa drugs      REVIEW OF SYSTEMS: 14-pt ROS was negative other than that noted in the HPI.    GENERAL PHYSICAL EXAM:   /60 (BP Location: Right arm, Patient Position: Sitting, Cuff Size: Adult Large)   Pulse 99   Ht 1.702 m (5' 7\")   Wt 76.2 kg (168 lb)   SpO2 100%   BMI 26.31 kg/m  '  GEN: NAD  EYES: EOMI  MOUTH: MMM  NECK: Supple  RESP: Unlabored breathing  CARDIAC: No LE edema  SKIN: Warm  ABD: soft  NEURO: AAO      RADIOLOGY: nothing recent    Urine - trace blood    ASSESSMENT:   Recurrent UTIs    PLAN:   UA, micro  Establish with Dr. Luevano for poss Cysto  Hydrate.  - Lifestyle and hygiene modifications were reviewed today in clinic, including wiping front to back, wearing cotton breathable underwear, voiding before and after intercourse to flush the urethra, minimizing baths and opting " for showers, and appropriate perineal hygiene. Push fluids to keep the urine dilute  -Estrace cream 3 x per week q HS  -Keflex 250mg daily as prophy once Omnicef is completed until seen by Dr. Luevano for further recs.     Maeve Guzman PA-C  [facility]    30 min spent with the patient in a face to face manner, >50% of that time spent on counseling and coordination of care of UTIs.

## 2019-08-06 NOTE — NURSING NOTE
"Chief Complaint   Patient presents with     Urinary Problem     Incontinence, Incompelete bladder emptying, Urgency     Recurrent UTI     Hx of Hematuria,Per Patient is more worry that Kidney Infection       Blood pressure 128/60, pulse 99, height 1.702 m (5' 7\"), weight 76.2 kg (168 lb), SpO2 100 %, not currently breastfeeding. Body mass index is 26.31 kg/m .    Patient Active Problem List   Diagnosis     Hematuria     Dyspnea and respiratory abnormality     Hyperlipidemia LDL goal <130     Advanced directives, counseling/discussion     Essential hypertension with goal blood pressure less than 140/90     Family history of colon cancer- mother in her 40's-colonoscopies every 5 years- next one 2023     Osteopenia, unspecified location - was on fosamax, then Boniva secondary to hot flashes - off boniva since 2012     Urgency incontinence     Vulvar itching - ? early lichen sclerosis vs. 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        Allergies   Allergen Reactions     Macrobid [Nitrofurantoin] GI Disturbance     Prednisone      Sulfa Drugs        Current Outpatient Medications   Medication Sig Dispense Refill     ASPIRIN 81 MG OR TABS 1 TABLET DAILY       brimonidine (ALPHAGAN P) 0.1 % ophthalmic solution Place 1 drop into both eyes 2 times daily        CALCIUM /VITAMIN D TABS   OR 2 qd       cefdinir (OMNICEF) 300 MG capsule Take 1 capsule (300 mg) by mouth 2 times daily for 10 days 20 capsule 0     cephALEXin (KEFLEX) 250 MG capsule Take 1 capsule (250 mg) by mouth daily 90 capsule 0     clobetasol (TEMOVATE) 0.05 % external ointment APPLY SPARINGLY TO AFFECTED AREA OF GENITALS TWICE WEEKLY AT NIGHTTIME 60 g " 1     estradiol (ESTRACE VAGINAL) 0.1 MG/GM vaginal cream Apply small amount to the vaginal opening and urethra M, W, F q. h.s. 42.5 g 3     levothyroxine (SYNTHROID/LEVOTHROID) 88 MCG tablet Take 1 tablet (88 mcg) by mouth every morning 90 tablet 1     lisinopril (PRINIVIL,ZESTRIL) 30 MG tablet TAKE 1 TABLET EVERY DAY 90 tablet 3     LORazepam (ATIVAN) 0.5 MG tablet Take 1 tablet (0.5 mg) by mouth 2 times daily Take 30 minutes prior to departure.  Do not operate a vehicle after taking this medication 10 tablet 0     metoprolol succinate ER (TOPROL-XL) 50 MG 24 hr tablet TAKE 1 TABLET EVERY DAY 90 tablet 0     MULTI-VITAMIN OR TABS 1 qd       simvastatin (ZOCOR) 40 MG tablet Take 1 tablet (40 mg) by mouth At Bedtime 90 tablet 3     triamterene-HCTZ (MAXZIDE-25) 37.5-25 MG tablet TAKE 1 TABLET EVERY DAY 90 tablet 0     amoxicillin (AMOXIL) 500 MG capsule Take 2 capsules (1,000 mg) by mouth 2 times daily (Patient not taking: Reported on 8/6/2019) 28 capsule 0     LEVOBUNOLOL HCL 0.5 % OP SOLN one drop each day         Social History     Tobacco Use     Smoking status: Never Smoker     Smokeless tobacco: Never Used   Substance Use Topics     Alcohol use: No     Drug use: No       UA RESULTS:  Recent Labs   Lab Test 08/06/19  0959 08/02/19  1340   COLOR Yellow Yellow   APPEARANCE Clear Cloudy   URINEGLC Negative Negative   URINEBILI Negative Negative   URINEKETONE Negative Negative   SG 1.015 1.015   UBLD Trace* Small*   URINEPH 7.0 7.0   PROTEIN Negative Negative   UROBILINOGEN 0.2 0.2   NITRITE Negative Negative   LEUKEST Negative Moderate*   RBCU  --  5-10*   WBCU  --  >100*     PVR 228ML    Cheryl Bhakta [facility]  8/6/2019  10:57 AM

## 2019-08-06 NOTE — LETTER
"2019       RE: Aruna Santos  [address] [facility] [address]     Dear Colleague,    Thank you for referring your patient, Aruna Santos, to the [facility] [place] [facility]. Please see a copy of my visit note below.    CC: Recurrent UTIs    HPI: It was my pleasure to meet Ms. Aruna Santos, a 78 year old year old female seen in consultation today at the request of Dr. Simmons for recurrent UTIs. She is not immunosuppressed.  Today she complains of a recent history of frequent urinary tract infections, and states has had 8 in the last 4 months.  These are typically symptomatic.  Does not have typical symptoms and often unaware of infection.  Rarely has urgency, burning an dfrequency  She has no history of stones.  She has never been hospitalized for UTIs.       With infection, Ms. Aruna Santos typically goes to  where cultures are performed (see EMR, kleb x3-4, e coli x2-3, staph, raoultella).  All have been sensitive to cefazolin.  Now on Omnicef.     Wears a pad all day.  Saw Dr. Rodriguez in the past.    Hx of hyst with \"bladder repair\" (for incontinence) in .    -Inciting events include: Patient does not hold urine beyond the urge to void  -Daily Fluid intake: water with minimal caffeine.    -Bowels: reg    PVR 225cc today. She feels as if she is emptying.     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     Skin cancer      Tubular adenoma of colon      - repeat in 2019 - q 5 years      Urgency incontinence        Past Surgical History:   Procedure " Laterality Date     adenocarcinoma  01/30/2018    Primary adenocarcinoma of scalp     ARTHROSCOPY SHOULDER  1/2013    [facility]     COLONOSCOPY  9/22/94,     Colonoscopies q 5 year -next 2019     CYSTOCELE REPAIR  10/31/1984     HYSTERECTOMY, PAP NO LONGER INDICATED  10/31/84    Hysterectomy, Total Abdominal w ovaries left intact     SURGICAL HISTORY OF -   5/17/73    Cholecystectomy & Incidental appendectomy       FAMILY HISTORY: Denies family history of urologic cancer.     Social History     Socioeconomic History     Marital status:      Spouse name: Valentin     Number of children: 2     Years of education: Not on file     Highest education level: Not on file   Occupational History     Employer: NONE    Social Needs     Financial resource strain: Not on file     Food insecurity:     Worry: Not on file     Inability: Not on file     Transportation needs:     Medical: Not on file     Non-medical: Not on file   Tobacco Use     Smoking status: Never Smoker     Smokeless tobacco: Never Used   Substance and Sexual Activity     Alcohol use: No     Drug use: No     Sexual activity: Yes     Partners: Male   Lifestyle     Physical activity:     Days per week: Not on file     Minutes per session: Not on file     Stress: Not on file   Relationships     Social connections:     Talks on phone: Not on file     Gets together: Not on file     Attends Christian service: Not on file     Active member of club or organization: Not on file     Attends meetings of clubs or organizations: Not on file     Relationship status: Not on file     Intimate partner violence:     Fear of current or ex partner: Not on file     Emotionally abused: Not on file     Physically abused: Not on file     Forced sexual activity: Not on file   Other Topics Concern     Parent/sibling w/ CABG, MI or angioplasty before 65F 55M? No   Social History Narrative     Not on file      reports that she has never smoked. She has never used smokeless  "tobacco.    Current Outpatient Medications   Medication Sig Dispense Refill     amoxicillin (AMOXIL) 500 MG capsule Take 2 capsules (1,000 mg) by mouth 2 times daily (Patient not taking: Reported on 8/6/2019) 28 capsule 0     ASPIRIN 81 MG OR TABS 1 TABLET DAILY       brimonidine (ALPHAGAN P) 0.1 % ophthalmic solution Place 1 drop into both eyes 2 times daily        CALCIUM /VITAMIN D TABS   OR 2 qd       cefdinir (OMNICEF) 300 MG capsule Take 1 capsule (300 mg) by mouth 2 times daily for 10 days 20 capsule 0     clobetasol (TEMOVATE) 0.05 % external ointment APPLY SPARINGLY TO AFFECTED AREA OF GENITALS TWICE WEEKLY AT NIGHTTIME 60 g 1     LEVOBUNOLOL HCL 0.5 % OP SOLN one drop each day       levothyroxine (SYNTHROID/LEVOTHROID) 88 MCG tablet Take 1 tablet (88 mcg) by mouth every morning 90 tablet 1     lisinopril (PRINIVIL,ZESTRIL) 30 MG tablet TAKE 1 TABLET EVERY DAY 90 tablet 3     LORazepam (ATIVAN) 0.5 MG tablet Take 1 tablet (0.5 mg) by mouth 2 times daily Take 30 minutes prior to departure.  Do not operate a vehicle after taking this medication 10 tablet 0     metoprolol succinate ER (TOPROL-XL) 50 MG 24 hr tablet TAKE 1 TABLET EVERY DAY 90 tablet 0     MULTI-VITAMIN OR TABS 1 qd       simvastatin (ZOCOR) 40 MG tablet Take 1 tablet (40 mg) by mouth At Bedtime 90 tablet 3     triamterene-HCTZ (MAXZIDE-25) 37.5-25 MG tablet TAKE 1 TABLET EVERY DAY 90 tablet 0       ALLERGIES: Macrobid [nitrofurantoin]; Prednisone; and Sulfa drugs      REVIEW OF SYSTEMS: 14-pt ROS was negative other than that noted in the HPI.    GENERAL PHYSICAL EXAM:   /60 (BP Location: Right arm, Patient Position: Sitting, Cuff Size: Adult Large)   Pulse 99   Ht 1.702 m (5' 7\")   Wt 76.2 kg (168 lb)   SpO2 100%   BMI 26.31 kg/m   '  GEN: NAD  EYES: EOMI  MOUTH: MMM  NECK: Supple  RESP: Unlabored breathing  CARDIAC: No LE edema  SKIN: Warm  ABD: soft  NEURO: AAO      RADIOLOGY: nothing recent    Urine - trace blood    ASSESSMENT: "   Recurrent UTIs    PLAN:   UA, micro  Establish with Dr. Luevano for poss Cysto  Hydrate.  - Lifestyle and hygiene modifications were reviewed today in clinic, including wiping front to back, wearing cotton breathable underwear, voiding before and after intercourse to flush the urethra, minimizing baths and opting for showers, and appropriate perineal hygiene. Push fluids to keep the urine dilute  -Estrace cream 3 x per week q HS  -Keflex 250mg daily as prophy once Omnicef is completed until seen by Dr. Luevano for further recs.     Maeve Guzman PA-C  [facility]    30 min spent with the patient in a face to face manner, >50% of that time spent on counseling and coordination of care of UTIs.       Again, thank you for allowing me to participate in the care of your patient.      Sincerely,    Maeve Guzman PA-C, [facility]

## 2019-08-06 NOTE — PATIENT INSTRUCTIONS
Please call [phone] to schedule Ultrasound.    Complete Omnicef. Then start keflex 250mg daily until seen by Dr. Luevano for further recommendations.     Estrogen cream three times a week near urethra (pea-sized amount): If >$40, let me know and we can get via a compound pharmacy.

## 2019-08-08 ENCOUNTER — TELEPHONE (OUTPATIENT)
Dept: UROLOGY | Facility: CLINIC | Age: 79
End: 2019-08-08

## 2019-08-08 DIAGNOSIS — N39.0 RECURRENT UTI: ICD-10-CM

## 2019-08-08 NOTE — TELEPHONE ENCOUNTER
Called RX into [facility]. Informed patient that she should be expecting a call from them.    Fern Rand LPN

## 2019-08-08 NOTE — TELEPHONE ENCOUNTER
Health Call Center    Phone Message    May a detailed message be left on voicemail: yes    Reason for Call: Medication Question or concern regarding medication   Prescription Clarification  Name of Medication: Estradiol  Prescribing Provider: Maeve Guzman   Pharmacy: [facility]   What on the order needs clarification? Maeve Guzman instructed patient that if Estradiol was more than $40 to contact the clinic.  The pharmacy quoted Aruna a cost of $140.  Megan indicated that prescription could be sent to a compounding pharmacy.  Please provide Aruna with an update.          Action Taken: Message routed to:  [facility] ([facility] UA POOL

## 2019-09-05 ENCOUNTER — HOSPITAL ENCOUNTER (OUTPATIENT)
Dept: ULTRASOUND IMAGING | Facility: CLINIC | Age: 79
Discharge: HOME | End: 2019-09-05
Attending: PHYSICIAN ASSISTANT | Admitting: PHYSICIAN ASSISTANT
Payer: COMMERCIAL

## 2019-09-05 DIAGNOSIS — N39.0 RECURRENT UTI: ICD-10-CM

## 2019-09-05 PROCEDURE — 76770 US EXAM ABDO BACK WALL COMP: CPT

## 2019-09-06 DIAGNOSIS — N39.0 URINARY TRACT INFECTION: ICD-10-CM

## 2019-09-06 DIAGNOSIS — E03.8 OTHER SPECIFIED HYPOTHYROIDISM: ICD-10-CM

## 2019-09-06 DIAGNOSIS — R94.4 DECREASED GFR: ICD-10-CM

## 2019-09-06 LAB
ALBUMIN SERPL-MCNC: 3.6 G/DL (ref 3.4–5)
ALBUMIN UR-MCNC: NEGATIVE MG/DL
ALP SERPL-CCNC: 45 U/L (ref 40–150)
ALT SERPL W P-5'-P-CCNC: 33 U/L (ref 0–50)
ANION GAP SERPL CALCULATED.3IONS-SCNC: 7 MMOL/L (ref 3–14)
APPEARANCE UR: CLEAR
AST SERPL W P-5'-P-CCNC: 24 U/L (ref 0–45)
BACTERIA #/AREA URNS HPF: ABNORMAL /HPF
BILIRUB SERPL-MCNC: 0.6 MG/DL (ref 0.2–1.3)
BILIRUB UR QL STRIP: NEGATIVE
BUN SERPL-MCNC: 26 MG/DL (ref 7–30)
CALCIUM SERPL-MCNC: 9.3 MG/DL (ref 8.5–10.1)
CHLORIDE SERPL-SCNC: 106 MMOL/L (ref 94–109)
CO2 SERPL-SCNC: 27 MMOL/L (ref 20–32)
COLOR UR AUTO: YELLOW
CREAT SERPL-MCNC: 1.04 MG/DL (ref 0.52–1.04)
GFR SERPL CREATININE-BSD FRML MDRD: 52 ML/MIN/{1.73_M2}
GLUCOSE SERPL-MCNC: 107 MG/DL (ref 70–99)
GLUCOSE UR STRIP-MCNC: NEGATIVE MG/DL
HGB UR QL STRIP: ABNORMAL
KETONES UR STRIP-MCNC: NEGATIVE MG/DL
LEUKOCYTE ESTERASE UR QL STRIP: ABNORMAL
NITRATE UR QL: NEGATIVE
NON-SQ EPI CELLS #/AREA URNS LPF: ABNORMAL /LPF
PH UR STRIP: 5.5 PH (ref 5–7)
POTASSIUM SERPL-SCNC: 4 MMOL/L (ref 3.4–5.3)
PROT SERPL-MCNC: 7.1 G/DL (ref 6.8–8.8)
RBC #/AREA URNS AUTO: ABNORMAL /HPF
SODIUM SERPL-SCNC: 140 MMOL/L (ref 133–144)
SOURCE: ABNORMAL
SP GR UR STRIP: 1.01 (ref 1–1.03)
T3 SERPL-MCNC: 92 NG/DL (ref 60–181)
T4 FREE SERPL-MCNC: 1.21 NG/DL (ref 0.76–1.46)
TSH SERPL DL<=0.005 MIU/L-ACNC: 2.05 MU/L (ref 0.4–4)
UROBILINOGEN UR STRIP-ACNC: 0.2 EU/DL (ref 0.2–1)
WBC #/AREA URNS AUTO: ABNORMAL /HPF

## 2019-09-06 PROCEDURE — 84443 ASSAY THYROID STIM HORMONE: CPT | Performed by: FAMILY MEDICINE

## 2019-09-06 PROCEDURE — 84480 ASSAY TRIIODOTHYRONINE (T3): CPT | Performed by: FAMILY MEDICINE

## 2019-09-06 PROCEDURE — 81001 URINALYSIS AUTO W/SCOPE: CPT | Performed by: FAMILY MEDICINE

## 2019-09-06 PROCEDURE — 84439 ASSAY OF FREE THYROXINE: CPT | Performed by: FAMILY MEDICINE

## 2019-09-06 PROCEDURE — 36415 COLL VENOUS BLD VENIPUNCTURE: CPT | Performed by: FAMILY MEDICINE

## 2019-09-06 PROCEDURE — 87086 URINE CULTURE/COLONY COUNT: CPT | Performed by: FAMILY MEDICINE

## 2019-09-06 PROCEDURE — 80053 COMPREHEN METABOLIC PANEL: CPT | Performed by: FAMILY MEDICINE

## 2019-09-07 LAB
BACTERIA SPEC CULT: NORMAL
SPECIMEN SOURCE: NORMAL

## 2019-09-11 ENCOUNTER — OFFICE VISIT (OUTPATIENT)
Dept: UROLOGY | Facility: CLINIC | Age: 79
End: 2019-09-11
Payer: COMMERCIAL

## 2019-09-11 VITALS
BODY MASS INDEX: 26.37 KG/M2 | WEIGHT: 168 LBS | DIASTOLIC BLOOD PRESSURE: 80 MMHG | HEART RATE: 58 BPM | HEIGHT: 67 IN | OXYGEN SATURATION: 98 % | SYSTOLIC BLOOD PRESSURE: 132 MMHG

## 2019-09-11 DIAGNOSIS — E03.8 OTHER SPECIFIED HYPOTHYROIDISM: ICD-10-CM

## 2019-09-11 DIAGNOSIS — M62.89 PELVIC FLOOR DYSFUNCTION: ICD-10-CM

## 2019-09-11 DIAGNOSIS — N81.9 VAGINAL VAULT PROLAPSE: ICD-10-CM

## 2019-09-11 DIAGNOSIS — M79.18 MYALGIA OF PELVIC FLOOR: ICD-10-CM

## 2019-09-11 DIAGNOSIS — Z87.440 PERSONAL HISTORY OF URINARY TRACT INFECTION: ICD-10-CM

## 2019-09-11 DIAGNOSIS — N39.0 RECURRENT UTI: Primary | ICD-10-CM

## 2019-09-11 DIAGNOSIS — N39.8 VOIDING DYSFUNCTION: ICD-10-CM

## 2019-09-11 LAB
ALBUMIN UR-MCNC: NEGATIVE MG/DL
APPEARANCE UR: CLEAR
BILIRUB UR QL STRIP: NEGATIVE
COLOR UR AUTO: YELLOW
GLUCOSE UR STRIP-MCNC: NEGATIVE MG/DL
HGB UR QL STRIP: NEGATIVE
KETONES UR STRIP-MCNC: NEGATIVE MG/DL
LEUKOCYTE ESTERASE UR QL STRIP: ABNORMAL
NITRATE UR QL: NEGATIVE
PH UR STRIP: 7 PH (ref 5–7)
RESIDUAL VOLUME (RV) (EXTERNAL): 11
SOURCE: ABNORMAL
SP GR UR STRIP: 1.01 (ref 1–1.03)
UROBILINOGEN UR STRIP-ACNC: 0.2 EU/DL (ref 0.2–1)

## 2019-09-11 PROCEDURE — 51798 US URINE CAPACITY MEASURE: CPT | Performed by: UROLOGY

## 2019-09-11 PROCEDURE — 52000 CYSTOURETHROSCOPY: CPT | Performed by: UROLOGY

## 2019-09-11 PROCEDURE — 81003 URINALYSIS AUTO W/O SCOPE: CPT | Performed by: UROLOGY

## 2019-09-11 RX ORDER — LIDOCAINE HYDROCHLORIDE 20 MG/ML
JELLY TOPICAL ONCE
Status: DISCONTINUED | OUTPATIENT
Start: 2019-09-11 | End: 2019-09-11 | Stop reason: HOSPADM

## 2019-09-11 RX ORDER — LEVOTHYROXINE SODIUM 88 UG/1
88 TABLET ORAL EVERY MORNING
Qty: 90 TABLET | Refills: 1 | Status: SHIPPED | OUTPATIENT
Start: 2019-09-11 | End: 2020-03-13

## 2019-09-11 ASSESSMENT — MIFFLIN-ST. JEOR: SCORE: 1279.67

## 2019-09-11 ASSESSMENT — PAIN SCALES - GENERAL: PAINLEVEL: NO PAIN (0)

## 2019-09-11 NOTE — PROGRESS NOTES
"September 11, 2019    Return visit    Patient returns today for follow up for cystoscopy for Luis, in setting of hysterectomy and prior \"bladder repair\" in the past.  She also has history of elevated PVRs (225mL in August).  She denies any changes in her health since last visit.  UCx last week with >100K mixed ghulam.    /80   Pulse 58   Ht 1.702 m (5' 7\")   Wt 76.2 kg (168 lb)   SpO2 98%   BMI 26.31 kg/m    She is comfortable, in no distress, non-labored breathing.  Abdomen is soft, non-tender, non-distended.  Normal external female genitalia.  Negative CST.  Pelvic exam is remarkable for some myofascial tenderness of the pelvic floor, inability to localize her pelvic floor musculature.  She has stage II support with her posterior wall to 0.    Cystoscopy Note: After informed consent was obtained patient was prepped and draped in the standard fashion.  The flexible cystoscope was inserted into a normal appearing urethral meatus with initially a slight resistance.  The urothelium was carefully examined and there were no tumors, masses, stones, foreign bodies, or other urothelial abnormalities noted.  Bilateral ureteral orifices were noted in the normal orthotopic position and both effluxed clear urine.  The cystoscope was retroflexed and the bladder neck was unremarkable.  The urethra was carefully examined upon removing the cystoscope and was unremarkable.  Patient tolerated the procedure without complications noted.      Renal US without obvious nidus for infection, PVR 87mL    PVR today after cystoscopy 11 mL by bladder scan    A/P: 79 year old F with history of prior pelvic floor surgery, stage II vault prolapse, voiding dysfunction, myofascial tenderness of the pelvic floor, pelvic floor dysfunction and Luis vs colonization    Discussed that if not having any symptoms recommend NOT checking urine as she may have some colonization rather than infection    It appears that she does have some voiding " dysfunction.  We discussed how her pelvic floor symptoms are related to the physical exam findings and her pelvic floor myofascial dysfunction.  We discussed how the recommended treatment is dedicated pelvic floor therapy.  We discussed how the pelvic floor physical therapy works and patient is agreeable.  Referral was placed.      Karyn Luevano MD MPH   of Urology    CC  Patient Care Team:  Lisbet Simmons MD as PCP - General (Family Practice)  Lisbet Simmons MD as Assigned PCP

## 2019-09-11 NOTE — RESULT ENCOUNTER NOTE
Please call pt with results below:     -All of your labs are normal or significantly improved from previous.   Your recent urine wasn't entirely a clean catch, which is sometimes difficult to obtain, but with your urine culture being negative, there is no sign of true infection.    TSH - thyroid stimulating hormone and other circulating thyroid hormones (free t4 and total T3) = normal thyroid function - please continue your current dose of levothyroxine.  I've sent an order for #90 with 1 refill to [facility] for you.  You just need to call them to fill it when you are ready.    The testing of your blood sugar, kidney function, much improved , liver function and electrolytes was normal.     For additional lab test information, labtestsonline.org is an excellent reference.

## 2019-09-11 NOTE — LETTER
"9/11/2019       RE: Aruna Santos  [address] [facility] [address]     Dear Colleague,    Thank you for referring your patient, Aruna Santos, to the [facility] [place] at [facility]. Please see a copy of my visit note below.    September 11, 2019    Return visit    Patient returns today for follow up for cystoscopy for Luis, in setting of hysterectomy and prior \"bladder repair\" in the past.  She also has history of elevated PVRs (225mL in August).  She denies any changes in her health since last visit.  UCx last week with >100K mixed ghulam.    /80   Pulse 58   Ht 1.702 m (5' 7\")   Wt 76.2 kg (168 lb)   SpO2 98%   BMI 26.31 kg/m     She is comfortable, in no distress, non-labored breathing.  Abdomen is soft, non-tender, non-distended.  Normal external female genitalia.  Negative CST.  Pelvic exam is remarkable for some myofascial tenderness of the pelvic floor, inability to localize her pelvic floor musculature.  She has stage II support with her posterior wall to 0.    Cystoscopy Note: After informed consent was obtained patient was prepped and draped in the standard fashion.  The flexible cystoscope was inserted into a normal appearing urethral meatus with initially a slight resistance.  The urothelium was carefully examined and there were no tumors, masses, stones, foreign bodies, or other urothelial abnormalities noted.  Bilateral ureteral orifices were noted in the normal orthotopic position and both effluxed clear urine.  The cystoscope was retroflexed and the bladder neck was unremarkable.  The urethra was carefully examined upon removing the cystoscope and was unremarkable.  Patient tolerated the procedure without complications noted.      Renal US without obvious nidus for infection, PVR 87mL    PVR today after cystoscopy 11 mL by bladder scan    A/P: 79 year old F with history of prior pelvic floor surgery, stage II " vault prolapse, voiding dysfunction, myofascial tenderness of the pelvic floor, pelvic floor dysfunction and Luis vs colonization    Discussed that if not having any symptoms recommend NOT checking urine as she may have some colonization rather than infection    It appears that she does have some voiding dysfunction.  We discussed how her pelvic floor symptoms are related to the physical exam findings and her pelvic floor myofascial dysfunction.  We discussed how the recommended treatment is dedicated pelvic floor therapy.  We discussed how the pelvic floor physical therapy works and patient is agreeable.  Referral was placed.      Karyn Luevano MD MPH   of Urology    CC  Patient Care Team:  Lisbet Simmons MD as PCP - General (Family Practice)

## 2019-09-11 NOTE — NURSING NOTE
Chief Complaint   Patient presents with     Follow Up     Patient is here for follow up for recurrent UTI's, and cysto.      Prior to the start of the procedure and with procedural staff participation, I verbally confirmed the patient s identity using two indicators, relevant allergies, that the procedure was appropriate and matched the consent or emergent situation, and that the correct equipment/implants were available. Immediately prior to starting the procedure I conducted the Time Out with the procedural staff and re-confirmed the patient s name, procedure, and site/side. I have wiped the patient off with the povidone-Iodine solution, draped them,  used Lidocaine hydrochloride jelly, and instilled sterile water into the bladder. (The Joint Commission universal protocol was followed.)  Yes    Sedation (Moderate or Deep): None    5mL 2% lidocaine hydrochloride Urojet instilled into urethra.    NDC# [address]-74  Lot #: [id number]  Expiration Date: 12/2020    PVR today was 11ml    Thi Villanueva CMA

## 2019-09-11 NOTE — PATIENT INSTRUCTIONS
"Do not submit a urine sample unless you are having symptoms or are having procedures that require clean urine    Supplements to prevent UTI  -probiotics  -cranberry   Ellura: www.myellura.com   Theracran HP by [employer]  -d-mannose    Websites with free information:    American Urogynecologic Society patient website: www.voicesforpfd.org    Total Control Program: www.totalcontrolprogram.com    Please see one of the dedicated pelvic floor physical therapist ([facility] [phone])    Please return to see me in 4 months, sooner if needed    It was a pleasure meeting with you today.  Thank you for allowing me and my team the privilege of caring for you today.  YOU are the reason we are here, and I truly hope we provided you with the excellent service you deserve.  Please let us know if there is anything else we can do for you so that we can be sure you are leaving completely satisfied with your care experience.    AFTER YOUR CYSTOSCOPY  ?  ?  You have just completed a cystoscopy, or \"cysto\", which allowed your physician to learn more about your bladder (or to remove a stent placed after surgery). We suggest that you continue to avoid caffeine, fruit juice, and alcohol for the next 24 hours, however, you are encouraged to return to your normal activities.  ?  ?  A few things that are considered normal after your cystoscopy:  ?  * small amount of bleeding (or spotting) that clears within the next 24 hours  ?  * slight burning sensation with urination  ?  * sensation of needing to void (urinate) more frequently  ?  * the feeling of \"air\" in your urine  ?  * mild discomfort that is relieved with Tylenol    * bladder spasms  ?  ?  ?  Please contact our office promptly if you:  ?  * develop a fever above 101 degrees  ?  * are unable to urinate  ?  * develop bright red blood that does not stop  ?  * experience severe pain or swelling  ?  ?  ?  And of course, please contact our office " with any concerns or questions [phone]  ?

## 2019-09-24 DIAGNOSIS — I10 ESSENTIAL HYPERTENSION WITH GOAL BLOOD PRESSURE LESS THAN 140/90: ICD-10-CM

## 2019-09-25 RX ORDER — TRIAMTERENE/HYDROCHLOROTHIAZID 37.5-25 MG
TABLET ORAL
Qty: 30 TABLET | Refills: 0 | Status: SHIPPED | OUTPATIENT
Start: 2019-09-25 | End: 2019-10-23

## 2019-09-25 RX ORDER — METOPROLOL SUCCINATE 50 MG/1
TABLET, EXTENDED RELEASE ORAL
Qty: 30 TABLET | Refills: 0 | Status: SHIPPED | OUTPATIENT
Start: 2019-09-25 | End: 2019-10-23

## 2019-09-25 NOTE — TELEPHONE ENCOUNTER
"Requested Prescriptions   Pending Prescriptions Disp Refills     metoprolol succinate ER (TOPROL-XL) 50 MG 24 hr tablet [Pharmacy Med Name: METOPROLOL SUCCINATE ER 50 MG Tablet Extended Release 24 Hour]            Last Written Prescription Date:  7.18.19  Last Fill Quantity: 90 tablet,  # refills: 0   Last office visit: 9/27/2018 with prescribing provider:  Lisbet Simmons MD         Future Office Visit:   Next 5 appointments (look out 90 days)    Oct 01, 2019 10:00 AM CDT  Return Visit with Mary Miranda PA-C  [facility] ([facility] [address] [address] [phone]            90 tablet 0     Sig: TAKE 1 TABLET EVERY DAY       Beta-Blockers Protocol Passed - 9/24/2019  9:08 PM        Passed - Blood pressure under 140/90 in past 12 months     BP Readings from Last 3 Encounters:   09/11/19 132/80   08/06/19 128/60   05/03/19 110/54                 Passed - Patient is age 6 or older        Passed - Recent (12 mo) or future (30 days) visit within the authorizing provider's specialty     Patient had office visit in the last 12 months or has a visit in the next 30 days with authorizing provider or within the authorizing provider's specialty.  See \"Patient Info\" tab in inbasket, or \"Choose Columns\" in Meds & Orders section of the refill encounter.              Passed - Medication is active on med list        triamterene-HCTZ (MAXZIDE-25) 37.5-25 MG tablet [Pharmacy Med Name: TRIAMTERENE/HYDROCHLOROTHIAZIDE 37.5-25 MG Tablet]                  Last Written Prescription Date:  7.22.19  Last Fill Quantity: 90 tablet,  # refills: 0   Last office visit: 9/27/2018 with prescribing provider:  Lisbet Simmons MD                 Future Office Visit:   Next 5 appointments (look out 90 days)    Oct 01, 2019 10:00 AM CDT  Return Visit with Mary Miranda PA-C  [facility] ([facility]) [address] " "[address] [id number] [phone]            90 tablet 0     Sig: TAKE 1 TABLET EVERY DAY       Diuretics (Including Combos) Protocol Passed - 9/24/2019  9:08 PM        Passed - Blood pressure under 140/90 in past 12 months     BP Readings from Last 3 Encounters:   09/11/19 132/80   08/06/19 128/60   05/03/19 110/54                 Passed - Recent (12 mo) or future (30 days) visit within the authorizing provider's specialty     Patient had office visit in the last 12 months or has a visit in the next 30 days with authorizing provider or within the authorizing provider's specialty.  See \"Patient Info\" tab in inbasket, or \"Choose Columns\" in Meds & Orders section of the refill encounter.              Passed - Medication is active on med list        Passed - Patient is age 18 or older        Passed - No active pregancy on record        Passed - Normal serum creatinine on file in past 12 months     Recent Labs   Lab Test 09/06/19  0927   CR 1.04              Passed - Normal serum potassium on file in past 12 months     Recent Labs   Lab Test 09/06/19 0927   POTASSIUM 4.0                    Passed - Normal serum sodium on file in past 12 months     Recent Labs   Lab Test 09/06/19  0927                 Passed - No positive pregnancy test in past 12 months        "

## 2019-09-25 NOTE — TELEPHONE ENCOUNTER
Due for an Office visit for further refills, only fill for 30 days     Waleska Shah RN, BSN  [facility]

## 2019-10-01 ENCOUNTER — OFFICE VISIT (OUTPATIENT)
Dept: DERMATOLOGY | Facility: CLINIC | Age: 79
End: 2019-10-01
Payer: COMMERCIAL

## 2019-10-01 VITALS — DIASTOLIC BLOOD PRESSURE: 70 MMHG | OXYGEN SATURATION: 98 % | SYSTOLIC BLOOD PRESSURE: 157 MMHG | HEART RATE: 66 BPM

## 2019-10-01 DIAGNOSIS — D18.01 ANGIOMA OF SKIN: ICD-10-CM

## 2019-10-01 DIAGNOSIS — L81.4 LENTIGO: ICD-10-CM

## 2019-10-01 DIAGNOSIS — D22.9 NEVUS: ICD-10-CM

## 2019-10-01 DIAGNOSIS — L82.1 SEBORRHEIC KERATOSIS: ICD-10-CM

## 2019-10-01 DIAGNOSIS — D48.5 NEOPLASM OF UNCERTAIN BEHAVIOR OF SKIN: Primary | ICD-10-CM

## 2019-10-01 PROCEDURE — 11103 TANGNTL BX SKIN EA SEP/ADDL: CPT | Performed by: PHYSICIAN ASSISTANT

## 2019-10-01 PROCEDURE — 88305 TISSUE EXAM BY PATHOLOGIST: CPT | Mod: TC | Performed by: PHYSICIAN ASSISTANT

## 2019-10-01 PROCEDURE — 99214 OFFICE O/P EST MOD 30 MIN: CPT | Mod: 25 | Performed by: PHYSICIAN ASSISTANT

## 2019-10-01 PROCEDURE — 11102 TANGNTL BX SKIN SINGLE LES: CPT | Performed by: PHYSICIAN ASSISTANT

## 2019-10-01 NOTE — PROGRESS NOTES
HPI:   chief complaint: Aruna Santos is a 79 year old female who presents for Full skin cancer screening to rule out skin cancer.  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  Social History: lives by [place]. Went on a cruise to [place] over the summer time with grandchildren  On review of systems, there are no further skin complaints, patient is feeling otherwise well.  See patient intake sheet.  ROS of the following were done and are negative: Constitutional, Eyes, Ears, Nose,   Mouth, Throat, Cardiovascular, Respiratory, GI, Genitourinary, Musculoskeletal,   Psychiatric, Endocrine, Allergic/Immunologic.    This document serves as a record of the services and decisions personally performed and made by Mary Miranda, MS, PA-C. It was created on her behalf by Jaqui Belcher, a trained medical scribe. The creation of this document is based on the provider's statements to the medical scribe.  Jaqui Belcher 9:58 AM October 1, 2019    PHYSICAL EXAM:   BP (!) 157/70   Pulse 66   SpO2 98%   Breastfeeding? No   Skin exam performed as follows: Type 2 skin. Mood appropriate  Alert and Oriented X 3. Well developed, well nourished in no distress.  General appearance: Normal  Head including face: Normal  Eyes: conjunctiva and lids: Normal  Mouth: Lips, teeth, gums: Normal  Neck: Normal  Chest-breast/axillae: Normal  Back: Normal  Spleen and liver: Normal  Cardiovascular: Exam of peripheral vascular system by observation for swelling, varicosities, edema: Normal  Genitalia: groin, buttocks: Normal  Extremities: digits/nails (clubbing): Normal  Eccrine and Apocrine glands: Normal  Right upper extremity: Normal  Left  upper extremity: Normal  Right lower extremity: Normal  Left lower extremity: Normal  Skin: Scalp and body hair: See below    Pt deferred exam of breasts, groin, buttocks: No    Other physical findings:  1. Multiple pigmented macules on extremities and trunk  2. Multiple pigmented macules on face, trunk and extremities  3. Multiple vascular papules on trunk, arms and legs  4. Multiple scattered keratotic plaques  5. 2 mm yellow papule on occipital scalp  6. Cyst  7. 4 mm pink papule on right posterior thigh         Except as noted above, no other signs of skin cancer or melanoma.     ASSESSMENT/PLAN:   Benign Full skin cancer screening today.    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    Multiple benign appearing nevi on arms, legs and trunk. Discussed ABCDEs of melanoma and sunscreen.   Multiple lentigos on arms, legs and trunk. Advised benign, no treatment needed.  Multiple scattered angiomas. Advised benign, no treatment needed.   Seborrheic keratosis on arms, legs and trunk. Advised benign, no treatment needed.  Milia r/o adenocarcionma on occipital scalp. Photo taken and placed in chart. Shave bx in typical fashion .  Area cleaned with betadyne and anesthetized with 1% lidocaine with epi .  Dermablade used to remove the lesion and sent to pathology. Bleeding was cauterized. Pt tolerated procedure well.   Cyst- advised benign and no treatment needed. Discussed excision with Dr. Hansen if bothersome.  R/o BCC on right posterior thigh. Photo taken and placed in chart. Shave bx in typical fashion .  Area cleaned with betadyne and anesthetized with 1% lidocaine with epi .  Dermablade used to remove the lesion and sent to pathology. Bleeding was cauterized. Pt tolerated procedure well.   History of adenocarcinoma on the scalp 1/2018. No lymphadenopathy palpated today - advised for pt to do this once monthly at  home. More than 50% of reported cases are located in the tete-orbital region and the hair-bearing scalp.  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    Aruna to follow up with Primary Care provider regarding elevated blood pressure.        Follow-up: pending path/Q 6 months FSE/PRN sooner     1.) Patient was asked about new and changing moles. YES  2.) Patient received a complete physical skin examination: YES  3.) Patient was counseled to perform a monthly self skin examination: YES  Scribed By: Jaqui Belcher, Medical Scribe    The information in this document, created by the medical scribe for me, accurately reflects the services I personally performed and the decisions made by me. I have reviewed and approved this document for accuracy prior to leaving the patient care area.  October 1, 2019 10:07 AM    Mary Miranda MS, PA-C

## 2019-10-01 NOTE — LETTER
10/1/2019         RE: Aruna Santos  [address] [facility] [address]        Dear Colleague,    Thank you for referring your patient, Aruna Santos, to the [facility]. Please see a copy of my visit note below.    HPI:   chief complaint: Aruna Santos is a 79 year old female who presents for Full skin cancer screening to rule out skin cancer.  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  Social History: lives by [place]. Went on a cruise to [place] over the summer time with grandchildren  On review of systems, there are no further skin complaints, patient is feeling otherwise well.  See patient intake sheet.  ROS of the following were done and are negative: Constitutional, Eyes, Ears, Nose,   Mouth, Throat, Cardiovascular, Respiratory, GI, Genitourinary, Musculoskeletal,   Psychiatric, Endocrine, Allergic/Immunologic.    This document serves as a record of the services and decisions personally performed and made by Mary Miranda, MS, PA-C. It was created on her behalf by Jaqui Belcher, a trained medical scribe. The creation of this document is based on the provider's statements to the medical scribe.  Jaqui Belcher 9:58 AM October 1, 2019    PHYSICAL EXAM:   BP (!) 157/70   Pulse 66   SpO2 98%   Breastfeeding? No   Skin exam performed as follows: Type 2 skin. Mood appropriate  Alert and Oriented X 3. Well developed, well nourished in no distress.  General appearance: Normal  Head including face: Normal  Eyes: conjunctiva and lids: Normal  Mouth: Lips, teeth, gums: Normal  Neck: Normal  Chest-breast/axillae: Normal  Back: Normal  Spleen and liver: Normal  Cardiovascular:  Exam of peripheral vascular system by observation for swelling, varicosities, edema: Normal  Genitalia: groin, buttocks: Normal  Extremities: digits/nails (clubbing): Normal  Eccrine and Apocrine glands: Normal  Right upper extremity: Normal  Left upper extremity: Normal  Right lower extremity: Normal  Left lower extremity: Normal  Skin: Scalp and body hair: See below    Pt deferred exam of breasts, groin, buttocks: No    Other physical findings:  1. Multiple pigmented macules on extremities and trunk  2. Multiple pigmented macules on face, trunk and extremities  3. Multiple vascular papules on trunk, arms and legs  4. Multiple scattered keratotic plaques  5. 2 mm yellow papule on occipital scalp  6. Cyst  7. 4 mm pink papule on right posterior thigh         Except as noted above, no other signs of skin cancer or melanoma.     ASSESSMENT/PLAN:   Benign Full skin cancer screening today.    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    Multiple benign appearing nevi on arms, legs and trunk. Discussed ABCDEs of melanoma and sunscreen.   Multiple lentigos on arms, legs and trunk. Advised benign, no treatment needed.  Multiple scattered angiomas. Advised benign, no treatment needed.   Seborrheic keratosis on arms, legs and trunk. Advised benign, no treatment needed.  Milia r/o adenocarcionma on occipital scalp. Photo taken and placed in chart. Shave bx in typical fashion .  Area cleaned with betadyne and anesthetized with 1% lidocaine with epi .  Dermablade used to remove the lesion and sent to pathology. Bleeding was cauterized. Pt tolerated procedure well.   Cyst- advised benign and no treatment needed. Discussed excision with Dr. Hansen if bothersome.  R/o BCC on right posterior thigh. Photo taken and placed in chart. Shave bx in typical fashion .  Area cleaned with betadyne and anesthetized with 1% lidocaine with  epi .  Dermablade used to remove the lesion and sent to pathology. Bleeding was cauterized. Pt tolerated procedure well.   History of adenocarcinoma on the scalp 1/2018. No lymphadenopathy palpated today - advised for pt to do this once monthly at home. More than 50% of reported cases are located in the tete-orbital region and the hair-bearing scalp.  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    Aruna to follow up with Primary Care provider regarding elevated blood pressure.        Follow-up: pending path/Q 6 months FSE/PRN sooner     1.) Patient was asked about new and changing moles. YES  2.) Patient received a complete physical skin examination: YES  3.) Patient was counseled to perform a monthly self skin examination: YES  Scribed By: Jaqui Belcher, Medical Scribe    The information in this document, created by the medical scribe for me, accurately reflects the services I personally performed and the decisions made by me. I have reviewed and approved this document for accuracy prior to leaving the patient care area.  October 1, 2019 10:07 AM    Mary Miranda, MS, PANHAN      Again, thank you for allowing me to participate in the care of your patient.        Sincerely,        Mary Miranda PA-C

## 2019-10-01 NOTE — PATIENT INSTRUCTIONS
Wound Care Instructions     FOR SUPERFICIAL WOUNDS     [facility] [phone]    [facility] [phone]                       AFTER 24 HOURS YOU SHOULD REMOVE THE BANDAGE AND BEGIN DAILY DRESSING CHANGES AS FOLLOWS:     1) Remove Dressing.     2) Clean and dry the area with tap water using a Q-tip or sterile gauze pad.     3) Apply Vaseline, Aquaphor, Polysporin ointment or Bacitracin ointment over entire wound.  Do NOT use Neosporin ointment.     4) Cover the wound with a band-aid, or a sterile non-stick gauze pad and micropore paper tape      REPEAT THESE INSTRUCTIONS AT LEAST ONCE A DAY UNTIL THE WOUND HAS COMPLETELY HEALED.    It is an old wives tale that a wound heals better when it is exposed to air and allowed to dry out. The wound will heal faster with a better cosmetic result if it is kept moist with ointment and covered with a bandage.    **Do not let the wound dry out.**      Supplies Needed:      *Cotton tipped applicators (Q-tips)    *Polysporin Ointment or Bacitracin Ointment (NOT NEOSPORIN)    *Band-aids or non-stick gauze pads and micropore paper tape.      PATIENT INFORMATION:    During the healing process you will notice a number of changes. All wounds develop a small halo of redness surrounding the wound.  This means healing is occurring. Severe itching with extensive redness usually indicates sensitivity to the ointment or bandage tape used to dress the wound.  You should call our office if this develops.      Swelling  and/or discoloration around your surgical site is common, particularly when performed around the eye.    All wounds normally drain.  The larger the wound the more drainage there will be.  After 7-10 days, you will notice the wound beginning to shrink and new skin will begin to grow.  The wound is healed when you can see skin has formed over the entire area.  A healed wound has a healthy, shiny look to the surface and is red to dark pink in color  to normalize.  Wounds may take approximately 4-6 weeks to heal.  Larger wounds may take 6-8 weeks.  After the wound is healed you may discontinue dressing changes.    You may experience a sensation of tightness as your wound heals. This is normal and will gradually subside.    Your healed wound may be sensitive to temperature changes. This sensitivity improves with time, but if you re having a lot of discomfort, try to avoid temperature extremes.    Patients frequently experience itching after their wound appears to have healed because of the continue healing under the skin.  Plain Vaseline will help relieve the itching.        POSSIBLE COMPLICATIONS    BLEEDIN. Leave the bandage in place.  2. Use tightly rolled up gauze or a cloth to apply direct pressure over the bandage for 30  minutes.  3. Reapply pressure for an additional 30 minutes if necessary  4. Use additional gauze and tape to maintain pressure once the bleeding has stopped.

## 2019-10-02 ENCOUNTER — THERAPY VISIT (OUTPATIENT)
Dept: PHYSICAL THERAPY | Facility: CLINIC | Age: 79
End: 2019-10-02
Payer: COMMERCIAL

## 2019-10-02 DIAGNOSIS — E78.5 HYPERLIPIDEMIA LDL GOAL <130: ICD-10-CM

## 2019-10-02 DIAGNOSIS — I10 ESSENTIAL HYPERTENSION WITH GOAL BLOOD PRESSURE LESS THAN 140/90: ICD-10-CM

## 2019-10-02 DIAGNOSIS — N39.8 VOIDING DYSFUNCTION: ICD-10-CM

## 2019-10-02 DIAGNOSIS — M62.89 PELVIC FLOOR DYSFUNCTION: ICD-10-CM

## 2019-10-02 DIAGNOSIS — N81.9 VAGINAL VAULT PROLAPSE: ICD-10-CM

## 2019-10-02 DIAGNOSIS — Z87.440 PERSONAL HISTORY OF URINARY TRACT INFECTION: ICD-10-CM

## 2019-10-02 DIAGNOSIS — M79.18 MYALGIA OF PELVIC FLOOR: ICD-10-CM

## 2019-10-02 PROCEDURE — 97162 PT EVAL MOD COMPLEX 30 MIN: CPT | Mod: GP | Performed by: PHYSICAL THERAPIST

## 2019-10-02 PROCEDURE — 97110 THERAPEUTIC EXERCISES: CPT | Mod: GP | Performed by: PHYSICAL THERAPIST

## 2019-10-02 PROCEDURE — 97535 SELF CARE MNGMENT TRAINING: CPT | Mod: GP | Performed by: PHYSICAL THERAPIST

## 2019-10-02 NOTE — TELEPHONE ENCOUNTER
"Requested Prescriptions   Pending Prescriptions Disp Refills     lisinopril (PRINIVIL/ZESTRIL) 30 MG tablet [Pharmacy Med Name: LISINOPRIL 30 MG Tablet]        Last Written Prescription Date:  9.27.18  Last Fill Quantity: 90 tablet,  # refills: 3   Last office visit: 9/27/2018 with prescribing provider:  Lisbet Simmons MD               Future Office Visit:       90 tablet 3     Sig: TAKE 1 TABLET EVERY DAY       ACE Inhibitors (Including Combos) Protocol Failed - 10/2/2019  2:07 PM        Failed - Blood pressure under 140/90 in past 12 months     BP Readings from Last 3 Encounters:   10/01/19 (!) 157/70   09/11/19 132/80   08/06/19 128/60                 Failed - Recent (12 mo) or future (30 days) visit within the authorizing provider's specialty     Patient has had an office visit with the authorizing provider or a provider within the authorizing providers department within the previous 12 mos or has a future within next 30 days. See \"Patient Info\" tab in inbasket, or \"Choose Columns\" in Meds & Orders section of the refill encounter.              Passed - Medication is active on med list        Passed - Patient is age 18 or older        Passed - No active pregnancy on record        Passed - Normal serum creatinine on file in past 12 months     Recent Labs   Lab Test 09/06/19 0927   CR 1.04             Passed - Normal serum potassium on file in past 12 months     Recent Labs   Lab Test 09/06/19 0927   POTASSIUM 4.0             Passed - No positive pregnancy test within past 12 months        simvastatin (ZOCOR) 40 MG tablet [Pharmacy Med Name: SIMVASTATIN 40 MG Tablet]              Last Written Prescription Date:  9.27.18  Last Fill Quantity: 90 tablet,  # refills: 3   Last office visit: 9/27/2018 with prescribing provider:  Lisbet Simmons MD                 Future Office Visit:       90 tablet 3     Sig: TAKE 1 TABLET (40 MG) BY MOUTH AT BEDTIME       Statins Protocol Failed - 10/2/2019  2:07 PM        " "Failed - Recent (12 mo) or future (30 days) visit within the authorizing provider's specialty     Patient has had an office visit with the authorizing provider or a provider within the authorizing providers department within the previous 12 mos or has a future within next 30 days. See \"Patient Info\" tab in inbasket, or \"Choose Columns\" in Meds & Orders section of the refill encounter.              Passed - LDL on file in past 12 months     Recent Labs   Lab Test 05/23/19  0744   *             Passed - No abnormal creatine kinase in past 12 months     No lab results found.             Passed - Medication is active on med list        Passed - Patient is age 18 or older        Passed - No active pregnancy on record        Passed - No positive pregnancy test in past 12 months        "

## 2019-10-02 NOTE — PROGRESS NOTES
[facility] Initial Evaluation  Subjective:  Hysterectomy/bladder lift ~age 44 (~). Did well after surgery up until the past ~5+years. Chief c/o: recurrent UTI's and kidney infections (2) over the years but the past year pretty much constant. Currently on 90 day antibiotic. Urinary incontinence not too bothersome but a bit. Wears ~ 1 pad/day or sometimes none, incomplete emptying usually. Voiding every ~ 1-2 hours in morning, ~ 3 hours in afternoon. At nighttime ~1-2. Urgency out of the blue and sometime urge incontinence, maybe every few days. This is better while on antibiotic. Also some stress incontinence. , 1 breach delivery, vaginal. Goal is figure out cause of recurrent infections, improve continence is secondary.     The history is provided by the patient. No  was used.   Type of problem:  Incontinence and pelvic dysfunction    This is a chronic condition                              Objective:  System                                 Pelvic Dysfunction Evaluation:    Bladder/Pelvic Problems:  1 breach delivery          Diagnostic Tests:    Pelvic Exam:  Yes  UA/Urine Sample:  Yes  Post Void Residual:  Yes      Cystoscopy:  Yes            Flexibility:    Tightness present at:Adductors; Piriformis and Gluteals    Abdominal Wall:  normal        Pelvic Clock Exam:  Pelvic clock exam: Very minimal tenderness but some mild tightness noted R>L LA/OI.  Ischiocavernosis pain:  -  Bulbocavernosis pain:  -  Transverse Perineal:  -  Levator ANI:  +/- and +      Reflex Testing:  normal    External Assessment:          Tissue Symmetry:  Normal  Introitus:  Normal  Muscle Contraction/Perineal Mobility:  Elevation and urogential triangle descent  Internal Assessment:  Internal assessment pelvic: 3+/5 strength, able to relax within 1-2 seconds.    Contraction/Grade:  Fair squeeze, definite lift (3)          Additional History:  Delivery History:  Vaginal delivery    Number of  Live Births: 2                       General     ROS    Assessment/Plan:    Patient is a 79 year old female with pelvic complaints.    Patient has the following significant findings with corresponding treatment plan.                Diagnosis 1:  Mixed incontinence/pelvic floor dysfunction  Decreased ROM/flexibility - manual therapy and therapeutic exercise  Decreased strength - therapeutic exercise and therapeutic activities  Decreased proprioception - neuro re-education and therapeutic activities  Inflammation - self management/home program  Impaired muscle performance - neuro re-education  Decreased function - therapeutic activities  Impaired posture - neuro re-education    Therapy Evaluation Codes:   1) History comprised of:   Personal factors that impact the plan of care:      Time since onset of symptoms.    Comorbidity factors that impact the plan of care are:      High blood pressure and thyroid.     Medications impacting care: High blood pressure and thyroid.  2) Examination of Body Systems comprised of:   Body structures and functions that impact the plan of care:      Pelvis.   Activity limitations that impact the plan of care are:      Stress incontinence and Urge incontinence.  3) Clinical presentation characteristics are:   Evolving/Changing.  4) Decision-Making    Moderate complexity using standardized patient assessment instrument and/or measureable assessment of functional outcome.  Cumulative Therapy Evaluation is: Moderate complexity.    Previous and current functional limitations:  (See Goal Flow Sheet for this information)    Short term and Long term goals: (See Goal Flow Sheet for this information)     Communication ability:  Patient appears to be able to clearly communicate and understand verbal and written communication and follow directions correctly.  Treatment Explanation - The following has been discussed with the patient:   RX ordered/plan of care  Anticipated outcomes  Possible risks and  side effects  This patient would benefit from PT intervention to resume normal activities.   Rehab potential is good.    Frequency:  2 X a month, once daily  Duration:  for 2 months  Discharge Plan:  Achieve all LTG.  Independent in home treatment program.  Reach maximal therapeutic benefit.    Please refer to the daily flowsheet for treatment today, total treatment time and time spent performing 1:1 timed codes.        03-Mar-2017 12:33

## 2019-10-03 RX ORDER — LISINOPRIL 30 MG/1
TABLET ORAL
Qty: 30 TABLET | Refills: 0 | Status: SHIPPED | OUTPATIENT
Start: 2019-10-03 | End: 2019-10-28

## 2019-10-03 RX ORDER — SIMVASTATIN 40 MG
40 TABLET ORAL AT BEDTIME
Qty: 30 TABLET | Refills: 0 | Status: SHIPPED | OUTPATIENT
Start: 2019-10-03 | End: 2019-10-28

## 2019-10-03 NOTE — TELEPHONE ENCOUNTER
No future appt scheduled  Medication is being filled for 1 time refill only due to:  Patient needs to be seen because it has been more than one year since last visit.    Mirian Hudson RN  [facility]

## 2019-10-07 ENCOUNTER — TELEPHONE (OUTPATIENT)
Dept: DERMATOLOGY | Facility: CLINIC | Age: 79
End: 2019-10-07

## 2019-10-07 LAB — COPATH REPORT: NORMAL

## 2019-10-07 NOTE — TELEPHONE ENCOUNTER
Patient called back. Educated patient on biopsy results- benign pathology results. No further treatment needed- recommended regular skin exams. Patient voiced understanding.    Jackie RN-BSN-PHN  [facility]  [phone]

## 2019-10-07 NOTE — TELEPHONE ENCOUNTER
Called and LM for patient to call back in regards to pathology results and providers notes.    Jackie RN-BSN-PHN  [facility]  [phone]

## 2019-10-07 NOTE — TELEPHONE ENCOUNTER
----- Message from Mary Miranda PA-C sent at 10/7/2019  1:33 PM CDT -----  Please notify patient of benign pathology results. No further treatment is needed. Recommend regular skin exams.

## 2019-10-21 ENCOUNTER — HOSPITAL ENCOUNTER (OUTPATIENT)
Dept: MAMMOGRAPHY | Facility: CLINIC | Age: 79
Discharge: HOME | End: 2019-10-21
Attending: FAMILY MEDICINE | Admitting: FAMILY MEDICINE
Payer: COMMERCIAL

## 2019-10-21 DIAGNOSIS — Z12.31 VISIT FOR SCREENING MAMMOGRAM: ICD-10-CM

## 2019-10-21 PROCEDURE — 77063 BREAST TOMOSYNTHESIS BI: CPT

## 2019-10-23 ENCOUNTER — TELEPHONE (OUTPATIENT)
Dept: FAMILY MEDICINE | Facility: CLINIC | Age: 79
End: 2019-10-23

## 2019-10-23 DIAGNOSIS — I10 ESSENTIAL HYPERTENSION WITH GOAL BLOOD PRESSURE LESS THAN 140/90: ICD-10-CM

## 2019-10-23 RX ORDER — TRIAMTERENE/HYDROCHLOROTHIAZID 37.5-25 MG
TABLET ORAL
Qty: 60 TABLET | Refills: 0 | Status: SHIPPED | OUTPATIENT
Start: 2019-10-23 | End: 2019-11-29

## 2019-10-23 RX ORDER — METOPROLOL SUCCINATE 50 MG/1
TABLET, EXTENDED RELEASE ORAL
Qty: 60 TABLET | Refills: 0 | Status: SHIPPED | OUTPATIENT
Start: 2019-10-23 | End: 2019-11-29

## 2019-10-23 NOTE — TELEPHONE ENCOUNTER
"Requested Prescriptions   Pending Prescriptions Disp Refills     triamterene-HCTZ (MAXZIDE-25) 37.5-25 MG tablet [Pharmacy Med Name: TRIAMTERENE/HYDROCHLOROTHIAZIDE 37.5-25 MG Tablet] 30 tablet 0     Sig: TAKE 1 TABLET EVERY DAY . NEED MD APPOINTMENT FOR REFILLS   Last Written Prescription Date:  9/25/19  Last Fill Quantity: 30,  # refills: 0   Last office visit: 9/27/2018  Sent to pharmacy as: Triamterene-HCTZ 37.5-25 MG Oral Tablet (MAXZIDE-25)   Class: E-Prescribe   Notes to Pharmacy: Due for an Office visit for further refills, only fill for 30 days     Future Office Visit:   Next 5 appointments (look out 90 days)    Nov 29, 2019 10:00 AM CST  PHYSICAL with Lisbet Simmons MD  [facility] [address] [place] [address] [id number] [phone]             Diuretics (Including Combos) Protocol Failed - 10/23/2019  4:16 PM        Failed - Blood pressure under 140/90 in past 12 months     BP Readings from Last 3 Encounters:   10/01/19 (!) 157/70   09/11/19 132/80   08/06/19 128/60           Failed - Recent (12 mo) or future (30 days) visit within the authorizing provider's specialty     Patient has had an office visit with the authorizing provider or a provider within the authorizing providers department within the previous 12 mos or has a future within next 30 days. See \"Patient Info\" tab in inbasket, or \"Choose Columns\" in Meds & Orders section of the refill encounter.              Passed - Medication is active on med list        Passed - Patient is age 18 or older        Passed - No active pregancy on record        Passed - Normal serum creatinine on file in past 12 months     Recent Labs   Lab Test 09/06/19  0927   CR 1.04            Passed - Normal serum potassium on file in past 12 months     Recent Labs   Lab Test 09/06/19 0927   POTASSIUM 4.0              Passed - Normal serum sodium on file in past 12 months     Recent Labs   Lab Test " "09/06/19  0927                 Passed - No positive pregnancy test in past 12 months                        metoprolol succinate ER (TOPROL-XL) 50 MG 24 hr tablet [Pharmacy Med Name: METOPROLOL SUCCINATE ER 50 MG Tablet Extended Release 24 Hour] 30 tablet 0     Sig: TAKE 1 TABLET EVERY DAY . NEED MD APPOINTMENT FOR REFILLS   Last Written Prescription Date:  9/25/19  Last Fill Quantity: 30,  # refills: 0   Last office visit: 9/27/2018      Class: E-Prescribe   Notes to Pharmacy: Due for an Office visit for further refills, only fill for 30 days       Future Office Visit:   Next 5 appointments (look out 90 days)    Nov 29, 2019 10:00 AM CST  PHYSICAL with [facility] MD  [facility] [address] [place] [id number]  [phone]             Beta-Blockers Protocol Failed - 10/23/2019  4:16 PM        Failed - Blood pressure under 140/90 in past 12 months     BP Readings from Last 3 Encounters:   10/01/19 (!) 157/70   09/11/19 132/80   08/06/19 128/60             Failed - Recent (12 mo) or future (30 days) visit within the authorizing provider's specialty     Patient has had an office visit with the authorizing provider or a provider within the authorizing providers department within the previous 12 mos or has a future within next 30 days. See \"Patient Info\" tab in inbasket, or \"Choose Columns\" in Meds & Orders section of the refill encounter.              Passed - Patient is age 6 or older        Passed - Medication is active on med list        Routing refill request to provider for review/approval because:  Labs out of range:  Pt BP elevated      Boo Malhotra RN   [facility]        "

## 2019-10-24 NOTE — TELEPHONE ENCOUNTER
Patient states she is scheduled for a physical on 11/29.  She does not feel she needs to come in sooner.    Miladis Viramontes

## 2019-10-28 ENCOUNTER — ANCILLARY PROCEDURE (OUTPATIENT)
Dept: GENERAL RADIOLOGY | Facility: CLINIC | Age: 79
End: 2019-10-28
Attending: FAMILY MEDICINE
Payer: COMMERCIAL

## 2019-10-28 ENCOUNTER — OFFICE VISIT (OUTPATIENT)
Dept: ORTHOPEDICS | Facility: CLINIC | Age: 79
End: 2019-10-28
Payer: COMMERCIAL

## 2019-10-28 VITALS
HEIGHT: 67 IN | DIASTOLIC BLOOD PRESSURE: 60 MMHG | SYSTOLIC BLOOD PRESSURE: 136 MMHG | WEIGHT: 168 LBS | BODY MASS INDEX: 26.37 KG/M2

## 2019-10-28 DIAGNOSIS — M79.642 BILATERAL HAND PAIN: ICD-10-CM

## 2019-10-28 DIAGNOSIS — M79.642 BILATERAL HAND PAIN: Primary | ICD-10-CM

## 2019-10-28 DIAGNOSIS — I10 ESSENTIAL HYPERTENSION WITH GOAL BLOOD PRESSURE LESS THAN 140/90: ICD-10-CM

## 2019-10-28 DIAGNOSIS — M19.042 PRIMARY OSTEOARTHRITIS OF BOTH HANDS: ICD-10-CM

## 2019-10-28 DIAGNOSIS — M65.331 TRIGGER FINGER, RIGHT MIDDLE FINGER: ICD-10-CM

## 2019-10-28 DIAGNOSIS — E78.5 HYPERLIPIDEMIA LDL GOAL <130: ICD-10-CM

## 2019-10-28 DIAGNOSIS — M79.641 BILATERAL HAND PAIN: ICD-10-CM

## 2019-10-28 DIAGNOSIS — M65.332 TRIGGER FINGER, LEFT MIDDLE FINGER: ICD-10-CM

## 2019-10-28 DIAGNOSIS — M19.041 PRIMARY OSTEOARTHRITIS OF BOTH HANDS: ICD-10-CM

## 2019-10-28 DIAGNOSIS — M79.641 BILATERAL HAND PAIN: Primary | ICD-10-CM

## 2019-10-28 PROCEDURE — 20550 NJX 1 TENDON SHEATH/LIGAMENT: CPT | Mod: 50 | Performed by: FAMILY MEDICINE

## 2019-10-28 PROCEDURE — 99204 OFFICE O/P NEW MOD 45 MIN: CPT | Performed by: FAMILY MEDICINE

## 2019-10-28 PROCEDURE — 73130 X-RAY EXAM OF HAND: CPT | Mod: RT

## 2019-10-28 RX ORDER — LIDOCAINE HYDROCHLORIDE 10 MG/ML
0.5 INJECTION, SOLUTION INFILTRATION; PERINEURAL
Status: SHIPPED | OUTPATIENT
Start: 2019-10-28

## 2019-10-28 RX ORDER — METHYLPREDNISOLONE ACETATE 40 MG/ML
20 INJECTION, SUSPENSION INTRA-ARTICULAR; INTRALESIONAL; INTRAMUSCULAR; SOFT TISSUE
Status: DISCONTINUED | OUTPATIENT
Start: 2019-10-28 | End: 2019-12-26

## 2019-10-28 RX ADMIN — METHYLPREDNISOLONE ACETATE 20 MG: 40 INJECTION, SUSPENSION INTRA-ARTICULAR; INTRALESIONAL; INTRAMUSCULAR; SOFT TISSUE at 15:50

## 2019-10-28 RX ADMIN — LIDOCAINE HYDROCHLORIDE 0.5 ML: 10 INJECTION, SOLUTION INFILTRATION; PERINEURAL at 15:50

## 2019-10-28 ASSESSMENT — MIFFLIN-ST. JEOR: SCORE: 1279.67

## 2019-10-28 NOTE — LETTER
10/28/2019         RE: Aruna Santos  [address] [place] [facility] [address]        Dear Colleague,    Thank you for referring your patient, Aruna Santos, to the [facility]. Please see a copy of my visit note below.    ASSESSMENT & PLAN    1. Bilateral hand pain    2. Primary osteoarthritis of both hands    3. Trigger finger, left middle finger    4. Trigger finger, right middle finger      Steroid injection of the bilateral middle trigger fingers was performed today in clinic  For pain related to your arthritis could try fish oil and Turmeric ([employer] - Omega Curcumin or Life Extension - Curcumin Elite)     If pain is not well controlled would recommend surgical referral  -----    SUBJECTIVE  Aruna Santos is a/an 79 year old Right handed female who is seen as a self referral for evaluation of bilateral hand pain. The patient is seen by themselves.    Onset: 1 month(s) ago. Reports insidious onset without acute precipitating event.  Location of Pain: bilateral hands  Rating of Pain at worst: 5/10  Rating of Pain Currently: 2/10  Worsened by: gripping, grasping, making a fist  Better with: cortisone injections  Treatments tried: rest/activity avoidance, tylenol, ibuprofen and corticosteroid injection of left long finger flexor tendon sheath (most recent date: 11/12/18 @ [facility]) that provided  10 month(s) of relief  Associated symptoms: triggering of left middle finger  Orthopedic history: YES - h/o bilateral hand pain on and off for 4-5 years, has previously seen Dr. Mariluz Taylor @ [facility]  Relevant surgical history: NO  Patient Social History: retired    Patient's past medical, surgical, social, and family histories were reviewed today and no pertinent history related to patient's presenting problem.    REVIEW OF SYSTEMS:  10 point ROS is negative other than symptoms noted above in HPI, Past Medical History or as stated below  Constitutional: NEGATIVE for fever, chills, change in  "weight  Skin: NEGATIVE for worrisome rashes, moles or lesions  GI/: NEGATIVE for bowel or bladder changes  Neuro: NEGATIVE for weakness, dizziness or paresthesias    OBJECTIVE:  /60   Ht 1.702 m (5' 7\")   Wt 76.2 kg (168 lb)   BMI 26.31 kg/m      General: healthy, alert and in no distress  HEENT: no scleral icterus or conjunctival erythema  Skin: no suspicious lesions or rash. No jaundice.  CV: regular rhythm by palpation  Resp: normal respiratory effort without conversational dyspnea   Psych: normal mood and affect  Gait: normal steady gait with appropriate coordination and balance  Neuro: normal light touch sensory exam of the bilateral hands.    MSK:  BILATERAL HAND  Inspection:  Bony hypertrophy over PIP joints bilateral hands  Palpation:   Fingers: Tender at the bilateral A1 Pulley middle finger, right greater than left.  Triggering but no locking  Range of Motion:    Limited with deep flexion of the middle fingers  Strength:     fair  Special Tests:    Positive: palpable triggering bilateral middle finger    Independent visualization of the below image:  X-ray Bilateral Hands  Varying degrees of  IP narrowing consistent with osteoarthritis.  Mild CMC narrowing bilaterally.  Ossicle noted at the radiocarpal joint of the right hand which appears old/chronic.  No fracture or acute osseous findings.    Latasha Huang DO [facility]        Again, thank you for allowing me to participate in the care of your patient.        Sincerely,        Latasha Huang DO    "

## 2019-10-28 NOTE — PATIENT INSTRUCTIONS
1. Bilateral hand pain    2. Primary osteoarthritis of both hands    3. Trigger finger, left middle finger    4. Trigger finger, right middle finger      Steroid injection of the bilateral middle trigger fingers was performed today in clinic  For pain related to your arthritis could try fish oil and Turmeric ([employer] - Omega Curcumin or Life Extension - Curcumin Elite)     - Would not soak in a hot tub, bath or swimming pool for 48 hours  - Ok to shower  - Ice today and only do your normal amounts of activity  - The lidocaine (what is giving you pain relief right now) will likely stop working in 1-2 hours.  You will then have pain again, similar to before you received the injection. The corticosteroid will not start working until approximately 1-2 weeks from now.  In a small percentage of people, cortisone can cause flushing/redness in the face. This usually lasts for 1-3 days and resolves. Cool compress and Ibuprofen/Tylenol can help if this happens.    If pain is not well controlled would recommend surgical referral

## 2019-10-28 NOTE — PROGRESS NOTES
"ASSESSMENT & PLAN    1. Bilateral hand pain    2. Primary osteoarthritis of both hands    3. Trigger finger, left middle finger    4. Trigger finger, right middle finger      Steroid injection of the bilateral middle trigger fingers was performed today in clinic  For pain related to your arthritis could try fish oil and Turmeric ([employer] - Omega Curcumin or Life Extension - Curcumin Elite)     If pain is not well controlled would recommend surgical referral  -----    SUBJECTIVE  Aruna Santos is a/an 79 year old Right handed female who is seen as a self referral for evaluation of bilateral hand pain. The patient is seen by themselves.    Onset: 1 month(s) ago. Reports insidious onset without acute precipitating event.  Location of Pain: bilateral hands  Rating of Pain at worst: 5/10  Rating of Pain Currently: 2/10  Worsened by: gripping, grasping, making a fist  Better with: cortisone injections  Treatments tried: rest/activity avoidance, tylenol, ibuprofen and corticosteroid injection of left long finger flexor tendon sheath (most recent date: 11/12/18 @ [facility]) that provided  10 month(s) of relief  Associated symptoms: triggering of left middle finger  Orthopedic history: YES - h/o bilateral hand pain on and off for 4-5 years, has previously seen Dr. Mariluz Taylor @ [facility]  Relevant surgical history: NO  Patient Social History: retired    Patient's past medical, surgical, social, and family histories were reviewed today and no pertinent history related to patient's presenting problem.    REVIEW OF SYSTEMS:  10 point ROS is negative other than symptoms noted above in HPI, Past Medical History or as stated below  Constitutional: NEGATIVE for fever, chills, change in weight  Skin: NEGATIVE for worrisome rashes, moles or lesions  GI/: NEGATIVE for bowel or bladder changes  Neuro: NEGATIVE for weakness, dizziness or paresthesias    OBJECTIVE:  /60   Ht 1.702 m (5' 7\")   Wt 76.2 kg (168 lb)   BMI 26.31 kg/m   "   General: healthy, alert and in no distress  HEENT: no scleral icterus or conjunctival erythema  Skin: no suspicious lesions or rash. No jaundice.  CV: regular rhythm by palpation  Resp: normal respiratory effort without conversational dyspnea   Psych: normal mood and affect  Gait: normal steady gait with appropriate coordination and balance  Neuro: normal light touch sensory exam of the bilateral hands.    MSK:  BILATERAL HAND  Inspection:  Bony hypertrophy over PIP joints bilateral hands  Palpation:   Fingers: Tender at the bilateral A1 Pulley middle finger, right greater than left.  Triggering but no locking  Range of Motion:    Limited with deep flexion of the middle fingers  Strength:     fair  Special Tests:    Positive: palpable triggering bilateral middle finger    Independent visualization of the below image:  X-ray Bilateral Hands  Varying degrees of  IP narrowing consistent with osteoarthritis.  Mild CMC narrowing bilaterally.  Ossicle noted at the radiocarpal joint of the right hand which appears old/chronic.  No fracture or acute osseous findings.    Hand / Upper Extremity Injection/Arthrocentesis: bilateral long A1  Date/Time: 10/28/2019 3:50 PM  Performed by: Latasha Huang DO  Authorized by: Latasha Huang DO     Indications:  Therapeutic, pain and tendon swelling  Needle Size:  27 G  Guidance: ultrasound    Approach:  Volar  Condition: trigger finger    Laterality:  Bilateral  Location:  Long finger    Site:  Bilateral long A1  Medications (Right):  20 mg methylPREDNISolone 40 MG/ML; 0.5 mL lidocaine 1 %  Medications (Left):  20 mg methylPREDNISolone 40 MG/ML; 0.5 mL lidocaine 1 %  Outcome:  Tolerated well, no immediate complications  Procedure discussed: discussed risks, benefits, and alternatives    Consent Given by:  Patient  Timeout: timeout called immediately prior to procedure    Prep: patient was prepped and draped in usual sterile fashion                 Latasha Huang DO  [facility]

## 2019-10-29 ENCOUNTER — THERAPY VISIT (OUTPATIENT)
Dept: PHYSICAL THERAPY | Facility: CLINIC | Age: 79
End: 2019-10-29
Payer: COMMERCIAL

## 2019-10-29 DIAGNOSIS — N39.41 URGENCY INCONTINENCE: ICD-10-CM

## 2019-10-29 DIAGNOSIS — M62.89 PELVIC FLOOR DYSFUNCTION: ICD-10-CM

## 2019-10-29 DIAGNOSIS — N39.8 VOIDING DYSFUNCTION: ICD-10-CM

## 2019-10-29 PROCEDURE — 97535 SELF CARE MNGMENT TRAINING: CPT | Mod: GP | Performed by: PHYSICAL THERAPIST

## 2019-10-29 PROCEDURE — 97110 THERAPEUTIC EXERCISES: CPT | Mod: GP | Performed by: PHYSICAL THERAPIST

## 2019-10-29 NOTE — TELEPHONE ENCOUNTER
"LISINOPRIL 30 MG Tablet  Last Written Prescription Date:  10/3/2019  Last Fill Quantity: 30,  # refills: 0   Last office visit: 9/27/2018 with prescribing provider:  Lisbet Simmons MD   Future Office Visit: 11/29/2019  Next 5 appointments (look out 90 days)    Nov 29, 2019 10:00 AM CST  PHYSICAL with Lisbet Simmons MD  [facility] [address] [place] [id number]  [phone]           SIMVASTATIN 40 MG Tablet  Last Written Prescription Date:  10/3/2019  Last Fill Quantity: 30,  # refills: 0   Last office visit: 9/27/2018 with prescribing provider:  Lisbet Simmons MD   Future Office Visit: 11/29/2019  Next 5 appointments (look out 90 days)    Nov 29, 2019 10:00 AM CST  PHYSICAL with Lisbet Simmons MD  [facility] [address] [place] [id number]  [phone]           Requested Prescriptions   Pending Prescriptions Disp Refills     lisinopril (PRINIVIL/ZESTRIL) 30 MG tablet [Pharmacy Med Name: LISINOPRIL 30 MG Tablet] 30 tablet 0     Sig: TAKE 1 TABLET EVERY DAY       ACE Inhibitors (Including Combos) Protocol Failed - 10/28/2019  7:28 PM        Failed - Recent (12 mo) or future (30 days) visit within the authorizing provider's specialty     Patient has had an office visit with the authorizing provider or a provider within the authorizing providers department within the previous 12 mos or has a future within next 30 days. See \"Patient Info\" tab in inbasket, or \"Choose Columns\" in Meds & Orders section of the refill encounter.              Passed - Blood pressure under 140/90 in past 12 months     BP Readings from Last 3 Encounters:   10/28/19 136/60   10/01/19 (!) 157/70   09/11/19 132/80                 Passed - Medication is active on med list        Passed - Patient is age 18 or older        Passed - No active pregnancy on record        Passed " "- Normal serum creatinine on file in past 12 months     Recent Labs   Lab Test 09/06/19  0927   CR 1.04             Passed - Normal serum potassium on file in past 12 months     Recent Labs   Lab Test 09/06/19  0927   POTASSIUM 4.0             Passed - No positive pregnancy test within past 12 months        simvastatin (ZOCOR) 40 MG tablet [Pharmacy Med Name: SIMVASTATIN 40 MG Tablet] 30 tablet 0     Sig: TAKE 1 TABLET (40 MG) BY MOUTH AT BEDTIME       Statins Protocol Failed - 10/28/2019  7:28 PM        Failed - Recent (12 mo) or future (30 days) visit within the authorizing provider's specialty     Patient has had an office visit with the authorizing provider or a provider within the authorizing providers department within the previous 12 mos or has a future within next 30 days. See \"Patient Info\" tab in inbasket, or \"Choose Columns\" in Meds & Orders section of the refill encounter.              Passed - LDL on file in past 12 months     Recent Labs   Lab Test 05/23/19  0744   *             Passed - No abnormal creatine kinase in past 12 months     No lab results found.             Passed - Medication is active on med list        Passed - Patient is age 18 or older        Passed - No active pregnancy on record        Passed - No positive pregnancy test in past 12 months          "

## 2019-10-30 RX ORDER — SIMVASTATIN 40 MG
40 TABLET ORAL AT BEDTIME
Qty: 30 TABLET | Refills: 1 | Status: SHIPPED | OUTPATIENT
Start: 2019-10-30 | End: 2019-11-29

## 2019-10-30 RX ORDER — LISINOPRIL 30 MG/1
TABLET ORAL
Qty: 30 TABLET | Refills: 1 | Status: SHIPPED | OUTPATIENT
Start: 2019-10-30 | End: 2019-11-29

## 2019-11-12 ENCOUNTER — THERAPY VISIT (OUTPATIENT)
Dept: PHYSICAL THERAPY | Facility: CLINIC | Age: 79
End: 2019-11-12
Payer: COMMERCIAL

## 2019-11-12 DIAGNOSIS — N39.41 URGENCY INCONTINENCE: ICD-10-CM

## 2019-11-12 DIAGNOSIS — M62.89 PELVIC FLOOR DYSFUNCTION: ICD-10-CM

## 2019-11-12 DIAGNOSIS — N39.8 VOIDING DYSFUNCTION: ICD-10-CM

## 2019-11-12 PROCEDURE — 97535 SELF CARE MNGMENT TRAINING: CPT | Mod: GP | Performed by: PHYSICAL THERAPIST

## 2019-11-29 ENCOUNTER — OFFICE VISIT (OUTPATIENT)
Dept: FAMILY MEDICINE | Facility: CLINIC | Age: 79
End: 2019-11-29
Payer: COMMERCIAL

## 2019-11-29 VITALS
SYSTOLIC BLOOD PRESSURE: 134 MMHG | DIASTOLIC BLOOD PRESSURE: 68 MMHG | TEMPERATURE: 98 F | HEIGHT: 67 IN | OXYGEN SATURATION: 97 % | BODY MASS INDEX: 26.68 KG/M2 | WEIGHT: 170 LBS | HEART RATE: 70 BPM

## 2019-11-29 DIAGNOSIS — N18.30 CKD (CHRONIC KIDNEY DISEASE) STAGE 3, GFR 30-59 ML/MIN (H): ICD-10-CM

## 2019-11-29 DIAGNOSIS — K62.3 RECTAL PROLAPSE: ICD-10-CM

## 2019-11-29 DIAGNOSIS — L72.3 SEBACEOUS CYST: ICD-10-CM

## 2019-11-29 DIAGNOSIS — C44.90 PRIMARY ADNEXAL CARCINOMA OF SKIN: ICD-10-CM

## 2019-11-29 DIAGNOSIS — E03.8 OTHER SPECIFIED HYPOTHYROIDISM: ICD-10-CM

## 2019-11-29 DIAGNOSIS — Z00.00 ENCOUNTER FOR MEDICARE ANNUAL WELLNESS EXAM: ICD-10-CM

## 2019-11-29 DIAGNOSIS — Z23 NEED FOR SHINGLES VACCINE: ICD-10-CM

## 2019-11-29 DIAGNOSIS — R30.0 DYSURIA: ICD-10-CM

## 2019-11-29 DIAGNOSIS — Z23 NEEDS FLU SHOT: ICD-10-CM

## 2019-11-29 DIAGNOSIS — N89.8 ITCHING IN THE VAGINAL AREA: ICD-10-CM

## 2019-11-29 DIAGNOSIS — F41.9 ANXIETY: ICD-10-CM

## 2019-11-29 DIAGNOSIS — C49.0 MALIGNANT NEOPLASM OF CONNECTIVE AND SOFT TISSUE OF HEAD, FACE AND NECK (H): ICD-10-CM

## 2019-11-29 DIAGNOSIS — Z00.01 ENCOUNTER FOR ROUTINE ADULT MEDICAL EXAM WITH ABNORMAL FINDINGS: Primary | ICD-10-CM

## 2019-11-29 DIAGNOSIS — M62.89 PELVIC FLOOR DYSFUNCTION: ICD-10-CM

## 2019-11-29 DIAGNOSIS — N81.9 VAGINAL VAULT PROLAPSE: ICD-10-CM

## 2019-11-29 DIAGNOSIS — E55.9 VITAMIN D DEFICIENCY: ICD-10-CM

## 2019-11-29 DIAGNOSIS — E78.5 HYPERLIPIDEMIA LDL GOAL <130: ICD-10-CM

## 2019-11-29 DIAGNOSIS — L29.2 VULVAR ITCHING: ICD-10-CM

## 2019-11-29 DIAGNOSIS — I10 ESSENTIAL HYPERTENSION WITH GOAL BLOOD PRESSURE LESS THAN 140/90: ICD-10-CM

## 2019-11-29 LAB
ALBUMIN SERPL-MCNC: 4 G/DL (ref 3.4–5)
ALP SERPL-CCNC: 50 U/L (ref 40–150)
ALT SERPL W P-5'-P-CCNC: 29 U/L (ref 0–50)
ANION GAP SERPL CALCULATED.3IONS-SCNC: 5 MMOL/L (ref 3–14)
AST SERPL W P-5'-P-CCNC: 24 U/L (ref 0–45)
BASOPHILS # BLD AUTO: 0.1 10E9/L (ref 0–0.2)
BASOPHILS NFR BLD AUTO: 1.1 %
BILIRUB SERPL-MCNC: 0.6 MG/DL (ref 0.2–1.3)
BUN SERPL-MCNC: 28 MG/DL (ref 7–30)
CALCIUM SERPL-MCNC: 10.5 MG/DL (ref 8.5–10.1)
CHLORIDE SERPL-SCNC: 104 MMOL/L (ref 94–109)
CHOLEST SERPL-MCNC: 211 MG/DL
CO2 SERPL-SCNC: 27 MMOL/L (ref 20–32)
CREAT SERPL-MCNC: 1.23 MG/DL (ref 0.52–1.04)
DIFFERENTIAL METHOD BLD: NORMAL
EOSINOPHIL # BLD AUTO: 0.3 10E9/L (ref 0–0.7)
EOSINOPHIL NFR BLD AUTO: 4.6 %
ERYTHROCYTE [DISTWIDTH] IN BLOOD BY AUTOMATED COUNT: 12.5 % (ref 10–15)
GFR SERPL CREATININE-BSD FRML MDRD: 42 ML/MIN/{1.73_M2}
GLUCOSE SERPL-MCNC: 99 MG/DL (ref 70–99)
HCT VFR BLD AUTO: 40.7 % (ref 35–47)
HDLC SERPL-MCNC: 51 MG/DL
HGB BLD-MCNC: 13.3 G/DL (ref 11.7–15.7)
LDLC SERPL CALC-MCNC: 112 MG/DL
LYMPHOCYTES # BLD AUTO: 1.8 10E9/L (ref 0.8–5.3)
LYMPHOCYTES NFR BLD AUTO: 27.5 %
MCH RBC QN AUTO: 30.9 PG (ref 26.5–33)
MCHC RBC AUTO-ENTMCNC: 32.7 G/DL (ref 31.5–36.5)
MCV RBC AUTO: 94 FL (ref 78–100)
MONOCYTES # BLD AUTO: 0.7 10E9/L (ref 0–1.3)
MONOCYTES NFR BLD AUTO: 10.2 %
NEUTROPHILS # BLD AUTO: 3.7 10E9/L (ref 1.6–8.3)
NEUTROPHILS NFR BLD AUTO: 56.6 %
NONHDLC SERPL-MCNC: 160 MG/DL
PLATELET # BLD AUTO: 239 10E9/L (ref 150–450)
POTASSIUM SERPL-SCNC: 4.5 MMOL/L (ref 3.4–5.3)
PROT SERPL-MCNC: 7.5 G/DL (ref 6.8–8.8)
RBC # BLD AUTO: 4.31 10E12/L (ref 3.8–5.2)
SODIUM SERPL-SCNC: 136 MMOL/L (ref 133–144)
T3 SERPL-MCNC: 107 NG/DL (ref 60–181)
T4 FREE SERPL-MCNC: 1.19 NG/DL (ref 0.76–1.46)
TRIGL SERPL-MCNC: 238 MG/DL
TSH SERPL DL<=0.005 MIU/L-ACNC: 1.86 MU/L (ref 0.4–4)
WBC # BLD AUTO: 6.6 10E9/L (ref 4–11)

## 2019-11-29 PROCEDURE — 84439 ASSAY OF FREE THYROXINE: CPT | Performed by: FAMILY MEDICINE

## 2019-11-29 PROCEDURE — 85025 COMPLETE CBC W/AUTO DIFF WBC: CPT | Performed by: FAMILY MEDICINE

## 2019-11-29 PROCEDURE — 82306 VITAMIN D 25 HYDROXY: CPT | Performed by: FAMILY MEDICINE

## 2019-11-29 PROCEDURE — 36415 COLL VENOUS BLD VENIPUNCTURE: CPT | Performed by: FAMILY MEDICINE

## 2019-11-29 PROCEDURE — 99397 PER PM REEVAL EST PAT 65+ YR: CPT | Mod: 25 | Performed by: FAMILY MEDICINE

## 2019-11-29 PROCEDURE — 99214 OFFICE O/P EST MOD 30 MIN: CPT | Mod: 25 | Performed by: FAMILY MEDICINE

## 2019-11-29 PROCEDURE — 82043 UR ALBUMIN QUANTITATIVE: CPT | Performed by: FAMILY MEDICINE

## 2019-11-29 PROCEDURE — 84443 ASSAY THYROID STIM HORMONE: CPT | Performed by: FAMILY MEDICINE

## 2019-11-29 PROCEDURE — 80061 LIPID PANEL: CPT | Performed by: FAMILY MEDICINE

## 2019-11-29 PROCEDURE — 80053 COMPREHEN METABOLIC PANEL: CPT | Performed by: FAMILY MEDICINE

## 2019-11-29 PROCEDURE — 84480 ASSAY TRIIODOTHYRONINE (T3): CPT | Performed by: FAMILY MEDICINE

## 2019-11-29 PROCEDURE — 90662 IIV NO PRSV INCREASED AG IM: CPT | Performed by: FAMILY MEDICINE

## 2019-11-29 PROCEDURE — G0008 ADMIN INFLUENZA VIRUS VAC: HCPCS | Performed by: FAMILY MEDICINE

## 2019-11-29 RX ORDER — SIMVASTATIN 40 MG
40 TABLET ORAL AT BEDTIME
Qty: 90 TABLET | Refills: 3 | Status: SHIPPED | OUTPATIENT
Start: 2019-11-29 | End: 2020-05-21

## 2019-11-29 RX ORDER — TRIAMTERENE/HYDROCHLOROTHIAZID 37.5-25 MG
TABLET ORAL
Qty: 90 TABLET | Refills: 3 | Status: SHIPPED | OUTPATIENT
Start: 2019-11-29 | End: 2020-05-21

## 2019-11-29 RX ORDER — CLOBETASOL PROPIONATE 0.5 MG/G
OINTMENT TOPICAL
Qty: 60 G | Refills: 1 | Status: SHIPPED | OUTPATIENT
Start: 2019-11-29 | End: 2021-06-04

## 2019-11-29 RX ORDER — LISINOPRIL 30 MG/1
TABLET ORAL
Qty: 90 TABLET | Refills: 3 | Status: SHIPPED | OUTPATIENT
Start: 2019-11-29 | End: 2020-05-21

## 2019-11-29 RX ORDER — METOPROLOL SUCCINATE 50 MG/1
TABLET, EXTENDED RELEASE ORAL
Qty: 90 TABLET | Refills: 3 | Status: SHIPPED | OUTPATIENT
Start: 2019-11-29 | End: 2020-05-21

## 2019-11-29 ASSESSMENT — ANXIETY QUESTIONNAIRES
3. WORRYING TOO MUCH ABOUT DIFFERENT THINGS: NOT AT ALL
5. BEING SO RESTLESS THAT IT IS HARD TO SIT STILL: NOT AT ALL
2. NOT BEING ABLE TO STOP OR CONTROL WORRYING: NOT AT ALL
7. FEELING AFRAID AS IF SOMETHING AWFUL MIGHT HAPPEN: NOT AT ALL
6. BECOMING EASILY ANNOYED OR IRRITABLE: NOT AT ALL
IF YOU CHECKED OFF ANY PROBLEMS ON THIS QUESTIONNAIRE, HOW DIFFICULT HAVE THESE PROBLEMS MADE IT FOR YOU TO DO YOUR WORK, TAKE CARE OF THINGS AT HOME, OR GET ALONG WITH OTHER PEOPLE: NOT DIFFICULT AT ALL
1. FEELING NERVOUS, ANXIOUS, OR ON EDGE: NOT AT ALL
GAD7 TOTAL SCORE: 0

## 2019-11-29 ASSESSMENT — PATIENT HEALTH QUESTIONNAIRE - PHQ9
SUM OF ALL RESPONSES TO PHQ QUESTIONS 1-9: 1
5. POOR APPETITE OR OVEREATING: NOT AT ALL

## 2019-11-29 ASSESSMENT — MIFFLIN-ST. JEOR: SCORE: 1287.61

## 2019-11-29 NOTE — LETTER
[facility]  [address] [place] [id number] [phone]   December 12, 2019    Aruna Santos  [address] [facility] [address]      Dear Aruna,    Here is a summary of your recent test results:    -Normal red blood cell (hgb) levels, normal white blood cell count and normal platelet levels.   -LDL(bad) cholesterol level is elevated, and your triglycerides are elevated which can increase your heart disease risk.  A diet high in fat and simple carbohydrates, genetics and being overweight can contribute to this. ADVISE: exercising 150 minutes of aerobic exercise per week (30 minutes for 5 days per week or 50 minutes for 3 days per week are options), eating a low saturated fat/low carbohydrate diet, and omega-3 fatty acids (fish oil) 0810-2812 mg daily are helpful to improve this. In 6 months, you should recheck your fasting cholesterol panel by scheduling a lab-only appointment.   -Liver and gallbladder tests are normal (ALT,AST, Alk phos, bilirubin),fasting  kidney function is stable from previous (Cr, GFR), sodium is normal, potassium is normal, calcium is normal, glucose is normal.   -TSH (thyroid stimulating hormone) level is normal which indicates appropriate thyroid replacement dosing.  ADVISE: continuing same replacement dose and rechecking this in 12 months.   -Vitamin D level is normal and getting 1000 IU daily in your diet or supplements is recommended.   -Microalbumin (urine protein) test is very mildly above normal.  ADVISE: keeping blood pressure and lipids in good control and rechecking this annually.     For additional lab test information, [email or web address] is an excellent reference.     Your test results are enclosed.      Please contact me if you have any questions.    In addition, here is a list of due or overdue Health Maintenance reminders.    Health Maintenance Due   Topic Date Due     Zoster (Shingles) Vaccine (2 of 3)  07/29/2011       Please call us at [phone] (or use [employer]) to address the above recommendations.            Thank you very much for trusting [facility]..     Healthy regards,       Lisbet Simmons M.D.          Results for orders placed or performed in visit on 11/29/19   25 Hydroxyvitamin D2 and D3     Status: None   Result Value Ref Range    25 OH Vit D2 <5 ug/L    25 OH Vit D3 33 ug/L    25 OH Vit D total <38 20 - 75 ug/L   Albumin Random Urine Quantitative with Creat Ratio     Status: Abnormal   Result Value Ref Range    Creatinine Urine 42 mg/dL    Albumin Urine mg/L 12 mg/L    Albumin Urine mg/g Cr 29.38 (H) 0 - 25 mg/g Cr   CBC with platelets differential     Status: None   Result Value Ref Range    WBC 6.6 4.0 - 11.0 10e9/L    RBC Count 4.31 3.8 - 5.2 10e12/L    Hemoglobin 13.3 11.7 - 15.7 g/dL    Hematocrit 40.7 35.0 - 47.0 %    MCV 94 78 - 100 fl    MCH 30.9 26.5 - 33.0 pg    MCHC 32.7 31.5 - 36.5 g/dL    RDW 12.5 10.0 - 15.0 %    Platelet Count 239 150 - 450 10e9/L    % Neutrophils 56.6 %    % Lymphocytes 27.5 %    % Monocytes 10.2 %    % Eosinophils 4.6 %    % Basophils 1.1 %    Absolute Neutrophil 3.7 1.6 - 8.3 10e9/L    Absolute Lymphocytes 1.8 0.8 - 5.3 10e9/L    Absolute Monocytes 0.7 0.0 - 1.3 10e9/L    Absolute Eosinophils 0.3 0.0 - 0.7 10e9/L    Absolute Basophils 0.1 0.0 - 0.2 10e9/L    Diff Method Automated Method    Comprehensive metabolic panel     Status: Abnormal   Result Value Ref Range    Sodium 136 133 - 144 mmol/L    Potassium 4.5 3.4 - 5.3 mmol/L    Chloride 104 94 - 109 mmol/L    Carbon Dioxide 27 20 - 32 mmol/L    Anion Gap 5 3 - 14 mmol/L    Glucose 99 70 - 99 mg/dL    Urea Nitrogen 28 7 - 30 mg/dL    Creatinine 1.23 (H) 0.52 - 1.04 mg/dL    GFR Estimate 42 (L) >60 mL/min/[1.73_m2]    GFR Estimate If Black 49 (L) >60 mL/min/[1.73_m2]    Calcium 10.5 (H) 8.5 - 10.1 mg/dL    Bilirubin Total 0.6 0.2 - 1.3 mg/dL    Albumin 4.0 3.4 - 5.0 g/dL    Protein  Total 7.5 6.8 - 8.8 g/dL    Alkaline Phosphatase 50 40 - 150 U/L    ALT 29 0 - 50 U/L    AST 24 0 - 45 U/L   Lipid panel reflex to direct LDL Fasting     Status: Abnormal   Result Value Ref Range    Cholesterol 211 (H) <200 mg/dL    Triglycerides 238 (H) <150 mg/dL    HDL Cholesterol 51 >49 mg/dL    LDL Cholesterol Calculated 112 (H) <100 mg/dL    Non HDL Cholesterol 160 (H) <130 mg/dL   TSH     Status: None   Result Value Ref Range    TSH 1.86 0.40 - 4.00 mU/L   T4 FREE     Status: None   Result Value Ref Range    T4 Free 1.19 0.76 - 1.46 ng/dL   T3 total     Status: None   Result Value Ref Range    Triiodothyronine (T3) 107 60 - 181 ng/dL

## 2019-11-29 NOTE — PATIENT INSTRUCTIONS
Patient Education   Personalized Prevention Plan  You are due for the preventive services outlined below.  Your care team is available to assist you in scheduling these services.  If you have already completed any of these items, please share that information with your care team to update in your medical record.  Health Maintenance Due   Topic Date Due     Zoster (Shingles) Vaccine (2 of 3) 07/29/2011     Flu Vaccine (1) 09/01/2019     Annual Wellness Visit  09/27/2019     FALL RISK ASSESSMENT  09/27/2019              Thank you for choosing [facility]  for your Health Care. It was a pleasure seeing you at your visit today. Please contact us with any questions or concerns you may have.                   Lisbet Simmons MD                            To reach your [facility] care team after hours call:   [phone]    Our clinic hours are:     Monday- 7:30 am - 7:00 pm                             Tuesday through Friday- 7:30 am - 5:00 pm                                        Saturday- 8:00 am - 12:00 pm                  Phone:  [phone]    Our pharmacy hours are:     Monday  8:00 am to 7:00 pm      Tuesday through Friday 8:00am to 6:00pm                        Saturday - 9:00 am to 1:00 pm      Sunday : Closed.              Phone:  [phone]      There is also information available at our web site:  [email or web address]    If your provider ordered any lab tests and you do not receive the results within 10 business days, please call the clinic.    If you need a medication refill please contact your pharmacy.  Please allow 2 business days for your refill to be completed.    Our clinic offers telephone visits and e visits.  Please ask one of your team members to explain more.      Use Talkpush (secure email communication and access to your chart) to send your primary care provider a message or make an appointment. Ask someone on your Team how to sign up  for [facility].

## 2019-11-29 NOTE — PROGRESS NOTES
"  SUBJECTIVE:   Aruna Santos is a 79 year old female who presents for Preventive Visit.      Are you in the first 12 months of your Medicare Part B coverage?  No    Physical Health:    In general, how would you rate your overall physical health? good    Outside of work, how many days during the week do you exercise? none    Outside of work, approximately how many minutes a day do you exercise?not applicable    If you drink alcohol do you typically have >3 drinks per day or >7 drinks per week? No    Do you usually eat at least 4 servings of fruit and vegetables a day, include whole grains & fiber and avoid regularly eating high fat or \"junk\" foods? Yes    Do you have any problems taking medications regularly?  No    Do you have any side effects from medications? none    Needs assistance for the following daily activities: no assistance needed    Which of the following safety concerns are present in your home?  none identified     Hearing impairment: No    In the past 6 months, have you been bothered by leaking of urine? Yes     Mental Health:    In general, how would you rate your overall mental or emotional health? good  PHQ-2 Score:      Do you feel safe in your environment? Yes    Have you ever done Advance Care Planning? (For example, a Health Directive, POLST, or a discussion with a medical provider or your loved ones about your wishes): Yes, advance care planning is on file.    Additional concerns to address?  YES left breast cyst X10 Days drainage   Vulvar itching - using meds as prescribed but whenever med's are missed area becomes symptomatic     Fall risk:  Fallen 2 or more times in the past year?: No  Any fall with injury in the past year?: No    Cognitive Screenin) Repeat 3 items (Leader, Season, Table)    2) Clock draw: NORMAL  3) 3 item recall: Recalls 3 objects  Results: 3 items recalled: COGNITIVE IMPAIRMENT LESS LIKELY    Mini-CogTM Copyright S [employer]. Licensed by the author for use in " "[facility]; reprinted with permission (octavia@[facility]). All rights reserved.      Do you have sleep apnea, excessive snoring or daytime drowsiness?: sleep apnea     Seeing [facility] for her recurrent dysuria, frequency, urgency. If she uses the estradiol cream and the clobetasol, her symptoms are signif. Improved.     Has  a sebaceous cyst left inner breast area - started to drain purulent fluid last week- still draining a little bit.  No redness or swelling. No fevers, chills or sweats.     10/21/2019 mammogram showed: \"BREAST DENSITY: Scattered fibroglandular densities.     CLINICAL INFORMATION: Breast screening.  Visit for screening  mammogram, 10/16/2018, 08/20/2015      FINDINGS: Density in the upper medial left breast at about 10-11:00  possibly a skin-based lesion, 9 cm from the nipple.                                                                      IMPRESSION: BI-RADS CATEGORY: 0 - Incomplete - Need Additional Imaging  Evaluation and/or Prior Mammograms for Comparison.     RECOMMENDED FOLLOW-UP: Ultrasound.\"   However, pt went back for the US and they determined it was just a skin based cyst and not a breast cyst, so it wasn't fully done.  We'll take a look at that again today.     Lichen sclerosus -needs recheck exam today.          Hyperlipidemia Follow-Up      Are you regularly taking any medication or supplement to lower your cholesterol?   Yes- simvastatin     Are you having muscle aches or other side effects that you think could be caused by your cholesterol lowering medication?  No     Recent Labs   Lab Test 05/23/19  0744 10/01/18  0747  07/23/15  1016 06/27/14  0929   CHOL 200* 177   < > 194 212*   HDL 52 53   < > 46* 48*   * 89   < > 91 114   TRIG 238* 173*   < > 285* 251*   CHOLHDLRATIO  --   --   --  4.2 4.4    < > = values in this interval not displayed.        Hypertension Follow-up:       Do you check your blood pressure regularly outside of the " clinic? Yes     Are you following a low salt diet? No    Are your blood pressures ever more than 140 on the top number (systolic) OR more   than 90 on the bottom number (diastolic), for example 140/90? No     BP Readings from Last 6 Encounters:   11/29/19 134/68   10/28/19 136/60   10/01/19 (!) 157/70   09/11/19 132/80   08/06/19 128/60   05/03/19 110/54       Hypothyroidism Follow-up:       Since last visit, patient describes the following symptoms: Weight stable, no hair loss, no skin changes, no constipation, no loose stools    TSH   Date Value Ref Range Status   09/06/2019 2.05 0.40 - 4.00 mU/L Final   05/23/2019 5.87 (H) 0.40 - 4.00 mU/L Final   10/01/2018 2.89 0.40 - 4.00 mU/L Final   07/10/2018 2.86 0.40 - 4.00 mU/L Final   07/28/2017 3.75 0.40 - 4.00 mU/L Final     T4 Free   Date Value Ref Range Status   09/06/2019 1.21 0.76 - 1.46 ng/dL Final   05/23/2019 1.05 0.76 - 1.46 ng/dL Final   10/01/2018 1.06 0.76 - 1.46 ng/dL Final   07/28/2017 1.02 0.76 - 1.46 ng/dL Final   07/25/2016 1.08 0.76 - 1.46 ng/dL Final         Reviewed and updated as needed this visit by clinical staff  Tobacco  Allergies  Meds  Problems  Med Hx  Surg Hx  Fam Hx  Soc Hx          Reviewed and updated as needed this visit by Provider  Tobacco  Allergies  Meds  Problems  Med Hx  Surg Hx  Fam Hx        Social History     Tobacco Use     Smoking status: Never Smoker     Smokeless tobacco: Never Used   Substance Use Topics     Alcohol use: No                           Current providers sharing in care for this patient include:   Patient Care Team:  Lisbet Simmons MD as PCP - General (Family Practice)  Lisbet Simmons MD as Assigned PCP    The following health maintenance items are reviewed in Epic and correct as of today:  Health Maintenance   Topic Date Due     ZOSTER IMMUNIZATION (2 of 3) 07/29/2011     INFLUENZA VACCINE (1) 09/01/2019     MEDICARE ANNUAL WELLNESS VISIT  09/27/2019     FALL RISK ASSESSMENT   09/27/2019     MICROALBUMIN  05/23/2020     ADVANCE CARE PLANNING  07/23/2020     BMP  09/06/2020     TSH W/FREE T4 REFLEX  09/06/2020     MAMMO SCREENING  10/21/2020     DTAP/TDAP/TD IMMUNIZATION (2 - Td) 06/24/2023     LIPID  05/23/2024     DEXA  Completed     PHQ-2  Completed     PNEUMOCOCCAL IMMUNIZATION 65+ HIGH/HIGHEST RISK  Completed     IPV IMMUNIZATION  Aged Out     MENINGITIS IMMUNIZATION  Aged Out     Labs reviewed in EPIC  BP Readings from Last 3 Encounters:   11/29/19 134/68   10/28/19 136/60   10/01/19 (!) 157/70    Wt Readings from Last 3 Encounters:   11/29/19 77.1 kg (170 lb)   10/28/19 76.2 kg (168 lb)   09/11/19 76.2 kg (168 lb)                  Patient Active Problem List   Diagnosis     Hematuria     Dyspnea and respiratory abnormality     Hyperlipidemia LDL goal <130     Advanced directives, counseling/discussion     Essential hypertension with goal blood pressure less than 140/90     Family history of colon cancer- mother in her 40's-colonoscopies every 5 years- next one 2023     Osteopenia, unspecified location - was on fosamax, then Boniva secondary to hot flashes - off boniva since 2012     Urgency incontinence- helped with pelvic floor physical therapy      Vulvar itching - ? early lichen sclerosis vs. 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, face and neck (H)     Rectal prolapse     Sebaceous cyst- left medial upper breast      Past Surgical History:   Procedure Laterality Date     adenocarcinoma  01/30/2018    Primary adenocarcinoma of scalp     ARTHROSCOPY SHOULDER  1/2013    [facility]     COLONOSCOPY  9/22/94,     Colonoscopies q 5 year -next 2019     CYSTOCELE REPAIR  10/31/1984     CYSTOSCOPY       HYSTERECTOMY, PAP NO LONGER INDICATED  10/31/84    Hysterectomy, Total Abdominal w ovaries left intact     SURGICAL HISTORY OF -   5/17/73    Cholecystectomy & Incidental appendectomy       Social History     Tobacco Use     Smoking status: Never Smoker     Smokeless tobacco: Never Used   Substance Use Topics     Alcohol use: No     Family History   Problem Relation Age of Onset     Cancer - colorectal Mother      Hypertension Father      Cerebrovascular Disease Maternal Grandfather      Cerebrovascular Disease Paternal Grandmother          Current Outpatient Medications   Medication Sig Dispense Refill     ASPIRIN 81 MG OR TABS 1 TABLET DAILY       brimonidine (ALPHAGAN P) 0.1 % ophthalmic solution Place 1 drop into both eyes 2 times daily        CALCIUM /VITAMIN D TABS   OR 2 qd       clobetasol (TEMOVATE) 0.05 % external ointment APPLY SPARINGLY TO AFFECTED AREA OF GENITALS TWICE WEEKLY AT NIGHTTIME 60 g 1     estradiol (ESTRACE VAGINAL) 0.1 MG/GM vaginal cream Apply small amount to the vaginal opening and urethra M, W, F q. h.s. 42.5 g 3     LEVOBUNOLOL HCL 0.5 % OP SOLN one drop each day       levothyroxine (SYNTHROID/LEVOTHROID) 88 MCG tablet Take 1 tablet (88 mcg) by mouth every morning 90 tablet 1     lisinopril (PRINIVIL/ZESTRIL) 30 MG tablet TAKE 1 TABLET EVERY DAY 90 tablet 3     LORazepam (ATIVAN) 0.5 MG tablet Take 1 tablet (0.5 mg) by mouth 2 times daily Take 30 minutes prior to departure.  Do not operate a vehicle after taking this medication 10 tablet 0     metoprolol  "succinate ER (TOPROL-XL) 50 MG 24 hr tablet TAKE 1 TABLET EVERY DAY 90 tablet 3     MULTI-VITAMIN OR TABS 1 qd       simvastatin (ZOCOR) 40 MG tablet Take 1 tablet (40 mg) by mouth At Bedtime 90 tablet 3     triamterene-HCTZ (MAXZIDE-25) 37.5-25 MG tablet TAKE 1 TABLET EVERY DAY 90 tablet 3     zoster vaccine recombinant adjuvanted (SHINGRIX) injection Inject 0.5 mLs into the muscle once for 1 dose 0.5 mL 1     Allergies   Allergen Reactions     Macrobid [Nitrofurantoin] GI Disturbance     Prednisone      Sulfa Drugs      Recent Labs   Lab Test 09/06/19  0927 05/23/19  0744 10/01/18  0747  03/20/18  0929   LDL  --  100* 89  --  113*   HDL  --  52 53  --  52   TRIG  --  238* 173*  --  276*   ALT 33 36 27   < >  --    CR 1.04 1.39* 1.15*   < >  --    GFRESTIMATED 52* 37* 46*   < >  --    GFRESTBLACK 60* 42* 56*   < >  --    POTASSIUM 4.0 4.5 4.5   < >  --    TSH 2.05 5.87* 2.89   < >  --     < > = values in this interval not displayed.      Pneumonia Vaccine:Adults age 65+ who received Pneumovax (PPSV23) at 65 years or older: Should be given PCV13 > 1 year after their most recent PPSV23  Mammogram Screening: Patient over age 75, has elected to continue with mammography screening.  History of abnormal Pap smear: NO - age 65 - see link Cervical Cytology Screening Guidelines    Last colonoscopy 5/1/2018. - normal.     ROS:  Constitutional, HEENT, cardiovascular, pulmonary, GI, , musculoskeletal, neuro, skin, endocrine and psych systems are negative, except as otherwise noted.    OBJECTIVE:   /68   Pulse 70   Temp 98  F (36.7  C) (Oral)   Ht 1.7 m (5' 6.93\")   Wt 77.1 kg (170 lb)   SpO2 97%   BMI 26.68 kg/m   Estimated body mass index is 26.68 kg/m  as calculated from the following:    Height as of this encounter: 1.7 m (5' 6.93\").    Weight as of this encounter: 77.1 kg (170 lb).  EXAM:   GENERAL APPEARANCE: healthy, alert and no distress  EYES: Eyes grossly normal to inspection, PERRL and conjunctivae and " sclerae normal  HENT: ear canals and TM's normal, nose and mouth without ulcers or lesions, oropharynx clear and oral mucous membranes moist  NECK: no adenopathy, no asymmetry, masses, or scars and thyroid normal to palpation  RESP: lungs clear to auscultation - no rales, rhonchi or wheezes  BREAST: there is a 1.5cm sebaceous cyst in the left upper medial breast with some purulent drainage from the central pore;  normal without masses, tenderness or nipple discharge and no palpable axillary masses or adenopathy  CV: regular rate and rhythm, normal S1 S2, no S3 or S4, no murmur, click or rub, no peripheral edema and peripheral pulses strong  ABDOMEN: soft, nontender, no hepatosplenomegaly, no masses and bowel sounds normal   (female): lichen sclerosis vs. Other of  female external genitalia, there are multiple 3mm-1.2cm diameter sebaceous vs.other superficial vulvar inclusion cysts noted bilaterl inner and outer labia - all appear benign and are firm - none are fluctuant or tender, otherwise normal urethral meatus, vaginal mucosal atrophy noted, there is a signif. redtal prolapse noted.    MS: no musculoskeletal defects are noted and gait is age appropriate without ataxia  SKIN: no suspicious lesions or rashes  NEURO: Normal strength and tone, sensory exam grossly normal, mentation intact and speech normal  PSYCH: mentation appears normal and affect normal/bright    Diagnostic Test Results:  Labs reviewed in Epic    ASSESSMENT / PLAN:       ICD-10-CM    1. Encounter for routine adult medical exam with abnormal findings Z00.01    2. Encounter for Medicare annual wellness exam Z00.00    3. Essential hypertension with goal blood pressure less than 140/90- needs lab follow up and refills today  I10 Albumin Random Urine Quantitative with Creat Ratio     CBC with platelets differential     Comprehensive metabolic panel     lisinopril (PRINIVIL/ZESTRIL) 30 MG tablet     metoprolol succinate ER (TOPROL-XL) 50 MG 24 hr  tablet     triamterene-HCTZ (MAXZIDE-25) 37.5-25 MG tablet     OFFICE/OUTPT VISIT,EST,LEVL IV   4. Hyperlipidemia LDL goal <130 - needs lab follow up and refills today  E78.5 Albumin Random Urine Quantitative with Creat Ratio     Comprehensive metabolic panel     Lipid panel reflex to direct LDL Fasting     simvastatin (ZOCOR) 40 MG tablet     OFFICE/OUTPT VISIT,EST,LEVL IV   5. Other specified hypothyroidism - needs lab follow up  today - ok for refills for now  E03.8 TSH     T4 FREE     T3 total     OFFICE/OUTPT VISIT,EST,LEVL IV   6. Anxiety F41.9 OFFICE/OUTPT VISIT,EST,LEVL IV   7. CKD (chronic kidney disease) stage 3, GFR 30-59 ml/min (H) - needs lab follow up and refills today  N18.3 Albumin Random Urine Quantitative with Creat Ratio     Comprehensive metabolic panel     OFFICE/OUTPT VISIT,EST,LEVL IV   8. Pelvic floor dysfunction- helped with pelvic floor physical therapy  M62.89 OFFICE/OUTPT VISIT,EST,LEVL IV   9. Dysuria- recurrent with urgency and frequency - not always infection - seeing Dr. Karyn Luevano, [facility]  R30.0 OFFICE/OUTPT VISIT,EST,LEVL IV   10. Vulvar itching - ? early lichen sclerosis vs. other - superior vulva L29.2 clobetasol (TEMOVATE) 0.05 % external ointment     OFFICE/OUTPT VISIT,EST,LEVL IV   11. Needs flu shot Z23 FLU VACCINE, INCREASED ANTIGEN, PRESV FREE     ADMIN 1st VACCINE   12. Need for shingles vaccine Z23 zoster vaccine recombinant adjuvanted (SHINGRIX) injection     EA ADD'L VACCINE   13. Vitamin D deficiency E55.9 25 Hydroxyvitamin D2 and D3     OFFICE/OUTPT VISIT,EST,LEVL IV   14. Itching in the vaginal area N89.8 clobetasol (TEMOVATE) 0.05 % external ointment     OFFICE/OUTPT VISIT,EST,LEVL IV   15. 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 Vaginal vault prolapse N81.9 OFFICE/OUTPT VISIT,EST,LEVL IV   18. Rectal prolapse  "K62.3 OFFICE/OUTPT VISIT,EST,LEVL IV   19. Sebaceous cyst- left medial upper breast  L72.3 SKIN CARE REFERRAL     OFFICE/OUTPT VISIT,EST,LEVL IV     Pt declines abx today for draining sebaceous cyst. She'll hot pack it in the meantime prior to skin care clinic appt to have it removed. Advised to apply warm soaks with antibacterial soap 3-4x/day, if possible.     Please, call or return to clinic or go to the ER immediately if signs or symptoms worsen or fail to improve as anticipated.     COUNSELING:  Reviewed preventive health counseling, as reflected in patient instructions    Estimated body mass index is 26.68 kg/m  as calculated from the following:    Height as of this encounter: 1.7 m (5' 6.93\").    Weight as of this encounter: 77.1 kg (170 lb).         reports that she has never smoked. She has never used smokeless tobacco.      Appropriate preventive services were discussed with this patient, including applicable screening as appropriate for cardiovascular disease, diabetes, osteopenia/osteoporosis, and glaucoma.  As appropriate for age/gender, discussed screening for colorectal cancer, prostate cancer, breast cancer, and cervical cancer. Checklist reviewing preventive services available has been given to the patient.    Return in about 6 months (around 5/29/2020) for  BP Recheck as a nurse only visit , comprehensive metabolic panel &cholesterol recheck for lab only . recheck with me in 1 year or sooner, if needed.     Reviewed patients plan of care and provided an AVS. The Complex Care Plan (for patients with higher acuity and needing more deliberate coordination of services) for Aruna meets the Care Plan requirement. This Care Plan has been established and reviewed with the Patient.    Counseling Resources:  ATP IV Guidelines  Pooled Cohorts Equation Calculator  Breast Cancer Risk Calculator  FRAX Risk Assessment  ICSI Preventive Guidelines  Dietary Guidelines for Americans, 2010  USDA's MyPlate  ASA " Prophylaxis  Lung CA Screening    Lisbet Simmons MD  [facility] [place]

## 2019-11-30 LAB
CREAT UR-MCNC: 42 MG/DL
MICROALBUMIN UR-MCNC: 12 MG/L
MICROALBUMIN/CREAT UR: 29.38 MG/G CR (ref 0–25)

## 2019-11-30 ASSESSMENT — ANXIETY QUESTIONNAIRES: GAD7 TOTAL SCORE: 0

## 2019-12-04 LAB
DEPRECATED CALCIDIOL+CALCIFEROL SERPL-MC: <38 UG/L (ref 20–75)
VITAMIN D2 SERPL-MCNC: <5 UG/L
VITAMIN D3 SERPL-MCNC: 33 UG/L

## 2019-12-26 ENCOUNTER — OFFICE VISIT (OUTPATIENT)
Dept: FAMILY MEDICINE | Facility: CLINIC | Age: 79
End: 2019-12-26
Payer: COMMERCIAL

## 2019-12-26 VITALS
BODY MASS INDEX: 26.06 KG/M2 | SYSTOLIC BLOOD PRESSURE: 110 MMHG | HEART RATE: 67 BPM | OXYGEN SATURATION: 97 % | DIASTOLIC BLOOD PRESSURE: 58 MMHG | TEMPERATURE: 98.5 F | WEIGHT: 166 LBS | HEIGHT: 67 IN

## 2019-12-26 DIAGNOSIS — J20.9 ACUTE BRONCHITIS, UNSPECIFIED ORGANISM: Primary | ICD-10-CM

## 2019-12-26 PROCEDURE — 99213 OFFICE O/P EST LOW 20 MIN: CPT | Performed by: PHYSICIAN ASSISTANT

## 2019-12-26 RX ORDER — AZITHROMYCIN 250 MG/1
TABLET, FILM COATED ORAL
Qty: 6 TABLET | Refills: 0 | Status: SHIPPED | OUTPATIENT
Start: 2019-12-26 | End: 2020-05-21

## 2019-12-26 ASSESSMENT — MIFFLIN-ST. JEOR: SCORE: 1270.6

## 2019-12-26 NOTE — PROGRESS NOTES
SUBJECTIVE:   Aruna Santos is a 79 year old female who presents to clinic today for the following health issues:    Acute Illness   Acute illness concerns: URI  Onset: 4    Fever: no    Chills/Sweats: YES    Headache (location?): YES- back of the head     Sinus Pressure:no    Conjunctivitis:  no    Ear Pain: no    Rhinorrhea: YES    Congestion: no     Sore Throat: no     Cough: YES- productive but doesn't know the color     Wheeze: no    Decreased Appetite: no    Nausea: no    Vomiting: no    Diarrhea:  no    Dysuria/Freq.: no    Fatigue/Achiness: YES    Sick/Strep Exposure: no     Therapies Tried and outcome: robitussin- slightly effective     Patient presents to clinic with complain of severe cough. Some nasal drainage. Throat is crackling but not spitting phlegm up. Denies sinus pain. Denies known fever. No known history of asthma or COPD. Patient was on Keflex for her bladder infection and finished course in 11/19. Robitussin helps alleviate symptoms temporarily.    Problem list and histories reviewed & adjusted, as indicated.  Additional history: as documented    Patient Active Problem List   Diagnosis     Hematuria     Dyspnea and respiratory abnormality     Hyperlipidemia LDL goal <130     Advanced directives, counseling/discussion     Essential hypertension with goal blood pressure less than 140/90     Family history of colon cancer- mother in her 40's-colonoscopies every 5 years- next one 2023     Osteopenia, unspecified location - was on fosamax, then Boniva secondary to hot flashes - off boniva since 2012     Urgency incontinence- helped with pelvic floor physical therapy      Vulvar itching - ? early lichen sclerosis vs. 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     COLONOSCOPY  9/22/94,     Colonoscopies q 5 year -next 2019     CYSTOCELE REPAIR  10/31/1984     CYSTOSCOPY       HYSTERECTOMY, PAP NO LONGER INDICATED  10/31/84    Hysterectomy, Total Abdominal w ovaries left intact     SURGICAL HISTORY OF -   5/17/73    Cholecystectomy & Incidental appendectomy       Social History     Tobacco Use     Smoking status: Never Smoker     Smokeless tobacco: Never Used   Substance Use Topics     Alcohol use: No     Family History   Problem Relation Age of Onset     Cancer - colorectal Mother      Hypertension Father      Cerebrovascular Disease Maternal Grandfather      Cerebrovascular Disease Paternal Grandmother          Current Outpatient Medications   Medication Sig Dispense Refill     azithromycin (ZITHROMAX) 250 MG tablet Two tablets first day, then one tablet daily for four days 6 tablet 0     ASPIRIN 81 MG OR TABS 1 TABLET DAILY       brimonidine (ALPHAGAN P) 0.1 % ophthalmic solution Place 1 drop into both eyes 2 times daily        CALCIUM /VITAMIN D TABS   OR 2 qd       clobetasol (TEMOVATE) 0.05 % external ointment APPLY SPARINGLY TO AFFECTED AREA OF GENITALS TWICE WEEKLY AT NIGHTTIME 60 g 1     estradiol (ESTRACE VAGINAL) 0.1 MG/GM vaginal cream Apply small  "amount to the vaginal opening and urethra M, W, F q. h.s. 42.5 g 3     LEVOBUNOLOL HCL 0.5 % OP SOLN one drop each day       levothyroxine (SYNTHROID/LEVOTHROID) 88 MCG tablet Take 1 tablet (88 mcg) by mouth every morning 90 tablet 1     lisinopril (PRINIVIL/ZESTRIL) 30 MG tablet TAKE 1 TABLET EVERY DAY 90 tablet 3     LORazepam (ATIVAN) 0.5 MG tablet Take 1 tablet (0.5 mg) by mouth 2 times daily Take 30 minutes prior to departure.  Do not operate a vehicle after taking this medication 10 tablet 0     metoprolol succinate ER (TOPROL-XL) 50 MG 24 hr tablet TAKE 1 TABLET EVERY DAY 90 tablet 3     MULTI-VITAMIN OR TABS 1 qd       simvastatin (ZOCOR) 40 MG tablet Take 1 tablet (40 mg) by mouth At Bedtime 90 tablet 3     triamterene-HCTZ (MAXZIDE-25) 37.5-25 MG tablet TAKE 1 TABLET EVERY DAY 90 tablet 3     Allergies   Allergen Reactions     Macrobid [Nitrofurantoin] GI Disturbance     Prednisone      Sulfa Drugs        Reviewed and updated as needed this visit by clinical staff  Tobacco  Allergies  Meds  Problems  Med Hx  Surg Hx  Fam Hx  Soc Hx        Reviewed and updated as needed this visit by Provider  Tobacco  Allergies  Meds  Problems  Med Hx  Surg Hx  Fam Hx         ROS:  Constitutional, HEENT, cardiovascular, pulmonary, GI, , musculoskeletal, neuro, skin, endocrine and psych systems are negative, except as otherwise noted.    This document serves as a record of the services and decisions personally performed and made by Mariluz Ackerman PA-C. It was created on her behalf by Mynor Frankel, a trained medical scribe. The creation of this document is based on the provider's statements to the medical scribe.  Mynor Frankel 11:50 AM December 26, 2019  OBJECTIVE:   /58 (BP Location: Left arm, Cuff Size: Adult Regular)   Pulse 67   Temp 98.5  F (36.9  C) (Oral)   Ht 1.702 m (5' 7\")   Wt 75.3 kg (166 lb)   SpO2 97%   BMI 26.00 kg/m   Body mass index is 26 kg/m .    Physical Exam:  GENERAL: healthy, " alert and no distress  EYES: Eyes grossly normal to inspection, PERRL and conjunctivae and sclerae normal  HENT: ear canals and TM's normal and nose and mouth without ulcers or lesions  NECK: no adenopathy  RESP: lungs clear to auscultation - no rales, rhonchi or wheezes  CV: regular rate and rhythm, normal S1 S2, no S3 or S4, no murmur, click or rub, no peripheral edema and peripheral pulses strong  MS: no gross musculoskeletal defects noted, no edema  SKIN: no suspicious lesions or rashes  NEURO: Normal strength and tone, mentation intact and speech normal  PSYCH: mentation appears normal, affect normal/bright    Diagnostic Test Results:  No results found for this or any previous visit (from the past 24 hour(s)).  ASSESSMENT/PLAN:   Aruna was seen today for uri.    Diagnoses and all orders for this visit:    Acute bronchitis, unspecified organism  Symptomatic. Started on 250 mg of Azithromycin. Continue to take robitussin as PRN for symptoms. Keep me informed if symptoms worsen or not improved.  -     azithromycin (ZITHROMAX) 250 MG tablet; Two tablets first day, then one tablet daily for four days    Return in about 5 days (around 12/31/2019) for contact clinic if worsening/not improving.     [address] [address]  [email or web address]   Office: [phone]           Mariluz Ackerman MS, PANHAN

## 2020-01-28 ENCOUNTER — OFFICE VISIT (OUTPATIENT)
Dept: UROLOGY | Facility: CLINIC | Age: 80
End: 2020-01-28
Payer: COMMERCIAL

## 2020-01-28 VITALS
WEIGHT: 168 LBS | HEIGHT: 67 IN | BODY MASS INDEX: 26.37 KG/M2 | DIASTOLIC BLOOD PRESSURE: 72 MMHG | SYSTOLIC BLOOD PRESSURE: 134 MMHG | HEART RATE: 58 BPM

## 2020-01-28 DIAGNOSIS — Z87.440 PERSONAL HISTORY OF URINARY TRACT INFECTION: ICD-10-CM

## 2020-01-28 DIAGNOSIS — N39.8 VOIDING DYSFUNCTION: Primary | ICD-10-CM

## 2020-01-28 LAB — RESIDUAL VOLUME (RV) (EXTERNAL): 183

## 2020-01-28 PROCEDURE — 51798 US URINE CAPACITY MEASURE: CPT | Performed by: UROLOGY

## 2020-01-28 PROCEDURE — 99213 OFFICE O/P EST LOW 20 MIN: CPT | Mod: 25 | Performed by: UROLOGY

## 2020-01-28 ASSESSMENT — MIFFLIN-ST. JEOR: SCORE: 1279.67

## 2020-01-28 ASSESSMENT — PAIN SCALES - GENERAL: PAINLEVEL: NO PAIN (0)

## 2020-01-28 NOTE — PATIENT INSTRUCTIONS
Continue your pelvic floor exercises    Try double voiding-sit down to urinate, stand up and count to 10 and then try again    Return to see us 6 months, sooner if needed    Websites with free information:    [employer] patient website: www.voicesforpfd.org    Total Control Program: www.totalcontrolprogram.com    It was a pleasure meeting with you today.  Thank you for allowing me and my team the privilege of caring for you today.  YOU are the reason we are here, and I truly hope we provided you with the excellent service you deserve.  Please let us know if there is anything else we can do for you so that we can be sure you are leaving completely satisfied with your care experience.

## 2020-01-28 NOTE — PROGRESS NOTES
"January 28, 2020    Return visit    Patient returns today for follow up.  She is overall doing well with her exercises.  No UTIs  She denies any changes in her health since last visit.    /72   Pulse 58   Ht 1.702 m (5' 7\")   Wt 76.2 kg (168 lb)   BMI 26.31 kg/m    She is comfortable, in no distress, non-labored breathing.     mL by bladder scan    A/P: 79 year old F with history of prior pelvic floor surgery, stage II vault prolapse, voiding dysfunction, myofascial tenderness of the pelvic floor, pelvic floor dysfunction and Luis vs colonization    Discussed double voiding    Continue pelvic floor exercises    Monitor and let us know if concern for UTI    RTC 6 months, sooner if needed    15 minutes were spent with the patient today, > 50% in counseling and coordination of care    Karyn Luevano MD MPH   of Urology    CC  Patient Care Team:  Lisbet Simmons MD as PCP - General (Family Practice)  Lisbet Simmons MD as Assigned PCP                "

## 2020-01-28 NOTE — LETTER
"  RE: Aruna Santos  [address] [facility] [address]     Dear Colleague,  Thank you for referring your patient, Aruna Santos, to the [facility] [place] at [facility]. Please see a copy of my visit note below.    January 28, 2020    Return visit    Patient returns today for follow up.  She is overall doing well with her exercises.  No UTIs  She denies any changes in her health since last visit.    /72   Pulse 58   Ht 1.702 m (5' 7\")   Wt 76.2 kg (168 lb)   BMI 26.31 kg/m     She is comfortable, in no distress, non-labored breathing.     mL by bladder scan    A/P: 79 year old F with history of prior pelvic floor surgery, stage II vault prolapse, voiding dysfunction, myofascial tenderness of the pelvic floor, pelvic floor dysfunction and Luis vs colonization    Discussed double voiding    Continue pelvic floor exercises    Monitor and let us know if concern for UTI    RTC 6 months, sooner if needed    15 minutes were spent with the patient today, > 50% in counseling and coordination of care    Kayrn Luevano MD MPH   of Urology  CC  Patient Care Team:  Lisbet Simmons MD as PCP - General (Family Practice)  Lisbet Simmons MD as Assigned PCP    "

## 2020-01-28 NOTE — NURSING NOTE
Chief Complaint   Patient presents with     pelvic floor dysfunction     History of UTI's and urinary incontinence     Post Void Residual results: 183 mL    Maylin Arriaga, EMT

## 2020-02-07 NOTE — PROGRESS NOTES
PRE-OP EVALUATION:  Today's date: 2/10/2020    Aruna Santos (: 1940) presents for pre-operative evaluation assessment as requested by Dr. Dhaval Pierson.  She requires evaluation and anesthesia risk assessment prior to undergoing surgery/procedure for treatment of right cataract .    Proposed Surgery/ Procedure: cataract surgery of the right eye  Date of Surgery/ Procedure: 2020  Time of Surgery/ Procedure: 9am  Hospital/Surgical Facility: [facility]  Fax number for surgical facility: [phone]  Primary Physician: Lisbet Simmons  Type of Anesthesia Anticipated: General    Patient has a Health Care Directive or Living Will:  YES     1. NO - Do you have a history of heart attack, stroke, stent, bypass or surgery on an artery in the head, neck, heart or legs?  2. NO - Do you ever have any pain or discomfort in your chest?  3. NO - Do you have a history of  Heart Failure?  4. NO - Are you troubled by shortness of breath when: walking on the level, up a slight hill or at night?  5. NO - Do you currently have a cold, bronchitis or other respiratory infection?  6. NO - Do you have a cough, shortness of breath or wheezing?  7. NO - Do you sometimes get pains in the calves of your legs when you walk?  8. NO - Do you or anyone in your family have previous history of blood clots?  9. NO - Do you or does anyone in your family have a serious bleeding problem such as prolonged bleeding following surgeries or cuts?  10. NO - Have you ever had problems with anemia or been told to take iron pills?  11. NO - Have you had any abnormal blood loss such as black, tarry or bloody stools, or abnormal vaginal bleeding?  12. YES - HAVE YOU EVER HAD A BLOOD TRANSFUSION? When she had her son cait 38 years ago.   12. NO - Have you ever had a blood transfusion?  13. NO - Have you or any of your relatives ever had problems with anesthesia?  14. YES - DO YOU HAVE SLEEP APNEA, EXCESSIVE SNORING OR DAYTIME  DROWSINESS? Uses dental device for sleep apnea.   14. NO - Do you have sleep apnea, excessive snoring or daytime drowsiness?  15. NO - Do you have any prosthetic heart valves?  16. NO - Do you have prosthetic joints?  17. NO - Is there any chance that you may be pregnant?    HPI:     HPI related to upcoming procedure:     Pt is receiving treatment for right cataract. She was diagnosed with bilateral cataracts couple years ago and symptoms are worsening over time. She had left eye cataract surgery in 07/2018 with no complications.     HYPERLIPIDEMIA - Patient has a long history of significant Hyperlipidemia requiring medication for treatment with recent fair control. Patient reports no problems or side effects with the medication.     HYPERTENSION - Patient has longstanding history of HTN , currently denies any symptoms referable to elevated blood pressure. Specifically denies chest pain, palpitations, dyspnea, orthopnea, PND or peripheral edema. Blood pressure readings have been in normal range. Current medication regimen is as listed below. Patient denies any side effects of medication.     HYPOTHYROIDISM - Patient has a longstanding history of chronic Hypothyroidism. Patient has been doing well, noting no tremor, insomnia, hair loss or changes in skin texture. Continues to take medications as directed, without adverse reactions or side effects. Last TSH   Lab Results   Component Value Date    TSH 1.86 11/29/2019   .      RENAL INSUFFICIENCY - Patient has a longstanding history of moderate-severe chronic renal insufficiency. Last Cr 1.23 on 11/29/2019.     MEDICAL HISTORY:     Patient Active Problem List    Diagnosis Date Noted     Hyperlipidemia LDL goal <130 10/31/2010     Priority: High     Essential hypertension with goal blood pressure less than 140/90 01/01/2005     Priority: High     Dysuria- recurrent with urgency and frequency - not always infection - seeing Dr. 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, face and neck (H) 2019     Priority: Medium     Rectal prolapse 2019     Priority: Medium     Sebaceous cyst- left medial upper breast  2019     Priority: Medium     Myalgia of pelvic floor 2019     Priority: Medium     Vaginal vault prolapse 2019     Priority: Medium     Voiding dysfunction 2019     Priority: Medium     Pelvic floor dysfunction- helped with pelvic floor physical therapy  2019     Priority: Medium     Personal history of urinary tract infection 2019     Priority: Medium     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 Hansen , dermatology n and oncology - Dr Selby for primary adnexal carcinoma of skin tomorrow. Had a PET scan = negative 3/2018.        CKD (chronic kidney disease) stage 3, GFR 30-59 ml/min (H) 2018     Priority: Medium     Environmental allergies 2016     Priority: Medium     Tubular adenomas of colon-  - repeat in 2018 s/p skin ca = 2 more tubular adenomas - repeat in        Priority: Medium     Anxiety 2014     Priority: Medium     Glaucoma 2014     Priority: Medium     Vulvar itching - ? early lichen sclerosis vs. other - superior vulva 2014     Priority: Medium     Osteopenia, unspecified location - was on fosamax, then Boniva secondary to hot flashes - off boniva since 2013     Priority: Medium     Family history of colon cancer- mother in her 40's-colonoscopies every 5 years- next one       Priority: Medium     mother  of colon cancer in her 40's       Urgency incontinence- helped with pelvic floor physical therapy       Priority: Medium     Advanced directives, counseling/discussion 10/24/2011     Priority: Medium     Advance Directive Problem List Overview:   Name Relationship Phone    Primary Health  Care Agent            Alternative Health Care Agent          Discussed advance care planning with patient; information given to patient to review. However pt did decline at this time. 10/24/2011          Other specified hypothyroidism 2008     Priority: Medium     Hematuria 2005     Priority: Medium     Problem list name updated by automated process. Provider to review and confirm  [employer] Update utility       Dyspnea and respiratory abnormality 2005     Priority: Medium     Problem list name updated by automated process. Provider to review        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     Skin cancer      Spider veins      Tubular adenoma of colon      - repeat in 2019 - q 5 years      Urgency incontinence      Past Surgical History:   Procedure Laterality Date     adenocarcinoma  2018    Primary adenocarcinoma of scalp     ARTHROSCOPY SHOULDER  2013    [facility]     COLONOSCOPY  89,     Colonoscopies q 5 year -next 2019     CYSTOCELE REPAIR  10/31/1984     CYSTOSCOPY       HYSTERECTOMY, PAP NO LONGER INDICATED  10/31/84    Hysterectomy, Total Abdominal w ovaries left intact     SURGICAL HISTORY OF -   75    Cholecystectomy & Incidental appendectomy     Current Outpatient Medications   Medication Sig Dispense Refill     ASPIRIN 81 MG OR TABS 1 TABLET DAILY       azithromycin (ZITHROMAX) 250 MG tablet Two tablets first day, then one tablet daily for four days 6 tablet 0     brimonidine (ALPHAGAN P) 0.1 % ophthalmic solution Place 1 drop into both eyes 2 times daily        CALCIUM /VITAMIN D TABS   OR 2 qd        clobetasol (TEMOVATE) 0.05 % external ointment APPLY SPARINGLY TO AFFECTED AREA OF GENITALS TWICE WEEKLY AT NIGHTTIME 60 g 1     estradiol (ESTRACE VAGINAL) 0.1 MG/GM vaginal cream Apply small amount to the vaginal opening and urethra M, W, F q. h.s. 42.5 g 3     LEVOBUNOLOL HCL 0.5 % OP SOLN one drop each day       levothyroxine (SYNTHROID/LEVOTHROID) 88 MCG tablet Take 1 tablet (88 mcg) by mouth every morning 90 tablet 1     lisinopril (PRINIVIL/ZESTRIL) 30 MG tablet TAKE 1 TABLET EVERY DAY 90 tablet 3     LORazepam (ATIVAN) 0.5 MG tablet Take 1 tablet (0.5 mg) by mouth 2 times daily Take 30 minutes prior to departure.  Do not operate a vehicle after taking this medication 10 tablet 0     metoprolol succinate ER (TOPROL-XL) 50 MG 24 hr tablet TAKE 1 TABLET EVERY DAY 90 tablet 3     MULTI-VITAMIN OR TABS 1 qd       simvastatin (ZOCOR) 40 MG tablet Take 1 tablet (40 mg) by mouth At Bedtime 90 tablet 3     triamterene-HCTZ (MAXZIDE-25) 37.5-25 MG tablet TAKE 1 TABLET EVERY DAY 90 tablet 3     OTC products: None, except as noted above - last dose of aspirin was 02/08.     Allergies   Allergen Reactions     Macrobid [Nitrofurantoin] GI Disturbance     Prednisone      Sulfa Drugs       Latex Allergy: NO    Social History     Tobacco Use     Smoking status: Never Smoker     Smokeless tobacco: Never Used   Substance Use Topics     Alcohol use: No     History   Drug Use No       REVIEW OF SYSTEMS:   CONSTITUTIONAL: NEGATIVE for fever, chills, change in weight  INTEGUMENTARY/SKIN: NEGATIVE for worrisome rashes, moles or lesions  EYES: POSITIVE for right cataract affecting her vision, NEGATIVE for irritation  ENT/MOUTH: NEGATIVE for ear, mouth and throat problems  RESP: NEGATIVE for significant cough or SOB  BREAST: NEGATIVE for masses, tenderness or discharge  CV: NEGATIVE for chest pain, palpitations or peripheral edema  GI: NEGATIVE for nausea, abdominal pain, heartburn, or change in bowel habits  : NEGATIVE for  "frequency, dysuria, or hematuria  MUSCULOSKELETAL: NEGATIVE for significant arthralgias or myalgia  NEURO: NEGATIVE for weakness, dizziness or paresthesias  ENDOCRINE: NEGATIVE for temperature intolerance, skin/hair changes  HEME: NEGATIVE for bleeding problems  PSYCHIATRIC: NEGATIVE for changes in mood or affect    This document serves as a record of the services and decisions personally performed and made by Lisbet Simmons MD. It was created on her behalf by Nichol Louis, a trained medical scribe. The creation of this document is based on the provider's statements to the medical scribe.  Nichol Louis 8:49 AM February 10, 2020    EXAM:   /78   Pulse 84   Temp 97.6  F (36.4  C)   Ht 1.702 m (5' 7\")   Wt 75.8 kg (167 lb)   SpO2 97%   BMI 26.16 kg/m      GENERAL APPEARANCE: healthy, alert and no distress     EYES: EOMI, PERRL     HENT: ear canals and TM's normal and nose and mouth without ulcers or lesions     NECK: no adenopathy, no asymmetry, masses, or scars and thyroid normal to palpation     RESP: lungs clear to auscultation - no rales, rhonchi or wheezes     CV: regular rates and rhythm, normal S1 S2, no S3 or S4 and no murmur, click or rub     ABDOMEN:  soft, nontender, no HSM or masses and bowel sounds normal     MS: extremities normal- no gross deformities noted, no evidence of inflammation in joints, FROM in all extremities.     SKIN: no suspicious lesions or rashes     NEURO: Normal strength and tone, sensory exam grossly normal, mentation intact and speech normal     PSYCH: mentation appears normal. and affect normal/bright     LYMPHATICS: No cervical adenopathy    DIAGNOSTICS:   No EKG needed secondary to low risk surgery such as cataract.     Labs Resulted Today:   Results for orders placed or performed in visit on 02/10/20   CBC with platelets     Status: None   Result Value Ref Range    WBC 6.2 4.0 - 11.0 10e9/L    RBC Count 4.11 3.8 - 5.2 10e12/L    Hemoglobin 12.5 11.7 - 15.7 g/dL    Hematocrit " 38.6 35.0 - 47.0 %    MCV 94 78 - 100 fl    MCH 30.4 26.5 - 33.0 pg    MCHC 32.4 31.5 - 36.5 g/dL    RDW 12.3 10.0 - 15.0 %    Platelet Count 279 150 - 450 10e9/L   Basic metabolic panel     Status: Abnormal   Result Value Ref Range    Sodium 140 133 - 144 mmol/L    Potassium 4.0 3.4 - 5.3 mmol/L    Chloride 106 94 - 109 mmol/L    Carbon Dioxide 26 20 - 32 mmol/L    Anion Gap 8 3 - 14 mmol/L    Glucose 94 70 - 99 mg/dL    Urea Nitrogen 23 7 - 30 mg/dL    Creatinine 1.02 0.52 - 1.04 mg/dL    GFR Estimate 53 (L) >60 mL/min/[1.73_m2]    GFR Estimate If Black 61 >60 mL/min/[1.73_m2]    Calcium 9.4 8.5 - 10.1 mg/dL   TSH with free T4 reflex     Status: None   Result Value Ref Range    TSH 2.46 0.40 - 4.00 mU/L     Recent Labs   Lab Test 11/29/19  1128 09/06/19  0927 05/23/19  0744   HGB 13.3  --  13.1     --  231    140 140   POTASSIUM 4.5 4.0 4.5   CR 1.23* 1.04 1.39*     IMPRESSION:   Reason for surgery/procedure: cataracts  Diagnosis/reason for consult: preoperative clearance    The proposed surgical procedure is considered LOW risk.    REVISED CARDIAC RISK INDEX  The patient has the following serious cardiovascular risks for perioperative complications such as (MI, PE, VFib and 3  AV Block):  No serious cardiac risks  INTERPRETATION: 0 risks: Class I (very low risk - 0.4% complication rate)    The patient has the following additional risks for perioperative complications:  No identified additional risks      ICD-10-CM    1. Preop general physical exam Z01.818 CBC with platelets     Basic metabolic panel     TSH with free T4 reflex   2. Senile cataract of right eye, unspecified age-related cataract type H25.9    3. CKD (chronic kidney disease) stage 3, GFR 30-59 ml/min (H) N18.3 CBC with platelets     Basic metabolic panel   4. Other specified hypothyroidism E03.8 TSH with free T4 reflex     Pt will take levothyroxine and lisinopril morning of surgery with sip of water. Pt was advised to hold aspirin and  NSAID's such as ibuprofen, motrin, aleve 7 days prior to procedure.     Pt dx'd with adnexal adenocarcinoma of scalp in 2018. S/p adenocarcinoma surgery in 01/30/2018. Per pt wider excision margins were clear. She did colonoscopy and PET scan for follow-up. Sees Dr. Hansen of dermatology at [facility] every 6 months for skin check.     Pt has no risk factors such as hx of stroke or CVA. Stop daily 81mg aspirin altogether secondary to AHA latest recommendations.     RECOMMENDATIONS:     --Patient will only take levothyroxine and lisinopril morning of surgery She will also hold aspirin and NSAID's 7 days prior to procedure.     APPROVAL GIVEN to proceed with proposed procedure, without further diagnostic evaluation     The information in this document, created by the medical scribe for me, accurately reflects the services I personally performed and the decisions made by me. I have reviewed and approved this document for accuracy prior to leaving the patient care area.  February 10, 2020 9:19 AM    Signed Electronically by: Lisbet Simmons MD    Copy of this evaluation report is provided to requesting physician.    [place] Preop Guidelines    Revised Cardiac Risk Index

## 2020-02-07 NOTE — PATIENT INSTRUCTIONS
Stop your daily aspirin 81mg altogether - secondary to the latest recommendations from the American Heart Association.     Definitely no aspirin, Ibuprofen or naproxen prior to your cataract surgery.     Before Your Surgery      Call your surgeon if there is any change in your health. This includes signs of a cold or flu (such as a sore throat, runny nose, cough, rash or fever).    Do not smoke, drink alcohol or take over the counter medicine (unless your surgeon or primary care doctor tells you to) for the 24 hours before and after surgery.    If you take prescribed drugs: Follow your doctor s orders about which medicines to take and which to stop until after surgery.    Eating and drinking prior to surgery: follow the instructions from your surgeon    Take a shower or bath the night before surgery. Use the soap your surgeon gave you to gently clean your skin. If you do not have soap from your surgeon, use your regular soap. Do not shave or scrub the surgery site.  Wear clean pajamas and have clean sheets on your bed.       Thank you so much or choosing [facility]  for your Health Care. It was a pleasure seeing you at your visit today! Please contact us with any questions or concerns you may have.                   Lisbet Simmons MD                              To reach your [facility] care team after hours call:   [phone]    Our clinic hours are:     Monday- 7:30 am - 7:00 pm                             Tuesday through Friday- 7:30 am - 5:00 pm                                        Saturday- 8:00 am - 12:00 pm                  Phone:  [phone]    Our pharmacy hours are:     Monday  8:00 am to 7:00 pm      Tuesday through Friday 8:00am to 6:00pm                        Saturday - 9:00 am to 1:00 pm      Sunday : Closed.              Phone:  [phone]      There is also information available at our web site:  [email or web address]    If your  provider ordered any lab tests and you do not receive the results within 10 business days, please call the clinic.    If you need a medication refill please contact your pharmacy.  Please allow 2 business days for your refill to be completed.    Our clinic offers telephone visits and e visits.  Please ask one of your team members to explain more.      Use Letaohart (secure email communication and access to your chart) to send your primary care provider a message or make an appointment. Ask someone on your Team how to sign up for Letaohart.

## 2020-02-10 ENCOUNTER — OFFICE VISIT (OUTPATIENT)
Dept: FAMILY MEDICINE | Facility: CLINIC | Age: 80
End: 2020-02-10
Payer: COMMERCIAL

## 2020-02-10 VITALS
HEART RATE: 84 BPM | WEIGHT: 167 LBS | BODY MASS INDEX: 26.21 KG/M2 | OXYGEN SATURATION: 97 % | DIASTOLIC BLOOD PRESSURE: 78 MMHG | HEIGHT: 67 IN | TEMPERATURE: 97.6 F | SYSTOLIC BLOOD PRESSURE: 126 MMHG

## 2020-02-10 DIAGNOSIS — E03.8 OTHER SPECIFIED HYPOTHYROIDISM: ICD-10-CM

## 2020-02-10 DIAGNOSIS — N18.30 CKD (CHRONIC KIDNEY DISEASE) STAGE 3, GFR 30-59 ML/MIN (H): ICD-10-CM

## 2020-02-10 DIAGNOSIS — H25.9 SENILE CATARACT OF RIGHT EYE, UNSPECIFIED AGE-RELATED CATARACT TYPE: ICD-10-CM

## 2020-02-10 DIAGNOSIS — Z01.818 PREOP GENERAL PHYSICAL EXAM: Primary | ICD-10-CM

## 2020-02-10 PROBLEM — C49.0: Status: ACTIVE | Noted: 2019-11-29

## 2020-02-10 PROBLEM — C44.90: Status: ACTIVE | Noted: 2018-01-27

## 2020-02-10 LAB
ANION GAP SERPL CALCULATED.3IONS-SCNC: 8 MMOL/L (ref 3–14)
BUN SERPL-MCNC: 23 MG/DL (ref 7–30)
CALCIUM SERPL-MCNC: 9.4 MG/DL (ref 8.5–10.1)
CHLORIDE SERPL-SCNC: 106 MMOL/L (ref 94–109)
CO2 SERPL-SCNC: 26 MMOL/L (ref 20–32)
CREAT SERPL-MCNC: 1.02 MG/DL (ref 0.52–1.04)
ERYTHROCYTE [DISTWIDTH] IN BLOOD BY AUTOMATED COUNT: 12.3 % (ref 10–15)
GFR SERPL CREATININE-BSD FRML MDRD: 53 ML/MIN/{1.73_M2}
GLUCOSE SERPL-MCNC: 94 MG/DL (ref 70–99)
HCT VFR BLD AUTO: 38.6 % (ref 35–47)
HGB BLD-MCNC: 12.5 G/DL (ref 11.7–15.7)
MCH RBC QN AUTO: 30.4 PG (ref 26.5–33)
MCHC RBC AUTO-ENTMCNC: 32.4 G/DL (ref 31.5–36.5)
MCV RBC AUTO: 94 FL (ref 78–100)
PLATELET # BLD AUTO: 279 10E9/L (ref 150–450)
POTASSIUM SERPL-SCNC: 4 MMOL/L (ref 3.4–5.3)
RBC # BLD AUTO: 4.11 10E12/L (ref 3.8–5.2)
SODIUM SERPL-SCNC: 140 MMOL/L (ref 133–144)
TSH SERPL DL<=0.005 MIU/L-ACNC: 2.46 MU/L (ref 0.4–4)
WBC # BLD AUTO: 6.2 10E9/L (ref 4–11)

## 2020-02-10 PROCEDURE — 85027 COMPLETE CBC AUTOMATED: CPT | Performed by: FAMILY MEDICINE

## 2020-02-10 PROCEDURE — 84443 ASSAY THYROID STIM HORMONE: CPT | Performed by: FAMILY MEDICINE

## 2020-02-10 PROCEDURE — 80048 BASIC METABOLIC PNL TOTAL CA: CPT | Performed by: FAMILY MEDICINE

## 2020-02-10 PROCEDURE — 99214 OFFICE O/P EST MOD 30 MIN: CPT | Performed by: FAMILY MEDICINE

## 2020-02-10 PROCEDURE — 36415 COLL VENOUS BLD VENIPUNCTURE: CPT | Performed by: FAMILY MEDICINE

## 2020-02-10 ASSESSMENT — MIFFLIN-ST. JEOR: SCORE: 1275.14

## 2020-02-10 NOTE — LETTER
[facility]  [address] [place] [id number]                  [phone]   March 4, 2020    Aruna Santos  [address] [facility] [address]      Dear Aruna,    Here is a summary of your recent test results:    -Normal red blood cell (hgb) levels, normal white blood cell count and normal platelet levels.   -Kidney function (GFR) is improved from previous.   -Sodium is normal.   -Potassium is normal.   -Calcium is normal.   -Glucose (diabetic screening test) is normal.   -TSH (thyroid stimulating hormone) level is normal which indicates normal thyroid function.     Please continue your current medications. And follow up as we discussed at your last office visit.     For additional lab test information, [email or web address] is an excellent reference.     Your test results are enclosed.      Please contact me if you have any questions.    In addition, here is a list of due or overdue Health Maintenance reminders.    Health Maintenance Due   Topic Date Due     Zoster (Shingles) Vaccine (2 of 3) 07/29/2011       Please call us at [phone] (or use [employer]) to address the above recommendations.            Thank you very much for trusting [facility]..     Healthy regards,       Lisbet Simmons M.D.          Results for orders placed or performed in visit on 02/10/20   CBC with platelets     Status: None   Result Value Ref Range    WBC 6.2 4.0 - 11.0 10e9/L    RBC Count 4.11 3.8 - 5.2 10e12/L    Hemoglobin 12.5 11.7 - 15.7 g/dL    Hematocrit 38.6 35.0 - 47.0 %    MCV 94 78 - 100 fl    MCH 30.4 26.5 - 33.0 pg    MCHC 32.4 31.5 - 36.5 g/dL    RDW 12.3 10.0 - 15.0 %    Platelet Count 279 150 - 450 10e9/L   Basic metabolic panel     Status: Abnormal   Result Value Ref Range    Sodium 140 133 - 144 mmol/L    Potassium 4.0 3.4 - 5.3 mmol/L    Chloride 106 94 - 109 mmol/L    Carbon Dioxide 26 20 - 32 mmol/L    Anion Gap 8 3 - 14 mmol/L    Glucose 94 70  - 99 mg/dL    Urea Nitrogen 23 7 - 30 mg/dL    Creatinine 1.02 0.52 - 1.04 mg/dL    GFR Estimate 53 (L) >60 mL/min/[1.73_m2]    GFR Estimate If Black 61 >60 mL/min/[1.73_m2]    Calcium 9.4 8.5 - 10.1 mg/dL   TSH with free T4 reflex     Status: None   Result Value Ref Range    TSH 2.46 0.40 - 4.00 mU/L

## 2020-03-10 PROBLEM — M62.89 PELVIC FLOOR DYSFUNCTION: Status: RESOLVED | Noted: 2019-09-11 | Resolved: 2020-03-10

## 2020-03-10 PROBLEM — N39.8 VOIDING DYSFUNCTION: Status: RESOLVED | Noted: 2019-09-11 | Resolved: 2020-03-10

## 2020-03-10 NOTE — PROGRESS NOTES
Discharge Note        Aruna failed to follow up and current status is unknown.  Please see information below for last relevant information on current status.  Patient seen for 3 visits.    SUBJECTIVE  Subjective changes noted by patient:  PT 10 minutes late. Off antibiotics off medication for ~ 2-3 days. Very little leakage, much less. Delay voiding ~2 hours. Up at nighttime variable. Not wearing pad unless gone all day. Feels like she can continue on her own.   .  Current pain level is  .     Previous pain level was   .   Changes in function:  Yes (See Goal flowsheet attached for changes in current functional level)  Adverse reaction to treatment or activity: None    OBJECTIVE  Changes noted in objective findings: Patient deferred pelvic floor recheck. Discussed HEP and urge suppression.      ASSESSMENT/PLAN  Diagnosis: pelvic floor dysfunction   Updated problem list and treatment plan:     STG/LTGs have been met or progress has been made towards goals:  Yes, please see goal flowsheet for most current information  Assessment of Progress: current status is unknown.    Last current status: Pt is progressing well   Self Management Plans:  HEP  I have re-evaluated this patient and find that the nature, scope, duration and intensity of the therapy is appropriate for the medical condition of the patient.  Aruna continues to require the following intervention to meet STG and LTG's:  HEP.    Recommendations:  Discharge with current home program.  Patient to follow up with MD as needed.    Please refer to the daily flowsheet for treatment today, total treatment time and time spent performing 1:1 timed codes.

## 2020-03-11 DIAGNOSIS — E03.8 OTHER SPECIFIED HYPOTHYROIDISM: ICD-10-CM

## 2020-03-11 NOTE — TELEPHONE ENCOUNTER
"Requested Prescriptions   Pending Prescriptions Disp Refills     levothyroxine (SYNTHROID/LEVOTHROID) 88 MCG tablet [Pharmacy Med Name: LEVOTHYROXINE SODIUM 88 MCG Tablet]        Last Written Prescription Date:  9.11.19  Last Fill Quantity: 90 tablet,  # refills: 1   Last office visit: 2/10/2020 with prescribing provider:  Lisbet Simmons MD         Future Office Visit:   Next 5 appointments (look out 90 days)    Apr 07, 2020 10:00 AM CDT  Return Visit with Mary Miranda PA-C  [facility] ([facility] [address] [id number] [phone]   May 21, 2020  9:40 AM CDT  Office Visit with Lisbet Simmons MD  [facility] [address] [place] [address]  [phone]            90 tablet 1     Sig: TAKE 1 TABLET EVERY MORNING       Thyroid Protocol Passed - 3/11/2020  4:37 PM        Passed - Patient is 12 years or older        Passed - Recent (12 mo) or future (30 days) visit within the authorizing provider's specialty     Patient has had an office visit with the authorizing provider or a provider within the authorizing providers department within the previous 12 mos or has a future within next 30 days. See \"Patient Info\" tab in inbasket, or \"Choose Columns\" in Meds & Orders section of the refill encounter.              Passed - Medication is active on med list        Passed - Normal TSH on file in past 12 months     Recent Labs   Lab Test 02/10/20  0912   TSH 2.46              Passed - No active pregnancy on record     If patient is pregnant or has had a positive pregnancy test, please check TSH.          Passed - No positive pregnancy test in past 12 months     If patient is pregnant or has had a positive pregnancy test, please check TSH.             "

## 2020-03-13 RX ORDER — LEVOTHYROXINE SODIUM 88 UG/1
TABLET ORAL
Qty: 90 TABLET | Refills: 3 | Status: SHIPPED | OUTPATIENT
Start: 2020-03-13 | End: 2021-06-04

## 2020-05-21 ENCOUNTER — OFFICE VISIT (OUTPATIENT)
Dept: FAMILY MEDICINE | Facility: CLINIC | Age: 80
End: 2020-05-21
Payer: COMMERCIAL

## 2020-05-21 VITALS
OXYGEN SATURATION: 96 % | DIASTOLIC BLOOD PRESSURE: 82 MMHG | HEART RATE: 74 BPM | HEIGHT: 67 IN | TEMPERATURE: 97.2 F | BODY MASS INDEX: 26.21 KG/M2 | WEIGHT: 167 LBS | SYSTOLIC BLOOD PRESSURE: 122 MMHG

## 2020-05-21 DIAGNOSIS — E56.9 VITAMIN DEFICIENCY: ICD-10-CM

## 2020-05-21 DIAGNOSIS — I10 ESSENTIAL HYPERTENSION WITH GOAL BLOOD PRESSURE LESS THAN 140/90: ICD-10-CM

## 2020-05-21 DIAGNOSIS — N18.30 CKD (CHRONIC KIDNEY DISEASE) STAGE 3, GFR 30-59 ML/MIN (H): ICD-10-CM

## 2020-05-21 DIAGNOSIS — E78.5 HYPERLIPIDEMIA LDL GOAL <130: ICD-10-CM

## 2020-05-21 DIAGNOSIS — E03.8 OTHER SPECIFIED HYPOTHYROIDISM: ICD-10-CM

## 2020-05-21 DIAGNOSIS — C49.0 MALIGNANT NEOPLASM OF CONNECTIVE AND SOFT TISSUE OF HEAD, FACE AND NECK (H): ICD-10-CM

## 2020-05-21 DIAGNOSIS — I10 ESSENTIAL HYPERTENSION WITH GOAL BLOOD PRESSURE LESS THAN 140/90: Primary | ICD-10-CM

## 2020-05-21 DIAGNOSIS — M89.9 DISORDER OF BONE, UNSPECIFIED: ICD-10-CM

## 2020-05-21 LAB
BASOPHILS # BLD AUTO: 0.1 10E9/L (ref 0–0.2)
BASOPHILS NFR BLD AUTO: 0.8 %
DIFFERENTIAL METHOD BLD: NORMAL
EOSINOPHIL # BLD AUTO: 0.3 10E9/L (ref 0–0.7)
EOSINOPHIL NFR BLD AUTO: 3.9 %
ERYTHROCYTE [DISTWIDTH] IN BLOOD BY AUTOMATED COUNT: 12.7 % (ref 10–15)
HCT VFR BLD AUTO: 42 % (ref 35–47)
HGB BLD-MCNC: 13.7 G/DL (ref 11.7–15.7)
LYMPHOCYTES # BLD AUTO: 1.7 10E9/L (ref 0.8–5.3)
LYMPHOCYTES NFR BLD AUTO: 27.1 %
MCH RBC QN AUTO: 30.4 PG (ref 26.5–33)
MCHC RBC AUTO-ENTMCNC: 32.6 G/DL (ref 31.5–36.5)
MCV RBC AUTO: 93 FL (ref 78–100)
MONOCYTES # BLD AUTO: 0.6 10E9/L (ref 0–1.3)
MONOCYTES NFR BLD AUTO: 10 %
NEUTROPHILS # BLD AUTO: 3.7 10E9/L (ref 1.6–8.3)
NEUTROPHILS NFR BLD AUTO: 58.2 %
PLATELET # BLD AUTO: 237 10E9/L (ref 150–450)
RBC # BLD AUTO: 4.51 10E12/L (ref 3.8–5.2)
T3 SERPL-MCNC: 96 NG/DL (ref 60–181)
WBC # BLD AUTO: 6.4 10E9/L (ref 4–11)

## 2020-05-21 PROCEDURE — 36415 COLL VENOUS BLD VENIPUNCTURE: CPT | Performed by: FAMILY MEDICINE

## 2020-05-21 PROCEDURE — 82043 UR ALBUMIN QUANTITATIVE: CPT | Performed by: FAMILY MEDICINE

## 2020-05-21 PROCEDURE — 85025 COMPLETE CBC W/AUTO DIFF WBC: CPT | Performed by: FAMILY MEDICINE

## 2020-05-21 PROCEDURE — 80053 COMPREHEN METABOLIC PANEL: CPT | Performed by: FAMILY MEDICINE

## 2020-05-21 PROCEDURE — 84439 ASSAY OF FREE THYROXINE: CPT | Performed by: FAMILY MEDICINE

## 2020-05-21 PROCEDURE — 84443 ASSAY THYROID STIM HORMONE: CPT | Performed by: FAMILY MEDICINE

## 2020-05-21 PROCEDURE — 99214 OFFICE O/P EST MOD 30 MIN: CPT | Performed by: FAMILY MEDICINE

## 2020-05-21 PROCEDURE — 84480 ASSAY TRIIODOTHYRONINE (T3): CPT | Performed by: FAMILY MEDICINE

## 2020-05-21 PROCEDURE — 80061 LIPID PANEL: CPT | Performed by: FAMILY MEDICINE

## 2020-05-21 PROCEDURE — 82306 VITAMIN D 25 HYDROXY: CPT | Performed by: FAMILY MEDICINE

## 2020-05-21 PROCEDURE — 83735 ASSAY OF MAGNESIUM: CPT | Performed by: FAMILY MEDICINE

## 2020-05-21 RX ORDER — SIMVASTATIN 40 MG
40 TABLET ORAL AT BEDTIME
Qty: 90 TABLET | Refills: 3 | Status: SHIPPED | OUTPATIENT
Start: 2020-05-21 | End: 2021-06-04

## 2020-05-21 RX ORDER — METOPROLOL SUCCINATE 50 MG/1
TABLET, EXTENDED RELEASE ORAL
Qty: 90 TABLET | Refills: 3 | Status: SHIPPED | OUTPATIENT
Start: 2020-05-21 | End: 2021-06-04

## 2020-05-21 RX ORDER — LISINOPRIL 30 MG/1
TABLET ORAL
Qty: 90 TABLET | Refills: 3 | Status: SHIPPED | OUTPATIENT
Start: 2020-05-21 | End: 2021-06-04

## 2020-05-21 RX ORDER — TRIAMTERENE/HYDROCHLOROTHIAZID 37.5-25 MG
TABLET ORAL
Qty: 90 TABLET | Refills: 3 | Status: SHIPPED | OUTPATIENT
Start: 2020-05-21 | End: 2021-06-04

## 2020-05-21 ASSESSMENT — MIFFLIN-ST. JEOR: SCORE: 1275.14

## 2020-05-21 NOTE — PATIENT INSTRUCTIONS
Try a gentle, liquid supplement for turmeric with black pepper, if still desired for anti-inflammatory.  Look patel milk products on amazon.com.     Try compression glove for support, if desired.     Look at your inflammatory foods:avoid or eliminate  processed flours, processed sugars, saturated fats.   For anti-inflammatory foods: Try to stick with dark green leafy veggies, organic foods, beets, walnuts, green tea- especially matcha green tea, salmon, pomegranates, olive oil, fish oil, dark red tart cherries.        Ok for glucosamine/chondroitin with MSM supplement, if desired.        Please, call or return to clinic or go to the ER immediately if signs or symptoms worsen or fail to improve as anticipated.     Thank you so much or choosing [facility]  for your Health Care. It was a pleasure seeing you at your visit today! Please contact us with any questions or concerns you may have.                   Lisbet Simmons MD                              To reach your [facility] care team after hours call:   [phone]    PLEASE NOTE OUR HOURS HAVE CHANGED secondary to COVID-19 coronavirus pandemic, as we are trying to minimize patient exposure to the virus,  which is now widespread in the [facility].  These hours may change with very little notice.  We apologize for any inconvenience.       Our current clinic hours are:    Monday- Friday  9:00am - 4:00pm                              Saturday and Sunday : Closed to in person visits      We have telephone and virtual visit times available between 7:40am - 6pm on Mondays.                                                      And 7:40am - 5pm Tuesdays through Fridays.                  Phone:  [phone]    Our pharmacy hours:   Monday  9:00 am to 6:00 pm      Tuesday through Friday 9:00am to 5:00pm                        Saturday - 9:00 am to 12 noon       Sunday : Closed.              Phone:  [phone]      There is also information available at our web site:  [email or web address]    If your provider ordered any lab tests and you do not receive the results within 10 business days, please call the clinic.    If you need a medication refill please contact your pharmacy.  Please allow 2 business days for your refill to be completed.    Our clinic offers telephone visits and e visits.  Please ask one of your team members to explain more.      Use [employer] (secure email communication and access to your chart) to send your primary care provider a message or make an appointment. Ask someone on your Team how to sign up for "[employer]t.

## 2020-05-21 NOTE — LETTER
[facility]  [address] [place] [address] [id number] [phone]                    June 9, 2020    Aruna Santos  [address] [facility] [address]      Dear Aruna,    Your recent test results showed:   -Normal red blood cell (hgb) levels, normal white blood cell count and normal platelet levels.   -LDL(bad) cholesterol level is elevated, and your triglycerides are elevated which can increase your heart disease risk.  A diet high in fat and simple carbohydrates, genetics and being overweight can contribute to this. ADVISE: exercising 150 minutes of aerobic exercise per week (30 minutes for 5 days per week or 50 minutes for 3 days per week are options), eating a low saturated fat/low carbohydrate diet, and omega-3 fatty acids (fish oil) 2000 mg  TWICE daily are helpful to improve this. In 3 months, you should recheck your fasting cholesterol panel by scheduling a lab-only appointment.   -Liver and gallbladder tests are normal (ALT,AST, Alk phos, bilirubin), kidney function is stable or slightly improved from 6 months ago (Cr, GFR), sodium is normal, potassium is normal, calcium is normal, glucose is slightly elevated, but not yet within prediabetes range.   -TSH (thyroid stimulating hormone) level is normal which indicates appropriate thyroid replacement dosing.  ADVISE: continuing same replacement dose and rechecking this in 12 months.   -Magnesium is elevated.  ADVISE: rechecking this in 3 months.   -Vitamin D level is normal and getting 1000 IU daily in your diet or your current level of supplements is recommended.   -Microalbumin (urine protein) test is just above  normal.  ADVISE: rechecking this annually.     For additional lab test information, labtestsonline.org is an excellent reference.     Your test results are enclosed.      Please contact me if you have any questions.    In addition, here is a list of due or overdue Health Maintenance reminders.    Health Maintenance Due    Topic Date Due     Zoster (Shingles) Vaccine (2 of 3) 07/29/2011       Please call us at [phone] (or use [employer]) to address the above recommendations.            Thank you very much for trusting [facility]..     Healthy regards,         Lisbet Simmons M.D.          Results for orders placed or performed in visit on 05/21/20   25 Hydroxyvitamin D2 and D3     Status: None   Result Value Ref Range    25 OH Vit D2 <5 ug/L    25 OH Vit D3 33 ug/L    25 OH Vit D total <38 20 - 75 ug/L   Albumin Random Urine Quantitative with Creat Ratio     Status: Abnormal   Result Value Ref Range    Creatinine Urine 36 mg/dL    Albumin Urine mg/L 10 mg/L    Albumin Urine mg/g Cr 26.27 (H) 0 - 25 mg/g Cr   CBC with platelets differential     Status: None   Result Value Ref Range    WBC 6.4 4.0 - 11.0 10e9/L    RBC Count 4.51 3.8 - 5.2 10e12/L    Hemoglobin 13.7 11.7 - 15.7 g/dL    Hematocrit 42.0 35.0 - 47.0 %    MCV 93 78 - 100 fl    MCH 30.4 26.5 - 33.0 pg    MCHC 32.6 31.5 - 36.5 g/dL    RDW 12.7 10.0 - 15.0 %    Platelet Count 237 150 - 450 10e9/L    % Neutrophils 58.2 %    % Lymphocytes 27.1 %    % Monocytes 10.0 %    % Eosinophils 3.9 %    % Basophils 0.8 %    Absolute Neutrophil 3.7 1.6 - 8.3 10e9/L    Absolute Lymphocytes 1.7 0.8 - 5.3 10e9/L    Absolute Monocytes 0.6 0.0 - 1.3 10e9/L    Absolute Eosinophils 0.3 0.0 - 0.7 10e9/L    Absolute Basophils 0.1 0.0 - 0.2 10e9/L    Diff Method Automated Method    Comprehensive metabolic panel     Status: Abnormal   Result Value Ref Range    Sodium 139 133 - 144 mmol/L    Potassium 4.5 3.4 - 5.3 mmol/L    Chloride 105 94 - 109 mmol/L    Carbon Dioxide 25 20 - 32 mmol/L    Anion Gap 9 3 - 14 mmol/L    Glucose 106 (H) 70 - 99 mg/dL    Urea Nitrogen 26 7 - 30 mg/dL    Creatinine 1.09 (H) 0.52 - 1.04 mg/dL    GFR Estimate 49 (L) >60 mL/min/[1.73_m2]    GFR Estimate If Black 56 (L) >60 mL/min/[1.73_m2]    Calcium 9.6 8.5 - 10.1 mg/dL    Bilirubin Total 0.7  0.2 - 1.3 mg/dL    Albumin 3.8 3.4 - 5.0 g/dL    Protein Total 7.8 6.8 - 8.8 g/dL    Alkaline Phosphatase 50 40 - 150 U/L    ALT 25 0 - 50 U/L    AST 24 0 - 45 U/L   Lipid panel reflex to direct LDL Fasting     Status: Abnormal   Result Value Ref Range    Cholesterol 213 (H) <200 mg/dL    Triglycerides 251 (H) <150 mg/dL    HDL Cholesterol 53 >49 mg/dL    LDL Cholesterol Calculated 110 (H) <100 mg/dL    Non HDL Cholesterol 160 (H) <130 mg/dL   Magnesium     Status: Abnormal   Result Value Ref Range    Magnesium 2.4 (H) 1.6 - 2.3 mg/dL   T3 total     Status: None   Result Value Ref Range    Triiodothyronine (T3) 96 60 - 181 ng/dL   TSH     Status: None   Result Value Ref Range    TSH 1.90 0.40 - 4.00 mU/L   T4 FREE     Status: None   Result Value Ref Range    T4 Free 1.30 0.76 - 1.46 ng/dL

## 2020-05-21 NOTE — PROGRESS NOTES
SUBJECTIVE:                                                    Aruna Santos is a 79 year old female who presents to clinic today for the following health issues:    Hyperlipidemia Follow-Up      Are you regularly taking any medication or supplement to lower your cholesterol?   Yes- simvastatin     Are you having muscle aches or other side effects that you think could be caused by your cholesterol lowering medication?  No     Recent Labs   Lab Test 11/29/19  1128 05/23/19  0744  07/23/15  1016 06/27/14  0929   CHOL 211* 200*   < > 194 212*   HDL 51 52   < > 46* 48*   * 100*   < > 91 114   TRIG 238* 238*   < > 285* 251*   CHOLHDLRATIO  --   --   --  4.2 4.4    < > = values in this interval not displayed.          Hypertension Follow-up      Do you check your blood pressure regularly outside of the clinic? No     Are you following a low salt diet? No    Are your blood pressures ever more than 140 on the top number (systolic) OR more   than 90 on the bottom number (diastolic), for example 140/90? No     BP Readings from Last 3 Encounters:   05/21/20 122/82   02/10/20 126/78   01/28/20 134/72     Creatinine   Date Value Ref Range Status   02/10/2020 1.02 0.52 - 1.04 mg/dL Final   11/29/2019 1.23 (H) 0.52 - 1.04 mg/dL Final   09/06/2019 1.04 0.52 - 1.04 mg/dL Final   05/23/2019 1.39 (H) 0.52 - 1.04 mg/dL Final   10/01/2018 1.15 (H) 0.52 - 1.04 mg/dL Final     GFR Estimate   Date Value Ref Range Status   02/10/2020 53 (L) >60 mL/min/[1.73_m2] Final     Comment:     Non  GFR Calc  Starting 12/18/2018, serum creatinine based estimated GFR (eGFR) will be   calculated using the Chronic Kidney Disease Epidemiology Collaboration   (CKD-EPI) equation.     11/29/2019 42 (L) >60 mL/min/[1.73_m2] Final     Comment:     Non  GFR Calc  Starting 12/18/2018, serum creatinine based estimated GFR (eGFR) will be   calculated using the Chronic Kidney Disease Epidemiology Collaboration   (CKD-EPI)  equation.     09/06/2019 52 (L) >60 mL/min/[1.73_m2] Final     Comment:     Non  GFR Calc  Starting 12/18/2018, serum creatinine based estimated GFR (eGFR) will be   calculated using the Chronic Kidney Disease Epidemiology Collaboration   (CKD-EPI) equation.             Hypothyroidism Follow-up      Since last visit, patient describes the following symptoms: Weight stable, no hair loss, no skin changes, no constipation, no loose stools      How many servings of fruits and vegetables do you eat daily?  4 or more    On average, how many sweetened beverages do you drink each day (Examples: soda, juice, sweet tea, etc.  Do NOT count diet or artificially sweetened beverages)?   1 soda weekly     How many days per week do you exercise enough to make your heart beat faster? 3 or less    How many minutes a day do you exercise enough to make your heart beat faster? 9 or less    How many days per week do you miss taking your medication? 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     Joint Pain: tried higher dose turmeric supplement per ortho recommendation and that upset her stomach.     Onset: x 1 year     Description:   Location: bilateral hand and fingers.   Character: Dull ache    Intensity: moderate    Progression of Symptoms:  worse    Accompanying Signs & Symptoms:  Other symptoms: none    History:   Previous similar pain: YES- Ongoing. Pt states that she had two steroid injections of the left and right middle finger. Pt had x-rays done by hand Dr and states that she has arthritis of both hands     Precipitating factors:   Trauma or overuse: YES- overuse, pt states that she has been doing work outside. Right hand aches more than the left      Alleviating factors:  Improved by: Tylenol, injections     Therapies Tried and outcome: see above        Patient Active Problem List   Diagnosis     Hematuria     Dyspnea and respiratory abnormality     Hyperlipidemia LDL goal <130     Advanced directives, counseling/discussion     Essential hypertension with goal blood pressure less than 140/90- well controlled     Family history of colon cancer- mother in her 40's-colonoscopies every 5 years- next one 2023     Osteopenia, unspecified location - was on fosamax, then Boniva secondary to hot flashes - off boniva since 2012     Vulvar itching - ? early lichen sclerosis vs. 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 Tyrone Dooley [facility] q6months      Myalgia of pelvic floor     Vaginal vault prolapse     Personal history of urinary tract infection     Dysuria- recurrent with urgency and frequency - not always infection - seeing Dr. 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 Tyrone Dooley [facility]     Rectal prolapse     Sebaceous cyst-  left medial upper breast        Current Outpatient Medications   Medication Sig Dispense Refill     ASPIRIN 81 MG OR TABS 1 TABLET DAILY       brimonidine (ALPHAGAN P) 0.1 % ophthalmic solution Place 1 drop into both eyes 2 times daily        CALCIUM /VITAMIN D TABS   OR 2 qd       clobetasol (TEMOVATE) 0.05 % external ointment APPLY SPARINGLY TO AFFECTED AREA OF GENITALS TWICE WEEKLY AT NIGHTTIME 60 g 1     estradiol (ESTRACE VAGINAL) 0.1 MG/GM vaginal cream Apply small amount to the vaginal opening and urethra M, W, F q. h.s. 42.5 g 3     LEVOBUNOLOL HCL 0.5 % OP SOLN one drop each day       levothyroxine (SYNTHROID/LEVOTHROID) 88 MCG tablet TAKE 1 TABLET EVERY MORNING 90 tablet 3     lisinopril (PRINIVIL/ZESTRIL) 30 MG tablet TAKE 1 TABLET EVERY DAY 90 tablet 3     LORazepam (ATIVAN) 0.5 MG tablet Take 1 tablet (0.5 mg) by mouth 2 times daily Take 30 minutes prior to departure.  Do not operate a vehicle after taking this medication 10 tablet 0     metoprolol succinate ER (TOPROL-XL) 50 MG 24 hr tablet TAKE 1 TABLET EVERY DAY 90 tablet 3     MULTI-VITAMIN OR TABS 1 qd       simvastatin (ZOCOR) 40 MG tablet Take 1 tablet (40 mg) by mouth At Bedtime 90 tablet 3     triamterene-HCTZ (MAXZIDE-25) 37.5-25 MG tablet TAKE 1 TABLET EVERY DAY 90 tablet 3     azithromycin (ZITHROMAX) 250 MG tablet Two tablets first day, then one tablet daily for four days (Patient not taking: Reported on 5/21/2020) 6 tablet 0          Allergies   Allergen Reactions     Macrobid [Nitrofurantoin] GI Disturbance     Prednisone      Sulfa Drugs           Problem list and histories reviewed & adjusted, as indicated.  Additional history: as documented    Reviewed and updated as needed this visit by clinical staff       Reviewed and updated as needed this visit by Provider         ROS:   ROS: 12 point ROS neg other than the symptoms noted above    OBJECTIVE:                                                    /82   Pulse 74   Temp 97.2  F  "(36.2  C) (Tympanic)   Ht 1.702 m (5' 7\")   Wt 75.8 kg (167 lb)   SpO2 96%   Breastfeeding No   BMI 26.16 kg/m    Body mass index is 26.16 kg/m .   GENERAL: healthy, alert, well nourished, well hydrated, no distress  HENT: ear canals- normal; TMs- normal; Nose- normal; Mouth- no ulcers, no lesions  NECK: no tenderness, no adenopathy, no asymmetry, no masses, no stiffness; thyroid- normal to palpation  RESP: lungs clear to auscultation - no rales, no rhonchi, no wheezes  CV: regular rates and rhythm, normal S1 S2, no S3 or S4 and no murmur, no click or rub -  ABDOMEN: soft, no tenderness, no  hepatosplenomegaly, no masses, normal bowel sounds  MS: extremities- no gross deformities noted, no edema  Bilateral hands - right greater than left - IP joint enlargements - multiple finger joints involved.        Diagnostic Test Results:  Labs reviewed in Epic  No results found for any visits on 05/21/20.     ASSESSMENT/PLAN:                                                        ICD-10-CM    1. Essential hypertension with goal blood pressure less than 140/90- well controlled  I10 Albumin Random Urine Quantitative with Creat Ratio     CBC with platelets differential     Comprehensive metabolic panel     Magnesium   2. Other specified hypothyroidism  E03.8 T3 total     TSH     T4 FREE   3. CKD (chronic kidney disease) stage 3, GFR 30-59 ml/min (H)  N18.3    4. Vitamin deficiency  E56.9 25 Hydroxyvitamin D2 and D3   5. Essential hypertension with goal blood pressure less than 140/90- needs lab follow up and refills today   I10 triamterene-HCTZ (MAXZIDE-25) 37.5-25 MG tablet     metoprolol succinate ER (TOPROL-XL) 50 MG 24 hr tablet     lisinopril (ZESTRIL) 30 MG tablet   6. Hyperlipidemia LDL goal <130 - needs lab follow up and refills today   E78.5 Lipid panel reflex to direct LDL Fasting     simvastatin (ZOCOR) 40 MG tablet   7. Malignant neoplasm of connective and soft tissue of head, face and neck (H)- Primary adnexal " carcinoma /adenocarcinoma of skin- left scalp - following with  - [facility]   C49.0    8. Disorder of bone, unspecified   M89.9 25 Hydroxyvitamin D2 and D3     Try a gentle, liquid supplement for turmeric with black pepper, if still desired for anti-inflammatory.  Look at your inflammatory foods:avoid or eliminate  processed flours, processed sugars, saturated fats.   For anti-inflammatory foods: Try to stick with dark green leafy veggies, organic foods, beets, walnuts, green tea- especially matcha green tea, salmon, pomegranates, olive oil, fish oil, dark red tart cherries.    Ok for glucosamine/chondroitin with MSM supplement, if desired.    Please, call or return to clinic or go to the ER immediately if signs or symptoms worsen or fail to improve as anticipated.     Pt declines refill on the estrogen vaginal cream. Has quite a bit at home.      See Patient Instructions.     Return in about 6 months (around 11/30/2020) for BP Recheck, Lab Work, Physical Exam, cholesterol recheck.          Lisbet Simmons MD  [facility]

## 2020-05-22 LAB
ALBUMIN SERPL-MCNC: 3.8 G/DL (ref 3.4–5)
ALP SERPL-CCNC: 50 U/L (ref 40–150)
ALT SERPL W P-5'-P-CCNC: 25 U/L (ref 0–50)
ANION GAP SERPL CALCULATED.3IONS-SCNC: 9 MMOL/L (ref 3–14)
AST SERPL W P-5'-P-CCNC: 24 U/L (ref 0–45)
BILIRUB SERPL-MCNC: 0.7 MG/DL (ref 0.2–1.3)
BUN SERPL-MCNC: 26 MG/DL (ref 7–30)
CALCIUM SERPL-MCNC: 9.6 MG/DL (ref 8.5–10.1)
CHLORIDE SERPL-SCNC: 105 MMOL/L (ref 94–109)
CHOLEST SERPL-MCNC: 213 MG/DL
CO2 SERPL-SCNC: 25 MMOL/L (ref 20–32)
CREAT SERPL-MCNC: 1.09 MG/DL (ref 0.52–1.04)
CREAT UR-MCNC: 36 MG/DL
GFR SERPL CREATININE-BSD FRML MDRD: 49 ML/MIN/{1.73_M2}
GLUCOSE SERPL-MCNC: 106 MG/DL (ref 70–99)
HDLC SERPL-MCNC: 53 MG/DL
LDLC SERPL CALC-MCNC: 110 MG/DL
MAGNESIUM SERPL-MCNC: 2.4 MG/DL (ref 1.6–2.3)
MICROALBUMIN UR-MCNC: 10 MG/L
MICROALBUMIN/CREAT UR: 26.27 MG/G CR (ref 0–25)
NONHDLC SERPL-MCNC: 160 MG/DL
POTASSIUM SERPL-SCNC: 4.5 MMOL/L (ref 3.4–5.3)
PROT SERPL-MCNC: 7.8 G/DL (ref 6.8–8.8)
SODIUM SERPL-SCNC: 139 MMOL/L (ref 133–144)
T4 FREE SERPL-MCNC: 1.3 NG/DL (ref 0.76–1.46)
TRIGL SERPL-MCNC: 251 MG/DL
TSH SERPL DL<=0.005 MIU/L-ACNC: 1.9 MU/L (ref 0.4–4)

## 2020-07-07 ENCOUNTER — OFFICE VISIT (OUTPATIENT)
Dept: DERMATOLOGY | Facility: CLINIC | Age: 80
End: 2020-07-07
Payer: COMMERCIAL

## 2020-07-07 VITALS — OXYGEN SATURATION: 98 % | DIASTOLIC BLOOD PRESSURE: 61 MMHG | HEART RATE: 58 BPM | SYSTOLIC BLOOD PRESSURE: 138 MMHG

## 2020-07-07 DIAGNOSIS — L82.1 SEBORRHEIC KERATOSIS: ICD-10-CM

## 2020-07-07 DIAGNOSIS — Z85.828 HISTORY OF SKIN CANCER: ICD-10-CM

## 2020-07-07 DIAGNOSIS — L81.4 LENTIGO: ICD-10-CM

## 2020-07-07 DIAGNOSIS — D18.01 ANGIOMA OF SKIN: ICD-10-CM

## 2020-07-07 DIAGNOSIS — D22.9 NEVUS: Primary | ICD-10-CM

## 2020-07-07 PROCEDURE — 99214 OFFICE O/P EST MOD 30 MIN: CPT | Performed by: PHYSICIAN ASSISTANT

## 2020-07-07 NOTE — PROGRESS NOTES
HPI:   chief complaint: Aruna Santos is a 79 year old female who presents for Full skin cancer screening to rule out skin cancer.  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  Social History: lives by [place]. Went on a cruise to [place] over the summer time with grandchildren  On review of systems, there are no further skin complaints, patient is feeling otherwise well.  See patient intake sheet.  ROS of the following were done and are negative: Constitutional, Eyes, Ears, Nose,   Mouth, Throat, Cardiovascular, Respiratory, GI, Genitourinary, Musculoskeletal,   Psychiatric, Endocrine, Allergic/Immunologic.    HPI, past medical history, social history, allergies, medications reviewed as of July 7, 2020      PHYSICAL EXAM:   /61   Pulse 58   SpO2 98%   Skin exam performed as follows: Type 2 skin. Mood appropriate  Alert and Oriented X 3. Well developed, well nourished in no distress.  General appearance: Normal  Head including face: Normal  Eyes: conjunctiva and lids: Normal  Mouth: Lips, teeth, gums: Normal  Neck: Normal  Chest-breast/axillae: Normal  Back: Normal  Spleen and liver: Normal  Cardiovascular: Exam of peripheral vascular system by observation for swelling, varicosities, edema: Normal  Genitalia: groin, buttocks: Normal  Extremities: digits/nails (clubbing): Normal  Eccrine and Apocrine glands: Normal  Right upper extremity: Normal  Left upper extremity: Normal  Right lower extremity: Normal  Left lower extremity: Normal  Skin: Scalp and body hair: See below    Pt deferred exam of breasts, groin, buttocks: No    Other physical findings:  1. Multiple pigmented macules on extremities and trunk  2. Multiple  pigmented macules on face, trunk and extremities  3. Multiple vascular papules on trunk, arms and legs  4. Multiple scattered keratotic plaques        Except as noted above, no other signs of skin cancer or melanoma.     ASSESSMENT/PLAN:   Benign Full skin cancer screening today.    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    Multiple benign appearing nevi on arms, legs and trunk. Discussed ABCDEs of melanoma and sunscreen.   Multiple lentigos on arms, legs and trunk. Advised benign, no treatment needed.  Multiple scattered angiomas. Advised benign, no treatment needed.   Seborrheic keratosis on arms, legs and trunk. Advised benign, no treatment needed.  History of adenocarcinoma on the scalp 1/2018. No lymphadenopathy palpated today - advised for pt to do this once monthly at home. More than 50% of reported cases are located in the tete-orbital region and the hair-bearing scalp.  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    Aruna to follow up with Primary Care provider regarding elevated blood pressure.        Follow-up: pending path/Q 6 months FSE/PRN sooner     1.) Patient was asked about new and changing moles. YES  2.) Patient received a complete physical skin examination: YES  3.) Patient was counseled to perform a monthly self skin examination: YES  Scribed By: Mary Miranda, MS, PA-C

## 2020-07-07 NOTE — LETTER
7/7/2020         RE: Aruna Santos  [address] [facility] [address]        Dear Colleague,    Thank you for referring your patient, Aruna Santos, to the [facility]. Please see a copy of my visit note below.    HPI:   chief complaint: Aruna Santos is a 79 year old female who presents for Full skin cancer screening to rule out skin cancer.  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  Social History: lives by [place]. Went on a cruise to [place] over the summer time with grandchildren  On review of systems, there are no further skin complaints, patient is feeling otherwise well.  See patient intake sheet.  ROS of the following were done and are negative: Constitutional, Eyes, Ears, Nose,   Mouth, Throat, Cardiovascular, Respiratory, GI, Genitourinary, Musculoskeletal,   Psychiatric, Endocrine, Allergic/Immunologic.    HPI, past medical history, social history, allergies, medications reviewed as of July 7, 2020      PHYSICAL EXAM:   /61   Pulse 58   SpO2 98%   Skin exam performed as follows: Type 2 skin. Mood appropriate  Alert and Oriented X 3. Well developed, well nourished in no distress.  General appearance: Normal  Head including face: Normal  Eyes: conjunctiva and lids: Normal  Mouth: Lips, teeth, gums: Normal  Neck: Normal  Chest-breast/axillae: Normal  Back: Normal  Spleen and liver: Normal  Cardiovascular: Exam of peripheral vascular system by observation for swelling, varicosities, edema: Normal  Genitalia: groin, buttocks: Normal  Extremities: digits/nails (clubbing): Normal  Eccrine and Apocrine glands: Normal  Right upper extremity: Normal  Left upper extremity: Normal  Right  lower extremity: Normal  Left lower extremity: Normal  Skin: Scalp and body hair: See below    Pt deferred exam of breasts, groin, buttocks: No    Other physical findings:  1. Multiple pigmented macules on extremities and trunk  2. Multiple pigmented macules on face, trunk and extremities  3. Multiple vascular papules on trunk, arms and legs  4. Multiple scattered keratotic plaques        Except as noted above, no other signs of skin cancer or melanoma.     ASSESSMENT/PLAN:   Benign Full skin cancer screening today.    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    Multiple benign appearing nevi on arms, legs and trunk. Discussed ABCDEs of melanoma and sunscreen.   Multiple lentigos on arms, legs and trunk. Advised benign, no treatment needed.  Multiple scattered angiomas. Advised benign, no treatment needed.   Seborrheic keratosis on arms, legs and trunk. Advised benign, no treatment needed.  History of adenocarcinoma on the scalp 1/2018. No lymphadenopathy palpated today - advised for pt to do this once monthly at home. More than 50% of reported cases are located in the tete-orbital region and the hair-bearing scalp.  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    Aruna to follow up with Primary Care provider regarding elevated blood pressure.        Follow-up: pending path/Q 6 months FSE/PRN sooner     1.) Patient was asked about new and changing moles. YES  2.) Patient received a complete physical skin examination: YES  3.) Patient was counseled to perform a monthly self skin examination: YES  Scribed By: Mary Miranda, MS, PA-C      Again, thank you for allowing me to participate in the care of your patient.        Sincerely,        Mary  DAVID Youssef PA-C

## 2020-07-15 ENCOUNTER — VIRTUAL VISIT (OUTPATIENT)
Dept: UROLOGY | Facility: CLINIC | Age: 80
End: 2020-07-15
Payer: COMMERCIAL

## 2020-07-15 VITALS — HEIGHT: 67 IN | WEIGHT: 168 LBS | BODY MASS INDEX: 26.37 KG/M2

## 2020-07-15 DIAGNOSIS — Z87.440 PERSONAL HISTORY OF URINARY TRACT INFECTION: ICD-10-CM

## 2020-07-15 DIAGNOSIS — M79.18 MYALGIA OF PELVIC FLOOR: ICD-10-CM

## 2020-07-15 DIAGNOSIS — R30.0 DYSURIA: Primary | ICD-10-CM

## 2020-07-15 DIAGNOSIS — N81.9 VAGINAL VAULT PROLAPSE: ICD-10-CM

## 2020-07-15 PROCEDURE — 99212 OFFICE O/P EST SF 10 MIN: CPT | Mod: 95 | Performed by: UROLOGY

## 2020-07-15 ASSESSMENT — PAIN SCALES - GENERAL: PAINLEVEL: NO PAIN (0)

## 2020-07-15 ASSESSMENT — MIFFLIN-ST. JEOR: SCORE: 1279.67

## 2020-07-15 NOTE — PATIENT INSTRUCTIONS
Continue your double voiding and your pelvic floor exercises    Websites with free information:    [employer] patient website: www.voicesforpfd.org    Total Control Program: www.totalcontrolprogram.com    Supplements to prevent UTI to consider  -probiotics  -cranberry   Ellura: www.myellura.com   Theracran HP by [employer] [facility] [address]  -d-mannose  -Vitamin C 500mg to 1000mg twice daily    It was a pleasure meeting with you today.  Thank you for allowing me and my team the privilege of caring for you today.  YOU are the reason we are here, and I truly hope we provided you with the excellent service you deserve.  Please let us know if there is anything else we can do for you so that we can be sure you are leaving completely satisfied with your care experience.

## 2020-07-15 NOTE — LETTER
"7/15/2020       RE: Aruna Santos  [address] [facility] [address]     Dear Colleague,    Thank you for referring your patient, Aruna Santos, to the [facility] [place] at [facility]. Please see a copy of my visit note below.    July 15, 2020    Aruna Santos is a 79 year old female who is being evaluated via a billable telephone visit.      The patient has been notified of following:     \"This telephone visit will be conducted via a call between you and your physician/provider. We have found that certain health care needs can be provided without the need for a physical exam.  This service lets us provide the care you need with a short phone conversation.  If a prescription is necessary we can send it directly to your pharmacy.  If lab work is needed we can place an order for that and you can then stop by our lab to have the test done at a later time.    Telephone visits are billed at different rates depending on your insurance coverage. During this emergency period, for some insurers they may be billed the same as an in-person visit.  Please reach out to your insurance provider with any questions.    If during the course of the call the physician/provider feels a telephone visit is not appropriate, you will not be charged for this service.\"    Patient has given verbal consent for Telephone visit?  Yes    What phone number would you like to be contacted at?[phone]    How would you like to obtain your AVS? Mail a copy  Sneha Butcher CMA    Patient has elected a telephone call for today's follow up.  Called patient today and verified her name and date of birth prior to discussion.    Today's telephone visit is to discuss how her urinary symptoms are doing since her last visit in January.  Very busy in her garden.  Lives with her  and 2 sons, sister and brother in law live next door, daughter and grandkids a couple miles away.  "     No UTIs since last.  Double voiding and seems to be emptying.  Doing her exercises the best she can.  She denies gross hematuria, vaginal bleeding. Prolpase not bothersome enough at this time that she wants treatment.  Denies constipation    Patient denies any changes to her past medical, surgical or social history.  Patient denies any changes to her medications or allergies    A/P:  Aruna Santos is a 79 year old F with history of prior pelvic floor surgery, stage II vault prolapse, voiding dysfunction, myofascial tenderness of the pelvic floor, pelvic floor dysfunction and Luis vs colonization    Continue pelvic floor exercises    Continue double voiding    Let us know if she has an infection    RTC 6 months sooner if needed    6 minutes were spent in patient care today    Karyn Luevano MD MPH   of Urology    CC  Patient Care Team:  Lisbet Simmons MD as PCP - General (Family Practice)  Lisbet Simmons MD as Assigned PCP

## 2020-07-15 NOTE — PROGRESS NOTES
"July 15, 2020    Aruna Santos is a 79 year old female who is being evaluated via a billable telephone visit.      The patient has been notified of following:     \"This telephone visit will be conducted via a call between you and your physician/provider. We have found that certain health care needs can be provided without the need for a physical exam.  This service lets us provide the care you need with a short phone conversation.  If a prescription is necessary we can send it directly to your pharmacy.  If lab work is needed we can place an order for that and you can then stop by our lab to have the test done at a later time.    Telephone visits are billed at different rates depending on your insurance coverage. During this emergency period, for some insurers they may be billed the same as an in-person visit.  Please reach out to your insurance provider with any questions.    If during the course of the call the physician/provider feels a telephone visit is not appropriate, you will not be charged for this service.\"    Patient has given verbal consent for Telephone visit?  Yes    What phone number would you like to be contacted at?[phone]    How would you like to obtain your AVS? Mail a copy  Sneha Butcher CMA    Patient has elected a telephone call for today's follow up.  Called patient today and verified her name and date of birth prior to discussion.    Today's telephone visit is to discuss how her urinary symptoms are doing since her last visit in January.  Very busy in her garden.  Lives with her  and 2 sons, sister and brother in law live next door, daughter and grandkids a couple miles away.      No UTIs since last.  Double voiding and seems to be emptying.  Doing her exercises the best she can.  She denies gross hematuria, vaginal bleeding. Prolpase not bothersome enough at this time that she wants treatment.  Denies constipation    Patient denies any changes to her past medical, surgical or " social history.  Patient denies any changes to her medications or allergies    A/P:  Aruna Santos is a 79 year old F with history of prior pelvic floor surgery, stage II vault prolapse, voiding dysfunction, myofascial tenderness of the pelvic floor, pelvic floor dysfunction and Luis vs colonization    Continue pelvic floor exercises    Continue double voiding    Let us know if she has an infection    RTC 6 months sooner if needed    6 minutes were spent in patient care today    Karyn Luevano MD MPH   of Urology    CC  Patient Care Team:  Lisbet Simmons MD as PCP - General (Family Practice)  Lisbet Simmons MD as Assigned PCP

## 2020-10-22 ENCOUNTER — HOSPITAL ENCOUNTER (OUTPATIENT)
Dept: MAMMOGRAPHY | Facility: CLINIC | Age: 80
Discharge: HOME | End: 2020-10-22
Attending: FAMILY MEDICINE | Admitting: FAMILY MEDICINE
Payer: COMMERCIAL

## 2020-10-22 DIAGNOSIS — Z12.31 VISIT FOR SCREENING MAMMOGRAM: ICD-10-CM

## 2020-10-22 PROCEDURE — 77063 BREAST TOMOSYNTHESIS BI: CPT

## 2020-12-04 ENCOUNTER — OFFICE VISIT (OUTPATIENT)
Dept: FAMILY MEDICINE | Facility: CLINIC | Age: 80
End: 2020-12-04
Payer: COMMERCIAL

## 2020-12-04 VITALS
OXYGEN SATURATION: 96 % | HEIGHT: 67 IN | TEMPERATURE: 97 F | SYSTOLIC BLOOD PRESSURE: 122 MMHG | WEIGHT: 167 LBS | BODY MASS INDEX: 26.21 KG/M2 | DIASTOLIC BLOOD PRESSURE: 82 MMHG | HEART RATE: 78 BPM

## 2020-12-04 DIAGNOSIS — Z11.59 NEED FOR HEPATITIS C SCREENING TEST: ICD-10-CM

## 2020-12-04 DIAGNOSIS — C44.90 PRIMARY ADNEXAL CARCINOMA OF SKIN: ICD-10-CM

## 2020-12-04 DIAGNOSIS — E78.5 HYPERLIPIDEMIA LDL GOAL <130: ICD-10-CM

## 2020-12-04 DIAGNOSIS — N18.31 STAGE 3A CHRONIC KIDNEY DISEASE (H): ICD-10-CM

## 2020-12-04 DIAGNOSIS — D12.6 TUBULAR ADENOMA OF COLON: ICD-10-CM

## 2020-12-04 DIAGNOSIS — Z00.01 ENCOUNTER FOR ROUTINE ADULT MEDICAL EXAM WITH ABNORMAL FINDINGS: ICD-10-CM

## 2020-12-04 DIAGNOSIS — Z00.00 ENCOUNTER FOR MEDICARE ANNUAL WELLNESS EXAM: Primary | ICD-10-CM

## 2020-12-04 DIAGNOSIS — N39.46 MIXED STRESS AND URGE URINARY INCONTINENCE: ICD-10-CM

## 2020-12-04 DIAGNOSIS — E03.8 OTHER SPECIFIED HYPOTHYROIDISM: ICD-10-CM

## 2020-12-04 DIAGNOSIS — R30.0 DYSURIA: ICD-10-CM

## 2020-12-04 DIAGNOSIS — I10 ESSENTIAL HYPERTENSION WITH GOAL BLOOD PRESSURE LESS THAN 140/90: ICD-10-CM

## 2020-12-04 LAB
ALBUMIN SERPL-MCNC: 3.9 G/DL (ref 3.4–5)
ALBUMIN UR-MCNC: NEGATIVE MG/DL
ALP SERPL-CCNC: 60 U/L (ref 40–150)
ALT SERPL W P-5'-P-CCNC: 29 U/L (ref 0–50)
ANION GAP SERPL CALCULATED.3IONS-SCNC: 7 MMOL/L (ref 3–14)
APPEARANCE UR: CLEAR
AST SERPL W P-5'-P-CCNC: 22 U/L (ref 0–45)
BASOPHILS # BLD AUTO: 0.1 10E9/L (ref 0–0.2)
BASOPHILS NFR BLD AUTO: 0.7 %
BILIRUB SERPL-MCNC: 0.9 MG/DL (ref 0.2–1.3)
BILIRUB UR QL STRIP: NEGATIVE
BUN SERPL-MCNC: 29 MG/DL (ref 7–30)
CALCIUM SERPL-MCNC: 9.7 MG/DL (ref 8.5–10.1)
CHLORIDE SERPL-SCNC: 104 MMOL/L (ref 94–109)
CHOLEST SERPL-MCNC: 237 MG/DL
CO2 SERPL-SCNC: 26 MMOL/L (ref 20–32)
COLOR UR AUTO: YELLOW
CREAT SERPL-MCNC: 1.12 MG/DL (ref 0.52–1.04)
CREAT UR-MCNC: 70 MG/DL
DIFFERENTIAL METHOD BLD: NORMAL
EOSINOPHIL # BLD AUTO: 0.2 10E9/L (ref 0–0.7)
EOSINOPHIL NFR BLD AUTO: 3.4 %
ERYTHROCYTE [DISTWIDTH] IN BLOOD BY AUTOMATED COUNT: 12.4 % (ref 10–15)
GFR SERPL CREATININE-BSD FRML MDRD: 47 ML/MIN/{1.73_M2}
GLUCOSE SERPL-MCNC: 105 MG/DL (ref 70–99)
GLUCOSE UR STRIP-MCNC: NEGATIVE MG/DL
HCT VFR BLD AUTO: 43.1 % (ref 35–47)
HDLC SERPL-MCNC: 50 MG/DL
HGB BLD-MCNC: 14.1 G/DL (ref 11.7–15.7)
HGB UR QL STRIP: NEGATIVE
KETONES UR STRIP-MCNC: NEGATIVE MG/DL
LDLC SERPL CALC-MCNC: 125 MG/DL
LEUKOCYTE ESTERASE UR QL STRIP: NEGATIVE
LYMPHOCYTES # BLD AUTO: 1.8 10E9/L (ref 0.8–5.3)
LYMPHOCYTES NFR BLD AUTO: 26.3 %
MCH RBC QN AUTO: 30.3 PG (ref 26.5–33)
MCHC RBC AUTO-ENTMCNC: 32.7 G/DL (ref 31.5–36.5)
MCV RBC AUTO: 93 FL (ref 78–100)
MICROALBUMIN UR-MCNC: <5 MG/L
MICROALBUMIN/CREAT UR: NORMAL MG/G CR (ref 0–25)
MONOCYTES # BLD AUTO: 0.6 10E9/L (ref 0–1.3)
MONOCYTES NFR BLD AUTO: 9.1 %
NEUTROPHILS # BLD AUTO: 4 10E9/L (ref 1.6–8.3)
NEUTROPHILS NFR BLD AUTO: 60.5 %
NITRATE UR QL: NEGATIVE
NONHDLC SERPL-MCNC: 187 MG/DL
PH UR STRIP: 6 PH (ref 5–7)
PLATELET # BLD AUTO: 232 10E9/L (ref 150–450)
POTASSIUM SERPL-SCNC: 4.4 MMOL/L (ref 3.4–5.3)
PROT SERPL-MCNC: 7.9 G/DL (ref 6.8–8.8)
RBC # BLD AUTO: 4.65 10E12/L (ref 3.8–5.2)
SODIUM SERPL-SCNC: 137 MMOL/L (ref 133–144)
SOURCE: NORMAL
SP GR UR STRIP: 1.01 (ref 1–1.03)
T3 SERPL-MCNC: 129 NG/DL (ref 60–181)
T4 FREE SERPL-MCNC: 1.26 NG/DL (ref 0.76–1.46)
TRIGL SERPL-MCNC: 312 MG/DL
TSH SERPL DL<=0.005 MIU/L-ACNC: 2.26 MU/L (ref 0.4–4)
UROBILINOGEN UR STRIP-ACNC: 0.2 EU/DL (ref 0.2–1)
WBC # BLD AUTO: 6.7 10E9/L (ref 4–11)

## 2020-12-04 PROCEDURE — 84439 ASSAY OF FREE THYROXINE: CPT | Performed by: FAMILY MEDICINE

## 2020-12-04 PROCEDURE — 81003 URINALYSIS AUTO W/O SCOPE: CPT | Performed by: FAMILY MEDICINE

## 2020-12-04 PROCEDURE — 80053 COMPREHEN METABOLIC PANEL: CPT | Performed by: FAMILY MEDICINE

## 2020-12-04 PROCEDURE — 99397 PER PM REEVAL EST PAT 65+ YR: CPT | Performed by: FAMILY MEDICINE

## 2020-12-04 PROCEDURE — 86803 HEPATITIS C AB TEST: CPT | Performed by: FAMILY MEDICINE

## 2020-12-04 PROCEDURE — 82043 UR ALBUMIN QUANTITATIVE: CPT | Performed by: FAMILY MEDICINE

## 2020-12-04 PROCEDURE — 84443 ASSAY THYROID STIM HORMONE: CPT | Performed by: FAMILY MEDICINE

## 2020-12-04 PROCEDURE — 85025 COMPLETE CBC W/AUTO DIFF WBC: CPT | Performed by: FAMILY MEDICINE

## 2020-12-04 PROCEDURE — 80061 LIPID PANEL: CPT | Performed by: FAMILY MEDICINE

## 2020-12-04 PROCEDURE — 36415 COLL VENOUS BLD VENIPUNCTURE: CPT | Performed by: FAMILY MEDICINE

## 2020-12-04 PROCEDURE — 84480 ASSAY TRIIODOTHYRONINE (T3): CPT | Performed by: FAMILY MEDICINE

## 2020-12-04 PROCEDURE — 99214 OFFICE O/P EST MOD 30 MIN: CPT | Mod: 25 | Performed by: FAMILY MEDICINE

## 2020-12-04 RX ORDER — OXYBUTYNIN CHLORIDE 10 MG/1
10-20 TABLET, EXTENDED RELEASE ORAL DAILY
Qty: 60 TABLET | Refills: 3 | Status: SHIPPED | OUTPATIENT
Start: 2020-12-04 | End: 2021-06-04

## 2020-12-04 ASSESSMENT — ACTIVITIES OF DAILY LIVING (ADL): CURRENT_FUNCTION: NO ASSISTANCE NEEDED

## 2020-12-04 ASSESSMENT — MIFFLIN-ST. JEOR: SCORE: 1270.14

## 2020-12-04 NOTE — LETTER
[facility]  [address] [place] [id number]  [phone]                    December 28, 2020    Aruna LEONOR Santos  [address] [facility] [address]      Dear Aruna,    Here is a summary of your recent test results:    All labs normal except :     -LDL(bad) cholesterol level is elevated, and your triglycerides are elevated which can increase your heart disease risk.  A diet high in fat and simple carbohydrates, genetics and being overweight can contribute to this. ADVISE: exercising 150 minutes of aerobic exercise per week (30 minutes for 5 days per week or 50 minutes for 3 days per week are options), eating a low saturated fat/low carbohydrate diet, and omega-3 fatty acids (fish oil) 2000 mg twice  daily are helpful to improve this. In 3 months, you should recheck your fasting cholesterol panel by scheduling a lab-only appointment.   -Liver and gallbladder tests (ALT,AST, Alk phos,bilirubin) are normal.   -Kidney function (GFR) is decreased.  ADVISE: rechecking this in 1 month as a NONFASTING , well hydrated lab only appointment.   -Sodium is normal.   -Potassium is normal.   -Calcium is normal.   -Glucose (diabetic screening test) is normal.     For additional lab test information, labtestsonline.org is an excellent reference.     Your test results are enclosed.      Please contact me if you have any questions.    In addition, here is a list of due or overdue Health Maintenance reminders.    Health Maintenance Due   Topic Date Due     ANNUAL REVIEW OF HM ORDERS  1940     Zoster (Shingles) Vaccine (3 of 3) 12/18/2020       Please call us at [phone] (or use [employer]) to address the above recommendations.            Thank you very much for trusting [facility]..     Healthy regards,         Lisbet Simmons M.D.          Results for orders placed or performed in visit on 12/04/20   Hepatitis C Screen Reflex to HCV RNA Quant and Genotype      Status: None   Result Value Ref Range    Hepatitis C Antibody Nonreactive NR^Nonreactive   Albumin Random Urine Quantitative with Creat Ratio     Status: None   Result Value Ref Range    Creatinine Urine 70 mg/dL    Albumin Urine mg/L <5 mg/L    Albumin Urine mg/g Cr Unable to calculate due to low value 0 - 25 mg/g Cr   CBC with platelets differential     Status: None   Result Value Ref Range    WBC 6.7 4.0 - 11.0 10e9/L    RBC Count 4.65 3.8 - 5.2 10e12/L    Hemoglobin 14.1 11.7 - 15.7 g/dL    Hematocrit 43.1 35.0 - 47.0 %    MCV 93 78 - 100 fl    MCH 30.3 26.5 - 33.0 pg    MCHC 32.7 31.5 - 36.5 g/dL    RDW 12.4 10.0 - 15.0 %    Platelet Count 232 150 - 450 10e9/L    % Neutrophils 60.5 %    % Lymphocytes 26.3 %    % Monocytes 9.1 %    % Eosinophils 3.4 %    % Basophils 0.7 %    Absolute Neutrophil 4.0 1.6 - 8.3 10e9/L    Absolute Lymphocytes 1.8 0.8 - 5.3 10e9/L    Absolute Monocytes 0.6 0.0 - 1.3 10e9/L    Absolute Eosinophils 0.2 0.0 - 0.7 10e9/L    Absolute Basophils 0.1 0.0 - 0.2 10e9/L    Diff Method Automated Method    Comprehensive metabolic panel     Status: Abnormal   Result Value Ref Range    Sodium 137 133 - 144 mmol/L    Potassium 4.4 3.4 - 5.3 mmol/L    Chloride 104 94 - 109 mmol/L    Carbon Dioxide 26 20 - 32 mmol/L    Anion Gap 7 3 - 14 mmol/L    Glucose 105 (H) 70 - 99 mg/dL    Urea Nitrogen 29 7 - 30 mg/dL    Creatinine 1.12 (H) 0.52 - 1.04 mg/dL    GFR Estimate 47 (L) >60 mL/min/[1.73_m2]    GFR Estimate If Black 54 (L) >60 mL/min/[1.73_m2]    Calcium 9.7 8.5 - 10.1 mg/dL    Bilirubin Total 0.9 0.2 - 1.3 mg/dL    Albumin 3.9 3.4 - 5.0 g/dL    Protein Total 7.9 6.8 - 8.8 g/dL    Alkaline Phosphatase 60 40 - 150 U/L    ALT 29 0 - 50 U/L    AST 22 0 - 45 U/L   Lipid panel reflex to direct LDL Fasting     Status: Abnormal   Result Value Ref Range    Cholesterol 237 (H) <200 mg/dL    Triglycerides 312 (H) <150 mg/dL    HDL Cholesterol 50 >49 mg/dL    LDL Cholesterol Calculated 125 (H) <100 mg/dL    Non  HDL Cholesterol 187 (H) <130 mg/dL   T3 total     Status: None   Result Value Ref Range    Triiodothyronine (T3) 129 60 - 181 ng/dL   TSH     Status: None   Result Value Ref Range    TSH 2.26 0.40 - 4.00 mU/L   T4 FREE     Status: None   Result Value Ref Range    T4 Free 1.26 0.76 - 1.46 ng/dL   UA reflex to Microscopic and Culture     Status: None    Specimen: Midstream Urine   Result Value Ref Range    Color Urine Yellow     Appearance Urine Clear     Glucose Urine Negative NEG^Negative mg/dL    Bilirubin Urine Negative NEG^Negative    Ketones Urine Negative NEG^Negative mg/dL    Specific Gravity Urine 1.015 1.003 - 1.035    Blood Urine Negative NEG^Negative    pH Urine 6.0 5.0 - 7.0 pH    Protein Albumin Urine Negative NEG^Negative mg/dL    Urobilinogen Urine 0.2 0.2 - 1.0 EU/dL    Nitrite Urine Negative NEG^Negative    Leukocyte Esterase Urine Negative NEG^Negative    Source Midstream Urine

## 2020-12-04 NOTE — PROGRESS NOTES
"SUBJECTIVE:   Aruna Santos is a 80 year old female who presents for Preventive Visit.   and the following other medical problems:      1. Encounter for Medicare annual wellness exam    2. Encounter for routine adult medical exam with abnormal findings    3. Essential hypertension with goal blood pressure less than 140/90- well controlled    4. Hyperlipidemia LDL goal <130 - is fasting today - needs labs and med refills    5. Other specified hypothyroidism - needs labs and medication refills     6. Tubular adenomas of colon- 2014 - repeat in 5/2018 s/p skin ca = 2 more tubular adenomas - repeat in 2023 - reviewed previous colonoscopy reports today     7. Stage 3a chronic kidney disease- needs labs rechecked today - enforced minimizing NSAIDS    8. 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 Hansen - [facility] q6months - next appt 1/2021     9. Dysuria- recurrent with urgency and frequency - not always infection - seeing [facility] - has some urge incontinence as well - regularly- needs to make appt      10. Need for hepatitis C screening test    11. Mixed stress and urge urinary incontinence- hasn't tried any anti-cholinergics            Patient has been advised of split billing requirements and indicates understanding: Yes   Are you in the first 12 months of your Medicare coverage?  No    Healthy Habits:     In general, how would you rate your overall health?  Very good    Frequency of exercise:  None    Do you usually eat at least 4 servings of fruit and vegetables a day, include whole grains    & fiber and avoid regularly eating high fat or \"junk\" foods?  Yes    Taking medications regularly:  Yes    Barriers to taking medications:  None    Medication side effects:  None    Ability to successfully perform activities of daily living:  No assistance needed    Home Safety:  No safety concerns identified    Hearing Impairment:  No " hearing concerns    In the past 6 months, have you been bothered by leaking of urine? Yes    In general, how would you rate your overall mental or emotional health?  Very good      PHQ-2 Total Score: 0    Additional concerns today:  No    Do you feel safe in your environment? Yes    Have you ever done Advance Care Planning? (For example, a Health Directive, POLST, or a discussion with a medical provider or your loved ones about your wishes): Yes, patient states has an Advance Care Planning document and will bring a copy to the clinic.      Fall risk- none.        Cognitive Screening   1) Repeat 3 items (Leader, Season, Table)    2) Clock draw:   NORMAL  3) 3 item recall: Recalls 3 objects  Results: 3 items recalled: COGNITIVE IMPAIRMENT LESS LIKELY    Mini-CogTM Copyright ISAAK Glaser. Licensed by the author for use in [facility]; reprinted with permission (octavia@[facility]). All rights reserved.      Do you have sleep apnea, excessive snoring or daytime drowsiness?: no    Reviewed and updated as needed this visit by clinical staff                 Reviewed and updated as needed this visit by Provider                Social History     Tobacco Use     Smoking status: Never Smoker     Smokeless tobacco: Never Used   Substance Use Topics     Alcohol use: No     If you drink alcohol do you typically have >3 drinks per day or >7 drinks per week? No    Alcohol Use 8/9/2017   Prescreen: >3 drinks/day or >7 drinks/week? The patient does not drink >3 drinks per day nor >7 drinks per week.           Hyperlipidemia Follow-Up      Are you regularly taking any medication or supplement to lower your cholesterol?   Yes- simvastatin    Are you having muscle aches or other side effects that you think could be caused by your cholesterol lowering medication?  No     Recent Labs   Lab Test 05/21/20  1042 11/29/19  1128 07/23/15  1016 07/23/15  1016 06/27/14  0929   CHOL 213* 211*   < > 194 212*   HDL 53 51   < > 46* 48*   *  112*   < > 91 114   TRIG 251* 238*   < > 285* 251*   CHOLHDLRATIO  --   --   --  4.2 4.4    < > = values in this interval not displayed.        Hypertension Follow-up      Do you check your blood pressure regularly outside of the clinic? No     Are you following a low salt diet? No    Are your blood pressures ever more than 140 on the top number (systolic) OR more   than 90 on the bottom number (diastolic), for example 140/90? Yes      Current providers sharing in care for this patient include:     Patient Care Team:  Lisbet Simmons MD as PCP - General (Family Practice)  Lisbet Simmons MD as Assigned PCP  Karyn Luevano MD as Assigned Surgical Provider    The following health maintenance items are reviewed in Epic and correct as of today:  Health Maintenance   Topic Date Due     HEPATITIS C SCREENING  09/07/1958     ZOSTER IMMUNIZATION (2 of 3) 07/29/2011     FALL RISK ASSESSMENT  11/29/2020     MEDICARE ANNUAL WELLNESS VISIT  11/29/2020     BMP  05/21/2021     MICROALBUMIN  05/21/2021     TSH W/FREE T4 REFLEX  05/21/2021     MAMMO SCREENING  10/22/2021     DTAP/TDAP/TD IMMUNIZATION (2 - Td) 06/24/2023     ADVANCE CARE PLANNING  12/10/2024     LIPID  05/21/2025     DEXA  10/16/2033     PHQ-2  Completed     INFLUENZA VACCINE  Completed     Pneumococcal Vaccine: 65+ Years  Completed     Pneumococcal Vaccine: Pediatrics (0 to 5 Years) and At-Risk Patients (6 to 64 Years)  Aged Out     IPV IMMUNIZATION  Aged Out     MENINGITIS IMMUNIZATION  Aged Out     HEPATITIS B IMMUNIZATION  Aged Out     Lab work is in process  Labs reviewed in EPIC  BP Readings from Last 3 Encounters:   12/04/20 122/82   07/07/20 138/61   05/21/20 122/82    Wt Readings from Last 3 Encounters:   12/04/20 75.8 kg (167 lb)   07/15/20 76.2 kg (168 lb)   05/21/20 75.8 kg (167 lb)                  Patient Active Problem List   Diagnosis     Hematuria     Dyspnea and respiratory abnormality     Hyperlipidemia LDL goal <130      Advanced directives, counseling/discussion     Essential hypertension with goal blood pressure less than 140/90- well controlled     Family history of colon cancer- mother in her 40's-colonoscopies every 5 years- next one 2023     Osteopenia, unspecified location - was on fosamax, then Boniva secondary to hot flashes - off boniva since 2012     Vulvar itching - ? early lichen sclerosis vs. 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 Hansen - [facility] q6months      Myalgia of pelvic floor     Vaginal vault prolapse     Personal history of urinary tract infection     Dysuria- recurrent with urgency and frequency - not always infection - seeing Dr. 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 Hansen - [facility]     Rectal prolapse     Sebaceous cyst- left medial upper breast      Past Surgical History:   Procedure Laterality Date     ARTHROSCOPY SHOULDER  1/2013    [facility]     CATARACT IOL, RT/LT Left 07/01/2018    LEFT side in 7/2018- the RIGHT 2/17/2020- Both Dr.Carter Pierson - ophthalmology      COLONOSCOPY  9/22/94,     Colonoscopies q 5 year -next 2019     CYSTOCELE REPAIR  10/31/1984     CYSTOSCOPY       HYSTERECTOMY, PAP NO LONGER INDICATED  10/31/84    Hysterectomy, Total Abdominal w ovaries left intact     SURGICAL HISTORY OF -   5/17/73    Cholecystectomy & Incidental appendectomy     wide excision -of primary Cutaneous adnexal Ca. of scalp   01/30/2018    Primary adnexal carcinoma  /adenocarcinoma of skin- left scalp - s/p wide excision- seeing Dr. Hansen - [facility] q6months        Social History     Tobacco Use     Smoking status: Never Smoker     Smokeless tobacco: Never Used   Substance Use Topics     Alcohol use: No     Family History   Problem Relation Age of Onset     Cancer - colorectal Mother      Hypertension Father      Cerebrovascular Disease Maternal Grandfather      Cerebrovascular Disease Paternal Grandmother      Cancer Son 44        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 - now on warfarin - never  - lives with pt and          Current Outpatient Medications   Medication Sig Dispense Refill     ASPIRIN 81 MG OR TABS 1 TABLET DAILY       brimonidine (ALPHAGAN P) 0.1 % ophthalmic solution Place 1 drop into both eyes 2 times daily        CALCIUM /VITAMIN D TABS   OR 2 qd       clobetasol (TEMOVATE) 0.05 % external ointment APPLY SPARINGLY TO AFFECTED AREA OF GENITALS TWICE WEEKLY AT NIGHTTIME 60 g 1     estradiol (ESTRACE VAGINAL) 0.1 MG/GM vaginal cream Apply small amount to the vaginal opening and urethra M, W, F q. h.s. 42.5 g 3     LEVOBUNOLOL HCL 0.5 % OP SOLN one drop each day       levothyroxine (SYNTHROID/LEVOTHROID) 88 MCG tablet TAKE 1 TABLET EVERY MORNING 90 tablet 3     lisinopril (ZESTRIL) 30 MG tablet TAKE 1 TABLET EVERY DAY 90 tablet 3     LORazepam (ATIVAN) 0.5 MG tablet Take 1 tablet (0.5 mg) by mouth 2 times daily Take 30 minutes prior to departure.  Do not operate a vehicle after taking this medication 10 tablet 0     metoprolol succinate ER (TOPROL-XL) 50 MG 24 hr tablet TAKE 1 TABLET EVERY DAY 90 tablet 3     MULTI-VITAMIN OR TABS 1 qd       oxybutynin ER (DITROPAN-XL) 10 MG 24 hr tablet Take 1-2 tablets (10-20 mg) by mouth daily 60 tablet 3     simvastatin (ZOCOR) 40 MG tablet Take 1 tablet (40 mg) by mouth At Bedtime 90 tablet 3     triamterene-HCTZ (MAXZIDE-25) 37.5-25 MG tablet TAKE 1 TABLET EVERY DAY 90 tablet 3  "    Allergies   Allergen Reactions     Macrobid [Nitrofurantoin] GI Disturbance     Prednisone      Sulfa Drugs      Recent Labs   Lab Test 12/04/20  0926 05/21/20  1042 11/29/19  1128 11/29/19  1128   * 110*  --  112*   HDL 50 53  --  51   TRIG 312* 251*  --  238*   ALT 29 25  --  29   CR 1.12* 1.09*   < > 1.23*   GFRESTIMATED 47* 49*   < > 42*   GFRESTBLACK 54* 56*   < > 49*   POTASSIUM 4.4 4.5   < > 4.5   TSH 2.26 1.90   < > 1.86    < > = values in this interval not displayed.      Pneumonia Vaccine:Adults age 65+ who received Pneumovax (PPSV23) at 65 years or older: Should be given PCV13 > 1 year after their most recent PPSV23  Mammogram Screening: Patient over age 75, has elected to continue with mammography screening.  History of abnormal Pap smear: Status post benign hysterectomy. Health Maintenance and Surgical History updated.    Review of Systems  Constitutional, HEENT, cardiovascular, pulmonary, gi and gu systems are negative, except as otherwise noted.    OBJECTIVE:   /82   Pulse 78   Temp 97  F (36.1  C)   Ht 1.702 m (5' 7\")   Wt 75.8 kg (167 lb)   SpO2 96%   BMI 26.16 kg/m   Estimated body mass index is 26.31 kg/m  as calculated from the following:    Height as of 7/15/20: 1.702 m (5' 7\").    Weight as of 7/15/20: 76.2 kg (168 lb).  Physical Exam  GENERAL APPEARANCE: healthy, alert and no distress  EYES: Eyes grossly normal to inspection, PERRL and conjunctivae and sclerae normal  HENT: ear canals and TM's normal, nose and mouth without ulcers or lesions, oropharynx clear and oral mucous membranes moist  NECK: no adenopathy, no asymmetry, masses, or scars and thyroid normal to palpation  RESP: lungs clear to auscultation - no rales, rhonchi or wheezes  BREAST: normal without masses, tenderness or nipple discharge and no palpable axillary masses or adenopathy  CV: regular rate and rhythm, normal S1 S2, no S3 or S4, no murmur, click or rub, no peripheral edema and peripheral pulses " strong  ABDOMEN: soft, nontender, no hepatosplenomegaly, no masses and bowel sounds normal  MS: no musculoskeletal defects are noted and gait is age appropriate without ataxia  SKIN: no suspicious lesions or rashes  NEURO: Normal strength and tone, sensory exam grossly normal, mentation intact and speech normal  PSYCH: mentation appears normal and affect normal/bright    Diagnostic Test Results:  Labs reviewed in Epic    ASSESSMENT / PLAN:       ICD-10-CM    1. Encounter for Medicare annual wellness exam  Z00.00    2. Encounter for routine adult medical exam with abnormal findings  Z00.01    3. Essential hypertension with goal blood pressure less than 140/90- well controlled  I10 Albumin Random Urine Quantitative with Creat Ratio     CBC with platelets differential     Comprehensive metabolic panel     OFFICE/OUTPT VISIT,EST,LEVL IV   4. Hyperlipidemia LDL goal <130 - is fasting today - needs labs and med refills  E78.5 Albumin Random Urine Quantitative with Creat Ratio     Comprehensive metabolic panel     Lipid panel reflex to direct LDL Fasting     OFFICE/OUTPT VISIT,EST,LEVL IV   5. Other specified hypothyroidism - needs labs and medication refills   E03.8 T3 total     TSH     T4 FREE     OFFICE/OUTPT VISIT,EST,LEVL IV   6. Tubular adenomas of colon- 2014 - repeat in 5/2018 s/p skin ca = 2 more tubular adenomas - repeat in 2023 - reviewed previous colonoscopy reports today   D12.6    7. Stage 3a chronic kidney disease- needs labs rechecked today - enforced minimizing NSAIDS  N18.31 CBC with platelets differential     Comprehensive metabolic panel     OFFICE/OUTPT VISIT,EST,LEVL IV   8. 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 Hansen - [facility] q6months - next appt 1/2021   C44.90    9. Dysuria- recurrent with urgency and frequency - not always infection - seeing Dr. Karyn Luevano [facility] - has some urge incontinence as well - regularly-  "needs to make appt    R30.0 oxybutynin ER (DITROPAN-XL) 10 MG 24 hr tablet     OFFICE/OUTPT VISIT,EST,LEVL IV     UA reflex to Microscopic and Culture   10. Need for hepatitis C screening test  Z11.59 Hepatitis C Screen Reflex to HCV RNA Quant and Genotype   11. Mixed stress and urge urinary incontinence- hasn't tried any anti-cholinergics   N39.46 oxybutynin ER (DITROPAN-XL) 10 MG 24 hr tablet     OFFICE/OUTPT VISIT,EST,LEVL IV     UA reflex to Microscopic and Culture       Patient has been advised of split billing requirements and indicates understanding: Yes  COUNSELING:  Reviewed preventive health counseling, as reflected in patient instructions    Estimated body mass index is 26.31 kg/m  as calculated from the following:    Height as of 7/15/20: 1.702 m (5' 7\").    Weight as of 7/15/20: 76.2 kg (168 lb).        She reports that she has never smoked. She has never used smokeless tobacco.        For bladder urgency and bladder irritation which can contribute to daytime and/or night-time urgency, accidents or incontinence for children and adults :   Avoid or better yet eliminate citrus juices (orange juice, grapefruit juice, lemonade or limeade), citric acid in things as preservatives,  and /or or vinegars ( acidic substances - even those in salad dressing), all caffeine, even decaf coffee, and teas; alcohol, and artificial sweeteners, chocolate, tomato products and carbonated beverages ( even sparkling emery).   Even 1 serving of any of the above can irritate/negatively affect the bladder for 3 days.     If you eliminate all the above and are still having problems, please contact our office.          Pt will call after her insurance changes after 1/1/2021 for refills on her medicines. Ok to refill everything for #90 days with at least 1 refill.     Appropriate preventive services were discussed with this patient, including applicable screening as appropriate for cardiovascular disease, diabetes, " osteopenia/osteoporosis, and glaucoma.  As appropriate for age/gender, discussed screening for colorectal cancer, prostate cancer, breast cancer, and cervical cancer. Checklist reviewing preventive services available has been given to the patient.    Reviewed patients plan of care and provided an AVS. The Complex Care Plan (for patients with higher acuity and needing more deliberate coordination of services) for Aruna meets the Care Plan requirement. This Care Plan has been established and reviewed with the Patient.    Counseling Resources:  ATP IV Guidelines  Pooled Cohorts Equation Calculator  Breast Cancer Risk Calculator  Breast Cancer: Medication to Reduce Risk  FRAX Risk Assessment  ICSI Preventive Guidelines  Dietary Guidelines for Americans, 2010  USDA's MyPlate  ASA Prophylaxis  Lung CA Screening    Lisbet Simmons MD  [facility]    Identified Health Risks:

## 2020-12-04 NOTE — PATIENT INSTRUCTIONS
For bladder urgency and bladder irritation which can contribute to daytime and/or night-time urgency, accidents or incontinence for children and adults :   Avoid or better yet eliminate citrus juices (orange juice, grapefruit juice, lemonade or limeade), citric acid in things as preservatives,  and /or or vinegars ( acidic substances - even those in salad dressing), all caffeine, even decaf coffee, and teas; alcohol, and artificial sweeteners, chocolate, tomato products and carbonated beverages ( even sparkling emery).   Even 1 serving of any of the above can irritate/negatively affect the bladder for 3 days.     If you eliminate all the above and are still having problems, please contact our office.          Patient Education   Personalized Prevention Plan  You are due for the preventive services outlined below.  Your care team is available to assist you in scheduling these services.  If you have already completed any of these items, please share that information with your care team to update in your medical record.  Health Maintenance Due   Topic Date Due     Hepatitis C Screening  09/07/1958     Zoster (Shingles) Vaccine (2 of 3) 07/29/2011     FALL RISK ASSESSMENT  11/29/2020       Thank you so much or choosing [facility]  for your Health Care. It was a pleasure seeing you at your visit today! Please contact us with any questions or concerns you may have.                   Lisbet Simmons MD                              To reach your [facility] care team after hours call:   [phone]    PLEASE NOTE OUR HOURS HAVE CHANGED secondary to COVID-19 coronavirus pandemic, as we are trying to minimize patient exposure to the virus,  which is now widespread in the [facility].  These hours may change with very little notice.  We apologize for any inconvenience.       Our current clinic hours are:          Monday- Thursday   7:00am - 6:00pm  in person.       Friday  7:00am- 5:00pm                       Saturday and Sunday : Closed to in person and virtual visits        We have telephone and virtual visit times available between    7:00am - 6pm on Monday-Friday as well.                                                Phone:  [phone]      Our pharmacy hours:   Monday  9:00 am to 6:00 pm      Tuesday through Friday 9:00am to 5:00pm                        Saturday - 9:00 am to 12 noon       Sunday : Closed.              Phone:  [phone]    ###  Please note: at this time we are not accepting any walk-in visits. ###      There is also information available at our web site:  [email or web address]    If your provider ordered any lab tests and you do not receive the results within 10 business days, please call the clinic.    If you need a medication refill please contact your pharmacy.  Please allow 2 business days for your refill to be completed.    Our clinic offers telephone visits and e visits.  Please ask one of your team members to explain more.      Use [employer] (secure email communication and access to your chart) to send your primary care provider a message or make an appointment. Ask someone on your Team how to sign up for [employer].

## 2020-12-07 LAB — HCV AB SERPL QL IA: NONREACTIVE

## 2021-01-07 ENCOUNTER — VIRTUAL VISIT (OUTPATIENT)
Dept: FAMILY MEDICINE | Facility: CLINIC | Age: 81
End: 2021-01-07
Payer: COMMERCIAL

## 2021-01-07 DIAGNOSIS — R30.0 DYSURIA: Primary | ICD-10-CM

## 2021-01-07 DIAGNOSIS — R82.90 NONSPECIFIC FINDING ON EXAMINATION OF URINE: ICD-10-CM

## 2021-01-07 LAB
BACTERIA #/AREA URNS HPF: ABNORMAL /HPF
NON-SQ EPI CELLS #/AREA URNS LPF: ABNORMAL /LPF
RBC #/AREA URNS AUTO: ABNORMAL /HPF
WBC #/AREA URNS AUTO: ABNORMAL /HPF

## 2021-01-07 PROCEDURE — 81015 MICROSCOPIC EXAM OF URINE: CPT | Performed by: FAMILY MEDICINE

## 2021-01-07 PROCEDURE — 87186 SC STD MICRODIL/AGAR DIL: CPT | Performed by: FAMILY MEDICINE

## 2021-01-07 PROCEDURE — 87086 URINE CULTURE/COLONY COUNT: CPT | Performed by: FAMILY MEDICINE

## 2021-01-07 PROCEDURE — 99207 PR NO CHARGE LOS: CPT | Mod: 95 | Performed by: FAMILY MEDICINE

## 2021-01-07 PROCEDURE — 87088 URINE BACTERIA CULTURE: CPT | Performed by: FAMILY MEDICINE

## 2021-01-07 RX ORDER — CEFDINIR 300 MG/1
300 CAPSULE ORAL 2 TIMES DAILY
Qty: 14 CAPSULE | Refills: 0 | Status: SHIPPED | OUTPATIENT
Start: 2021-01-07 | End: 2021-01-14

## 2021-01-07 NOTE — PROGRESS NOTES
Aruna Santos is a 80 year old female who is being evaluated via a billable telephone visit.      What phone number would you like to be contacted at? [phone]  How would you like to obtain your AVS? Mail a copy  Assessment & Plan     Dysuria- recurrent with urgency and frequency - not always infection - seeing [facility] - has some urge incontinence as well - regularly- needs to make appt  : will check urinalysis. In the meantime, start antibiotic. Will follow up on urine culture.  - cefdinir (OMNICEF) 300 MG capsule; Take 1 capsule (300 mg) by mouth 2 times daily for 7 days  - Urine Microscopic    Nonspecific finding on examination of urine  - Urine Culture Aerobic Bacterial        Return if symptoms worsen or fail to improve.    Tom Covington DO  [facility]     Aruna Santos is a 80 year old who presents to clinic today for the following health issues     HPI       Genitourinary - Female  Onset/Duration: 1 day  Description:   Painful urination (Dysuria): YES           Frequency: YES  Blood in urine (Hematuria): no  Delay in urine (Hesitency): no  Intensity: moderate  Progression of Symptoms:  same  Accompanying Signs & Symptoms:  Fever/chills: YES chills  Flank pain: no  Nausea and vomiting: no  Vaginal symptoms: none  Abdominal/Pelvic Pain: no  History:   History of frequent UTI s: YES  History of kidney stones: no  Sexually Active: YES somewhat   Therapies tried and outcome: Pyridium , Increase fluid intake and OTC advil or tylenol       Review of Systems   Constitutional, HEENT, cardiovascular, pulmonary, gi and gu systems are negative, except as otherwise noted.      Objective           Vitals:  No vitals were obtained today due to virtual visit.    Physical Exam   healthy, alert and no distress  PSYCH: Alert and oriented times 3; coherent speech, normal   rate and volume, able to articulate logical thoughts, able   to abstract reason, no  tangential thoughts, no hallucinations   or delusions  Her affect is normal  RESP: No cough, no audible wheezing, able to talk in full sentences  Remainder of exam unable to be completed due to telephone visits    Results for orders placed or performed in visit on 01/07/21   Urine Microscopic     Status: Abnormal   Result Value Ref Range    WBC Urine 0 - 5 OTO5^0 - 5 /HPF    RBC Urine O - 2 OTO2^O - 2 /HPF    Bacteria Urine Many (A) NEG^Negative /HPF    Squamous Epithelial /LPF Urine Few FEW^Few /LPF   Urine Culture Aerobic Bacterial     Status: None (Preliminary result)    Specimen: Midstream Urine   Result Value Ref Range    Specimen Description Midstream Urine     Special Requests Specimen received in preservative     Culture Micro PENDING              Phone call duration: 4 minutes

## 2021-01-08 LAB
BACTERIA SPEC CULT: ABNORMAL
Lab: ABNORMAL
SPECIMEN SOURCE: ABNORMAL

## 2021-01-12 ENCOUNTER — OFFICE VISIT (OUTPATIENT)
Dept: DERMATOLOGY | Facility: CLINIC | Age: 81
End: 2021-01-12
Payer: COMMERCIAL

## 2021-01-12 VITALS — DIASTOLIC BLOOD PRESSURE: 59 MMHG | SYSTOLIC BLOOD PRESSURE: 125 MMHG | OXYGEN SATURATION: 98 % | HEART RATE: 61 BPM

## 2021-01-12 DIAGNOSIS — C80.1 ADENOCARCINOMA (H): ICD-10-CM

## 2021-01-12 DIAGNOSIS — D18.01 ANGIOMA OF SKIN: ICD-10-CM

## 2021-01-12 DIAGNOSIS — D22.9 NEVUS: Primary | ICD-10-CM

## 2021-01-12 DIAGNOSIS — L81.4 LENTIGO: ICD-10-CM

## 2021-01-12 DIAGNOSIS — L82.1 SEBORRHEIC KERATOSIS: ICD-10-CM

## 2021-01-12 PROCEDURE — 99213 OFFICE O/P EST LOW 20 MIN: CPT | Performed by: PHYSICIAN ASSISTANT

## 2021-01-12 NOTE — PROGRESS NOTES
HPI:   chief complaint: Aruna Santos is a 80 year old female who presents for Full skin cancer screening to rule out skin cancer.  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  Social History: Son has a carcinoid tumor - has been worse lately and just got radioactive glass in his liver for treatment.   On review of systems, there are no further skin complaints, patient is feeling otherwise well.  See patient intake sheet.  ROS of the following were done and are negative: Constitutional, Eyes, Ears, Nose,   Mouth, Throat, Cardiovascular, Respiratory, GI, Genitourinary, Musculoskeletal,   Psychiatric, Endocrine, Allergic/Immunologic.        PHYSICAL EXAM:   /59   Pulse 61   SpO2 98%   Skin exam performed as follows: Type 2 skin. Mood appropriate  Alert and Oriented X 3. Well developed, well nourished in no distress.  General appearance: Normal  Head including face: Normal  Eyes: conjunctiva and lids: Normal  Mouth: Lips, teeth, gums: Normal  Neck: Normal  Chest-breast/axillae: Normal  Back: Normal  Spleen and liver: Normal  Cardiovascular: Exam of peripheral vascular system by observation for swelling, varicosities, edema: Normal  Genitalia: groin, buttocks: Normal  Extremities: digits/nails (clubbing): Normal  Eccrine and Apocrine glands: Normal  Right upper extremity: Normal  Left upper extremity: Normal  Right lower extremity: Normal  Left lower extremity: Normal  Skin: Scalp and body hair: See below    Pt deferred exam of breasts, groin, buttocks: No    Other physical findings:  1. Multiple pigmented macules on extremities and trunk  2. Multiple pigmented macules on face, trunk and extremities  3. Multiple vascular papules  on trunk, arms and legs  4. Multiple scattered keratotic plaques        Except as noted above, no other signs of skin cancer or melanoma.     ASSESSMENT/PLAN:   Benign Full skin cancer screening today.    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    Multiple benign appearing nevi on arms, legs and trunk. Discussed ABCDEs of melanoma and sunscreen.   Multiple lentigos on arms, legs and trunk. Advised benign, no treatment needed.  Multiple scattered angiomas. Advised benign, no treatment needed.   Seborrheic keratosis on arms, legs and trunk. Advised benign, no treatment needed.  History of adenocarcinoma on the scalp 1/2018. No lymphadenopathy palpated today - advised for pt to do this once monthly at home. More than 50% of reported cases are located in the tete-orbital region and the hair-bearing scalp.  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          Follow-up:Q 6 months FSE/PRN sooner     1.) Patient was asked about new and changing moles. YES  2.) Patient received a complete physical skin examination: YES  3.) Patient was counseled to perform a monthly self skin examination: YES  Scribed By: Mary Miranda MS, PANHAN

## 2021-01-12 NOTE — LETTER
1/12/2021         RE: Aruna Santos  [address] [facility] [address]        Dear Colleague,    Thank you for referring your patient, Aruna Santos, to the [facility]. Please see a copy of my visit note below.    HPI:   chief complaint: Aruna Santos is a 80 year old female who presents for Full skin cancer screening to rule out skin cancer.  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  Social History: Son has a carcinoid tumor - has been worse lately and just got radioactive glass in his liver for treatment.   On review of systems, there are no further skin complaints, patient is feeling otherwise well.  See patient intake sheet.  ROS of the following were done and are negative: Constitutional, Eyes, Ears, Nose,   Mouth, Throat, Cardiovascular, Respiratory, GI, Genitourinary, Musculoskeletal,   Psychiatric, Endocrine, Allergic/Immunologic.        PHYSICAL EXAM:   /59   Pulse 61   SpO2 98%   Skin exam performed as follows: Type 2 skin. Mood appropriate  Alert and Oriented X 3. Well developed, well nourished in no distress.  General appearance: Normal  Head including face: Normal  Eyes: conjunctiva and lids: Normal  Mouth: Lips, teeth, gums: Normal  Neck: Normal  Chest-breast/axillae: Normal  Back: Normal  Spleen and liver: Normal  Cardiovascular: Exam of peripheral vascular system by observation for swelling, varicosities, edema: Normal  Genitalia: groin, buttocks: Normal  Extremities: digits/nails (clubbing): Normal  Eccrine and Apocrine glands: Normal  Right upper extremity: Normal  Left upper extremity: Normal  Right lower extremity: Normal  Left lower extremity: Normal  Skin:  Scalp and body hair: See below    Pt deferred exam of breasts, groin, buttocks: No    Other physical findings:  1. Multiple pigmented macules on extremities and trunk  2. Multiple pigmented macules on face, trunk and extremities  3. Multiple vascular papules on trunk, arms and legs  4. Multiple scattered keratotic plaques        Except as noted above, no other signs of skin cancer or melanoma.     ASSESSMENT/PLAN:   Benign Full skin cancer screening today.    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    Multiple benign appearing nevi on arms, legs and trunk. Discussed ABCDEs of melanoma and sunscreen.   Multiple lentigos on arms, legs and trunk. Advised benign, no treatment needed.  Multiple scattered angiomas. Advised benign, no treatment needed.   Seborrheic keratosis on arms, legs and trunk. Advised benign, no treatment needed.  History of adenocarcinoma on the scalp 1/2018. No lymphadenopathy palpated today - advised for pt to do this once monthly at home. More than 50% of reported cases are located in the tete-orbital region and the hair-bearing scalp.  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          Follow-up:Q 6 months FSE/PRN sooner     1.) Patient was asked about new and changing moles. YES  2.) Patient received a complete physical skin examination: YES  3.) Patient was counseled to perform a monthly self skin examination: YES  Scribed By: Mary Miranda, MS, PANHAN        Again, thank you for allowing me to participate in the care of your patient.        Sincerely,        Mary Miranda PA-C

## 2021-01-16 ENCOUNTER — NURSE TRIAGE (OUTPATIENT)
Dept: NURSING | Facility: CLINIC | Age: 81
End: 2021-01-16

## 2021-01-16 ENCOUNTER — TRANSFERRED RECORDS (OUTPATIENT)
Dept: HEALTH INFORMATION MANAGEMENT | Facility: CLINIC | Age: 81
End: 2021-01-16

## 2021-02-15 ENCOUNTER — IMMUNIZATION (OUTPATIENT)
Dept: NURSING | Facility: CLINIC | Age: 81
End: 2021-02-15
Payer: COMMERCIAL

## 2021-02-15 PROCEDURE — 91300 PR COVID VAC PFIZER DIL RECON 30 MCG/0.3 ML IM: CPT

## 2021-02-15 PROCEDURE — 0001A PR COVID VAC PFIZER DIL RECON 30 MCG/0.3 ML IM: CPT

## 2021-03-08 ENCOUNTER — IMMUNIZATION (OUTPATIENT)
Dept: NURSING | Facility: CLINIC | Age: 81
End: 2021-03-08
Attending: INTERNAL MEDICINE
Payer: COMMERCIAL

## 2021-03-08 PROCEDURE — 0002A PR COVID VAC PFIZER DIL RECON 30 MCG/0.3 ML IM: CPT

## 2021-03-08 PROCEDURE — 91300 PR COVID VAC PFIZER DIL RECON 30 MCG/0.3 ML IM: CPT

## 2021-03-23 ENCOUNTER — TELEPHONE (OUTPATIENT)
Dept: FAMILY MEDICINE | Facility: CLINIC | Age: 81
End: 2021-03-23

## 2021-03-23 DIAGNOSIS — F41.9 ANXIETY: ICD-10-CM

## 2021-03-23 RX ORDER — LORAZEPAM 0.5 MG/1
0.5 TABLET ORAL 2 TIMES DAILY
Qty: 10 TABLET | Refills: 0 | Status: SHIPPED | OUTPATIENT
Start: 2021-03-23 | End: 2023-06-02

## 2021-03-23 NOTE — TELEPHONE ENCOUNTER
Pt son is passing away, being discharge today on hospice. Pt noted she would like this medication to help cope. Writer gave condolences, noted will send to PCP and pool to help facilitate/review fill.    Pt offered psych number and referral, Pt declined at this time but noted and will consider in future.    Controlled Substance Refill Request for   LORazepam (ATIVAN) 0.5 MG tablet 10 tablet 0 1/4/2018  No   Sig - Route: Take 1 tablet (0.5 mg) by mouth 2 times daily Take 30 minutes prior to departure.  Do not operate a vehicle after taking this medication - Oral       Problem List Complete:  Yes    Last Written Prescription Date:  1/4/2018  Last Fill Quantity: 10,   # refills: 0  [place] [facility] [place]: 1/7/2021      Future Office visit:   Next 5 appointments (look out 90 days)    Jun 04, 2021  8:00 AM  Office Visit with Lisbet Simmons MD  [facility] ([facility] ) [address] [place] [id number] [phone]          Controlled substance agreement:   Encounter-Level CSA:    There are no encounter-level csa.     Patient-Level CSA:    There are no patient-level csa.         Last Urine Drug Screen: No results found for: CDAUT, No results found for: COMDAT, No results found for: THC13, PCP13, COC13, MAMP13, OPI13, AMP13, BZO13, TCA13, MTD13, BAR13, OXY13, PPX13, BUP13     Processing:  Rx to be electronically transmitted to pharmacy by provider     [email or web address]   checked in past 3 months?  No, route to ÁLVARO GARCIA RN   [facility] Triage

## 2021-03-23 NOTE — TELEPHONE ENCOUNTER
OK for fill x 1.  If needing refills pt would need to do virtual visit.      Mariluz Ackerman MBA, MS, PA-C

## 2021-03-24 ENCOUNTER — TELEPHONE (OUTPATIENT)
Dept: FAMILY MEDICINE | Facility: CLINIC | Age: 81
End: 2021-03-24

## 2021-03-24 NOTE — TELEPHONE ENCOUNTER
Please start PA for lorazepam.  Patient is experiencing situational grief due to her son going into hospice care.      Mariluz Ackerman MBA, MS, PA-C

## 2021-03-24 NOTE — TELEPHONE ENCOUNTER
Reason for Call:  Form, our goal is to have forms completed with 72 hours, however, some forms may require a visit or additional information.    Type of letter, form or note:  medical    Who is the form from?: Insurance comp, [facility]    Where did the form come from: form was faxed in    What clinic location was the form placed at?: [facility]    Where the form was placed: Mariluz Ackerman Box/Folder    What number is listed as a contact on the form?: [phone]       Additional comments: [employer] - lorazepam 0.5 mg tablet    Call taken on 3/24/2021 at 10:38 AM by Safia Garcia

## 2021-03-25 NOTE — TELEPHONE ENCOUNTER
Received prior approval for lorazepam.  Please call pharmacy and advised to fill this Rx      Mariluz Ackerman MBA, MS, PA-C

## 2021-03-25 NOTE — TELEPHONE ENCOUNTER
Prior Authorization Approval    Authorization Effective Date: 2/23/2021  Authorization Expiration Date: 3/25/2022  Medication: Lorazepam 0.5mg tablets  Approved Dose/Quantity:   Reference #: [id number]   Insurance Company: [employer] - Phone [phone] Fax [phone]  Expected CoPay:       CoPay Card Available:      Foundation Assistance Needed:    Which Pharmacy is filling the prescription (Not needed for infusion/clinic administered): [facility] - [address]  Pharmacy Notified: Yes  Patient Notified: Yes, **Instructed pharmacy to notify patient when script is ready to /ship.**

## 2021-03-25 NOTE — TELEPHONE ENCOUNTER
PA Initiation    Medication: Lorazepam 0.5mg tablets  Insurance Company: [employer] - Phone [phone] Fax [phone]  Pharmacy Filling the Rx: [facility] - [place], [place] - [address] [place] [address]  Filling Pharmacy Phone: [phone]  Filling Pharmacy Fax: [phone]  Start Date: 3/25/2021

## 2021-05-06 ENCOUNTER — TRANSFERRED RECORDS (OUTPATIENT)
Dept: HEALTH INFORMATION MANAGEMENT | Facility: CLINIC | Age: 81
End: 2021-05-06

## 2021-06-03 NOTE — PROGRESS NOTES
Assessment & Plan       ICD-10-CM    1. Essential hypertension with goal blood pressure less than 140/90- well controlled  I10 Albumin Random Urine Quantitative with Creat Ratio     Magnesium     *UA reflex to Microscopic and Culture ([facility] (except [place] and [place])     Comprehensive metabolic panel (BMP + Alb, Alk Phos, ALT, AST, Total. Bili, TP)     CBC with platelets and differential   2. Grief reaction- sonFacundo's death 3/24/2021 - after 9 yr tate with carcinoid with mets to liver   F43.21 MENTAL HEALTH REFERRAL  - Adult; Outpatient Treatment; Individual/Couples/Family/Group Therapy/Health Psychology; [facility] [facility] [phone]; We will contact you to schedule the appointment or please call with any questions   3. Hyperlipidemia LDL goal <130  E78.5 Albumin Random Urine Quantitative with Creat Ratio     Lipid panel reflex to direct LDL Fasting     Comprehensive metabolic panel (BMP + Alb, Alk Phos, ALT, AST, Total. Bili, TP)     REVIEW OF HEALTH MAINTENANCE PROTOCOL ORDERS   4. Other specified hypothyroidism  E03.8 T4, free     T3, total     TSH     levothyroxine (SYNTHROID/LEVOTHROID) 88 MCG tablet     REVIEW OF HEALTH MAINTENANCE PROTOCOL ORDERS   5. Stage 3a chronic kidney disease  N18.31 Albumin Random Urine Quantitative with Creat Ratio     Comprehensive metabolic panel (BMP + Alb, Alk Phos, ALT, AST, Total. Bili, TP)     REVIEW OF HEALTH MAINTENANCE PROTOCOL ORDERS   6. Osteopenia, unspecified location - was on fosamax, then Boniva secondary to hot flashes - off boniva since 2012  M85.80 Comprehensive metabolic panel (BMP + Alb, Alk Phos, ALT, AST, Total. Bili, TP)   7. Vulvar itching - ? early lichen sclerosis vs. other - superior vulva  L29.2 clobetasol (TEMOVATE) 0.05 % external ointment   8. Itching in the vaginal area  N89.8 clobetasol (TEMOVATE) 0.05 % external ointment   9. Essential hypertension with goal blood pressure less than 140/90-  "needs lab follow up and refills today   I10 lisinopril (ZESTRIL) 30 MG tablet     metoprolol succinate ER (TOPROL-XL) 50 MG 24 hr tablet     triamterene-HCTZ (MAXZIDE-25) 37.5-25 MG tablet   10. Anxiety- fear of flying  F41.9    11. Dysuria- recurrent with urgency and frequency - not always infection - seeing Dr. Karyn Luevano, M [facility] - has some urge incontinence as well - regularly- needs to make appt    R30.0 oxybutynin ER (DITROPAN-XL) 10 MG 24 hr tablet   12. Mixed stress and urge urinary incontinence- oxybutynin 10mg daily has improved symptoms significantly   N39.46 oxybutynin ER (DITROPAN-XL) 10 MG 24 hr tablet   13. Hyperlipidemia LDL goal <130 - needs lab follow up and refills today   E78.5 simvastatin (ZOCOR) 40 MG tablet   14. 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 Hansen and his PA every 6 months -[facility] in [place]   C49.0    15. Visit for screening mammogram  Z12.31 MA Screen Bilateral w/[place]      Had ativan renewed after son's death in 2021 - see below. Hasn't taken any that.  Her other son is having a hard time since his death - they had a mechanical restoration business together.  They were 14 years apart - Nemesio was 14 yrs younger than Facundo who . Declines refill on the ativan today.      Continue with your gardening and start walking up and down your driveway for exercise.       Ordering of each unique test  Prescription drug management     40 minutes spent on the date of the encounter doing chart review, history and exam, documentation and further activities per the note       BMI:   Estimated body mass index is 26.31 kg/m  as calculated from the following:    Height as of this encounter: 1.702 m (5' 7\").    Weight as of this encounter: 76.2 kg (168 lb).       MEDICATIONS:   Orders Placed This Encounter   Medications     clobetasol (TEMOVATE) 0.05 % external ointment     Sig: APPLY SPARINGLY TO AFFECTED AREA OF " GENITALS TWICE WEEKLY AT NIGHTTIME     Dispense:  60 g     Refill:  1     levothyroxine (SYNTHROID/LEVOTHROID) 88 MCG tablet     Sig: TAKE 1 TABLET EVERY MORNING     Dispense:  90 tablet     Refill:  3     lisinopril (ZESTRIL) 30 MG tablet     Sig: TAKE 1 TABLET EVERY DAY     Dispense:  90 tablet     Refill:  3     metoprolol succinate ER (TOPROL-XL) 50 MG 24 hr tablet     Sig: TAKE 1 TABLET EVERY DAY     Dispense:  90 tablet     Refill:  3     oxybutynin ER (DITROPAN-XL) 10 MG 24 hr tablet     Sig: Take 1-2 tablets (10-20 mg) by mouth daily     Dispense:  180 tablet     Refill:  3     simvastatin (ZOCOR) 40 MG tablet     Sig: Take 1 tablet (40 mg) by mouth At Bedtime     Dispense:  90 tablet     Refill:  3     triamterene-HCTZ (MAXZIDE-25) 37.5-25 MG tablet     Sig: TAKE 1 TABLET EVERY DAY     Dispense:  90 tablet     Refill:  3          - Continue other medications without change  Work on weight loss  Regular exercise  See Patient Instructions    No follow-ups on file.          Lisbet Simmons MD  [facility]        Subjective :   Aruna is a 80 year old who presents for the following health issues:    1. Essential hypertension with goal blood pressure less than 140/90- well controlled    2. Hyperlipidemia LDL goal <130    3. Other specified hypothyroidism    4. Stage 3a chronic kidney disease    5. Osteopenia, unspecified location - was on fosamax, then Boniva secondary to hot flashes - off boniva since 2012    6. Vulvar itching - ? early lichen sclerosis vs. other - superior vulva    7. Itching in the vaginal area    8. Essential hypertension with goal blood pressure less than 140/90- needs lab follow up and refills today     9. Anxiety- fear of flying    10. Dysuria- recurrent with urgency and frequency - not always infection - seeing Dr. Karyn Luevano [facility] - has some urge incontinence as well - regularly- needs to make appt      11. Mixed stress and urge urinary  incontinence- oxybutynin 10mg daily has improved symptoms significantly     12. Hyperlipidemia LDL goal <130 - needs lab follow up and refills today     13. 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     Hyperlipidemia Follow-Up      Are you regularly taking any medication or supplement to lower your cholesterol?   Yes- simvistatin    Are you having muscle aches or other side effects that you think could be caused by your cholesterol lowering medication?  No     Recent Labs   Lab Test 20  0926 20  1042 07/23/15  1016 07/23/15  1016 14  0929   CHOL 237* 213*   < > 194 212*   HDL 50 53   < > 46* 48*   * 110*   < > 91 114   TRIG 312* 251*   < > 285* 251*   CHOLHDLRATIO  --   --   --  4.2 4.4    < > = values in this interval not displayed.      She hasn't been walking for exercise lately at all - encouraged pt to start again for her heart , lungs, and muscles.      Hypertension Follow-up:       Do you check your blood pressure regularly outside of the clinic? No     Are you following a low salt diet? No    Are your blood pressures ever more than 140 on the top number (systolic) OR more   than 90 on the bottom number (diastolic), for example 140/90? No  .  BP Readings from Last 6 Encounters:   21 130/76   21 125/59   20 122/82   20 138/61   20 122/82   02/10/20 126/78        See above.     Review of Systems   Constitutional, HEENT, cardiovascular, pulmonary, gi and gu systems are negative, except as otherwise noted.      Objective    BP  "130/76   Pulse 73   Temp 97.3  F (36.3  C)   Resp 16   Ht 1.702 m (5' 7\")   Wt 76.2 kg (168 lb)   SpO2 97%   BMI 26.31 kg/m    Body mass index is 26.31 kg/m .  Physical Exam   GENERAL: healthy, alert and no distress  EYES: Eyes grossly normal to inspection, PERRL and conjunctivae and sclerae normal  HENT: ear canals and TM's normal, nose and mouth without ulcers or lesions  NECK: no adenopathy, no asymmetry, masses, or scars and thyroid normal to palpation  RESP: lungs clear to auscultation - no rales, rhonchi or wheezes  CV: regular rate and rhythm, normal S1 S2, no S3 or S4, no murmur, click or rub, no peripheral edema and peripheral pulses strong  ABDOMEN: soft, nontender, no hepatosplenomegaly, no masses and bowel sounds normal  MS: no gross musculoskeletal defects noted, no edema  SKIN: no suspicious lesions or rashes  NEURO: Normal strength and tone, mentation intact and speech normal  PSYCH: mentation appears normal, affect normal/bright    Virtual Visit on 01/07/2021   Component Date Value Ref Range Status     WBC Urine 01/07/2021 0 - 5  OTO5^0 - 5 /HPF Corrected    Comment: Interfering substances, dipstick not done  CORRECTED ON 01/07 AT 1350: PREVIOUSLY REPORTED AS 0   5       RBC Urine 01/07/2021 O - 2  OTO2^O - 2 /HPF Final     Bacteria Urine 01/07/2021 Many* NEG^Negative /HPF Final     Squamous Epithelial /LPF Urine 01/07/2021 Few  FEW^Few /LPF Final     Specimen Description 01/07/2021 Midstream Urine   Final     Special Requests 01/07/2021 Specimen received in preservative   Final     Culture Micro 01/07/2021 *  Final                    Value:10,000 to 50,000 colonies/mL  Citrobacter freundii complex                   "

## 2021-06-04 ENCOUNTER — OFFICE VISIT (OUTPATIENT)
Dept: FAMILY MEDICINE | Facility: CLINIC | Age: 81
End: 2021-06-04
Payer: COMMERCIAL

## 2021-06-04 VITALS
HEIGHT: 67 IN | SYSTOLIC BLOOD PRESSURE: 130 MMHG | HEART RATE: 73 BPM | RESPIRATION RATE: 16 BRPM | TEMPERATURE: 97.3 F | WEIGHT: 168 LBS | BODY MASS INDEX: 26.37 KG/M2 | OXYGEN SATURATION: 97 % | DIASTOLIC BLOOD PRESSURE: 76 MMHG

## 2021-06-04 DIAGNOSIS — F41.9 ANXIETY: ICD-10-CM

## 2021-06-04 DIAGNOSIS — L29.2 VULVAR ITCHING: ICD-10-CM

## 2021-06-04 DIAGNOSIS — N18.31 STAGE 3A CHRONIC KIDNEY DISEASE (H): ICD-10-CM

## 2021-06-04 DIAGNOSIS — I10 ESSENTIAL HYPERTENSION WITH GOAL BLOOD PRESSURE LESS THAN 140/90: ICD-10-CM

## 2021-06-04 DIAGNOSIS — E78.5 HYPERLIPIDEMIA LDL GOAL <130: ICD-10-CM

## 2021-06-04 DIAGNOSIS — R30.0 DYSURIA: ICD-10-CM

## 2021-06-04 DIAGNOSIS — F43.21 GRIEF REACTION: ICD-10-CM

## 2021-06-04 DIAGNOSIS — R94.4 DECREASED GFR: ICD-10-CM

## 2021-06-04 DIAGNOSIS — Z12.31 VISIT FOR SCREENING MAMMOGRAM: ICD-10-CM

## 2021-06-04 DIAGNOSIS — N39.46 MIXED STRESS AND URGE URINARY INCONTINENCE: ICD-10-CM

## 2021-06-04 DIAGNOSIS — M85.80 OSTEOPENIA, UNSPECIFIED LOCATION: ICD-10-CM

## 2021-06-04 DIAGNOSIS — I10 ESSENTIAL HYPERTENSION WITH GOAL BLOOD PRESSURE LESS THAN 140/90: Primary | ICD-10-CM

## 2021-06-04 DIAGNOSIS — E03.8 OTHER SPECIFIED HYPOTHYROIDISM: ICD-10-CM

## 2021-06-04 DIAGNOSIS — C49.0 MALIGNANT NEOPLASM OF CONNECTIVE AND SOFT TISSUE OF HEAD, FACE AND NECK (H): ICD-10-CM

## 2021-06-04 DIAGNOSIS — N89.8 ITCHING IN THE VAGINAL AREA: ICD-10-CM

## 2021-06-04 LAB
ALBUMIN UR-MCNC: NEGATIVE MG/DL
APPEARANCE UR: CLEAR
BASOPHILS # BLD AUTO: 0 10E9/L (ref 0–0.2)
BASOPHILS NFR BLD AUTO: 0.4 %
BILIRUB UR QL STRIP: NEGATIVE
COLOR UR AUTO: YELLOW
DIFFERENTIAL METHOD BLD: NORMAL
EOSINOPHIL # BLD AUTO: 0.2 10E9/L (ref 0–0.7)
EOSINOPHIL NFR BLD AUTO: 3 %
ERYTHROCYTE [DISTWIDTH] IN BLOOD BY AUTOMATED COUNT: 12.5 % (ref 10–15)
GLUCOSE UR STRIP-MCNC: NEGATIVE MG/DL
HCT VFR BLD AUTO: 39.4 % (ref 35–47)
HGB BLD-MCNC: 13.1 G/DL (ref 11.7–15.7)
HGB UR QL STRIP: NEGATIVE
KETONES UR STRIP-MCNC: NEGATIVE MG/DL
LEUKOCYTE ESTERASE UR QL STRIP: ABNORMAL
LYMPHOCYTES # BLD AUTO: 2.1 10E9/L (ref 0.8–5.3)
LYMPHOCYTES NFR BLD AUTO: 31.1 %
MCH RBC QN AUTO: 30.7 PG (ref 26.5–33)
MCHC RBC AUTO-ENTMCNC: 33.2 G/DL (ref 31.5–36.5)
MCV RBC AUTO: 92 FL (ref 78–100)
MONOCYTES # BLD AUTO: 0.6 10E9/L (ref 0–1.3)
MONOCYTES NFR BLD AUTO: 8.9 %
NEUTROPHILS # BLD AUTO: 3.8 10E9/L (ref 1.6–8.3)
NEUTROPHILS NFR BLD AUTO: 56.6 %
NITRATE UR QL: NEGATIVE
NON-SQ EPI CELLS #/AREA URNS LPF: NORMAL /LPF
PH UR STRIP: 6 PH (ref 5–7)
PLATELET # BLD AUTO: 202 10E9/L (ref 150–450)
RBC # BLD AUTO: 4.27 10E12/L (ref 3.8–5.2)
RBC #/AREA URNS AUTO: NORMAL /HPF
SOURCE: ABNORMAL
SP GR UR STRIP: 1.01 (ref 1–1.03)
T3 SERPL-MCNC: 98 NG/DL (ref 60–181)
UROBILINOGEN UR STRIP-ACNC: 0.2 EU/DL (ref 0.2–1)
WBC # BLD AUTO: 6.8 10E9/L (ref 4–11)
WBC #/AREA URNS AUTO: NORMAL /HPF

## 2021-06-04 PROCEDURE — 83735 ASSAY OF MAGNESIUM: CPT | Performed by: FAMILY MEDICINE

## 2021-06-04 PROCEDURE — 99215 OFFICE O/P EST HI 40 MIN: CPT | Performed by: FAMILY MEDICINE

## 2021-06-04 PROCEDURE — 81001 URINALYSIS AUTO W/SCOPE: CPT | Performed by: FAMILY MEDICINE

## 2021-06-04 PROCEDURE — 80053 COMPREHEN METABOLIC PANEL: CPT | Performed by: FAMILY MEDICINE

## 2021-06-04 PROCEDURE — 80061 LIPID PANEL: CPT | Performed by: FAMILY MEDICINE

## 2021-06-04 PROCEDURE — 36415 COLL VENOUS BLD VENIPUNCTURE: CPT | Performed by: FAMILY MEDICINE

## 2021-06-04 PROCEDURE — 85025 COMPLETE CBC W/AUTO DIFF WBC: CPT | Performed by: FAMILY MEDICINE

## 2021-06-04 PROCEDURE — 84443 ASSAY THYROID STIM HORMONE: CPT | Performed by: FAMILY MEDICINE

## 2021-06-04 PROCEDURE — 82043 UR ALBUMIN QUANTITATIVE: CPT | Performed by: FAMILY MEDICINE

## 2021-06-04 PROCEDURE — 84439 ASSAY OF FREE THYROXINE: CPT | Performed by: FAMILY MEDICINE

## 2021-06-04 PROCEDURE — 84480 ASSAY TRIIODOTHYRONINE (T3): CPT | Performed by: FAMILY MEDICINE

## 2021-06-04 RX ORDER — CLOBETASOL PROPIONATE 0.5 MG/G
OINTMENT TOPICAL
Qty: 60 G | Refills: 1 | Status: SHIPPED | OUTPATIENT
Start: 2021-06-04 | End: 2022-06-06

## 2021-06-04 RX ORDER — OXYBUTYNIN CHLORIDE 10 MG/1
10-20 TABLET, EXTENDED RELEASE ORAL DAILY
Qty: 180 TABLET | Refills: 3 | Status: SHIPPED | OUTPATIENT
Start: 2021-06-04 | End: 2022-06-06

## 2021-06-04 RX ORDER — METOPROLOL SUCCINATE 50 MG/1
TABLET, EXTENDED RELEASE ORAL
Qty: 90 TABLET | Refills: 3 | Status: SHIPPED | OUTPATIENT
Start: 2021-06-04 | End: 2022-06-06

## 2021-06-04 RX ORDER — TRIAMTERENE/HYDROCHLOROTHIAZID 37.5-25 MG
TABLET ORAL
Qty: 90 TABLET | Refills: 3 | Status: SHIPPED | OUTPATIENT
Start: 2021-06-04 | End: 2022-06-06

## 2021-06-04 RX ORDER — LEVOTHYROXINE SODIUM 88 UG/1
TABLET ORAL
Qty: 90 TABLET | Refills: 3 | Status: SHIPPED | OUTPATIENT
Start: 2021-06-04 | End: 2022-05-06

## 2021-06-04 RX ORDER — LISINOPRIL 30 MG/1
TABLET ORAL
Qty: 90 TABLET | Refills: 3 | Status: SHIPPED | OUTPATIENT
Start: 2021-06-04 | End: 2022-06-06

## 2021-06-04 RX ORDER — SIMVASTATIN 40 MG
40 TABLET ORAL AT BEDTIME
Qty: 90 TABLET | Refills: 3 | Status: SHIPPED | OUTPATIENT
Start: 2021-06-04 | End: 2022-06-06

## 2021-06-04 RX ORDER — LORAZEPAM 0.5 MG/1
0.5 TABLET ORAL 2 TIMES DAILY
Qty: 10 TABLET | Refills: 0 | Status: CANCELLED | OUTPATIENT
Start: 2021-06-04

## 2021-06-04 ASSESSMENT — MIFFLIN-ST. JEOR: SCORE: 1274.67

## 2021-06-04 NOTE — PATIENT INSTRUCTIONS
[facility]  [address] [place] [id number]  Office: [phone]   Fax:    [phone]       If you desire to try  melatonin for sleep:   Try  Nature's Made or other [employer]P certified Melatonin for sleep:  take 30-60 minutes prior to bedtime.  Start with 3-5mg at night x 2 nights, then increase to 6-10mg nightly for 2 nights, then 9mg nightly for 2 nights, then 12-15mg nightly x 2 nights, etc, increasing by 3-5 mg every 2 nights. Max dose is absolutely 18-20mg nightly.   You can stop at whatever milligram dosage prior to 18-20mg works well for you.       Return in 6 months (on 12/6/2021) for Physical/Preventative Visit, cholesterol recheck, BP Recheck, with Dr. Simmons, in person. appt scheduled for 0800 that date.       encouraged pt to start walking again for her heart , lungs, and muscles.    Start walking for exercise - If walking outside,   - rain or shine - walk a block, then come home, next day walk   2 blocks , then come home ; and then add a block further from home daily - work up to 30-45minutes daily or 3-4x/week - or can work  up to  3-4 miles briskly daily.   Ok to just walk up and down your very long driveway, if you like.     If walking on treadmill or at  the mall, start with 5 minutes first day, then 6 minutes next day , then 7 minutes next day, then 8 minutes next day, etc.   Gradually work up to the above goals.  No stopping.      Can also try marching in place - try to have your knees come up as high to your chest as possible.  Start with 1-2 minutes at a time, and gradually add 30-60 seconds each workout. Try to work up to 15-20 minutes of walking/marching in place  Daily - great for during those days, when you are not comfortable walking outside.         Patient Education     Grief and Loss  Losing someone you care about is painful. Grief is the emotional reaction that follows. It s a normal process. It has both physical and emotional signs. But even with  major life changes, such as the loss of a spouse or parent, you can face the loss and move on.    Grief takes many forms  Each person will have their own grief process. Grief may or may not occur in predictable stages. Or it may bounce between stages. But over time, grief will slowly lead you to accept the loss. Many people can keep doing normal daily activities even with bouts of grief.  These normal grief reactions are common:    Not wanting to believe the loss is real    Feeling emotional numbness or shock    Feeling annoyed or outright angry    Thinking you could have done something to stop the loss    Feeling sad or even hopeless    Loss of appetite and sleep    Loss of interest in things you used to enjoy  Normal grief often does not need to be treated. You will slowly start feeling more adjusted to your new life. In some cases grief lasts long or is more difficult. This may need treatment with talk therapy. Severe grief reactions to griefmay need treatment. So may depression that's linked to grief. If you are concerned about your grief, talk with your healthcare provider.  [employer] last reviewed this educational content on 2/1/2018 2000-2021 [employer]. All rights reserved. This information is not intended as a substitute for professional medical care. Always follow your healthcare professional's instructions.           Patient Education     Helping Yourself Through Grief and Loss  Do what you can to stay healthy. Reaching out for support will help a great deal. You may find yourself asking:  Why?  It s normal to seek meaning by asking questions, but there s not always an answer for loss. With time, your loss may still be part of your life, but not the only thing in it.    Take care of yourself  Taking good care of yourself helps your body heal from the physical and emotional symptoms of grief. Pay extra attention to healthy exercise, sleep, and eating routines. What else do you need to feel  better? Having family around can help you feel loved. Or you might need a walk or movie with friends to take your mind off things for a little while.  Accept support  Joining the world again is part of healing. These tips may help:    Stay in touch with family and friends, even if it s hard to talk.    Tell people how they can help. It can be as simple as bringing you a meal or walking your dog.    Stick to a daily routine that keeps you connected to friends and family.    Try to avoid making major decisions.    Attend a support group of people who have been through the same type of loss.  When to get help  There's no normal length of time to grieve. But if you feel stuck and unable to move on, or if it has been 6 months or more since your loss and you still have signs of grief listed below, it may be time for professional help. Seeking professional help is not a sign of weakness. It means that you are taking responsibility for your recovery. Be alert to depression and call your healthcare provider if you:    Can t go to work or take care of the kids.    Can t eat or sleep normally.    Feel helpless, hopeless, or worthless.    Lose interest in hobbies, friends, and activities that used to give pleasure.    Gain or lose a lot of weight.    Feel your grief is getting worse, or not getting any better.    Have repeated thoughts of suicide or of harming yourself. Call 911 or a crisis hotline (you can find one online) if you have these thoughts.  At some point, you ll begin thinking about the future. You ll want to look ahead and make plans. To help yourself reach this point, try to do one thing each day to join in life. Keep at it, even if it feels strange at first. Your life can never be exactly the same. But one day you ll find you re living life fully again.  [employer] last reviewed this educational content on 2/1/2018 2000-2021 [employer]. All rights reserved. This information is not intended as a  substitute for professional medical care. Always follow your healthcare professional's instructions.           Patient Education     Moving Through Grief    Feeling better won t happen overnight. At first, it may be all you can do just to get through the day. But there is hope. Know that you will feel better with time, as long as you let yourself grieve. You need to grieve in order to heal. It hurts, but it is a normal part of healing process.  The first response  Your first response is often the most intense. You may cry a lot. Or you may feel a deep numbness or shock. Everyone grieves in his or her own way, but there are common signs of grief:    Having intense mood swings    Anxiety and loneliness because you are not with your loved one, and you want to bring the person back.    Sleeping too much or too little    Eating too much or too little    Having trouble thinking clearly    Wanting to be alone all the time  For most people, these symptoms lessen within 6 to 12 months. Talk to your healthcare provider if your symptoms last longer than 12 months.   Give yourself a break  Try not to expect too much of yourself right away. It may be hard to work, take care of family responsibilities, or focus on projects for a while. Give yourself more time than usual to get things done, since you may be distracted. Don't be afraid to ask for help. Take time for yourself. Do some things that you enjoy. Go for a ride in the country. Read. It may feel like nothing brings you ludmila. But know that time really does help.  Know your grief process  Let yourself feel all of your feelings and go through your grief fully. The process is full of ups and downs. One day you may feel a lot better. The next day, you may cry again. Try not to think:  I should be over this by now.  There are no  shoulds  to grief. Let yourself mourn your loss as long as you need to. Remember the good times you had with your loved one. It might help to think of  ways you dealt with a loss in the past. That way grief won t seem so scary and overwhelming. Talk with your healthcare provider if you are concerned about your grief process.  Jose Armando last reviewed this educational content on 2/1/2018 2000-2021 [employer]. All rights reserved. This information is not intended as a substitute for professional medical care. Always follow your healthcare professional's instructions.         Thank you so much or choosing [facility]  for your Health Care. It was a pleasure seeing you at your visit today! Please contact us with any questions or concerns you may have.                   Lisbet Simmons MD                              To reach your [facility] care team after hours call:   [phone]    PLEASE NOTE OUR HOURS HAVE CHANGED secondary to COVID-19 coronavirus pandemic, as we are trying to minimize patient exposure to the virus,  which is now widespread in the state.  These hours may change with very little notice.  We apologize for any inconvenience.       Our current clinic hours are:          Monday- Thursday   7:00am - 6:00pm  in person.      Friday  7:00am- 5:00pm                       Saturday and Sunday : Closed to in person and virtual visits        We have telephone and virtual visit times available between    7:00am - 6pm on Monday-Friday as well.                                                Phone:  [phone]      Our pharmacy hours: Monday through Friday 9:00am to 5:00pm                        Saturday - 9:00 am to 12 noon       Sunday : Closed.              Phone:  [phone]              ###  Please note: at this time we are not accepting any walk-in visits. ###      There is also information available at our web site:  [email or web address]    If your provider ordered any lab tests and you do not receive the results within 10 business days, please call the clinic.    If you need a  medication refill please contact your pharmacy.  Please allow 2 business days for your refill to be completed.    Our clinic offers telephone visits and e visits.  Please ask one of your team members to explain more.      Use [facility] (secure email communication and access to your chart) to send your primary care provider a message or make an appointment. Ask someone on your Team how to sign up for [facility].

## 2021-06-05 LAB
ALBUMIN SERPL-MCNC: 3.6 G/DL (ref 3.4–5)
ALP SERPL-CCNC: 43 U/L (ref 40–150)
ALT SERPL W P-5'-P-CCNC: 24 U/L (ref 0–50)
ANION GAP SERPL CALCULATED.3IONS-SCNC: 3 MMOL/L (ref 3–14)
AST SERPL W P-5'-P-CCNC: 17 U/L (ref 0–45)
BILIRUB SERPL-MCNC: 0.9 MG/DL (ref 0.2–1.3)
BUN SERPL-MCNC: 32 MG/DL (ref 7–30)
CALCIUM SERPL-MCNC: 9.5 MG/DL (ref 8.5–10.1)
CHLORIDE SERPL-SCNC: 104 MMOL/L (ref 94–109)
CHOLEST SERPL-MCNC: 199 MG/DL
CO2 SERPL-SCNC: 29 MMOL/L (ref 20–32)
CREAT SERPL-MCNC: 1.39 MG/DL (ref 0.52–1.04)
CREAT UR-MCNC: 58 MG/DL
GFR SERPL CREATININE-BSD FRML MDRD: 36 ML/MIN/{1.73_M2}
GLUCOSE SERPL-MCNC: 94 MG/DL (ref 70–99)
HDLC SERPL-MCNC: 53 MG/DL
LDLC SERPL CALC-MCNC: 110 MG/DL
MAGNESIUM SERPL-MCNC: 2.2 MG/DL (ref 1.6–2.3)
MICROALBUMIN UR-MCNC: 5 MG/L
MICROALBUMIN/CREAT UR: 9.03 MG/G CR (ref 0–25)
NONHDLC SERPL-MCNC: 146 MG/DL
POTASSIUM SERPL-SCNC: 4.5 MMOL/L (ref 3.4–5.3)
PROT SERPL-MCNC: 7 G/DL (ref 6.8–8.8)
SODIUM SERPL-SCNC: 136 MMOL/L (ref 133–144)
T4 FREE SERPL-MCNC: 1.17 NG/DL (ref 0.76–1.46)
TRIGL SERPL-MCNC: 178 MG/DL
TSH SERPL DL<=0.005 MIU/L-ACNC: 2.03 MU/L (ref 0.4–4)

## 2021-07-20 ENCOUNTER — OFFICE VISIT (OUTPATIENT)
Dept: DERMATOLOGY | Facility: CLINIC | Age: 81
End: 2021-07-20
Payer: COMMERCIAL

## 2021-07-20 VITALS — HEART RATE: 56 BPM | DIASTOLIC BLOOD PRESSURE: 58 MMHG | OXYGEN SATURATION: 99 % | SYSTOLIC BLOOD PRESSURE: 132 MMHG

## 2021-07-20 DIAGNOSIS — D22.9 NEVUS: Primary | ICD-10-CM

## 2021-07-20 DIAGNOSIS — D18.01 ANGIOMA OF SKIN: ICD-10-CM

## 2021-07-20 DIAGNOSIS — L82.1 SEBORRHEIC KERATOSIS: ICD-10-CM

## 2021-07-20 DIAGNOSIS — L81.4 LENTIGO: ICD-10-CM

## 2021-07-20 DIAGNOSIS — Z85.828 HISTORY OF SKIN CANCER: ICD-10-CM

## 2021-07-20 PROCEDURE — 99213 OFFICE O/P EST LOW 20 MIN: CPT | Performed by: PHYSICIAN ASSISTANT

## 2021-07-20 RX ORDER — LEVOBUNOLOL HYDROCHLORIDE 5 MG/ML
1 SOLUTION/ DROPS OPHTHALMIC DAILY
COMMUNITY
Start: 2021-06-09

## 2021-07-20 NOTE — LETTER
7/20/2021         RE: Aruna Santos  [address] [facility] [address]        Dear Colleague,    Thank you for referring your patient, Aruna Santos, to the [facility]. Please see a copy of my visit note below.    HPI:   chief complaint: Aruna Santos is a 80 year old female who presents for Full skin cancer screening to rule out skin cancer.  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  Social History: Son had a carcinoid tumor and passed away 3/2021.   On review of systems, there are no further skin complaints, patient is feeling otherwise well.  See patient intake sheet.  ROS of the following were done and are negative: Constitutional, Eyes, Ears, Nose,   Mouth, Throat, Cardiovascular, Respiratory, GI, Genitourinary, Musculoskeletal,   Psychiatric, Endocrine, Allergic/Immunologic.        PHYSICAL EXAM:   /58   Pulse 56   SpO2 99%   Skin exam performed as follows: Type 2 skin. Mood appropriate  Alert and Oriented X 3. Well developed, well nourished in no distress.  General appearance: Normal  Head including face: Normal  Eyes: conjunctiva and lids: Normal  Mouth: Lips, teeth, gums: Normal  Neck: Normal  Chest-breast/axillae: Normal  Back: Normal  Spleen and liver: Normal  Cardiovascular: Exam of peripheral vascular system by observation for swelling, varicosities, edema: Normal  Genitalia: groin, buttocks: Normal  Extremities: digits/nails (clubbing): Normal  Eccrine and Apocrine glands: Normal  Right upper extremity: Normal  Left upper extremity: Normal  Right lower extremity: Normal  Left lower extremity: Normal  Skin: Scalp and body hair: See below    Pt deferred exam of breasts,  groin, buttocks: No    Other physical findings:  1. Multiple pigmented macules on extremities and trunk  2. Multiple pigmented macules on face, trunk and extremities  3. Multiple vascular papules on trunk, arms and legs  4. Multiple scattered keratotic plaques        Except as noted above, no other signs of skin cancer or melanoma.     ASSESSMENT/PLAN:   Benign Full skin cancer screening today.    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    Multiple benign appearing nevi on arms, legs and trunk. Discussed ABCDEs of melanoma and sunscreen.   Multiple lentigos on arms, legs and trunk. Advised benign, no treatment needed.  Multiple scattered angiomas. Advised benign, no treatment needed.   Seborrheic keratosis on arms, legs and trunk. Advised benign, no treatment needed.  History of adenocarcinoma on the scalp 1/2018. No lymphadenopathy palpated today - advised for pt to do this once monthly at home. More than 50% of reported cases are located in the tete-orbital region and the hair-bearing scalp.  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          Follow-up:Q 6 months FSE/PRN sooner     1.) Patient was asked about new and changing moles. YES  2.) Patient received a complete physical skin examination: YES  3.) Patient was counseled to perform a monthly self skin examination: YES  Scribed By: Mary Miranda MS, PANHAN        Again, thank you for allowing me to participate in the care of your patient.        Sincerely,        Mary Miranda PA-C

## 2021-07-20 NOTE — PROGRESS NOTES
HPI:   chief complaint: Aruna Santos is a 80 year old female who presents for Full skin cancer screening to rule out skin cancer.  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  Social History: Son had a carcinoid tumor and passed away 3/2021.   On review of systems, there are no further skin complaints, patient is feeling otherwise well.  See patient intake sheet.  ROS of the following were done and are negative: Constitutional, Eyes, Ears, Nose,   Mouth, Throat, Cardiovascular, Respiratory, GI, Genitourinary, Musculoskeletal,   Psychiatric, Endocrine, Allergic/Immunologic.        PHYSICAL EXAM:   /58   Pulse 56   SpO2 99%   Skin exam performed as follows: Type 2 skin. Mood appropriate  Alert and Oriented X 3. Well developed, well nourished in no distress.  General appearance: Normal  Head including face: Normal  Eyes: conjunctiva and lids: Normal  Mouth: Lips, teeth, gums: Normal  Neck: Normal  Chest-breast/axillae: Normal  Back: Normal  Spleen and liver: Normal  Cardiovascular: Exam of peripheral vascular system by observation for swelling, varicosities, edema: Normal  Genitalia: groin, buttocks: Normal  Extremities: digits/nails (clubbing): Normal  Eccrine and Apocrine glands: Normal  Right upper extremity: Normal  Left upper extremity: Normal  Right lower extremity: Normal  Left lower extremity: Normal  Skin: Scalp and body hair: See below    Pt deferred exam of breasts, groin, buttocks: No    Other physical findings:  1. Multiple pigmented macules on extremities and trunk  2. Multiple pigmented macules on face, trunk and extremities  3. Multiple vascular papules on trunk, arms and legs  4. Multiple scattered keratotic  plaques        Except as noted above, no other signs of skin cancer or melanoma.     ASSESSMENT/PLAN:   Benign Full skin cancer screening today.    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    Multiple benign appearing nevi on arms, legs and trunk. Discussed ABCDEs of melanoma and sunscreen.   Multiple lentigos on arms, legs and trunk. Advised benign, no treatment needed.  Multiple scattered angiomas. Advised benign, no treatment needed.   Seborrheic keratosis on arms, legs and trunk. Advised benign, no treatment needed.  History of adenocarcinoma on the scalp 1/2018. No lymphadenopathy palpated today - advised for pt to do this once monthly at home. More than 50% of reported cases are located in the tete-orbital region and the hair-bearing scalp.  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          Follow-up:Q 6 months FSE/PRN sooner     1.) Patient was asked about new and changing moles. YES  2.) Patient received a complete physical skin examination: YES  3.) Patient was counseled to perform a monthly self skin examination: YES  Scribed By: Mary Miranda, MS, PA-C

## 2021-08-24 ENCOUNTER — OFFICE VISIT (OUTPATIENT)
Dept: FAMILY MEDICINE | Facility: CLINIC | Age: 81
End: 2021-08-24
Payer: COMMERCIAL

## 2021-08-24 VITALS
WEIGHT: 165 LBS | RESPIRATION RATE: 20 BRPM | SYSTOLIC BLOOD PRESSURE: 126 MMHG | DIASTOLIC BLOOD PRESSURE: 72 MMHG | BODY MASS INDEX: 25.84 KG/M2 | TEMPERATURE: 98.3 F | HEART RATE: 78 BPM | OXYGEN SATURATION: 98 %

## 2021-08-24 DIAGNOSIS — N39.0 URINARY TRACT INFECTION WITHOUT HEMATURIA, SITE UNSPECIFIED: Primary | ICD-10-CM

## 2021-08-24 DIAGNOSIS — R30.0 DYSURIA: ICD-10-CM

## 2021-08-24 LAB
ALBUMIN UR-MCNC: NEGATIVE MG/DL
APPEARANCE UR: ABNORMAL
BACTERIA #/AREA URNS HPF: ABNORMAL /HPF
BILIRUB UR QL STRIP: NEGATIVE
COLOR UR AUTO: YELLOW
GLUCOSE UR STRIP-MCNC: NEGATIVE MG/DL
HGB UR QL STRIP: ABNORMAL
KETONES UR STRIP-MCNC: NEGATIVE MG/DL
LEUKOCYTE ESTERASE UR QL STRIP: ABNORMAL
NITRATE UR QL: POSITIVE
PH UR STRIP: 7 [PH] (ref 5–7)
RBC #/AREA URNS AUTO: ABNORMAL /HPF
SP GR UR STRIP: 1.02 (ref 1–1.03)
SQUAMOUS #/AREA URNS AUTO: ABNORMAL /LPF
UROBILINOGEN UR STRIP-ACNC: 0.2 E.U./DL
WBC #/AREA URNS AUTO: >100 /HPF
WBC CLUMPS #/AREA URNS HPF: PRESENT /HPF

## 2021-08-24 PROCEDURE — 87086 URINE CULTURE/COLONY COUNT: CPT | Performed by: FAMILY MEDICINE

## 2021-08-24 PROCEDURE — 87186 SC STD MICRODIL/AGAR DIL: CPT | Performed by: FAMILY MEDICINE

## 2021-08-24 PROCEDURE — 81001 URINALYSIS AUTO W/SCOPE: CPT | Performed by: FAMILY MEDICINE

## 2021-08-24 PROCEDURE — 99213 OFFICE O/P EST LOW 20 MIN: CPT | Performed by: FAMILY MEDICINE

## 2021-08-24 RX ORDER — CIPROFLOXACIN 500 MG/1
500 TABLET, FILM COATED ORAL 2 TIMES DAILY
Qty: 14 TABLET | Refills: 0 | Status: SHIPPED | OUTPATIENT
Start: 2021-08-24 | End: 2021-08-31

## 2021-08-24 ASSESSMENT — ENCOUNTER SYMPTOMS
FREQUENCY: 1
CHILLS: 0
FLANK PAIN: 0

## 2021-08-24 NOTE — PROGRESS NOTES
Assessment & Plan   Dysuria  Urinary tract infection without hematuria, site unspecified  Ua positive and will treat with:  - ciprofloxacin (CIPRO) 500 MG tablet  Dispense: 14 tablet; Refill: 0        Return in about 1 week (around 8/31/2021) for symptoms failing to improve or sooner if worsening.      Johnathon Mahan MD      [facility]  [address] [place] [id number]  [email or web address]   Office: [phone]       Williams Valdovinos is a 80 year old who presents for the following health issues     HPI     Genitourinary - Female  Onset/Duration: about 4 days ago  Description:   Painful urination (Dysuria): YES           Frequency: YES  Blood in urine (Hematuria): no  Delay in urine (Hesitency): no  Intensity: moderate  Progression of Symptoms:  same  Accompanying Signs & Symptoms:  Fever/chills: no  Flank pain: no  Nausea and vomiting: no  Vaginal symptoms: none  Abdominal/Pelvic Pain: pelvic heaviness  History:   History of frequent UTI s: YES  History of kidney stones: YES  Sexually Active: not applicable  Possibility of pregnancy: No  Precipitating or alleviating factors: None  Therapies tried and outcome: AZO, Increase fluid intake     Review of Systems   Constitutional: Negative for chills.   Genitourinary: Positive for frequency. Negative for flank pain.          Objective    /72   Pulse 78   Temp 98.3  F (36.8  C)   Resp 20   Wt 74.8 kg (165 lb)   SpO2 98%   Breastfeeding No   BMI 25.84 kg/m    Body mass index is 25.84 kg/m .  Physical Exam   GENERAL: healthy, alert and no distress  ABDOMEN: soft, nontender, no hepatosplenomegaly, no masses and bowel sounds normal  BACK: no CVA tenderness, no paralumbar tenderness    Results for orders placed or performed in visit on 08/24/21 (from the past 24 hour(s))   UA Macro with Reflex to Micro and Culture - lab collect    Specimen: Urine, Midstream   Result Value Ref Range    Color Urine Yellow Colorless, Straw, Light  Yellow, Yellow    Appearance Urine Slightly Cloudy (A) Clear    Glucose Urine Negative Negative mg/dL    Bilirubin Urine Negative Negative    Ketones Urine Negative Negative mg/dL    Specific Gravity Urine 1.020 1.003 - 1.035    Blood Urine Small (A) Negative    pH Urine 7.0 5.0 - 7.0    Protein Albumin Urine Negative Negative mg/dL    Urobilinogen Urine 0.2 0.2, 1.0 E.U./dL    Nitrite Urine Positive (A) Negative    Leukocyte Esterase Urine Large (A) Negative   Urine Microscopic Exam   Result Value Ref Range    Bacteria Urine Moderate (A) None Seen /HPF    RBC Urine None Seen 0-2 /HPF /HPF    WBC Urine >100 (A) 0-5 /HPF /HPF    Squamous Epithelials Urine Few (A) None Seen /LPF    WBC Clumps Urine Present (A) None Seen /HPF

## 2021-08-25 ENCOUNTER — TELEPHONE (OUTPATIENT)
Dept: FAMILY MEDICINE | Facility: CLINIC | Age: 81
End: 2021-08-25

## 2021-08-25 DIAGNOSIS — R30.0 DYSURIA: Primary | ICD-10-CM

## 2021-08-25 DIAGNOSIS — N39.0 URINARY TRACT INFECTION WITHOUT HEMATURIA, SITE UNSPECIFIED: ICD-10-CM

## 2021-08-25 LAB — BACTERIA UR CULT: ABNORMAL

## 2021-08-25 NOTE — TELEPHONE ENCOUNTER
Patient calling in stating that the Cipro she received and started taking yesterday for her UTI gives her a stomach ache and is wondering if there is something else we can prescribe. Please advise    [facility] [place] [address] [facility]    Marco Antonio Dinero

## 2021-08-26 RX ORDER — CEFDINIR 300 MG/1
300 CAPSULE ORAL 2 TIMES DAILY
Qty: 14 CAPSULE | Refills: 0 | Status: SHIPPED | OUTPATIENT
Start: 2021-08-26 | End: 2021-09-16

## 2021-08-26 NOTE — TELEPHONE ENCOUNTER
"Patient calls back. She states she took it with food and water, stomach felt like a \"brick\". She did not take any cipro yesterday, and is feeling better.  She would like to switch to something else. Cefdinir sent to her pharmacy per Dr. Vance's note below.     Rosalia Vieira RN  [facility]    "

## 2021-08-26 NOTE — TELEPHONE ENCOUNTER
Please see how she is doing today -  Did she take it with food and water?  If she would like to switch still then please send in cefdinir 300 mg bid for 7 days

## 2021-08-27 ENCOUNTER — TELEPHONE (OUTPATIENT)
Dept: FAMILY MEDICINE | Facility: CLINIC | Age: 81
End: 2021-08-27

## 2021-08-27 NOTE — TELEPHONE ENCOUNTER
Call received from patient stating she saw Dr. Mahan on 8/24 for symptoms of urinary tract infection. Patient was started on Cipro which caused stomach upset. Medication was changed to Cefdinir yesterday. States this morning she woke with pain on the bottom of her heel and up into the back of her ankle on her left foot. Patient was on her feet all day yesterday more than normal but is concerned about the warnings related to tendon issues with Cipro. Patient had taken 2 doses of Cipro. Patient has taken Cipro in the past without issues. Patient has taken 3 tablets of Cefdinir so far with no issues with her stomach. Please advise.

## 2021-08-27 NOTE — TELEPHONE ENCOUNTER
The pain in midfoot could be some early tendinitis.  It is good that she stop the medicine and should avoid excessive walking or certainly no jumping which I doubt she is doing.  Could try icing the area gentle stretching of the calf muscle with help as well by reading the heel on the ground and leaning forward with this straight knee while holding onto a countertop or some other support.  She should continue cefdinir.

## 2021-08-30 ENCOUNTER — TRANSFERRED RECORDS (OUTPATIENT)
Dept: HEALTH INFORMATION MANAGEMENT | Facility: CLINIC | Age: 81
End: 2021-08-30

## 2021-08-31 NOTE — TELEPHONE ENCOUNTER
Called # [phone]     Advised of note below. Pt noted she is feeling better. Pt noted will continue abx.     Boo Malhotra RN   [facility]

## 2021-09-05 ENCOUNTER — HEALTH MAINTENANCE LETTER (OUTPATIENT)
Age: 81
End: 2021-09-05

## 2021-09-16 ENCOUNTER — OFFICE VISIT (OUTPATIENT)
Dept: FAMILY MEDICINE | Facility: CLINIC | Age: 81
End: 2021-09-16
Payer: COMMERCIAL

## 2021-09-16 VITALS
OXYGEN SATURATION: 99 % | WEIGHT: 166.38 LBS | BODY MASS INDEX: 26.11 KG/M2 | DIASTOLIC BLOOD PRESSURE: 78 MMHG | HEART RATE: 64 BPM | HEIGHT: 67 IN | SYSTOLIC BLOOD PRESSURE: 134 MMHG | TEMPERATURE: 97.2 F | RESPIRATION RATE: 16 BRPM

## 2021-09-16 DIAGNOSIS — M65.30 TRIGGER FINGER, ACQUIRED: ICD-10-CM

## 2021-09-16 DIAGNOSIS — Z01.818 PREOP GENERAL PHYSICAL EXAM: Primary | ICD-10-CM

## 2021-09-16 DIAGNOSIS — I10 ESSENTIAL HYPERTENSION WITH GOAL BLOOD PRESSURE LESS THAN 140/90: ICD-10-CM

## 2021-09-16 PROCEDURE — 99214 OFFICE O/P EST MOD 30 MIN: CPT | Mod: 25 | Performed by: FAMILY MEDICINE

## 2021-09-16 PROCEDURE — 80048 BASIC METABOLIC PNL TOTAL CA: CPT | Performed by: FAMILY MEDICINE

## 2021-09-16 PROCEDURE — 90662 IIV NO PRSV INCREASED AG IM: CPT | Performed by: FAMILY MEDICINE

## 2021-09-16 PROCEDURE — 36415 COLL VENOUS BLD VENIPUNCTURE: CPT | Performed by: FAMILY MEDICINE

## 2021-09-16 PROCEDURE — G0008 ADMIN INFLUENZA VIRUS VAC: HCPCS | Performed by: FAMILY MEDICINE

## 2021-09-16 ASSESSMENT — MIFFLIN-ST. JEOR: SCORE: 1262.3

## 2021-09-16 NOTE — PATIENT INSTRUCTIONS

## 2021-09-16 NOTE — PROGRESS NOTES
[address] [address]  Phone: [phone]  Primary Provider: Lisbet Simmons  Pre-op Performing Provider: STEPHEN MAHAN    PREOPERATIVE EVALUATION:  Today's date: 9/16/2021    Aruna Santos is a 81 year old female who presents for a preoperative evaluation.    Surgical Information:  Surgery/Procedure: Left hand- Trigger Finger   Surgery Location: [facility]   Surgeon: Dr Charles  Surgery Date: 09/27/2021  Time of Surgery: TBD  Where patient plans to recover: At home with family  Fax number for surgical facility: [phone]    Type of Anesthesia Anticipated: to be determined    Assessment & Plan     The proposed surgical procedure is considered INTERMEDIATE risk.    Preop general physical exam    Trigger finger, acquired    Essential hypertension with goal blood pressure less than 140/90- well controlled  Controlled - continue medication.       Mild Sleep Apnea: uses dental appliance and can bring to surgery      Risks and Recommendations:  The patient has the following additional risks and recommendations for perioperative complications:   - No identified additional risk factors other than previously addressed    Medication Instructions:  Patient is to take all scheduled medications on the day of surgery EXCEPT for modifications listed below:   - aspirin: Discontinue aspirin 7-10 days prior to procedure to reduce bleeding risk. It should be resumed postoperatively.    - ACE/ARB: May be continued on the day of surgery.     RECOMMENDATION:  APPROVAL GIVEN to proceed with proposed procedure, without further diagnostic evaluation.      Johnathon Mahan MD     [address] [address]  Office: [phone]  [email or web address]         Subjective     HPI related to upcoming procedure: triggering finger      Preop Questions 9/16/2021   1.  Have you ever had a heart attack or stroke? No   2. Have you ever had surgery on your heart or blood vessels, such as a stent placement, a coronary artery bypass, or surgery on an artery in your head, neck, heart, or legs? No   3. Do you have chest pain with activity? No   4. Do you have a history of  heart failure? No   5. Do you currently have a cold, bronchitis or symptoms of other infection? No   6. Do you have a cough, shortness of breath, or wheezing? No   7. Do you or anyone in your family have previous history of blood clots? No   8. Do you or does anyone in your family have a serious bleeding problem such as prolonged bleeding following surgeries or cuts? No   9. Have you ever had problems with anemia or been told to take iron pills? No   10. Have you had any abnormal blood loss such as black, tarry or bloody stools, or abnormal vaginal bleeding? No   11. Have you ever had a blood transfusion? YES - 1981 after childbirth   11a. Have you ever had a transfusion reaction? No   12. Are you willing to have a blood transfusion if it is medically needed before, during, or after your surgery? Yes   13. Have you or any of your relatives ever had problems with anesthesia? No   14. Do you have sleep apnea, excessive snoring or daytime drowsiness? YES - has a sleep study ~ 15 years ago and uses a dental device.    14a. Do you have a CPAP machine? No   15. Do you have any artifical heart valves or other implanted medical devices like a pacemaker, defibrillator, or continuous glucose monitor? No   16. Do you have artificial joints? No   17. Are you allergic to latex? No     Health Care Directive:  Patient has a Health Care Directive on file      Preoperative Review of :   reviewed - no record of controlled substances prescribed.      Review of Systems  Constitutional, neuro, ENT, endocrine, pulmonary, cardiac, gastrointestinal, genitourinary, musculoskeletal, integument and psychiatric systems are negative, except  as otherwise noted.    Patient Active Problem List    Diagnosis Date Noted     CKD (chronic kidney disease) stage 3, GFR 30-59 ml/min 01/04/2018     Priority: High     Hyperlipidemia LDL goal <130 10/31/2010     Priority: High     Other specified hypothyroidism 01/01/2008     Priority: High     Essential hypertension with goal blood pressure less than 140/90- well controlled 01/01/2005     Priority: High     Dysuria- recurrent with urgency and frequency - not always infection - seeing Dr. 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 Hansen - [facility] q6months  01/27/2018     Priority: Medium     Seeing Dr. Hansen , dermatology n and oncology - Dr Selby for primary adnexal carcinoma of skin tomorrow. Had a PET scan = negative 3/2018.        Environmental allergies 05/25/2016     Priority: Medium     Tubular adenomas of colon- 2014 - repeat in 5/2018 s/p skin ca = 2 more tubular adenomas - repeat in 2023       Priority: Medium     Anxiety 12/01/2014     Priority: Medium     Glaucoma 03/19/2014     Priority: Medium     Vulvar itching - ? early lichen sclerosis vs. other - superior vulva 03/06/2014     Priority: Medium     Osteopenia, unspecified location - was on fosamax, then Boniva secondary to hot flashes - off boniva since  2013     Priority: Medium     Family history of colon cancer- mother in her 40's-colonoscopies every 5 years- next one       Priority: Medium     mother  of colon cancer in her 40's       Hematuria 2005     Priority: Medium     Problem list name updated by automated process. Provider to review and confirm  [employer] Update utility       Dyspnea and respiratory abnormality 2005     Priority: Medium     Problem list name updated by automated process. Provider to review       Advanced directives, counseling/discussion 10/24/2011     Priority: Low     Advance Directive Problem List Overview:   Name Relationship Phone    Primary Health Care Agent            Alternative Health Care Agent          Discussed advance care planning with patient; information given to patient to review. However pt did decline at this time. 10/24/2011           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     Skin cancer      Spider veins      Tubular adenoma of colon      - repeat in 2019 - q 5 years      Urgency incontinence      Past Surgical History:   Procedure Laterality Date     ARTHROSCOPY SHOULDER  2013    [facility]     CATARACT IOL, RT/LT Left 2018    LEFT side in 2018- the RIGHT 2020- Both Dr.Carter Pierson - ophthalmology      COLONOSCOPY  89,     Colonoscopies q 5 year -next      CYSTOCELE REPAIR  10/31/1984     CYSTOSCOPY       HYSTERECTOMY, PAP NO LONGER INDICATED  10/31/84    Hysterectomy, Total Abdominal w ovaries left intact     SURGICAL HISTORY OF -   75    Cholecystectomy & Incidental appendectomy      "wide excision -of primary Cutaneous adnexal Ca. 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 Hansen - [facility] q6months      Current Outpatient Medications   Medication Sig Dispense Refill     CALCIUM /VITAMIN D TABS   OR 2 qd       clobetasol (TEMOVATE) 0.05 % external ointment APPLY SPARINGLY TO AFFECTED AREA OF GENITALS TWICE WEEKLY AT NIGHTTIME 60 g 1     levobunolol (BETAGAN) 0.5 % ophthalmic solution        levothyroxine (SYNTHROID/LEVOTHROID) 88 MCG tablet TAKE 1 TABLET EVERY MORNING 90 tablet 3     lisinopril (ZESTRIL) 30 MG tablet TAKE 1 TABLET EVERY DAY 90 tablet 3     LORazepam (ATIVAN) 0.5 MG tablet Take 1 tablet (0.5 mg) by mouth 2 times daily Take 30 minutes prior to departure.  Do not operate a vehicle after taking this medication 10 tablet 0     metoprolol succinate ER (TOPROL-XL) 50 MG 24 hr tablet TAKE 1 TABLET EVERY DAY 90 tablet 3     MULTI-VITAMIN OR TABS 1 qd       oxybutynin ER (DITROPAN-XL) 10 MG 24 hr tablet Take 1-2 tablets (10-20 mg) by mouth daily 180 tablet 3     simvastatin (ZOCOR) 40 MG tablet Take 1 tablet (40 mg) by mouth At Bedtime 90 tablet 3     triamterene-HCTZ (MAXZIDE-25) 37.5-25 MG tablet TAKE 1 TABLET EVERY DAY 90 tablet 3       Allergies   Allergen Reactions     Macrobid [Nitrofurantoin] GI Disturbance     Prednisone      Sulfa Drugs Rash        Social History     Tobacco Use     Smoking status: Never Smoker     Smokeless tobacco: Never Used   Substance Use Topics     Alcohol use: No       History   Drug Use No         Objective     /78   Pulse 64   Temp 97.2  F (36.2  C)   Resp 16   Ht 1.702 m (5' 7\")   Wt 75.5 kg (166 lb 6 oz)   SpO2 99%   BMI 26.06 kg/m      Physical Exam    GENERAL APPEARANCE: healthy, alert and no distress     EYES: EOMI, PERRL     HENT: ear canals and TM's normal and nose and mouth without ulcers or lesions     NECK: no adenopathy, no asymmetry, " masses, or scars and thyroid normal to palpation     RESP: lungs clear to auscultation - no rales, rhonchi or wheezes     CV: regular rates and rhythm, normal S1 S2, no S3 or S4 and no murmur, click or rub     ABDOMEN:  soft, nontender, no HSM or masses and bowel sounds normal     MS: extremities normal- no gross deformities noted, no evidence of inflammation in joints, FROM in all extremities.     SKIN: no suspicious lesions or rashes     NEURO: Normal strength and tone, sensory exam grossly normal, mentation intact and speech normal     PSYCH: mentation appears normal. and affect normal/bright     LYMPHATICS: No cervical adenopathy    Recent Labs   Lab Test 06/04/21  0818 12/04/20  0926   HGB 13.1 14.1    232    137   POTASSIUM 4.5 4.4   CR 1.39* 1.12*        Diagnostics:  Labs pending at this time.  Results will be reviewed when available.   No EKG required, no history of coronary heart disease, significant arrhythmia, peripheral arterial disease or other structural heart disease.    Revised Cardiac Risk Index (RCRI):  The patient has the following serious cardiovascular risks for perioperative complications:   - No serious cardiac risks = 0 points     RCRI Interpretation: 0 points: Class I (very low risk - 0.4% complication rate)         Signed Electronically by: Tomas Mahan MD  Copy of this evaluation report is provided to requesting physician.

## 2021-09-17 LAB
ANION GAP SERPL CALCULATED.3IONS-SCNC: 8 MMOL/L (ref 3–14)
BUN SERPL-MCNC: 22 MG/DL (ref 7–30)
CALCIUM SERPL-MCNC: 10.1 MG/DL (ref 8.5–10.1)
CHLORIDE BLD-SCNC: 104 MMOL/L (ref 94–109)
CO2 SERPL-SCNC: 27 MMOL/L (ref 20–32)
CREAT SERPL-MCNC: 1.13 MG/DL (ref 0.52–1.04)
GFR SERPL CREATININE-BSD FRML MDRD: 46 ML/MIN/1.73M2
GLUCOSE BLD-MCNC: 98 MG/DL (ref 70–99)
POTASSIUM BLD-SCNC: 4.2 MMOL/L (ref 3.4–5.3)
SODIUM SERPL-SCNC: 139 MMOL/L (ref 133–144)

## 2021-09-30 ENCOUNTER — TELEPHONE (OUTPATIENT)
Dept: DERMATOLOGY | Facility: CLINIC | Age: 81
End: 2021-09-30

## 2021-09-30 NOTE — TELEPHONE ENCOUNTER
[facility] Call Center    Phone Message    May a detailed message be left on voicemail: yes     Reason for Call:  Pt has a new spot and wanted to be seen sooner, since she has a Hx. I looked and the soonest Appt is mid Dec. Pt is already scheduled for 01/14 for skin check. Pt asked to leave it for 01/14 Appt. Please call Pt to discuss. Is it ok to wait this long for a new spot?   Thank you    Action Taken: Message routed to:  [facility] ([facility] Derm    Travel Screening: Not Applicable

## 2021-10-01 NOTE — TELEPHONE ENCOUNTER
Called patient  Chest mole- feels like a pimple- round, raised, itches.  advised to take a photo and send via [employer]. We can have provider advise if its ok to wait until fse in January or patient can have pcp look at the area and send a message to Mary if its urgent  Patient will try to have her son help her upload a photo    Michelle BAILEYRN BSN  [facility]  [phone]

## 2021-10-25 ENCOUNTER — HOSPITAL ENCOUNTER (OUTPATIENT)
Dept: MAMMOGRAPHY | Facility: CLINIC | Age: 81
Discharge: HOME | End: 2021-10-25
Attending: FAMILY MEDICINE | Admitting: FAMILY MEDICINE
Payer: COMMERCIAL

## 2021-10-25 DIAGNOSIS — Z12.31 VISIT FOR SCREENING MAMMOGRAM: ICD-10-CM

## 2021-10-25 PROCEDURE — 77063 BREAST TOMOSYNTHESIS BI: CPT

## 2021-12-06 ENCOUNTER — OFFICE VISIT (OUTPATIENT)
Dept: FAMILY MEDICINE | Facility: CLINIC | Age: 81
End: 2021-12-06
Payer: COMMERCIAL

## 2021-12-06 VITALS
HEIGHT: 67 IN | OXYGEN SATURATION: 99 % | BODY MASS INDEX: 26.02 KG/M2 | DIASTOLIC BLOOD PRESSURE: 78 MMHG | WEIGHT: 165.8 LBS | TEMPERATURE: 97.2 F | SYSTOLIC BLOOD PRESSURE: 120 MMHG | HEART RATE: 76 BPM

## 2021-12-06 DIAGNOSIS — Z00.01 ENCOUNTER FOR ROUTINE ADULT MEDICAL EXAM WITH ABNORMAL FINDINGS: Primary | ICD-10-CM

## 2021-12-06 DIAGNOSIS — E78.5 HYPERLIPIDEMIA LDL GOAL <130: ICD-10-CM

## 2021-12-06 DIAGNOSIS — M72.0 DUPUYTREN'S CONTRACTURE OF BOTH HANDS: ICD-10-CM

## 2021-12-06 DIAGNOSIS — E03.8 OTHER SPECIFIED HYPOTHYROIDISM: ICD-10-CM

## 2021-12-06 DIAGNOSIS — E55.9 VITAMIN D DEFICIENCY: ICD-10-CM

## 2021-12-06 DIAGNOSIS — M79.18 RIGHT BUTTOCK PAIN: ICD-10-CM

## 2021-12-06 DIAGNOSIS — D12.6 TUBULAR ADENOMA OF COLON: ICD-10-CM

## 2021-12-06 DIAGNOSIS — N18.31 STAGE 3A CHRONIC KIDNEY DISEASE (H): ICD-10-CM

## 2021-12-06 DIAGNOSIS — C44.90 PRIMARY ADNEXAL CARCINOMA OF SKIN: ICD-10-CM

## 2021-12-06 DIAGNOSIS — F43.21 GRIEF: ICD-10-CM

## 2021-12-06 DIAGNOSIS — Z78.0 ASYMPTOMATIC MENOPAUSAL STATE: ICD-10-CM

## 2021-12-06 DIAGNOSIS — R94.4 DECREASED GFR: ICD-10-CM

## 2021-12-06 DIAGNOSIS — I10 ESSENTIAL HYPERTENSION WITH GOAL BLOOD PRESSURE LESS THAN 140/90: ICD-10-CM

## 2021-12-06 DIAGNOSIS — C49.0 MALIGNANT NEOPLASM OF CONNECTIVE AND SOFT TISSUE OF HEAD, FACE AND NECK (H): ICD-10-CM

## 2021-12-06 DIAGNOSIS — M85.80 OSTEOPENIA, UNSPECIFIED LOCATION: ICD-10-CM

## 2021-12-06 LAB
ALBUMIN SERPL-MCNC: 3.8 G/DL (ref 3.4–5)
ALP SERPL-CCNC: 46 U/L (ref 40–150)
ALT SERPL W P-5'-P-CCNC: 25 U/L (ref 0–50)
ANION GAP SERPL CALCULATED.3IONS-SCNC: 6 MMOL/L (ref 3–14)
AST SERPL W P-5'-P-CCNC: 17 U/L (ref 0–45)
BASOPHILS # BLD AUTO: 0.1 10E3/UL (ref 0–0.2)
BASOPHILS NFR BLD AUTO: 1 %
BILIRUB SERPL-MCNC: 0.8 MG/DL (ref 0.2–1.3)
BUN SERPL-MCNC: 26 MG/DL (ref 7–30)
CALCIUM SERPL-MCNC: 9.9 MG/DL (ref 8.5–10.1)
CHLORIDE BLD-SCNC: 103 MMOL/L (ref 94–109)
CO2 SERPL-SCNC: 28 MMOL/L (ref 20–32)
CREAT SERPL-MCNC: 1.22 MG/DL (ref 0.52–1.04)
CREAT UR-MCNC: 66 MG/DL
EOSINOPHIL # BLD AUTO: 0.2 10E3/UL (ref 0–0.7)
EOSINOPHIL NFR BLD AUTO: 4 %
ERYTHROCYTE [DISTWIDTH] IN BLOOD BY AUTOMATED COUNT: 12.1 % (ref 10–15)
GFR SERPL CREATININE-BSD FRML MDRD: 42 ML/MIN/1.73M2
GLUCOSE BLD-MCNC: 111 MG/DL (ref 70–99)
HCT VFR BLD AUTO: 41.9 % (ref 35–47)
HGB BLD-MCNC: 14.2 G/DL (ref 11.7–15.7)
IMM GRANULOCYTES # BLD: 0 10E3/UL
IMM GRANULOCYTES NFR BLD: 0 %
LYMPHOCYTES # BLD AUTO: 1.7 10E3/UL (ref 0.8–5.3)
LYMPHOCYTES NFR BLD AUTO: 28 %
MCH RBC QN AUTO: 30.7 PG (ref 26.5–33)
MCHC RBC AUTO-ENTMCNC: 33.9 G/DL (ref 31.5–36.5)
MCV RBC AUTO: 91 FL (ref 78–100)
MICROALBUMIN UR-MCNC: <5 MG/L
MICROALBUMIN/CREAT UR: NORMAL MG/G{CREAT}
MONOCYTES # BLD AUTO: 0.6 10E3/UL (ref 0–1.3)
MONOCYTES NFR BLD AUTO: 10 %
NEUTROPHILS # BLD AUTO: 3.4 10E3/UL (ref 1.6–8.3)
NEUTROPHILS NFR BLD AUTO: 58 %
PLATELET # BLD AUTO: 236 10E3/UL (ref 150–450)
POTASSIUM BLD-SCNC: 4.2 MMOL/L (ref 3.4–5.3)
PROT SERPL-MCNC: 7.8 G/DL (ref 6.8–8.8)
RBC # BLD AUTO: 4.62 10E6/UL (ref 3.8–5.2)
SODIUM SERPL-SCNC: 137 MMOL/L (ref 133–144)
T3 SERPL-MCNC: 100 NG/DL (ref 60–181)
T4 FREE SERPL-MCNC: 1.26 NG/DL (ref 0.76–1.46)
TSH SERPL DL<=0.005 MIU/L-ACNC: 1.77 MU/L (ref 0.4–4)
WBC # BLD AUTO: 5.9 10E3/UL (ref 4–11)

## 2021-12-06 PROCEDURE — 82043 UR ALBUMIN QUANTITATIVE: CPT | Performed by: FAMILY MEDICINE

## 2021-12-06 PROCEDURE — 80061 LIPID PANEL: CPT | Performed by: FAMILY MEDICINE

## 2021-12-06 PROCEDURE — 84443 ASSAY THYROID STIM HORMONE: CPT | Performed by: FAMILY MEDICINE

## 2021-12-06 PROCEDURE — 36415 COLL VENOUS BLD VENIPUNCTURE: CPT | Performed by: FAMILY MEDICINE

## 2021-12-06 PROCEDURE — 85025 COMPLETE CBC W/AUTO DIFF WBC: CPT | Performed by: FAMILY MEDICINE

## 2021-12-06 PROCEDURE — 80053 COMPREHEN METABOLIC PANEL: CPT | Performed by: FAMILY MEDICINE

## 2021-12-06 PROCEDURE — 99214 OFFICE O/P EST MOD 30 MIN: CPT | Mod: 25 | Performed by: FAMILY MEDICINE

## 2021-12-06 PROCEDURE — 84439 ASSAY OF FREE THYROXINE: CPT | Performed by: FAMILY MEDICINE

## 2021-12-06 PROCEDURE — 82306 VITAMIN D 25 HYDROXY: CPT | Performed by: FAMILY MEDICINE

## 2021-12-06 PROCEDURE — 99397 PER PM REEVAL EST PAT 65+ YR: CPT | Performed by: FAMILY MEDICINE

## 2021-12-06 PROCEDURE — 84480 ASSAY TRIIODOTHYRONINE (T3): CPT | Performed by: FAMILY MEDICINE

## 2021-12-06 ASSESSMENT — ENCOUNTER SYMPTOMS
DIZZINESS: 0
HEMATURIA: 0
PARESTHESIAS: 0
ABDOMINAL PAIN: 0
HEMATOCHEZIA: 0
JOINT SWELLING: 0
DYSURIA: 0
NAUSEA: 0
HEARTBURN: 0
ARTHRALGIAS: 0
FREQUENCY: 1
CHILLS: 0
DIARRHEA: 0
CONSTIPATION: 0
EYE PAIN: 0
FEVER: 0
HEADACHES: 0
COUGH: 0
PALPITATIONS: 0
MYALGIAS: 0

## 2021-12-06 ASSESSMENT — ANXIETY QUESTIONNAIRES
1. FEELING NERVOUS, ANXIOUS, OR ON EDGE: NOT AT ALL
5. BEING SO RESTLESS THAT IT IS HARD TO SIT STILL: NOT AT ALL
IF YOU CHECKED OFF ANY PROBLEMS ON THIS QUESTIONNAIRE, HOW DIFFICULT HAVE THESE PROBLEMS MADE IT FOR YOU TO DO YOUR WORK, TAKE CARE OF THINGS AT HOME, OR GET ALONG WITH OTHER PEOPLE: NOT DIFFICULT AT ALL
6. BECOMING EASILY ANNOYED OR IRRITABLE: NOT AT ALL
2. NOT BEING ABLE TO STOP OR CONTROL WORRYING: SEVERAL DAYS
3. WORRYING TOO MUCH ABOUT DIFFERENT THINGS: NOT AT ALL
GAD7 TOTAL SCORE: 1
7. FEELING AFRAID AS IF SOMETHING AWFUL MIGHT HAPPEN: NOT AT ALL

## 2021-12-06 ASSESSMENT — PATIENT HEALTH QUESTIONNAIRE - PHQ9: 5. POOR APPETITE OR OVEREATING: NOT AT ALL

## 2021-12-06 ASSESSMENT — MIFFLIN-ST. JEOR: SCORE: 1259.69

## 2021-12-06 ASSESSMENT — ACTIVITIES OF DAILY LIVING (ADL): CURRENT_FUNCTION: NO ASSISTANCE NEEDED

## 2021-12-06 NOTE — PROGRESS NOTES
"SUBJECTIVE:   Aruna Santos is a 81 year old female who presents for Preventive Visit and the following other medical problems:      1. Encounter for routine adult medical exam with abnormal findings    2. Right buttock pain- hx of sciatica in the past - not radiating further right now - no new bowel or bladder control problems     3. Essential hypertension with goal blood pressure less than 140/90- well controlled    4. Hyperlipidemia LDL goal <130    5. Other specified hypothyroidism    6. Tubular adenomas of colon- 2014 - repeat in 5/2018 s/p skin ca = 2 more tubular adenomas - repeat in 2023     7. Stage 3a chronic kidney disease (H)    8. 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 Hansen - [facility]    9. Osteopenia, unspecified location - was on fosamax, then Boniva secondary to hot flashes - off boniva since 2012    10. Decreased GFR    11. Primary adnexal carcinoma of skin    12. Vitamin D deficiency    13. Dupuytren's contracture of both hands - s/p surgery on the left hand 3rd and 4th MCP joint areas in 9/2021     14. Asymptomatic menopausal state      Has refills on all her medications available until 6/4/2021.       Patient has been advised of split billing requirements and indicates understanding: Yes   Are you in the first 12 months of your Medicare coverage?  No    Healthy Habits:     In general, how would you rate your overall health?  Good    Frequency of exercise:  None    Do you usually eat at least 4 servings of fruit and vegetables a day, include whole grains    & fiber and avoid regularly eating high fat or \"junk\" foods?  Yes    Taking medications regularly:  Yes    Medication side effects:  None    Ability to successfully perform activities of daily living:  No assistance needed    Home Safety:  No safety concerns identified    Hearing Impairment:  No " hearing concerns    In the past 6 months, have you been bothered by leaking of urine? Yes    In general, how would you rate your overall mental or emotional health?  Good      PHQ-2 Total Score: 1    Additional concerns today:  Yes    She is still actively grieving for her son, Ronen, who  @ 55 on 3/24/2021with pt by his side - 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 - was  on warfarin - never  - lived  with pt and    isnow  86 and has memory issues.     Pt declines any medication or referral for therapy at this time. Encouraged pt     Do you feel safe in your environment? Yes    Have you ever done Advance Care Planning? (For example, a Health Directive, POLST, or a discussion with a medical provider or your loved ones about your wishes): Yes, advance care planning is on file.       Fall risk  Fallen 2 or more times in the past year?: No  Any fall with injury in the past year?: No    Cognitive Screening   1) Repeat 3 items (Leader, Season, Table)    2) Clock draw:   NORMAL  3) 3 item recall: Recalls 3 objects  Results: 3 items recalled: COGNITIVE IMPAIRMENT LESS LIKELY    Mini-CogTM Copyright S [employer]. Licensed by the author for use in [facility]; reprinted with permission (octavia@.[facility]). All rights reserved.      Do you have sleep apnea, excessive snoring or daytime drowsiness?: yes    Reviewed and updated as needed this visit by clinical staff  Tobacco  Allergies  Meds  Problems  Med Hx  Surg Hx  Fam Hx  Soc Hx         Reviewed and updated as needed this visit by Provider  Tobacco  Allergies  Meds  Problems  Med Hx  Surg Hx  Fam Hx        Social History     Tobacco Use     Smoking status: Never Smoker     Smokeless tobacco: Never Used   Substance Use Topics     Alcohol use: No         Alcohol Use 2021   Prescreen: >3 drinks/day or >7 drinks/week? No   Prescreen: >3 drinks/day or >7 drinks/week? -       Joint or Musculoskeletal Pain  Duration of  complaint: x2 wks   Description:   Location: right buttock area. ? Like previous sciatica, but doesn't radiate further.   Character: Gnawing  Intensity: moderate  Progression of Symptoms: same  Accompanying Signs & Symptoms: Other symptoms: none  History: Previous similar pain: YES    Precipitating factors: Trauma or overuse: no   Alleviating factors: Improved by: stretching  Therapies Tried and outcome: nothing.     Hypertension Follow-up:        Do you check your blood pressure regularly outside of the clinic? No     Are you following a low salt diet? Yes    Are your blood pressures ever more than 140 on the top number (systolic) OR more   than 90 on the bottom number (diastolic), for example 140/90? No    Hypothyroidism Follow-up:       Since last visit, patient describes the following symptoms: Weight stable, no hair loss, no skin changes, no constipation, no loose stools    TSH   Date Value Ref Range Status   06/04/2021 2.03 0.40 - 4.00 mU/L Final      Having more difficulty falling asleep. Discussed.   Difficulty turning off the day.     Hyperlipidemia Follow-Up:       Are you regularly taking any medication or supplement to lower your cholesterol?   Yes- simvistatin    Are you having muscle aches or other side effects that you think could be caused by your cholesterol lowering medication?  No    Recent Labs   Lab Test 06/04/21  0818 12/04/20  0926 07/25/16  0730 07/23/15  1016 06/27/14  0929   CHOL 199 237*   < > 194 212*   HDL 53 50   < > 46* 48*   * 125*   < > 91 114   TRIG 178* 312*   < > 285* 251*   CHOLHDLRATIO  --   --   --  4.2 4.4    < > = values in this interval not displayed.         providers sharing in care for this patient include:   Patient Care Team:  Lisbet Simmons MD as PCP - General (Family Practice)  Lisbet Simmons MD as Assigned PCP  Mary Miranda PA-C as Assigned Surgical Provider    The following health maintenance items are reviewed in Epic and correct as  of today:  Health Maintenance Due   Topic Date Due     FALL RISK ASSESSMENT  12/04/2021     Lab work is in process  Labs reviewed in EPIC  BP Readings from Last 3 Encounters:   12/06/21 120/78   09/16/21 134/78   08/24/21 126/72    Wt Readings from Last 3 Encounters:   12/06/21 75.2 kg (165 lb 12.8 oz)   09/16/21 75.5 kg (166 lb 6 oz)   08/24/21 74.8 kg (165 lb)                  Patient Active Problem List   Diagnosis     Hematuria     Dyspnea and respiratory abnormality     Hyperlipidemia LDL goal <130     Advanced directives, counseling/discussion     Essential hypertension with goal blood pressure less than 140/90- well controlled     Family history of colon cancer- mother in her 40's-colonoscopies every 5 years- next one 2023     Osteopenia, unspecified location - was on fosamax, then Boniva secondary to hot flashes - off boniva since 2012     Vulvar itching - ? early lichen sclerosis vs. 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 Tyrone Dooley [facility] q6months      Myalgia of pelvic floor     Vaginal vault prolapse     Personal history of urinary tract infection     Dysuria- recurrent with urgency and frequency - not always infection - seeing Dr. 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 Tyrone Dooley [facility]     Rectal prolapse     Sebaceous cyst- left medial upper breast      Dupuytren's contracture of both hands - s/p surgery on the left hand 3rd and 4th MCP joint areas in 9/2021      Vitamin D  deficiency     Past Surgical History:   Procedure Laterality Date     ARTHROSCOPY SHOULDER  2013    [facility]     CATARACT IOL, RT/LT Left 2018    LEFT side in 2018- the RIGHT 2020- Both Dr.Carter Pierson - ophthalmology      COLONOSCOPY  89,     Colonoscopies q 5 year -next      CYSTOCELE REPAIR  10/31/1984     CYSTOSCOPY       HYSTERECTOMY, PAP NO LONGER INDICATED  10/31/84    Hysterectomy, Total Abdominal w ovaries left intact     RELEASE TRIGGER FINGER Left 2021     SURGICAL HISTORY OF -   75    Cholecystectomy & Incidental appendectomy     wide excision -of primary Cutaneous adnexal Ca. 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 Hansen - [facility] q6months        Social History     Tobacco Use     Smoking status: Never Smoker     Smokeless tobacco: Never Used   Substance Use Topics     Alcohol use: No     Family History   Problem Relation Age of Onset     Cancer - colorectal Mother      Hypertension Father      Cerebrovascular Disease Maternal Grandfather      Cerebrovascular Disease Paternal Grandmother      Cancer Son 44         @ 55- 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 - now on warfarin - never  - lives with pt and      Family History Negative Son          Current Outpatient Medications   Medication Sig Dispense Refill     CALCIUM /VITAMIN D TABS   OR 2 qd       clobetasol (TEMOVATE) 0.05 % external ointment APPLY SPARINGLY TO AFFECTED AREA OF GENITALS TWICE WEEKLY AT NIGHTTIME 60 g 1     levobunolol (BETAGAN) 0.5 % ophthalmic solution        levothyroxine (SYNTHROID/LEVOTHROID) 88 MCG tablet TAKE 1 TABLET EVERY MORNING 90 tablet 3     lisinopril (ZESTRIL) 30 MG tablet TAKE 1 TABLET EVERY DAY 90 tablet 3     LORazepam (ATIVAN) 0.5 MG tablet Take 1 tablet (0.5 mg) by mouth 2 times daily Take 30 minutes prior to departure.  Do not operate a vehicle  after taking this medication 10 tablet 0     metoprolol succinate ER (TOPROL-XL) 50 MG 24 hr tablet TAKE 1 TABLET EVERY DAY 90 tablet 3     MULTI-VITAMIN OR TABS 1 qd       oxybutynin ER (DITROPAN-XL) 10 MG 24 hr tablet Take 1-2 tablets (10-20 mg) by mouth daily 180 tablet 3     simvastatin (ZOCOR) 40 MG tablet Take 1 tablet (40 mg) by mouth At Bedtime 90 tablet 3     triamterene-HCTZ (MAXZIDE-25) 37.5-25 MG tablet TAKE 1 TABLET EVERY DAY 90 tablet 3     Allergies   Allergen Reactions     Macrobid [Nitrofurantoin] GI Disturbance     Prednisone      Sulfa Drugs Rash     Recent Labs   Lab Test 09/16/21  1233 06/04/21  0818 12/04/20  0926 05/21/20  1042   LDL  --  110* 125* 110*   HDL  --  53 50 53   TRIG  --  178* 312* 251*   ALT  --  24 29 25   CR 1.13* 1.39* 1.12* 1.09*   GFRESTIMATED 46* 36* 47* 49*   GFRESTBLACK  --  42* 54* 56*   POTASSIUM 4.2 4.5 4.4 4.5   TSH  --  2.03 2.26 1.90      Pneumonia Vaccine:Adults age 65+ who received Pneumovax (PPSV23) at 65 years or older: Should be given PCV13 > 1 year after their most recent PPSV23  Mammogram Screening: Mammogram Screening: Recommended mammography every 1-2 years with patient discussion and risk factor consideration  History of abnormal Pap smear: NO - age 65 - see link Cervical Cytology Screening Guidelines    Mammogram Screening - Patient over age 75, has elected to continue with screening.  Pertinent mammograms are reviewed under the imaging tab.    Review of Systems   Constitutional: Negative for chills and fever.   HENT: Negative for congestion, ear pain and hearing loss.    Eyes: Negative for pain.   Respiratory: Negative for cough.    Cardiovascular: Negative for chest pain, palpitations and peripheral edema.   Gastrointestinal: Negative for abdominal pain, constipation, diarrhea, heartburn, hematochezia and nausea.   Genitourinary: Positive for frequency. Negative for dysuria, genital sores and hematuria.   Musculoskeletal: Negative for arthralgias, joint  "swelling and myalgias.   Neurological: Negative for dizziness, headaches and paresthesias.   Psychiatric/Behavioral: Negative for mood changes.       OBJECTIVE:   /78   Pulse 76   Temp 97.2  F (36.2  C)   Ht 1.702 m (5' 7\")   Wt 75.2 kg (165 lb 12.8 oz)   SpO2 99%   BMI 25.97 kg/m   Estimated body mass index is 25.97 kg/m  as calculated from the following:    Height as of this encounter: 1.702 m (5' 7\").    Weight as of this encounter: 75.2 kg (165 lb 12.8 oz).  Physical Exam  GENERAL APPEARANCE: healthy, alert and no distress  EYES: Eyes grossly normal to inspection, PERRL and conjunctivae and sclerae normal  HENT: ear canals and TM's normal, nose and mouth without ulcers or lesions, oropharynx clear and oral mucous membranes moist  NECK: no adenopathy, no asymmetry, masses, or scars and thyroid normal to palpation  RESP: lungs clear to auscultation - no rales, rhonchi or wheezes  BREAST: normal without masses, tenderness or nipple discharge and no palpable axillary masses or adenopathy  CV: regular rate and rhythm, normal S1 S2, no S3 or S4, no murmur, click or rub, no peripheral edema and peripheral pulses strong  ABDOMEN: soft, nontender, no hepatosplenomegaly, no masses and bowel sounds normal  MS: no musculoskeletal defects are noted and gait is age appropriate without ataxia  SKIN: no suspicious lesions or rashes  NEURO: Normal strength and tone, sensory exam grossly normal, mentation intact and speech normal  PSYCH: mentation appears normal and affect normal/bright    Diagnostic Test Results:  Labs reviewed in Epic    ASSESSMENT / PLAN:       ICD-10-CM    1. Encounter for routine adult medical exam with abnormal findings  Z00.01    2. Right buttock pain- hx of sciatica in the past - not radiating further right now - no new bowel or bladder control problems   M79.18 OFFICE/OUTPT VISIT,EST,LEVL IV   3. Essential hypertension with goal blood pressure less than 140/90- well controlled  I10 Albumin " Random Urine Quantitative with Creat Ratio     CBC with Platelets & Differential     Comprehensive metabolic panel     OFFICE/OUTPT VISIT,EST,LEVL IV   4. Hyperlipidemia LDL goal <130  E78.5 Comprehensive metabolic panel     Lipid panel reflex to direct LDL Fasting     OFFICE/OUTPT VISIT,EST,LEVL IV   5. Other specified hypothyroidism  E03.8 T3 total     T4 free     TSH     OFFICE/OUTPT VISIT,EST,LEVL IV   6. Tubular adenomas of colon-  - repeat in 2018 s/p skin ca = 2 more tubular adenomas - repeat in    D12.6    7. Stage 3a chronic kidney disease (H)  N18.31 CBC with Platelets & Differential     Comprehensive metabolic panel     OFFICE/OUTPT VISIT,EST,LEVL IV   8. 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 Hansen - [facility]  C49.0    9. Osteopenia, unspecified location - was on fosamax, then Boniva secondary to hot flashes - off boniva since   M85.80 OFFICE/OUTPT VISIT,EST,LEVL IV   10. Decreased GFR  R94.4 OFFICE/OUTPT VISIT,EST,LEVL IV   11. Primary adnexal carcinoma of skin  C44.90 Comprehensive metabolic panel   12. Vitamin D deficiency  E55.9 25 Hydroxyvitamin D2 and D3     OFFICE/OUTPT VISIT,EST,LEVL IV   13. Dupuytren's contracture of both hands - s/p surgery on the left hand 3rd and 4th MCP joint areas in 2021   M72.0    14. Asymptomatic menopausal state  Z78.0 DX Hip/Pelvis/Spine   15. 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    Weight as of this encounter: 75.2 kg (165 lb 12.8 oz).    She reports that she has never smoked. She has never used smokeless tobacco.    Appropriate preventive services were discussed with this patient, including applicable screening as appropriate for cardiovascular disease, diabetes, osteopenia/osteoporosis, and glaucoma.  As appropriate for age/gender, discussed screening for colorectal cancer, prostate cancer, breast cancer, and cervical cancer. Checklist reviewing preventive services available has been given to the patient.    Reviewed patients plan of care and provided an AVS. The Complex Care Plan (for patients with higher acuity and needing more deliberate coordination of services) for Aruna meets the Care Plan requirement. This Care Plan has been established and reviewed with the Patient.    Counseling Resources:  ATP IV Guidelines  Pooled Cohorts Equation Calculator  Breast Cancer Risk Calculator  Breast Cancer: Medication to Reduce Risk  FRAX Risk Assessment  ICSI Preventive Guidelines  Dietary Guidelines for Americans, 2010  USDA's MyPlate  ASA Prophylaxis  Lung CA Screening          Lisbet Simmons MD  [facility]    Identified Health Risks:  "

## 2021-12-06 NOTE — PATIENT INSTRUCTIONS
[facility]  [address] [place] [id number]  Office: [phone]   Fax:    [phone]       Ok to tylenol at night.     If you desire to try  melatonin for sleep:   Try  Nature's Made or other [employer]P certified Melatonin for sleep:  take 30-60 minutes prior to bedtime.  Start with 3-5mg at night x 2 nights, then increase to 6-10mg nightly for 2 nights, then 9mg nightly for 2 nights, then 12-15mg nightly x 2 nights, etc, increasing by 3-5 mg every 2 nights. Max dose is absolutely 18-20mg nightly.   You can stop at whatever milligram dosage prior to 18-20mg works well for you.        Back Exercises: Abdominal Lift  The Abdominal Lift strengthens your lower abdominal muscles, helping you keep your pelvis and back stable.    Lie on the floor with both knees bent. Put your feet flat on the floor and your arms by your sides. Tighten your abdominal muscles.    Lift one bent knee and move it toward your upper body. Keep your abdominal muscles tight and your back flat on the floor. Hold for 10 seconds.    Repeat 3 times. Then, switch legs.       4606-7171 The [employer]. [address] [place] [address]. All rights reserved. This information is not intended as a substitute for professional medical care. Always follow your healthcare professional's instructions.        Back Exercises: Seated Rotation      To start, sit in a chair with your feet flat on the floor. Shift your weight slightly forward to avoid rounding your back. Relax, and keep your ears, shoulders, and hips aligned.    Fold your arms and elbows just below shoulder height.    Turn from the waist with hips forward. Turn your head last. Do not push through the pain.    Hold for a count of 5. Return to starting position.    Repeat 5 times on one side. Then switch sides.    1122-2843 The [employer]. [address] [place] [address]. All rights reserved. This information is not intended as a  substitute for professional medical care. Always follow your healthcare professional's instructions.        Back Exercises: Pelvic Tilt  To start, lie on your back with your knees bent and feet flat on the floor. Don t press your neck or lower back to the floor. Breathe deeply. You should feel comfortable and relaxed in this position.    Tighten your abdomen and buttocks, and press your lower back toward the floor. This should be a small, subtle movement. This should not increase your pain.    Hold for 5 seconds. Release.    Repeat 5 times.           8032-9776 The [employer]. [address] [place] [address]. All rights reserved. This information is not intended as a substitute for professional medical care. Always follow your healthcare professional's instructions.        Back Exercises: Partial Curl-Ups        To start, lie on your back with your knees bent and feet flat on the floor. Don t press your neck or lower back to the floor. Breathe deeply. You should feel comfortable and relaxed in this position.    Cross your arms loosely.    Tighten your abdomen and curl [facility] up, keeping your head in line with your shoulders.    Hold for 5 seconds. Uncurl to lie down.    Repeat 5 times.     4284-7744 The [employer]. [address] [place] [address]. All rights reserved. This information is not intended as a substitute for professional medical care. Always follow your healthcare professional's instructions.        Back Exercises: Lower Back Stretch                        To start, sit in a chair with your feet flat on the floor. Shift your weight slightly forward to avoid rounding your back. Relax, and keep your ears, shoulders, and hips aligned.    Sit with your feet well apart.    Bend forward and touch the floor with the backs of your hands. Relax and let your body drop.    Hold for 20 seconds. Return to starting position.    Repeat 2 times.     0309-9457 The [employer]  [employer]. [address] [place] [address]. All rights reserved. This information is not intended as a substitute for professional medical care. Always follow your healthcare professional's instructions.        Back Exercises: Lower Back Rotation  To start, lie on your back with your knees bent and feet flat on the floor. Don t press your neck or lower back to the floor. Breathe deeply. You should feel comfortable and relaxed in this position.    Drop both knees to one side. Turn your head to the other side. Keep your shoulders flat on the floor.    Do not push through pain.    Hold for 20 seconds.    Slowly switch sides.    Repeat 2 times.                             7401-5306 The [employer]. [address] [place] [address]. All rights reserved. This information is not intended as a substitute for professional medical care. Always follow your healthcare professional's instructions.        Back Exercises: Leg Reach        Do this exercise on your hands and knees. Keep your knees under your hips and your hands under your shoulders. Keep your spine in a neutral position (not arched or sagging). Be sure to maintain your neck s natural curve.    Extend one leg straight back. Don t arch your back or let your head or body sag.    Hold for 5 seconds. Return to starting position.    Repeat 5 times.    Switch legs.     0437-1466 The [employer]. [address] [place] [address]. All rights reserved. This information is not intended as a substitute for professional medical care. Always follow your healthcare professional's instructions.        Back Exercises: Leg Pull        To start, lie on your back with your knees bent and feet flat on the floor. Don t press your neck or lower back to the floor. Breathe deeply. You should feel comfortable and relaxed in this position.    Pull one knee to your chest.    Hold for 30-60 seconds. Return to starting  position.    Repeat 2 times.    Switch legs.    For a double leg pull, pull both legs to your chest at the same time. Repeat 2 times.  For your safety, check with your healthcare provider before starting an exercise program.     0633-9092 [employer]. [address] [place] [address]. All rights reserved. This information is not intended as a substitute for professional medical care. Always follow your healthcare professional's instructions.        Back Exercises: Knee Lift        To start, lie on your back with your knees bent and feet flat on the floor. Don t press your neck or lower back to the floor. Breathe deeply. You should feel comfortable and relaxed in this position.    Lift one bent knee and move it toward your upper body. Keep your abdominal muscles tight and your back flat on the floor.    Hold for 10 seconds. Return to start position.    Repeat 3 times.    Switch legs.    2850-6767 [employer]. [address] [place] [address]. All rights reserved. This information is not intended as a substitute for professional medical care. Always follow your healthcare professional's instructions.        Back Exercises: Hip Rotator Stretch        To start, lie on your back with your knees bent and feet flat on the floor. Don t press your neck or lower back to the floor. Breathe deeply. You should feel comfortable and relaxed in this position.    Rest your right ankle on your left knee.    Place a towel behind your left thigh and use it to pull the knee toward your chest. Feel the stretch in your buttocks.    Hold for 30-60 seconds. Release.    Repeat 2 times.    Switch legs.   For your safety, check with your healthcare provider before starting an exercise program.     9972-1945 [employer]. [address] [place] [address]. All rights reserved. This information is not intended as a substitute for professional medical care. Always follow your  healthcare professional's instructions.        Back Exercises: Hip Lift        To start, lie on your back with your knees bent and feet flat on the floor. Don t press your neck or lower back to the floor. Breathe deeply. You should feel comfortable and relaxed in this position.    Slowly raise your hips upward.    Tighten your abdomen and buttocks. Be careful not to arch your back.    Hold for 5 seconds. Lower your hips to the floor.    Repeat 10 times.  For your safety, check with your healthcare provider before starting an exercise program.     3625-0429 [employer]. [address] [place] [address]. All rights reserved. This information is not intended as a substitute for professional medical care. Always follow your healthcare professional's instructions.        Back Exercises: Back Extension with Elbow Press    To start, lie face down on your stomach, feet slightly apart, forehead on the floor. Breathe deeply. You should feel comfortable and relaxed in this position.    Press up on your forearms. Keep your abdomen and hips on the floor. Stay within your painfree range.    Hold for 20 seconds. Lower slowly.    Repeat 2 times.    Return to starting position.    9005-9444 The [employer]. [address] [place] [address]. All rights reserved. This information is not intended as a substitute for professional medical care. Always follow your healthcare professional's instructions.        Back Exercises: Bridge  The Bridge exercise strengthens your abdominal, buttocks, and hamstring muscles. This helps keep your back stable and aligned when you walk.    Lie on the floor with your back and palms flat. Bend your knees. Keep your feet flat on the floor.    Contract your abdominal and buttocks muscles. Slowly lift your buttocks off the floor until there is a straight line from your knees to your shoulders.    Hold for 5  seconds. Repeat 10 times.      0917-3528 The [employer]  [employer]. [address] [place] [address]. All rights reserved. This information is not intended as a substitute for professional medical care. Always follow your healthcare professional's instructions.        Back Exercises: Back Release  Do this exercise on your hands and knees. Keep your knees under your hips and your hands under your shoulders. Keep your spine in a neutral position (not arched or sagging). Be sure to maintain your neck s natural curve.    Relax your abdominal and buttocks muscles, lift your head, and let your back sag. Be sure to keep your weight evenly distributed. Don t sit back on your hips.     Hold for 5 seconds.    Return to starting position.    Repeat 5 times.    6312-6244 The [employer]. [address] [place] [address]. All rights reserved. This information is not intended as a substitute for professional medical care. Always follow your healthcare professional's instructions.      Patient Education     Sleep and Women     Taking a warm bath can help you relax before bed.     Do you have trouble sleeping? Many women do. Some life changes are unique to women, such as pregnancy or menopause. These changes, along with the demands of family and work, can affect your health and your sleep. Talk to your healthcare provider if your sleep problems last more than a few weeks.  What affects your sleep  Many factors can affect how well you sleep. Hormone changes can cause mood swings, insomnia, and other problems. Balancing many roles, such as mother, partner, worker, and caretaker can also take a toll on your sleep. Worries about these competing demands can keep you awake at night. And, of course, with so much to do, who even has time for sleep? But you need to sleep well to be healthy and have energy. The good news is there are steps you can take to sleep better.  Tips for better sleep  Here are some steps you can take to sleep better:    Keep a regular sleep  schedule. Go to bed and get up at the same time each day.    Exercise regularly. But avoid strenuous exercise 2 hours to 4 hours before bedtime.    Learn to relax. Try a warm bath, yoga, or meditation. Reading a book or listening to music can help you relax before bedtime.    Create a comfortable setting for sleep. Make sure the room is quiet, dark, and not too hot or too cold.    Use your bed only for sleep and sex.    Avoid or limit caffeine, nicotine, and alcohol.    Avoid naps after 3 p.m.  Resources  American Academy of Sleep Medicine [phone]   National Sleep Foundation [phone]   Jose Armando last reviewed this educational content on 3/1/2017    1528-9429 The [employer]. [address] [place] [address]. All rights reserved. This information is not intended as a substitute for professional medical care. Always follow your healthcare professional's instructions.        Patient Education     Understanding Lumbar Radiculopathy    Lumbar radiculopathy is irritation or inflammation of a nerve root in the low back. It causes symptoms that spread out from the back down one or both legs. To understand this condition, it helps to understand the parts of the spine:    Vertebrae. These are bones that stack to form the spine. The lumbar spine contains 5 vertebrae near the bottom of your spine.    Disks. These are soft pads of tissue between the vertebrae. They act as shock absorbers for the spine.    Spinal canal. This is a tunnel formed within the stacked vertebrae. In the lumbar spine, nerves run through this canal.    Nerves. These branch off and leave the spinal canal, traveling out to parts of the body. As they leave the spinal canal, nerves pass through openings between the vertebrae. The nerve root is the part of the nerve that is closest to the spinal canal.    Sciatic nerve. This is a large nerve formed from several nerve roots in the low back. This nerve extends down the back of the leg  to the foot.  With lumbar radiculopathy, nerve roots in the low back become irritated. This leads to pain and symptoms. The sciatic nerve is commonly involved, so the condition is often called sciatica.  What causes lumbar radiculopathy?  Aging, injury, poor posture, extra body weight, and other issues can lead to problems in the low back. These problems may then irritate nerve roots. They include:    Damage to a disk in the lumbar spine. The damaged disk may then press on nearby nerve roots.    Degeneration from wear and tear, and aging. This can lead to narrowing (stenosis) of the openings between the vertebrae. The narrowed openings press on nerve roots as they leave the spinal canal.    Unstable spine. This is when a vertebra slips forward. It can then press on a nerve root.  Other, less common things can put pressure on nerves in the low back. These include diabetes, infection, or a tumor.  Symptoms of lumbar radiculopathy  These include:    Pain in the low back    Pain, numbness, tingling, or weakness that travels into the buttocks, hip, groin, or leg    Muscle spasms in the low back, or leg  Treatment for lumbar radiculopathy  In most cases, your healthcare provider will first try treatments that help relieve symptoms. These may include:    Prescription and over-the-counter pain medicines. These help relieve pain, swelling, and irritation.    Limits on positions and activities that increase pain. But lying in bed or avoiding all movement is only recommended for a short period of time.    Physical therapy, including exercises and stretches. This helps decrease pain and increase movement and function.    Steroid shots into the lower back. This may help relieve symptoms for a time.    Weight-loss program. If you are overweight, losing extra pounds (kilograms) may help relieve symptoms.  In some cases, you may need surgery to fix the underlying problem. This depends on the cause, the symptoms, and how long the  pain has lasted.  Possible complications  Over time, an irritated and inflamed nerve may become damaged. This may lead to long-lasting (permanent) numbness or weakness in your legs and feet. If symptoms change suddenly or get worse, be sure to let your healthcare provider know.  When to call your healthcare provider  Call your healthcare provider right away if you have any of these:    New pain or pain that gets worse    New or increasing weakness, tingling, or numbness in your leg or foot    Problems controlling your bladder or bowel  Jose Armando last reviewed this educational content on 2/1/2020 2000-2021 [employer]. All rights reserved. This information is not intended as a substitute for professional medical care. Always follow your healthcare professional's instructions.         Thank you so much or choosing [facility]  for your Health Care. It was a pleasure seeing you at your visit today! Please contact us with any questions or concerns you may have.                   Lisbet Simmons MD                              To reach your [facility] care team after hours call:   [phone]    PLEASE NOTE OUR HOURS HAVE CHANGED secondary to COVID-19 coronavirus pandemic, as we are trying to minimize patient exposure to the virus,  which is now widespread in the [facility].  These hours may change with very little notice.  We apologize for any inconvenience.       Our current clinic hours are:          Monday- Thursday   7:00am - 6:00pm  in person.      Friday  7:00am- 5:00pm                       Saturday and Sunday : Closed to in person and virtual visits        We have telephone and virtual visit times available between    7:00am - 6pm on Monday-Friday as well.                                                Phone:  [phone]      Our pharmacy hours: Monday through Friday 9:00am to 5:00pm                        Saturday - 9:00 am to 12 noon        Sunday : Closed.              Phone:  [phone]              ###  Please note: at this time we are not accepting any walk-in visits. ###      There is also information available at our web site:  [email or web address]    If your provider ordered any lab tests and you do not receive the results within 10 business days, please call the clinic.    If you need a medication refill please contact your pharmacy.  Please allow 2 business days for your refill to be completed.    Our clinic offers telephone visits and e visits.  Please ask one of your team members to explain more.      Use [employer] (secure email communication and access to your chart) to send your primary care provider a message or make an appointment. Ask someone on your Team how to sign up for [employer].

## 2021-12-07 ASSESSMENT — PATIENT HEALTH QUESTIONNAIRE - PHQ9: SUM OF ALL RESPONSES TO PHQ QUESTIONS 1-9: 4

## 2021-12-07 ASSESSMENT — ANXIETY QUESTIONNAIRES: GAD7 TOTAL SCORE: 1

## 2021-12-09 LAB
DEPRECATED CALCIDIOL+CALCIFEROL SERPL-MC: <39 UG/L (ref 20–75)
VITAMIN D2 SERPL-MCNC: <5 UG/L
VITAMIN D3 SERPL-MCNC: 34 UG/L

## 2021-12-15 LAB
CHOLEST SERPL-MCNC: 213 MG/DL
FASTING STATUS PATIENT QL REPORTED: YES
HDLC SERPL-MCNC: 53 MG/DL
LDLC SERPL CALC-MCNC: 116 MG/DL
NONHDLC SERPL-MCNC: 160 MG/DL
TRIGL SERPL-MCNC: 222 MG/DL

## 2022-03-07 ENCOUNTER — ANCILLARY PROCEDURE (OUTPATIENT)
Dept: BONE DENSITY | Facility: CLINIC | Age: 82
End: 2022-03-07
Attending: FAMILY MEDICINE
Payer: COMMERCIAL

## 2022-03-07 DIAGNOSIS — Z78.0 ASYMPTOMATIC MENOPAUSAL STATE: ICD-10-CM

## 2022-03-07 PROCEDURE — 77085 DXA BONE DENSITY AXL VRT FX: CPT | Performed by: INTERNAL MEDICINE

## 2022-03-09 PROBLEM — R94.4 DECREASED GFR: Status: RESOLVED | Noted: 2017-08-09 | Resolved: 2022-03-09

## 2022-04-15 ENCOUNTER — OFFICE VISIT (OUTPATIENT)
Dept: DERMATOLOGY | Facility: CLINIC | Age: 82
End: 2022-04-15
Payer: COMMERCIAL

## 2022-04-15 VITALS — SYSTOLIC BLOOD PRESSURE: 148 MMHG | DIASTOLIC BLOOD PRESSURE: 74 MMHG | OXYGEN SATURATION: 98 % | HEART RATE: 68 BPM

## 2022-04-15 DIAGNOSIS — L81.4 LENTIGO: ICD-10-CM

## 2022-04-15 DIAGNOSIS — D22.9 NEVUS: Primary | ICD-10-CM

## 2022-04-15 DIAGNOSIS — D18.01 ANGIOMA OF SKIN: ICD-10-CM

## 2022-04-15 DIAGNOSIS — L82.1 SEBORRHEIC KERATOSIS: ICD-10-CM

## 2022-04-15 DIAGNOSIS — Z85.828 HISTORY OF SKIN CANCER: ICD-10-CM

## 2022-04-15 PROCEDURE — 99213 OFFICE O/P EST LOW 20 MIN: CPT | Performed by: PHYSICIAN ASSISTANT

## 2022-04-15 NOTE — PROGRESS NOTES
HPI:   chief complaint: Aruna Santos is a 81 year old female who presents for Full skin cancer screening to rule out skin cancer.  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  Social History: Son had a carcinoid tumor and passed away 3/2021. She is planning a bus trip with a group from her [employer] to see the [employer] festival in [place] October 2022  On review of systems, there are no further skin complaints, patient is feeling otherwise well.  See patient intake sheet.  ROS of the following were done and are negative: Constitutional, Eyes, Ears, Nose,   Mouth, Throat, Cardiovascular, Respiratory, GI, Genitourinary, Musculoskeletal,   Psychiatric, Endocrine, Allergic/Immunologic.        PHYSICAL EXAM:   BP (!) 148/74   Pulse 68   SpO2 98%   Breastfeeding No   Skin exam performed as follows: Type 2 skin. Mood appropriate  Alert and Oriented X 3. Well developed, well nourished in no distress.  General appearance: Normal  Head including face: Normal  Eyes: conjunctiva and lids: Normal  Mouth: Lips, teeth, gums: Normal  Neck: Normal  Chest-breast/axillae: Normal  Back: Normal  Spleen and liver: Normal  Cardiovascular: Exam of peripheral vascular system by observation for swelling, varicosities, edema: Normal  Genitalia: groin, buttocks: Normal  Extremities: digits/nails (clubbing): Normal  Eccrine and Apocrine glands: Normal  Right upper extremity: Normal  Left upper extremity: Normal  Right lower extremity: Normal  Left lower extremity: Normal  Skin: Scalp and body hair: See below    Pt deferred exam of breasts, groin, buttocks: No    Other physical findings:  1. Multiple pigmented macules on extremities and trunk  2. Multiple  pigmented macules on face, trunk and extremities  3. Multiple vascular papules on trunk, arms and legs  4. Multiple scattered keratotic plaques        Except as noted above, no other signs of skin cancer or melanoma.     ASSESSMENT/PLAN:   Benign Full skin cancer screening today.    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    Multiple benign appearing nevi on arms, legs and trunk. Discussed ABCDEs of melanoma and sunscreen.   Multiple lentigos on arms, legs and trunk. Advised benign, no treatment needed.  Multiple scattered angiomas. Advised benign, no treatment needed.   Seborrheic keratosis on arms, legs and trunk. Advised benign, no treatment needed.  History of adenocarcinoma on the scalp 1/2018. No lymphadenopathy palpated today - advised for pt to do this once monthly at home. More than 50% of reported cases are located in the tete-orbital region and the hair-bearing scalp.  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          Follow-up:Q 6 months FSE/PRN sooner     1.) Patient was asked about new and changing moles. YES  2.) Patient received a complete physical skin examination: YES  3.) Patient was counseled to perform a monthly self skin examination: YES  Scribed By: Mary Miranda MS, PANHAN

## 2022-04-15 NOTE — LETTER
4/15/2022         RE: Aruna Santos  [address] [facility] [address]        Dear Colleague,    Thank you for referring your patient, Aruna Santos, to the [facility]. Please see a copy of my visit note below.    HPI:   chief complaint: Aruna Santos is a 81 year old female who presents for Full skin cancer screening to rule out skin cancer.  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  Social History: Son had a carcinoid tumor and passed away 3/2021. She is planning a bus trip with a group from her [employer] to see the [employer] festival in [place] October 2022  On review of systems, there are no further skin complaints, patient is feeling otherwise well.  See patient intake sheet.  ROS of the following were done and are negative: Constitutional, Eyes, Ears, Nose,   Mouth, Throat, Cardiovascular, Respiratory, GI, Genitourinary, Musculoskeletal,   Psychiatric, Endocrine, Allergic/Immunologic.        PHYSICAL EXAM:   BP (!) 148/74   Pulse 68   SpO2 98%   Breastfeeding No   Skin exam performed as follows: Type 2 skin. Mood appropriate  Alert and Oriented X 3. Well developed, well nourished in no distress.  General appearance: Normal  Head including face: Normal  Eyes: conjunctiva and lids: Normal  Mouth: Lips, teeth, gums: Normal  Neck: Normal  Chest-breast/axillae: Normal  Back: Normal  Spleen and liver: Normal  Cardiovascular: Exam of peripheral vascular system by observation for swelling, varicosities, edema: Normal  Genitalia: groin, buttocks: Normal  Extremities: digits/nails (clubbing): Normal  Eccrine and Apocrine glands: Normal  Right upper extremity: Normal  Left upper extremity:  Normal  Right lower extremity: Normal  Left lower extremity: Normal  Skin: Scalp and body hair: See below    Pt deferred exam of breasts, groin, buttocks: No    Other physical findings:  1. Multiple pigmented macules on extremities and trunk  2. Multiple pigmented macules on face, trunk and extremities  3. Multiple vascular papules on trunk, arms and legs  4. Multiple scattered keratotic plaques        Except as noted above, no other signs of skin cancer or melanoma.     ASSESSMENT/PLAN:   Benign Full skin cancer screening today.    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    Multiple benign appearing nevi on arms, legs and trunk. Discussed ABCDEs of melanoma and sunscreen.   Multiple lentigos on arms, legs and trunk. Advised benign, no treatment needed.  Multiple scattered angiomas. Advised benign, no treatment needed.   Seborrheic keratosis on arms, legs and trunk. Advised benign, no treatment needed.  History of adenocarcinoma on the scalp 1/2018. No lymphadenopathy palpated today - advised for pt to do this once monthly at home. More than 50% of reported cases are located in the tete-orbital region and the hair-bearing scalp.  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          Follow-up:Q 6 months FSE/PRN sooner     1.) Patient was asked about new and changing moles. YES  2.) Patient received a complete physical skin examination: YES  3.) Patient was counseled to perform a monthly self skin examination: YES  Scribed By: Mary Miranda, MS, PAMiahC        Again, thank you for allowing me to participate in the care of your patient.        Sincerely,        Mary Miranda PA-C

## 2022-06-06 ENCOUNTER — OFFICE VISIT (OUTPATIENT)
Dept: FAMILY MEDICINE | Facility: CLINIC | Age: 82
End: 2022-06-06
Payer: COMMERCIAL

## 2022-06-06 VITALS
HEIGHT: 67 IN | SYSTOLIC BLOOD PRESSURE: 136 MMHG | OXYGEN SATURATION: 97 % | HEART RATE: 67 BPM | TEMPERATURE: 97.5 F | BODY MASS INDEX: 26.21 KG/M2 | DIASTOLIC BLOOD PRESSURE: 78 MMHG | WEIGHT: 167 LBS

## 2022-06-06 DIAGNOSIS — R30.0 DYSURIA: ICD-10-CM

## 2022-06-06 DIAGNOSIS — D12.6 TUBULAR ADENOMA OF COLON: ICD-10-CM

## 2022-06-06 DIAGNOSIS — C49.0 MALIGNANT NEOPLASM OF CONNECTIVE AND SOFT TISSUE OF HEAD, FACE AND NECK (H): ICD-10-CM

## 2022-06-06 DIAGNOSIS — I10 ESSENTIAL HYPERTENSION WITH GOAL BLOOD PRESSURE LESS THAN 140/90: Primary | ICD-10-CM

## 2022-06-06 DIAGNOSIS — N89.8 ITCHING IN THE VAGINAL AREA: ICD-10-CM

## 2022-06-06 DIAGNOSIS — L29.2 VULVAR ITCHING: ICD-10-CM

## 2022-06-06 DIAGNOSIS — E03.8 OTHER SPECIFIED HYPOTHYROIDISM: ICD-10-CM

## 2022-06-06 DIAGNOSIS — M85.89 OSTEOPENIA OF MULTIPLE SITES: ICD-10-CM

## 2022-06-06 DIAGNOSIS — N18.31 STAGE 3A CHRONIC KIDNEY DISEASE (H): ICD-10-CM

## 2022-06-06 DIAGNOSIS — E83.52 SERUM CALCIUM ELEVATED: ICD-10-CM

## 2022-06-06 DIAGNOSIS — N39.46 MIXED STRESS AND URGE URINARY INCONTINENCE: ICD-10-CM

## 2022-06-06 DIAGNOSIS — E78.5 HYPERLIPIDEMIA LDL GOAL <130: ICD-10-CM

## 2022-06-06 DIAGNOSIS — E55.9 VITAMIN D DEFICIENCY: ICD-10-CM

## 2022-06-06 LAB
ALBUMIN SERPL-MCNC: 4 G/DL (ref 3.4–5)
ALP SERPL-CCNC: 45 U/L (ref 40–150)
ALT SERPL W P-5'-P-CCNC: 22 U/L (ref 0–50)
ANION GAP SERPL CALCULATED.3IONS-SCNC: 6 MMOL/L (ref 3–14)
AST SERPL W P-5'-P-CCNC: 17 U/L (ref 0–45)
BILIRUB SERPL-MCNC: 1 MG/DL (ref 0.2–1.3)
BUN SERPL-MCNC: 23 MG/DL (ref 7–30)
CALCIUM SERPL-MCNC: 10.3 MG/DL (ref 8.5–10.1)
CHLORIDE BLD-SCNC: 102 MMOL/L (ref 94–109)
CHOLEST SERPL-MCNC: 195 MG/DL
CO2 SERPL-SCNC: 28 MMOL/L (ref 20–32)
CREAT SERPL-MCNC: 1.14 MG/DL (ref 0.52–1.04)
CREAT UR-MCNC: 41 MG/DL
ERYTHROCYTE [DISTWIDTH] IN BLOOD BY AUTOMATED COUNT: 12 % (ref 10–15)
FASTING STATUS PATIENT QL REPORTED: YES
GFR SERPL CREATININE-BSD FRML MDRD: 49 ML/MIN/1.73M2
GLUCOSE BLD-MCNC: 93 MG/DL (ref 70–99)
HCT VFR BLD AUTO: 39.7 % (ref 35–47)
HDLC SERPL-MCNC: 49 MG/DL
HGB BLD-MCNC: 13.5 G/DL (ref 11.7–15.7)
LDLC SERPL CALC-MCNC: 98 MG/DL
MCH RBC QN AUTO: 30.7 PG (ref 26.5–33)
MCHC RBC AUTO-ENTMCNC: 34 G/DL (ref 31.5–36.5)
MCV RBC AUTO: 90 FL (ref 78–100)
MICROALBUMIN UR-MCNC: <5 MG/L
MICROALBUMIN/CREAT UR: NORMAL MG/G{CREAT}
NONHDLC SERPL-MCNC: 146 MG/DL
PLATELET # BLD AUTO: 217 10E3/UL (ref 150–450)
POTASSIUM BLD-SCNC: 3.8 MMOL/L (ref 3.4–5.3)
PROT SERPL-MCNC: 7.7 G/DL (ref 6.8–8.8)
RBC # BLD AUTO: 4.4 10E6/UL (ref 3.8–5.2)
SODIUM SERPL-SCNC: 136 MMOL/L (ref 133–144)
T3 SERPL-MCNC: 107 NG/DL (ref 60–181)
T4 FREE SERPL-MCNC: 1.31 NG/DL (ref 0.76–1.46)
TRIGL SERPL-MCNC: 242 MG/DL
TSH SERPL DL<=0.005 MIU/L-ACNC: 2.05 MU/L (ref 0.4–4)
WBC # BLD AUTO: 6.4 10E3/UL (ref 4–11)

## 2022-06-06 PROCEDURE — 80053 COMPREHEN METABOLIC PANEL: CPT | Performed by: FAMILY MEDICINE

## 2022-06-06 PROCEDURE — 85027 COMPLETE CBC AUTOMATED: CPT | Performed by: FAMILY MEDICINE

## 2022-06-06 PROCEDURE — 99214 OFFICE O/P EST MOD 30 MIN: CPT | Performed by: FAMILY MEDICINE

## 2022-06-06 PROCEDURE — 36415 COLL VENOUS BLD VENIPUNCTURE: CPT | Performed by: FAMILY MEDICINE

## 2022-06-06 PROCEDURE — 80061 LIPID PANEL: CPT | Performed by: FAMILY MEDICINE

## 2022-06-06 PROCEDURE — 82043 UR ALBUMIN QUANTITATIVE: CPT | Performed by: FAMILY MEDICINE

## 2022-06-06 PROCEDURE — 84439 ASSAY OF FREE THYROXINE: CPT | Performed by: FAMILY MEDICINE

## 2022-06-06 PROCEDURE — 84443 ASSAY THYROID STIM HORMONE: CPT | Performed by: FAMILY MEDICINE

## 2022-06-06 PROCEDURE — 82306 VITAMIN D 25 HYDROXY: CPT | Performed by: FAMILY MEDICINE

## 2022-06-06 PROCEDURE — 84480 ASSAY TRIIODOTHYRONINE (T3): CPT | Performed by: FAMILY MEDICINE

## 2022-06-06 RX ORDER — METOPROLOL SUCCINATE 50 MG/1
TABLET, EXTENDED RELEASE ORAL
Qty: 90 TABLET | Refills: 3 | Status: SHIPPED | OUTPATIENT
Start: 2022-06-06 | End: 2022-12-05

## 2022-06-06 RX ORDER — SIMVASTATIN 40 MG
40 TABLET ORAL AT BEDTIME
Qty: 90 TABLET | Refills: 3 | Status: SHIPPED | OUTPATIENT
Start: 2022-06-06 | End: 2022-12-05

## 2022-06-06 RX ORDER — LEVOTHYROXINE SODIUM 88 UG/1
TABLET ORAL
Qty: 90 TABLET | Refills: 3 | Status: SHIPPED | OUTPATIENT
Start: 2022-06-06 | End: 2022-12-05

## 2022-06-06 RX ORDER — LISINOPRIL 30 MG/1
TABLET ORAL
Qty: 90 TABLET | Refills: 3 | Status: SHIPPED | OUTPATIENT
Start: 2022-06-06 | End: 2022-12-05

## 2022-06-06 RX ORDER — OXYBUTYNIN CHLORIDE 10 MG/1
10-20 TABLET, EXTENDED RELEASE ORAL DAILY
Qty: 180 TABLET | Refills: 3 | Status: SHIPPED | OUTPATIENT
Start: 2022-06-06 | End: 2022-12-05

## 2022-06-06 RX ORDER — CLOBETASOL PROPIONATE 0.5 MG/G
OINTMENT TOPICAL
Qty: 60 G | Refills: 1 | Status: SHIPPED | OUTPATIENT
Start: 2022-06-06 | End: 2022-12-05

## 2022-06-06 RX ORDER — TRIAMTERENE/HYDROCHLOROTHIAZID 37.5-25 MG
TABLET ORAL
Qty: 90 TABLET | Refills: 3 | Status: SHIPPED | OUTPATIENT
Start: 2022-06-06 | End: 2022-12-05

## 2022-06-06 NOTE — PROGRESS NOTES
Assessment & Plan :       ICD-10-CM    1. Essential hypertension with goal blood pressure less than 140/90- needs lab follow up and refills today   I10 triamterene-HCTZ (MAXZIDE-25) 37.5-25 MG tablet     metoprolol succinate ER (TOPROL XL) 50 MG 24 hr tablet     lisinopril (ZESTRIL) 30 MG tablet     Albumin Random Urine Quantitative with Creat Ratio     CBC with platelets     Comprehensive metabolic panel   2. Hyperlipidemia LDL goal <130 - needs lab follow up and refills today   E78.5 simvastatin (ZOCOR) 40 MG tablet     Albumin Random Urine Quantitative with Creat Ratio     Comprehensive metabolic panel     Lipid panel reflex to direct LDL Fasting   3. Other specified hypothyroidism  E03.8 levothyroxine (SYNTHROID/LEVOTHROID) 88 MCG tablet     TSH     T4 free     T3 total   4. Vulvar itching - ? early lichen sclerosis vs. other - superior vulva  L29.2 clobetasol (TEMOVATE) 0.05 % external ointment   5. Itching in the vaginal area- using clobetasol twice weekly   N89.8 clobetasol (TEMOVATE) 0.05 % external ointment   6. 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 Hansen - [facility]  C49.0    7. Stage 3a chronic kidney disease (H)  N18.31    8. Tubular adenomas of colon- 2014 - repeat in 5/2018 s/p skin ca = 2 more tubular adenomas - repeat in 2023   D12.6    9. Vitamin D deficiency  E55.9 25 Hydroxyvitamin D2 and D3   10. Osteopenia of multiple sites  M85.89 25 Hydroxyvitamin D2 and D3   11. Dysuria- recurrent with urgency and frequency - not always infection - seeing [facility] - has some urge incontinence as well - regularly- needs to make appt    R30.0 oxybutynin ER (DITROPAN XL) 10 MG 24 hr tablet   12. Mixed stress and urge urinary incontinence- oxybutynin 10mg daily has improved symptoms significantly   N39.46 oxybutynin ER (DITROPAN XL) 10 MG 24 hr  "tablet        Ordering of each unique test  Prescription drug management  23 minutes spent on the date of the encounter doing chart review, history and exam, documentation and further activities per the note       BMI:   Estimated body mass index is 26.16 kg/m  as calculated from the following:    Height as of this encounter: 1.702 m (5' 7\").    Weight as of this encounter: 75.8 kg (167 lb).       MEDICATIONS:        - Continue other medications without change  Regular exercise  See Patient Instructions    Pt declines covid-19 booster today - will consider doing in the fall.      Return in about 6 months (around 12/6/2022) for Physical/Preventative Visit, blood pressure, cholesterol/lipids, w/ Dr. BOLAND for 40 minute appointment.          Lisbet Simmons MD  [facility] PRIOR ANNI Valdovinos is a 81 year old who presents for the following health issues :   1. Essential hypertension with goal blood pressure less than 140/90- needs lab follow up and refills today     2. Hyperlipidemia LDL goal <130 - needs lab follow up and refills today     3. Other specified hypothyroidism    4. Vulvar itching - ? early lichen sclerosis vs. other - superior vulva    5. Itching in the vaginal area- using clobetasol twice weekly     6. 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 Hansen - [facility]    7. Stage 3a chronic kidney disease (H)    8. Tubular adenomas of colon- 2014 - repeat in 5/2018 s/p skin ca = 2 more tubular adenomas - repeat in 2023     9. Vitamin D deficiency    10. Osteopenia of multiple sites    11. Dysuria- recurrent with urgency and frequency - not always infection - seeing Dr. Karyn Luevano [facility] - has some urge incontinence as well - regularly- needs to make appt      12. Mixed stress and urge urinary incontinence- oxybutynin 10mg " "daily has improved symptoms significantly       Reviewed Dexa scan from 3/7/2022: Osteopenia., Degenerative changes of the lumbar spine which may falsely elevate results.     There has been no significant change in bone density of the lumbar spine. There has been no significant change in bone density of the hip(s).      Recommendations include ensuring adequate Calcium and Vitamin D.     The current NOF Guidelines recommend treatment for patients with prior hip or vertebral fracture, T-score -2.5 or below, or 10 year risk of any major osteoporotic fracture 20% or greater,or 10 year risk of hip fracture 3% or greater as calculated using the FRAX calculator (www.shef.ac.uk/FRAX or you can google \"FRAX\").    This patient's risks based on available information, with the use of FRAX, are 15 % for major osteoporotic fracture and 4.0 % for hip fracture.   Based on these guidelines, treatment (in addition to calcium and vitamin D) is recommended for this patient, after ruling out other causes of osteoporosis.     Pt would like to avoid taking a bisphosphonate - will recheck calcium and vitamin D levels today.      History of Present Illness       Hypertension: She presents for follow up of hypertension.  She does not check blood pressure  regularly outside of the clinic. Outpatient blood pressures have not been over 140/90. She follows a low salt diet.     Hypothyroidism:     Since last visit, patient describes the following symptoms::  None    hair thinning. Hair thinning over time. Discussed minoxidil topical treatment.      Hyperlipidemia Follow-Up:       Are you regularly taking any medication or supplement to lower your cholesterol?   Yes- Simvastatin    Are you having muscle aches or other side effects that you think could be caused by your cholesterol lowering medication?  No      How many servings of fruits and vegetables do you eat daily?  2-3    On average, how many sweetened beverages do you drink each day " (Examples: soda, juice, sweet tea, etc.  Do NOT count diet or artificially sweetened beverages)?   2-3 Pepsi's per week    How many days per week do you exercise enough to make your heart beat faster?  - Gardening    How many minutes a day do you exercise enough to make your heart beat faster? n/a    How many days per week do you miss taking your medication? 0    Not doing any regular cardio or resistance training exercise at all - just mostly staying active - strongly encouraged walking and strength training.      Patient Active Problem List   Diagnosis     Hematuria     Dyspnea and respiratory abnormality     Hyperlipidemia LDL goal <130     Advanced directives, counseling/discussion     Essential hypertension with goal blood pressure less than 140/90- well controlled     Family history of colon cancer- mother in her 40's-colonoscopies every 5 years- next one 2023     Osteopenia, unspecified location - was on fosamax, then Boniva secondary to hot flashes - off boniva since 2012     Vulvar itching - ? early lichen sclerosis vs. 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 yTrone Dooley [facility] q6months      Myalgia of pelvic floor     Vaginal vault prolapse     Personal history of urinary tract infection     Dysuria- recurrent with urgency and frequency - not always infection - seeing Dr. 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 - was  on warfarin - never  - lived  with pt and      Asymptomatic menopausal state     Right buttock pain- hx of sciatica in the past - not radiating further right now - no new bowel or bladder control problems        Current Outpatient Medications   Medication Sig Dispense Refill     CALCIUM /VITAMIN D TABS   OR 2 qd       clobetasol (TEMOVATE) 0.05 % external ointment APPLY SPARINGLY TO AFFECTED AREA OF GENITALS TWICE WEEKLY AT NIGHTTIME 60 g 1     levobunolol (BETAGAN) 0.5 % ophthalmic solution        levothyroxine (SYNTHROID/LEVOTHROID) 88 MCG tablet TAKE 1 TABLET EVERY MORNING 90 tablet 3     lisinopril (ZESTRIL) 30 MG tablet TAKE 1 TABLET EVERY DAY 90 tablet 3     metoprolol succinate ER (TOPROL XL) 50 MG 24 hr tablet TAKE 1 TABLET EVERY DAY 90 tablet 3     MULTI-VITAMIN OR TABS 1 qd       oxybutynin ER (DITROPAN XL) 10 MG 24 hr tablet Take 1-2 tablets (10-20 mg) by mouth daily 180 tablet 3     simvastatin (ZOCOR) 40 MG tablet Take 1 tablet (40 mg) by mouth At Bedtime 90 tablet 3     triamterene-HCTZ (MAXZIDE-25) 37.5-25 MG tablet TAKE 1 TABLET EVERY DAY 90 tablet 3     LORazepam (ATIVAN) 0.5 MG tablet Take 1 tablet (0.5 mg) by mouth 2 times daily Take 30 minutes prior to departure.  Do not operate a vehicle after taking this medication 10 tablet 0          Allergies   Allergen Reactions     Ciprofloxacin GI Disturbance     Bad stomach pain      Macrobid [Nitrofurantoin] GI Disturbance     Prednisone      Sulfa Drugs Rash            Review of Systems :   Constitutional, HEENT, cardiovascular, pulmonary, GI, , musculoskeletal, neuro, skin, endocrine and psych systems are negative, except as otherwise noted.      Objective   ":   /78 (BP Location: Left arm, Patient Position: Chair, Cuff Size: Adult Large)   Pulse 67   Temp 97.5  F (36.4  C) (Tympanic)   Ht 1.702 m (5' 7\")   Wt 75.8 kg (167 lb)   SpO2 97%   Breastfeeding No   BMI 26.16 kg/m    Body mass index is 26.16 kg/m .  Physical Exam :   GENERAL: healthy, alert and no distress  EYES: Eyes grossly normal to inspection, PERRL and conjunctivae and sclerae normal  HENT: ear canals and TM's normal, nose and mouth without ulcers or lesions  NECK: no adenopathy, no asymmetry, masses, or scars and thyroid normal to palpation  RESP: lungs clear to auscultation - no rales, rhonchi or wheezes  CV: regular rate and rhythm, normal S1 S2, no S3 or S4, no murmur, click or rub, no peripheral edema and peripheral pulses strong  ABDOMEN: soft, nontender, no hepatosplenomegaly, no masses and bowel sounds normal  MS: no gross musculoskeletal defects noted, no edema  SKIN: no suspicious lesions or rashes  NEURO: Normal strength and tone, mentation intact and speech normal  PSYCH: mentation appears normal, affect normal/bright    Office Visit on 12/06/2021   Component Date Value Ref Range Status     25 OH Vitamin D2 12/06/2021 <5  ug/L Final     25 OH Vitamin D3 12/06/2021 34  ug/L Final     25 OH Vit D Total 12/06/2021 <39  20 - 75 ug/L Final    Season, race, dietary intake, and treatment affect the concentration of 25-hydroxy-Vitamin D. Values may decrease during winter months and increase during summer months. Values 20-29 ug/L may indicate Vitamin D insufficiency and values <20 ug/L may indicate Vitamin D deficiency.     Creatinine Urine mg/dL 12/06/2021 66  mg/dL Final     Albumin Urine mg/L 12/06/2021 <5  mg/L Final     Albumin Urine mg/g Cr 12/06/2021    Final    Unable to calculate:  Urine creatinine or albumin value below detectable level     Sodium 12/06/2021 137  133 - 144 mmol/L Final     Potassium 12/06/2021 4.2  3.4 - 5.3 mmol/L Final     Chloride 12/06/2021 103  94 - 109 " mmol/L Final     Carbon Dioxide (CO2) 12/06/2021 28  20 - 32 mmol/L Final     Anion Gap 12/06/2021 6  3 - 14 mmol/L Final     Urea Nitrogen 12/06/2021 26  7 - 30 mg/dL Final     Creatinine 12/06/2021 1.22 (A) 0.52 - 1.04 mg/dL Final     Calcium 12/06/2021 9.9  8.5 - 10.1 mg/dL Final     Glucose 12/06/2021 111 (A) 70 - 99 mg/dL Final     Alkaline Phosphatase 12/06/2021 46  40 - 150 U/L Final     AST 12/06/2021 17  0 - 45 U/L Final     ALT 12/06/2021 25  0 - 50 U/L Final     Protein Total 12/06/2021 7.8  6.8 - 8.8 g/dL Final     Albumin 12/06/2021 3.8  3.4 - 5.0 g/dL Final     Bilirubin Total 12/06/2021 0.8  0.2 - 1.3 mg/dL Final     GFR Estimate 12/06/2021 42 (A) >60 mL/min/1.73m2 Final    As of July 11, 2021, eGFR is calculated by the CKD-EPI creatinine equation, without race adjustment. eGFR can be influenced by muscle mass, exercise, and diet. The reported eGFR is an estimation only and is only applicable if the renal function is stable.     Cholesterol 12/06/2021 213 (A) <200 mg/dL Final     Triglycerides 12/06/2021 222 (A) <150 mg/dL Final     Direct Measure HDL 12/06/2021 53  >=50 mg/dL Final     LDL Cholesterol Calculated 12/06/2021 116 (A) <=100 mg/dL Final     Non HDL Cholesterol 12/06/2021 160 (A) <130 mg/dL Final     Patient Fasting > 8hrs? 12/06/2021 Yes   Final     T3 Total 12/06/2021 100  60 - 181 ng/dL Final     Free T4 12/06/2021 1.26  0.76 - 1.46 ng/dL Final     TSH 12/06/2021 1.77  0.40 - 4.00 mU/L Final     WBC Count 12/06/2021 5.9  4.0 - 11.0 10e3/uL Final     RBC Count 12/06/2021 4.62  3.80 - 5.20 10e6/uL Final     Hemoglobin 12/06/2021 14.2  11.7 - 15.7 g/dL Final     Hematocrit 12/06/2021 41.9  35.0 - 47.0 % Final     MCV 12/06/2021 91  78 - 100 fL Final     MCH 12/06/2021 30.7  26.5 - 33.0 pg Final     MCHC 12/06/2021 33.9  31.5 - 36.5 g/dL Final     RDW 12/06/2021 12.1  10.0 - 15.0 % Final     Platelet Count 12/06/2021 236  150 - 450 10e3/uL Final     % Neutrophils 12/06/2021 58  % Final      % Lymphocytes 12/06/2021 28  % Final     % Monocytes 12/06/2021 10  % Final     % Eosinophils 12/06/2021 4  % Final     % Basophils 12/06/2021 1  % Final     % Immature Granulocytes 12/06/2021 0  % Final     Absolute Neutrophils 12/06/2021 3.4  1.6 - 8.3 10e3/uL Final     Absolute Lymphocytes 12/06/2021 1.7  0.8 - 5.3 10e3/uL Final     Absolute Monocytes 12/06/2021 0.6  0.0 - 1.3 10e3/uL Final     Absolute Eosinophils 12/06/2021 0.2  0.0 - 0.7 10e3/uL Final     Absolute Basophils 12/06/2021 0.1  0.0 - 0.2 10e3/uL Final     Absolute Immature Granulocytes 12/06/2021 0.0  <=0.4 10e3/uL Final       Is fasting today.

## 2022-06-06 NOTE — PATIENT INSTRUCTIONS
[facility]  [address] [place] [address]  Office: [phone]   Fax:    [phone]     For hair loss - to stabilize hair loss or perhaps grow back small amount of your hair - try  Rogaine (Minoxidil is the generic name)  extra strength 5% for men - apply gently with dropper nightly and wash hands afterwards.     Strongly encouraged walking and strength training.      Return in about 6 months (around 12/6/2022) for Physical/Preventative Visit, blood pressure, cholesterol/lipids, w/ Dr. BOLAND for 40 minute appointment.     Thank you so much or choosing [facility]  for your Health Care. It was a pleasure seeing you at your visit today! Please contact us with any questions or concerns you may have.                   Lisbet Simmons MD                              To reach your [facility] care team after hours call:   [phone]    PLEASE NOTE OUR HOURS HAVE CHANGED secondary to COVID-19 coronavirus pandemic, as we are trying to minimize patient exposure to the virus,  which is now widespread in the [facility].  These hours may change with very little notice.  We apologize for any inconvenience.       Our current clinic hours are:          Monday- Thursday   7:00am - 6:00pm  in person.      Friday  7:00am- 5:00pm                       Saturday and Sunday : Closed to in person and virtual visits        We have telephone and virtual visit times available between    7:00am - 6pm on Monday-Friday as well.                                                Phone:  [phone]      Our pharmacy hours: Monday through Friday 8:00am to 5:00pm                        Saturday - 9:00 am to 12 noon       Sunday : Closed.              Phone:  [phone]              ###  Please note: at this time we are not accepting any walk-in visits. ###      There is also information available at our web site:  [email or web address]    If your provider  ordered any lab tests and you do not receive the results within 10 business days, please call the clinic.    If you need a medication refill please contact your pharmacy.  Please allow 3 business days for your refill to be completed.    Our clinic offers telephone visits and e visits.  Please ask one of your team members to explain more.      Use [employer] (secure email communication and access to your chart) to send your primary care provider a message or make an appointment. Ask someone on your Team how to sign up for [employer].

## 2022-10-17 ENCOUNTER — OFFICE VISIT (OUTPATIENT)
Dept: FAMILY MEDICINE | Facility: CLINIC | Age: 82
End: 2022-10-17
Payer: COMMERCIAL

## 2022-10-17 VITALS
TEMPERATURE: 97.3 F | WEIGHT: 163 LBS | OXYGEN SATURATION: 100 % | HEART RATE: 66 BPM | HEIGHT: 67 IN | DIASTOLIC BLOOD PRESSURE: 82 MMHG | SYSTOLIC BLOOD PRESSURE: 139 MMHG | BODY MASS INDEX: 25.58 KG/M2

## 2022-10-17 DIAGNOSIS — S01.01XD LACERATION OF SCALP, SUBSEQUENT ENCOUNTER: Primary | ICD-10-CM

## 2022-10-17 DIAGNOSIS — Z12.31 ENCOUNTER FOR SCREENING MAMMOGRAM FOR MALIGNANT NEOPLASM OF BREAST: ICD-10-CM

## 2022-10-17 DIAGNOSIS — C49.0 MALIGNANT NEOPLASM OF CONNECTIVE AND SOFT TISSUE OF HEAD, FACE AND NECK (H): ICD-10-CM

## 2022-10-17 PROCEDURE — G0008 ADMIN INFLUENZA VIRUS VAC: HCPCS | Performed by: FAMILY MEDICINE

## 2022-10-17 PROCEDURE — 91312 COVID-19,PF,PFIZER BOOSTER BIVALENT: CPT | Performed by: FAMILY MEDICINE

## 2022-10-17 PROCEDURE — 0124A COVID-19,PF,PFIZER BOOSTER BIVALENT: CPT | Performed by: FAMILY MEDICINE

## 2022-10-17 PROCEDURE — 99213 OFFICE O/P EST LOW 20 MIN: CPT | Mod: 25 | Performed by: FAMILY MEDICINE

## 2022-10-17 PROCEDURE — 90662 IIV NO PRSV INCREASED AG IM: CPT | Performed by: FAMILY MEDICINE

## 2022-10-17 NOTE — PROGRESS NOTES
"  Assessment & Plan   Laceration of scalp, subsequent encounter  From fall on 10/5. Area appears to be healing well, with some ecchymosis but not erythema or drainage. 5 staples in place, removed today.    Encounter for screening mammogram for malignant neoplasm of breast  - MA Screening Bilateral w/ [facility]        No follow-ups on file.     Corinne De Souza MS3, has participated in the care of this patient.     Provider Disclosure:  I agree with above History, Review of Systems, Physical exam and Plan.  I have reviewed the content of the documentation and have edited it as needed. I have personally performed the services documented here and the documentation accurately represents those services and the decisions I have made.      Electronically signed by:          Johnathon Mahan M.D.     [address] [address]  [email or web address]   Office: [phone]       Williams Valdovinos is a 82 year old, presenting for the following health issues:  Suture Removal    ED/UC Followup:    Facility:  [facility]  Date of visit: 10/5/2022  Reason for visit: laceration on head - was visiting [place], fell backwards off of a curb while trying to take a picture, hit the back of her head. No loss of consciousness or confusion. Went to the ED, had staples and head CT (normal). Also got tetanus booster.  Current Status: still feeling a little intermittent dizziness (room spinning) when she is laying down or getting up. The area is still slightly tender and numb. Near location of 2018 skin cancer surgery.  5 staples need to be removed      Review of Systems   Constitutional, HEENT, cardiovascular, pulmonary, gi and gu systems are negative, except as otherwise noted.      Objective    /82 (BP Location: Right arm, Cuff Size: Adult Regular)   Pulse 66   Temp 97.3  F (36.3  C) (Tympanic)   Ht 1.702 m (5' 7\")   Wt 73.9 kg (163 lb)   SpO2 100%   BMI 25.53 kg/m    Body mass index is " 25.53 kg/m .  Physical Exam   GENERAL: healthy, alert and no distress  NECK: no adenopathy, no asymmetry, masses, or scars and thyroid normal to palpation  RESP: lungs clear to auscultation - no rales, rhonchi or wheezes  CV: regular rate and rhythm, normal S1 S2, no S3 or S4, no murmur, click or rub, no peripheral edema and peripheral pulses strong  ABDOMEN: soft, nontender, no hepatosplenomegaly, no masses and bowel sounds normal  MS: no gross musculoskeletal defects noted, no edema  SKIN: Approx 1.5 cm laceration on left posterior scalp, with 5 staples. Area appears to be healing well. Some ecchymoses, no erythema or drainage.  PSYCH: mentation appears normal, affect normal/bright

## 2022-10-17 NOTE — PROGRESS NOTES
"  Assessment & Plan   Laceration of scalp, subsequent encounter  Well-healed, staples removed.    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     Corinnealirio EspitiaNolvia MS3, has participated in the care of this patient.     Provider Disclosure:  I agree with above History, Review of Systems, Physical exam and Plan.  I have reviewed the content of the documentation and have edited it as needed. I have personally performed the services documented here and the documentation accurately represents those services and the decisions I have made.      Electronically signed by:          Johnathon Mahan M.D.     [address] [address]  [email or web address]   Office: [phone]       Williams Valdovinos is a 82 year old, presenting for the following health issues:  Suture Removal      ED/UC Followup:    Facility:  [facility]  Date of visit: 10/5/2022  Reason for visit: laceration on head - fell backwards off of a curb  Current Status: still feeling a little intermittent dizziness when she is laying down or getting up.  6 staples need to be removed        Review of Systems         Objective    /82 (BP Location: Right arm, Cuff Size: Adult Regular)   Pulse 66   Temp 97.3  F (36.3  C) (Tympanic)   Ht 1.702 m (5' 7\")   Wt 73.9 kg (163 lb)   SpO2 100%   BMI 25.53 kg/m    Body mass index is 25.53 kg/m .  Physical Exam   GENERAL: healthy, alert and no distress  NECK: no adenopathy, no asymmetry, masses, or scars and thyroid normal to palpation  RESP: lungs clear to auscultation - no rales, rhonchi or wheezes  CV: regular rate and rhythm, normal S1 S2, no S3 or S4, no murmur, click or rub, no peripheral edema and peripheral " pulses strong  ABDOMEN: soft, nontender, no hepatosplenomegaly, no masses and bowel sounds normal  MS: no gross musculoskeletal defects noted, no edema  SKIN: Healed left posterior scalp laceration (staple removed) otherwise no suspicious lesions or rashes

## 2022-11-15 ENCOUNTER — HOSPITAL ENCOUNTER (OUTPATIENT)
Dept: MAMMOGRAPHY | Facility: CLINIC | Age: 82
Discharge: HOME | End: 2022-11-15
Attending: FAMILY MEDICINE | Admitting: FAMILY MEDICINE
Payer: COMMERCIAL

## 2022-11-15 DIAGNOSIS — Z12.31 ENCOUNTER FOR SCREENING MAMMOGRAM FOR MALIGNANT NEOPLASM OF BREAST: ICD-10-CM

## 2022-11-15 PROCEDURE — 77067 SCR MAMMO BI INCL CAD: CPT

## 2022-11-16 NOTE — RESULT ENCOUNTER NOTE
Your mammogram was normal.     Thank you so much for choosing [facility].  Please contact us with any questions that you may have.   We appreciate the opportunity to serve you now and look forward to supporting your healthcare needs for a long time to come!    Most Sincerely,     Lisbet Simmons MD

## 2022-11-22 ENCOUNTER — OFFICE VISIT (OUTPATIENT)
Dept: DERMATOLOGY | Facility: CLINIC | Age: 82
End: 2022-11-22
Payer: COMMERCIAL

## 2022-11-22 DIAGNOSIS — C49.0 MALIGNANT NEOPLASM OF CONNECTIVE AND SOFT TISSUE OF HEAD, FACE AND NECK (H): ICD-10-CM

## 2022-11-22 DIAGNOSIS — Z85.828 HISTORY OF SKIN CANCER: ICD-10-CM

## 2022-11-22 DIAGNOSIS — L82.1 SEBORRHEIC KERATOSIS: ICD-10-CM

## 2022-11-22 DIAGNOSIS — D22.9 NEVUS: Primary | ICD-10-CM

## 2022-11-22 DIAGNOSIS — D18.01 ANGIOMA OF SKIN: ICD-10-CM

## 2022-11-22 DIAGNOSIS — L81.4 LENTIGO: ICD-10-CM

## 2022-11-22 PROCEDURE — 99213 OFFICE O/P EST LOW 20 MIN: CPT | Performed by: PHYSICIAN ASSISTANT

## 2022-11-22 ASSESSMENT — PAIN SCALES - GENERAL: PAINLEVEL: NO PAIN (0)

## 2022-11-22 NOTE — LETTER
11/22/2022         RE: Aruna Santos  [address] [facility] [address]        Dear Colleague,    Thank you for referring your patient, Aruna Santos, to the [facility]. Please see a copy of my visit note below.    HPI:   chief complaint: Aruna Santos is a 82 year old female who presents for Full skin cancer screening to rule out skin cancer.  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  Social History: Son had a carcinoid tumor and passed away 3/2021. Went on a bus trip with a group from her [employer] to see the [employer] festival in [place] October 2022 - fell, hit her head and needed stitches.   On review of systems, there are no further skin complaints, patient is feeling otherwise well.  See patient intake sheet.  ROS of the following were done and are negative: Constitutional, Eyes, Ears, Nose,   Mouth, Throat, Cardiovascular, Respiratory, GI, Genitourinary, Musculoskeletal,   Psychiatric, Endocrine, Allergic/Immunologic.        PHYSICAL EXAM:   There were no vitals taken for this visit.  Skin exam performed as follows: Type 2 skin. Mood appropriate  Alert and Oriented X 3. Well developed, well nourished in no distress.  General appearance: Normal  Head including face: Normal  Eyes: conjunctiva and lids: Normal  Mouth: Lips, teeth, gums: Normal  Neck: Normal  Chest-breast/axillae: Normal  Back: Normal  Spleen and liver: Normal  Cardiovascular: Exam of peripheral vascular system by observation for swelling, varicosities, edema: Normal  Genitalia: groin, buttocks: Normal  Extremities: digits/nails (clubbing): Normal  Eccrine and Apocrine glands: Normal  Right upper extremity: Normal  Left  upper extremity: Normal  Right lower extremity: Normal  Left lower extremity: Normal  Skin: Scalp and body hair: See below    Pt deferred exam of breasts, groin, buttocks: No    Other physical findings:  1. Multiple pigmented macules on extremities and trunk  2. Multiple pigmented macules on face, trunk and extremities  3. Multiple vascular papules on trunk, arms and legs  4. Multiple scattered keratotic plaques        Except as noted above, no other signs of skin cancer or melanoma.     ASSESSMENT/PLAN:   Benign Full skin cancer screening today.    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    Multiple benign appearing nevi on arms, legs and trunk. Discussed ABCDEs of melanoma and sunscreen.   Multiple lentigos on arms, legs and trunk. Advised benign, no treatment needed.  Multiple scattered angiomas. Advised benign, no treatment needed.   Seborrheic keratosis on arms, legs and trunk. Advised benign, no treatment needed.  History of adenocarcinoma on the scalp 1/2018. No lymphadenopathy palpated today - advised for pt to do this once monthly at home. More than 50% of reported cases are located in the tete-orbital region and the hair-bearing scalp.  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          Follow-up:Q 6 months FSE/PRN sooner     1.) Patient was asked about new and changing moles. YES  2.) Patient received a complete physical skin examination: YES  3.) Patient was counseled to perform a monthly self skin examination: YES  Scribed By: Mary Miranda, MS, PAMiahC        Again, thank you for allowing me to participate in the care of your patient.        Sincerely,        Mary Miranda PA-C

## 2022-11-22 NOTE — PROGRESS NOTES
HPI:   chief complaint: Aruna Santos is a 82 year old female who presents for Full skin cancer screening to rule out skin cancer.  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  Social History: Son had a carcinoid tumor and passed away 3/2021. Went on a bus trip with a group from her [employer] to see the [employer] festival in [place] October 2022 - fell, hit her head and needed stitches.   On review of systems, there are no further skin complaints, patient is feeling otherwise well.  See patient intake sheet.  ROS of the following were done and are negative: Constitutional, Eyes, Ears, Nose,   Mouth, Throat, Cardiovascular, Respiratory, GI, Genitourinary, Musculoskeletal,   Psychiatric, Endocrine, Allergic/Immunologic.        PHYSICAL EXAM:   There were no vitals taken for this visit.  Skin exam performed as follows: Type 2 skin. Mood appropriate  Alert and Oriented X 3. Well developed, well nourished in no distress.  General appearance: Normal  Head including face: Normal  Eyes: conjunctiva and lids: Normal  Mouth: Lips, teeth, gums: Normal  Neck: Normal  Chest-breast/axillae: Normal  Back: Normal  Spleen and liver: Normal  Cardiovascular: Exam of peripheral vascular system by observation for swelling, varicosities, edema: Normal  Genitalia: groin, buttocks: Normal  Extremities: digits/nails (clubbing): Normal  Eccrine and Apocrine glands: Normal  Right upper extremity: Normal  Left upper extremity: Normal  Right lower extremity: Normal  Left lower extremity: Normal  Skin: Scalp and body hair: See below    Pt deferred exam of breasts, groin, buttocks: No    Other physical findings:  1. Multiple pigmented macules on extremities and  trunk  2. Multiple pigmented macules on face, trunk and extremities  3. Multiple vascular papules on trunk, arms and legs  4. Multiple scattered keratotic plaques        Except as noted above, no other signs of skin cancer or melanoma.     ASSESSMENT/PLAN:   Benign Full skin cancer screening today.    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    Multiple benign appearing nevi on arms, legs and trunk. Discussed ABCDEs of melanoma and sunscreen.   Multiple lentigos on arms, legs and trunk. Advised benign, no treatment needed.  Multiple scattered angiomas. Advised benign, no treatment needed.   Seborrheic keratosis on arms, legs and trunk. Advised benign, no treatment needed.  History of adenocarcinoma on the scalp 1/2018. No lymphadenopathy palpated today - advised for pt to do this once monthly at home. More than 50% of reported cases are located in the tete-orbital region and the hair-bearing scalp.  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          Follow-up:Q 6 months FSE/PRN sooner     1.) Patient was asked about new and changing moles. YES  2.) Patient received a complete physical skin examination: YES  3.) Patient was counseled to perform a monthly self skin examination: YES  Scribed By: Mary Miranda MS, PAMiahC

## 2022-12-05 ENCOUNTER — OFFICE VISIT (OUTPATIENT)
Dept: FAMILY MEDICINE | Facility: CLINIC | Age: 82
End: 2022-12-05
Payer: COMMERCIAL

## 2022-12-05 VITALS
BODY MASS INDEX: 25.27 KG/M2 | TEMPERATURE: 97.1 F | HEIGHT: 67 IN | SYSTOLIC BLOOD PRESSURE: 132 MMHG | WEIGHT: 161 LBS | HEART RATE: 47 BPM | OXYGEN SATURATION: 100 % | DIASTOLIC BLOOD PRESSURE: 68 MMHG

## 2022-12-05 DIAGNOSIS — D12.6 TUBULAR ADENOMA OF COLON: ICD-10-CM

## 2022-12-05 DIAGNOSIS — E78.5 HYPERLIPIDEMIA LDL GOAL <130: ICD-10-CM

## 2022-12-05 DIAGNOSIS — I10 ESSENTIAL HYPERTENSION WITH GOAL BLOOD PRESSURE LESS THAN 140/90: Primary | ICD-10-CM

## 2022-12-05 DIAGNOSIS — E55.9 VITAMIN D DEFICIENCY: ICD-10-CM

## 2022-12-05 DIAGNOSIS — N39.46 MIXED STRESS AND URGE URINARY INCONTINENCE: ICD-10-CM

## 2022-12-05 DIAGNOSIS — N89.8 ITCHING IN THE VAGINAL AREA: ICD-10-CM

## 2022-12-05 DIAGNOSIS — L29.2 VULVAR ITCHING: ICD-10-CM

## 2022-12-05 DIAGNOSIS — R30.0 DYSURIA: ICD-10-CM

## 2022-12-05 DIAGNOSIS — M85.80 OSTEOPENIA, UNSPECIFIED LOCATION: ICD-10-CM

## 2022-12-05 DIAGNOSIS — E03.8 OTHER SPECIFIED HYPOTHYROIDISM: ICD-10-CM

## 2022-12-05 DIAGNOSIS — N18.31 STAGE 3A CHRONIC KIDNEY DISEASE (H): ICD-10-CM

## 2022-12-05 DIAGNOSIS — Z80.0 FAMILY HISTORY OF COLON CANCER: ICD-10-CM

## 2022-12-05 LAB
ALBUMIN SERPL BCG-MCNC: 4.6 G/DL (ref 3.5–5.2)
ALP SERPL-CCNC: 45 U/L (ref 35–104)
ALT SERPL W P-5'-P-CCNC: 16 U/L (ref 10–35)
ANION GAP SERPL CALCULATED.3IONS-SCNC: 11 MMOL/L (ref 7–15)
AST SERPL W P-5'-P-CCNC: 23 U/L (ref 10–35)
BILIRUB SERPL-MCNC: 0.7 MG/DL
BUN SERPL-MCNC: 25.3 MG/DL (ref 8–23)
CALCIUM SERPL-MCNC: 10.4 MG/DL (ref 8.8–10.2)
CHLORIDE SERPL-SCNC: 102 MMOL/L (ref 98–107)
CHOLEST SERPL-MCNC: 274 MG/DL
CREAT SERPL-MCNC: 1.21 MG/DL (ref 0.51–0.95)
DEPRECATED HCO3 PLAS-SCNC: 26 MMOL/L (ref 22–29)
ERYTHROCYTE [DISTWIDTH] IN BLOOD BY AUTOMATED COUNT: 12.2 % (ref 10–15)
GFR SERPL CREATININE-BSD FRML MDRD: 45 ML/MIN/1.73M2
GLUCOSE SERPL-MCNC: 105 MG/DL (ref 70–99)
HCT VFR BLD AUTO: 41.4 % (ref 35–47)
HDLC SERPL-MCNC: 56 MG/DL
HGB BLD-MCNC: 13.9 G/DL (ref 11.7–15.7)
LDLC SERPL CALC-MCNC: 179 MG/DL
MCH RBC QN AUTO: 30.7 PG (ref 26.5–33)
MCHC RBC AUTO-ENTMCNC: 33.6 G/DL (ref 31.5–36.5)
MCV RBC AUTO: 91 FL (ref 78–100)
NONHDLC SERPL-MCNC: 218 MG/DL
PLATELET # BLD AUTO: 239 10E3/UL (ref 150–450)
POTASSIUM SERPL-SCNC: 4.5 MMOL/L (ref 3.4–5.3)
PROT SERPL-MCNC: 7.6 G/DL (ref 6.4–8.3)
RBC # BLD AUTO: 4.53 10E6/UL (ref 3.8–5.2)
SODIUM SERPL-SCNC: 139 MMOL/L (ref 136–145)
T3 SERPL-MCNC: 109 NG/DL (ref 85–202)
T4 FREE SERPL-MCNC: 1.61 NG/DL (ref 0.9–1.7)
TRIGL SERPL-MCNC: 193 MG/DL
TSH SERPL DL<=0.005 MIU/L-ACNC: 2.48 UIU/ML (ref 0.3–4.2)
WBC # BLD AUTO: 7.2 10E3/UL (ref 4–11)

## 2022-12-05 PROCEDURE — 36415 COLL VENOUS BLD VENIPUNCTURE: CPT | Performed by: FAMILY MEDICINE

## 2022-12-05 PROCEDURE — 82043 UR ALBUMIN QUANTITATIVE: CPT | Performed by: FAMILY MEDICINE

## 2022-12-05 PROCEDURE — 80053 COMPREHEN METABOLIC PANEL: CPT | Performed by: FAMILY MEDICINE

## 2022-12-05 PROCEDURE — 80061 LIPID PANEL: CPT | Performed by: FAMILY MEDICINE

## 2022-12-05 PROCEDURE — 84443 ASSAY THYROID STIM HORMONE: CPT | Performed by: FAMILY MEDICINE

## 2022-12-05 PROCEDURE — 84439 ASSAY OF FREE THYROXINE: CPT | Performed by: FAMILY MEDICINE

## 2022-12-05 PROCEDURE — 85027 COMPLETE CBC AUTOMATED: CPT | Performed by: FAMILY MEDICINE

## 2022-12-05 PROCEDURE — 84480 ASSAY TRIIODOTHYRONINE (T3): CPT | Performed by: FAMILY MEDICINE

## 2022-12-05 PROCEDURE — 82306 VITAMIN D 25 HYDROXY: CPT | Performed by: FAMILY MEDICINE

## 2022-12-05 PROCEDURE — 99214 OFFICE O/P EST MOD 30 MIN: CPT | Performed by: FAMILY MEDICINE

## 2022-12-05 RX ORDER — OXYBUTYNIN CHLORIDE 10 MG/1
10-20 TABLET, EXTENDED RELEASE ORAL DAILY
Qty: 180 TABLET | Refills: 3 | Status: SHIPPED | OUTPATIENT
Start: 2022-12-05 | End: 2024-01-11

## 2022-12-05 RX ORDER — LEVOTHYROXINE SODIUM 88 UG/1
TABLET ORAL
Qty: 90 TABLET | Refills: 3 | Status: SHIPPED | OUTPATIENT
Start: 2022-12-05 | End: 2023-12-11

## 2022-12-05 RX ORDER — METOPROLOL SUCCINATE 50 MG/1
TABLET, EXTENDED RELEASE ORAL
Qty: 90 TABLET | Refills: 3 | Status: SHIPPED | OUTPATIENT
Start: 2022-12-05 | End: 2023-12-03

## 2022-12-05 RX ORDER — CLOBETASOL PROPIONATE 0.5 MG/G
OINTMENT TOPICAL
Qty: 60 G | Refills: 1 | Status: SHIPPED | OUTPATIENT
Start: 2022-12-05 | End: 2024-01-11

## 2022-12-05 RX ORDER — TRIAMTERENE/HYDROCHLOROTHIAZID 37.5-25 MG
TABLET ORAL
Qty: 90 TABLET | Refills: 3 | Status: SHIPPED | OUTPATIENT
Start: 2022-12-05 | End: 2023-12-03

## 2022-12-05 RX ORDER — LISINOPRIL 30 MG/1
TABLET ORAL
Qty: 90 TABLET | Refills: 3 | Status: SHIPPED | OUTPATIENT
Start: 2022-12-05 | End: 2023-12-03

## 2022-12-05 RX ORDER — SIMVASTATIN 40 MG
40 TABLET ORAL AT BEDTIME
Qty: 90 TABLET | Refills: 3 | Status: SHIPPED | OUTPATIENT
Start: 2022-12-05 | End: 2022-12-28 | Stop reason: ALTCHOICE

## 2022-12-05 NOTE — PATIENT INSTRUCTIONS
" [facility]  [address] [place] [address]  Office: [phone]   Fax:    [phone]     Return in about 6 months (around 6/2/2023) for Physical/Preventative Visit, cholesterol/lipids, blood pressure, w/ Dr. BOLAND for 40 minute appointment.     Future Appointments 12/5/2022 - 6/3/2023        Date Visit Type Length Department Provider     5/23/2023 10:30 AM RETURN 15 min [facility] DERMATOLOGY Mary Miranda PA-C    Location Instructions:     [address] [place] [id number]              6/2/2023  8:40 AM ANNUAL WELLNESS 40 min  FAMILY PRACTICE Lisbet Simmons MD    Location Instructions:     [facility] is located at [address], [address] Free parking is available; access the lot by turning north from [address] onto [address], then west onto [address].                    Thank you so much or choosing [facility]  for your Health Care. It was a pleasure seeing you at your visit today! Please contact us with any questions or concerns you may have.                   Lisbet Simmons MD                              To reach your [facility] care team after hours call:   [phone] press #2 \"to speak with your care team\".  This will get you to our clinic instead of routing to central [facility]  scheduling.     PLEASE NOTE OUR HOURS HAVE CHANGED secondary to COVID-19 coronavirus pandemic, as we are trying to minimize patient exposure to the virus,  which is now widespread in the [facility].  These hours may change with very little notice.  We apologize for any inconvenience.       Our current clinic hours are:          Monday- Thursday   7:00am - 6:00pm  in person.      Friday  7:00am- 5:00pm                       Saturday and Sunday : Closed to in person and virtual visits        We have telephone and virtual visit times available between "    7:00am - 6pm on Monday-Friday as well.                                                Phone:  [phone]      Our pharmacy hours: Monday through Friday 8:00am to 5:00pm                        Saturday - 9:00 am to 12 noon       Sunday : Closed.              Phone:  [phone]              ###  Please note: at this time we are not accepting any walk-in visits. ###      There is also information available at our web site:  [email or web address]    If your provider ordered any lab tests and you do not receive the results within 10 business days, please call the clinic.    If you need a medication refill please contact your pharmacy.  Please allow 3 business days for your refill to be completed.    Our clinic offers telephone visits and e visits.  Please ask one of your team members to explain more.      Use [employer] (secure email communication and access to your chart) to send your primary care provider a message or make an appointment. Ask someone on your Team how to sign up for [email or web address].

## 2022-12-05 NOTE — PROGRESS NOTES
Assessment & Plan       ICD-10-CM    1. Essential hypertension with goal blood pressure less than 140/90- needs lab follow up and refills today   I10 triamterene-HCTZ (MAXZIDE-25) 37.5-25 MG tablet     metoprolol succinate ER (TOPROL XL) 50 MG 24 hr tablet     lisinopril (ZESTRIL) 30 MG tablet     Albumin Random Urine Quantitative with Creat Ratio     CBC with platelets     Comprehensive metabolic panel      2. Hyperlipidemia LDL goal <130 - needs lab follow up and refills today   E78.5 simvastatin (ZOCOR) 40 MG tablet     Albumin Random Urine Quantitative with Creat Ratio     Comprehensive metabolic panel     Lipid panel reflex to direct LDL Fasting      3. Dysuria- recurrent with urgency and frequency - not always infection - seeing Dr. Karyn Luevano, [facility] - has some urge incontinence as well - regularly- needs to make appt    R30.0 oxybutynin ER (DITROPAN XL) 10 MG 24 hr tablet      4. Mixed stress and urge urinary incontinence- oxybutynin 10mg daily has improved symptoms significantly   N39.46 oxybutynin ER (DITROPAN XL) 10 MG 24 hr tablet      5. Other specified hypothyroidism -- needs lab follow up and refills today   E03.8 levothyroxine (SYNTHROID/LEVOTHROID) 88 MCG tablet     TSH     T4 free     T3 total      6. Vulvar itching - ? early lichen sclerosis vs. other - superior vulva  L29.2 clobetasol (TEMOVATE) 0.05 % external ointment      7. Itching in the vaginal area- using clobetasol twice weekly   N89.8 clobetasol (TEMOVATE) 0.05 % external ointment      8. Tubular adenomas of colon- 2014 - repeat in 5/2018 s/p skin ca = 2 more tubular adenomas - repeat in 5/2023 - pt really wants to do one more - ordered for 5/2023   D12.6       9. Family history of colon cancer- mother in her 40's-colonoscopies every 5 years- next one 2023  Z80.0 Colonoscopy Screening  Referral      10. Vitamin D deficiency - - needs lab follow up    E55.9 25 Hydroxyvitamin D2 and D3      11. Osteopenia, unspecified  "location - was on fosamax, then Boniva secondary to hot flashes - off boniva since 2012- last bone density 3/2022 - stable   M85.80       12. Stage 3a chronic kidney disease (H) - - needs lab follow up and refills today for her lipids and htn   N18.31 CBC with platelets     Comprehensive metabolic panel          with early dementia - pt is primary caregiver for him. Dr. Mahan is his doctor.     Ordering of each unique test  Prescription drug management  30 minutes spent on the date of the encounter doing chart review, history and exam, documentation and further activities per the note       BMI:   Estimated body mass index is 25.22 kg/m  as calculated from the following:    Height as of this encounter: 1.702 m (5' 7\").    Weight as of this encounter: 73 kg (161 lb).   Weight management plan: Discussed healthy diet and exercise guidelines    MEDICATIONS:  Continue current medications without change  Regular exercise  See Patient Instructions    Return in about 6 months (around 6/2/2023) for Physical/Preventative Visit, cholesterol/lipids, blood pressure, w/ Dr. BOLAND for 40 minute appointment.     Future Appointments 12/5/2022 - 6/3/2023      Date Visit Type Length Department Provider     5/23/2023 10:30 AM RETURN 15 min [facility] DERMATOLOGY Mary Miranda PA-C    Location Instructions:     [address] [place] [id number]              6/2/2023  8:40 AM ANNUAL WELLNESS 40 min  FAMILY PRACTICE Lisbet Simmons MD    Location Instructions:     [facility] is located at [address] [place] [address], along [address] Free parking is available; access the lot by turning north from [address] onto [facility], then west onto [facility].                          Lisbet Simmons MD  [facility]        Williams Valdovinos is a 82 year old, presenting for the following health issues:  Recheck Medication    1. Essential hypertension with " goal blood pressure less than 140/90- needs lab follow up and refills today     2. Hyperlipidemia LDL goal <130 - needs lab follow up and refills today     3. Dysuria- recurrent with urgency and frequency - not always infection - seeing Dr. Karyn Luevano [facility] - has some urge incontinence as well - regularly- needs to make appt      4. Mixed stress and urge urinary incontinence- oxybutynin 10mg daily has improved symptoms significantly     5. Other specified hypothyroidism -- needs lab follow up and refills today     6. Vulvar itching - ? early lichen sclerosis vs. other - superior vulva    7. Itching in the vaginal area- using clobetasol twice weekly     8. Tubular adenomas of colon- 2014 - repeat in 5/2018 s/p skin ca = 2 more tubular adenomas - repeat in 5/2023 - pt really wants to do one more - ordered for 5/2023     9. Family history of colon cancer- mother in her 40's-colonoscopies every 5 years- next one 2023    10. Vitamin D deficiency - - needs lab follow up      11. Osteopenia, unspecified location - was on fosamax, then Boniva secondary to hot flashes - off boniva since 2012- last bone density 3/2022 - stable     12. Stage 3a chronic kidney disease (H) - - needs lab follow up and refills today for her lipids and htn       Unfortunately, she is one day early for her annual Medicare Wellness visit. Her last one was 12/6/2021.      2 months ago, she was on a bus tour of the [facility], when she  Fell backwards off  a curb in [place], [place] on [address] while taking a picture  and hit her head. Had to get stitches.  Had to take an ambulance.  The previous laceration area is still a little sore. Was in same area as previous skin cancer removal.   Didn't fracture her skull.  Didn't lose consciousness.  Had a negative head CT.   Gets some vertigo on and off turning over in bed to her left.  Vertigo is much improved from initially after the fall.      History of Present Illness       Hypertension: She  "presents for follow up of hypertension.  She does not check blood pressure  regularly outside of the clinic. Outpatient blood pressures have not been over 140/90. She does not follow a low salt diet.     Hypothyroidism:     Since last visit, patient describes the following symptoms::  None      Mixed stress and urge urinary incontinence --oxybutynin 10-20mg daily really helps a lot.      Hyperlipidemia Follow-Up:       Are you regularly taking any medication or supplement to lower your cholesterol?   Yes- simvastatin     Are you having muscle aches or other side effects that you think could be caused by your cholesterol lowering medication?  No      How many servings of fruits and vegetables do you eat daily?  2-3    On average, how many sweetened beverages do you drink each day (Examples: soda, juice, sweet tea, etc.  Do NOT count diet or artificially sweetened beverages)?   1    How many days per week do you exercise enough to make your heart beat faster? 3 or less    How many minutes a day do you exercise enough to make your heart beat faster? 10 - 19    How many days per week do you miss taking your medication? 0        Review of Systems   Constitutional, HEENT, cardiovascular, pulmonary, GI, , musculoskeletal, neuro, skin, endocrine and psych systems are negative, except as otherwise noted.      Objective    /68 (BP Location: Left arm, Patient Position: Chair, Cuff Size: Adult Large)   Pulse (!) 47   Temp 97.1  F (36.2  C) (Tympanic)   Ht 1.702 m (5' 7\")   Wt 73 kg (161 lb)   SpO2 100%   BMI 25.22 kg/m    Body mass index is 25.22 kg/m .  Physical Exam   GENERAL: healthy, alert and no distress  EYES: Eyes grossly normal to inspection, PERRL and conjunctivae and sclerae normal  HENT: ear canals and TM's normal, nose and mouth without ulcers or lesions  NECK: no adenopathy, no asymmetry, masses, or scars and thyroid normal to palpation  RESP: lungs clear to auscultation - no rales, rhonchi or " wheezes  BREAST: normal without masses, tenderness or nipple discharge and no palpable axillary masses or adenopathy  CV: regular rate and rhythm, normal S1 S2, no S3 or S4, no murmur, click or rub, no peripheral edema and peripheral pulses strong  ABDOMEN: soft, nontender, no hepatosplenomegaly, no masses and bowel sounds normal  MS: no gross musculoskeletal defects noted, no edema  SKIN: no suspicious lesions or rashes  NEURO: Normal strength and tone, mentation intact and speech normal  PSYCH: mentation appears normal, affect normal/bright    Office Visit on 06/06/2022   Component Date Value Ref Range Status     25 OH Vitamin D2 06/06/2022 <5  ug/L Final     25 OH Vitamin D3 06/06/2022 34  ug/L Final     25 OH Vit D Total 06/06/2022 <39  20 - 75 ug/L Final    Season, race, dietary intake, and treatment affect the concentration of 25-hydroxy-Vitamin D. Values may decrease during winter months and increase during summer months. Values 20-29 ug/L may indicate Vitamin D insufficiency and values <20 ug/L may indicate Vitamin D deficiency.     Creatinine Urine mg/dL 06/06/2022 41  mg/dL Final     Albumin Urine mg/L 06/06/2022 <5  mg/L Final     Albumin Urine mg/g Cr 06/06/2022    Final    Unable to calculate:  Urine creatinine or albumin value below detectable level     WBC Count 06/06/2022 6.4  4.0 - 11.0 10e3/uL Final     RBC Count 06/06/2022 4.40  3.80 - 5.20 10e6/uL Final     Hemoglobin 06/06/2022 13.5  11.7 - 15.7 g/dL Final     Hematocrit 06/06/2022 39.7  35.0 - 47.0 % Final     MCV 06/06/2022 90  78 - 100 fL Final     MCH 06/06/2022 30.7  26.5 - 33.0 pg Final     MCHC 06/06/2022 34.0  31.5 - 36.5 g/dL Final     RDW 06/06/2022 12.0  10.0 - 15.0 % Final     Platelet Count 06/06/2022 217  150 - 450 10e3/uL Final     Sodium 06/06/2022 136  133 - 144 mmol/L Final     Potassium 06/06/2022 3.8  3.4 - 5.3 mmol/L Final     Chloride 06/06/2022 102  94 - 109 mmol/L Final     Carbon Dioxide (CO2) 06/06/2022 28  20 - 32  mmol/L Final     Anion Gap 06/06/2022 6  3 - 14 mmol/L Final     Urea Nitrogen 06/06/2022 23  7 - 30 mg/dL Final     Creatinine 06/06/2022 1.14 (H)  0.52 - 1.04 mg/dL Final     Calcium 06/06/2022 10.3 (H)  8.5 - 10.1 mg/dL Final     Glucose 06/06/2022 93  70 - 99 mg/dL Final     Alkaline Phosphatase 06/06/2022 45  40 - 150 U/L Final     AST 06/06/2022 17  0 - 45 U/L Final     ALT 06/06/2022 22  0 - 50 U/L Final     Protein Total 06/06/2022 7.7  6.8 - 8.8 g/dL Final     Albumin 06/06/2022 4.0  3.4 - 5.0 g/dL Final     Bilirubin Total 06/06/2022 1.0  0.2 - 1.3 mg/dL Final     GFR Estimate 06/06/2022 49 (L)  >60 mL/min/1.73m2 Final    Effective December 21, 2021 eGFRcr in adults is calculated using the 2021 CKD-EPI creatinine equation which includes age and gender (Danie et al., NEJM, DOI: 10.1056/MHHGrk2726669)     Cholesterol 06/06/2022 195  <200 mg/dL Final     Triglycerides 06/06/2022 242 (H)  <150 mg/dL Final     Direct Measure HDL 06/06/2022 49 (L)  >=50 mg/dL Final     LDL Cholesterol Calculated 06/06/2022 98  <=100 mg/dL Final     Non HDL Cholesterol 06/06/2022 146 (H)  <130 mg/dL Final     Patient Fasting > 8hrs? 06/06/2022 Yes   Final     TSH 06/06/2022 2.05  0.40 - 4.00 mU/L Final     Free T4 06/06/2022 1.31  0.76 - 1.46 ng/dL Final     T3 Total 06/06/2022 107  60 - 181 ng/dL Final

## 2022-12-06 LAB
CREAT UR-MCNC: 66.7 MG/DL
MICROALBUMIN UR-MCNC: <12 MG/L
MICROALBUMIN/CREAT UR: NORMAL MG/G{CREAT}

## 2022-12-12 ENCOUNTER — OFFICE VISIT (OUTPATIENT)
Dept: FAMILY MEDICINE | Facility: CLINIC | Age: 82
End: 2022-12-12
Payer: COMMERCIAL

## 2022-12-12 ENCOUNTER — NURSE TRIAGE (OUTPATIENT)
Dept: FAMILY MEDICINE | Facility: CLINIC | Age: 82
End: 2022-12-12

## 2022-12-12 VITALS
HEIGHT: 67 IN | SYSTOLIC BLOOD PRESSURE: 120 MMHG | BODY MASS INDEX: 25.74 KG/M2 | HEART RATE: 71 BPM | TEMPERATURE: 97.1 F | DIASTOLIC BLOOD PRESSURE: 70 MMHG | OXYGEN SATURATION: 99 % | WEIGHT: 164 LBS

## 2022-12-12 DIAGNOSIS — I10 ESSENTIAL HYPERTENSION WITH GOAL BLOOD PRESSURE LESS THAN 140/90: ICD-10-CM

## 2022-12-12 DIAGNOSIS — N18.31 STAGE 3A CHRONIC KIDNEY DISEASE (H): ICD-10-CM

## 2022-12-12 DIAGNOSIS — E83.52 SERUM CALCIUM ELEVATED: ICD-10-CM

## 2022-12-12 DIAGNOSIS — R30.0 DYSURIA: Primary | ICD-10-CM

## 2022-12-12 LAB
ALBUMIN UR-MCNC: NEGATIVE MG/DL
APPEARANCE UR: CLEAR
BACTERIA #/AREA URNS HPF: ABNORMAL /HPF
BILIRUB UR QL STRIP: NEGATIVE
COLOR UR AUTO: YELLOW
GLUCOSE UR STRIP-MCNC: NEGATIVE MG/DL
HGB UR QL STRIP: NEGATIVE
KETONES UR STRIP-MCNC: NEGATIVE MG/DL
LEUKOCYTE ESTERASE UR QL STRIP: ABNORMAL
NITRATE UR QL: NEGATIVE
PH UR STRIP: 7 [PH] (ref 5–7)
RBC #/AREA URNS AUTO: ABNORMAL /HPF
SP GR UR STRIP: 1.01 (ref 1–1.03)
SQUAMOUS #/AREA URNS AUTO: ABNORMAL /LPF
UROBILINOGEN UR STRIP-ACNC: 0.2 E.U./DL
WBC #/AREA URNS AUTO: ABNORMAL /HPF
WBC CLUMPS #/AREA URNS HPF: PRESENT /HPF

## 2022-12-12 PROCEDURE — 80048 BASIC METABOLIC PNL TOTAL CA: CPT | Performed by: NURSE PRACTITIONER

## 2022-12-12 PROCEDURE — 36415 COLL VENOUS BLD VENIPUNCTURE: CPT | Performed by: NURSE PRACTITIONER

## 2022-12-12 PROCEDURE — 87086 URINE CULTURE/COLONY COUNT: CPT | Performed by: NURSE PRACTITIONER

## 2022-12-12 PROCEDURE — 99214 OFFICE O/P EST MOD 30 MIN: CPT | Performed by: NURSE PRACTITIONER

## 2022-12-12 PROCEDURE — 87186 SC STD MICRODIL/AGAR DIL: CPT | Performed by: NURSE PRACTITIONER

## 2022-12-12 PROCEDURE — 83970 ASSAY OF PARATHORMONE: CPT | Performed by: NURSE PRACTITIONER

## 2022-12-12 PROCEDURE — 81001 URINALYSIS AUTO W/SCOPE: CPT | Performed by: NURSE PRACTITIONER

## 2022-12-12 RX ORDER — CEFDINIR 300 MG/1
300 CAPSULE ORAL 2 TIMES DAILY
Qty: 14 CAPSULE | Refills: 0 | Status: SHIPPED | OUTPATIENT
Start: 2022-12-12 | End: 2022-12-19

## 2022-12-12 NOTE — TELEPHONE ENCOUNTER
Situation  Patient calling requesting an appointment for UTI symptoms     background   previous kidney infection and bladder infections   Last a year ago    Assessment   symptoms started yesterday 12/11  Burning denied pain  Frequency    no fever   No lower abd or lower back pain   No visible blood   No cramping     Per  protocol -should be seen in office today.   Patient is  Not comfortable with [employer] to submit an evisit     Reason for Disposition    Pain or burning with passing urine (urination) and female    Age > 50 years    All other females with painful urination, or patient wants to be seen    Additional Information    Negative: Shock suspected (e.g., cold/pale/clammy skin, too weak to stand, low BP, rapid pulse)    Negative: Sounds like a life-threatening emergency to the triager    Negative: Followed a female genital area injury (e.g., vagina, vulva)    Negative: Followed a male genital area injury (penis, scrotum)    Negative: Vaginal discharge    Negative: Pus (white, yellow) or bloody discharge from end of penis    Negative: Pain or burning with passing urine (urination) and pregnant    Negative: Shock suspected (e.g., cold/pale/clammy skin, too weak to stand, low BP, rapid pulse)    Negative: Sounds like a life-threatening emergency to the triager    Negative: Unable to urinate (or only a few drops) and bladder feels very full    Negative: Vomiting    Negative: Patient sounds very sick or weak to the triager    Negative: SEVERE pain with urination    Negative: Fever > 100.4 F (38.0 C)    Negative: Side (flank) or lower back pain present    Negative: Taking antibiotic > 24 hours for UTI and fever persists    Negative: Taking antibiotic > 3 days for UTI and painful urination not improved    Negative: Unusual vaginal discharge    Negative: > 2 UTIs in last year    Negative: Patient is worried they have a sexually transmitted infection (STI)    Negative: Possibility of pregnancy    Protocols used: URINARY  SYMPTOMS-A-OH, URINATION PAIN - FEMALE-A-OH    Patient has been taking AZO as directed    Advised patient will check with provider and call back     Yuli CARR RN   [facility] Triage

## 2022-12-12 NOTE — TELEPHONE ENCOUNTER
Huddled with provider     Appointment scheduled-patient advised of arrival and appointment time     Yuli CARR RN   [facility] Triage

## 2022-12-12 NOTE — PROGRESS NOTES
Assessment & Plan     Dysuria  Urine and symptoms consistent with UTI.  Monitor for kidney etiology.   Antibiotics-good probiotics encouraged while on antibiotics.   If symptoms worsen on antibiotics I would encourage her to be seen for further assessment and care.   Pyridium offered and declined.   Urine culture pending.   Written education provided.   Aruna verbalizes understanding of plan of care and is in agreement.    - Urine Culture Aerobic Bacterial - lab collect  - UA with Microscopic - lab collect  - Urine Microscopic Exam  - cefdinir (OMNICEF) 300 MG capsule  Dispense: 14 capsule; Refill: 0    Serum calcium elevated    - Basic metabolic panel  (Ca, Cl, CO2, Creat, Gluc, K, Na, BUN)  - Parathyroid Hormone Intact  - Basic metabolic panel  (Ca, Cl, CO2, Creat, Gluc, K, Na, BUN)  - Parathyroid Hormone Intact    Essential hypertension with goal blood pressure less than 140/90- well controlled  No concerns.  Stable.      Stage 3a chronic kidney disease (H)    - Basic metabolic panel  (Ca, Cl, CO2, Creat, Gluc, K, Na, BUN)    Return in about 2 weeks (around 12/26/2022) for Lab only appointment.      BEE [facility]    Subjective   Aruna is a 82 year old, presenting for the following health issues:  Urinary Problem      HPI     Triaged - 9:54a  Situation  Patient calling requesting an appointment for UTI symptoms      background   previous kidney infection and bladder infections   Last a year ago     Assessment   symptoms started yesterday 12/11  Burning denied pain  Frequency    no fever   No lower abd or lower back pain   No visible blood   No cramping      Per  protocol -should be seen in office today.   Patient is  Not comfortable with [employer] to submit an evisit      Reason for Disposition    Pain or burning with passing urine (urination) and female    Age > 50 years    All other females with painful urination, or patient wants to be seen    Additional  "Information    Negative: Shock suspected (e.g., cold/pale/clammy skin, too weak to stand, low BP, rapid pulse)    Negative: Sounds like a life-threatening emergency to the triager    Negative: Followed a female genital area injury (e.g., vagina, vulva)    Negative: Followed a male genital area injury (penis, scrotum)    Negative: Vaginal discharge    Negative: Pus (white, yellow) or bloody discharge from end of penis    Negative: Pain or burning with passing urine (urination) and pregnant    Negative: Shock suspected (e.g., cold/pale/clammy skin, too weak to stand, low BP, rapid pulse)    Negative: Sounds like a life-threatening emergency to the triager    Negative: Unable to urinate (or only a few drops) and bladder feels very full    Negative: Vomiting    Negative: Patient sounds very sick or weak to the triager    Negative: SEVERE pain with urination    Negative: Fever > 100.4 F (38.0 C)    Negative: Side (flank) or lower back pain present    Negative: Taking antibiotic > 24 hours for UTI and fever persists    Negative: Taking antibiotic > 3 days for UTI and painful urination not improved    Negative: Unusual vaginal discharge    Negative: > 2 UTIs in last year    Negative: Patient is worried they have a sexually transmitted infection (STI)    Negative: Possibility of pregnancy    Protocols used: URINARY SYMPTOMS-A-OH, URINATION PAIN - FEMALE-A-OH     Patient has been taking AZO as directed     Advised patient will check with provider and call back      Yuli CARR RN   [facility] Triage       Review of Systems   Constitutional, HEENT, cardiovascular, pulmonary, GI, , musculoskeletal, neuro, skin, endocrine and psych systems are negative, except as otherwise noted in the HPI.        Objective    /70   Pulse 71   Temp 97.1  F (36.2  C)   Ht 1.702 m (5' 7\")   Wt 74.4 kg (164 lb)   SpO2 99%   BMI 25.69 kg/m    Body mass index is 25.69 kg/m .  Physical Exam   GENERAL: healthy, " alert and no distress  RESP: lungs clear to auscultation - no rales, rhonchi or wheezes  CV: regular rate and rhythm, normal S1 S2, no S3 or S4, no murmur, click or rub, no peripheral edema and peripheral pulses strong  ABDOMEN: soft, nontender, no hepatosplenomegaly, no masses and bowel sounds normal  NO CVA tenderness  NEURO: Normal strength and tone, mentation intact and speech normal  PSYCH: mentation appears normal, affect normal/bright    Results for orders placed or performed in visit on 12/12/22   UA with Microscopic - lab collect     Status: Abnormal   Result Value Ref Range    Color Urine Yellow Colorless, Straw, Light Yellow, Yellow    Appearance Urine Clear Clear    Glucose Urine Negative Negative mg/dL    Bilirubin Urine Negative Negative    Ketones Urine Negative Negative mg/dL    Specific Gravity Urine 1.015 1.003 - 1.035    Blood Urine Negative Negative    pH Urine 7.0 5.0 - 7.0    Protein Albumin Urine Negative Negative mg/dL    Urobilinogen Urine 0.2 0.2, 1.0 E.U./dL    Nitrite Urine Negative Negative    Leukocyte Esterase Urine Moderate (A) Negative   Urine Microscopic Exam     Status: Abnormal   Result Value Ref Range    Bacteria Urine Many (A) None Seen /HPF    RBC Urine None Seen 0-2 /HPF /HPF    WBC Urine 10-25 (A) 0-5 /HPF /HPF    Squamous Epithelials Urine Few (A) None Seen /LPF    WBC Clumps Urine Present (A) None Seen /HPF   Basic metabolic panel  (Ca, Cl, CO2, Creat, Gluc, K, Na, BUN)     Status: Abnormal   Result Value Ref Range    Sodium 139 136 - 145 mmol/L    Potassium 4.4 3.4 - 5.3 mmol/L    Chloride 100 98 - 107 mmol/L    Carbon Dioxide (CO2) 27 22 - 29 mmol/L    Anion Gap 12 7 - 15 mmol/L    Urea Nitrogen 28.0 (H) 8.0 - 23.0 mg/dL    Creatinine 1.42 (H) 0.51 - 0.95 mg/dL    Calcium 9.6 8.8 - 10.2 mg/dL    Glucose 101 (H) 70 - 99 mg/dL    GFR Estimate 37 (L) >60 mL/min/1.73m2   Parathyroid Hormone Intact     Status: Normal   Result Value Ref Range    Parathyroid Hormone Intact 21 15  - 65 pg/mL    Narrative    This result was obtained with the Roche Elecsys PTH STAT assay.   This reference range differs from PTH assays used in other [facility] laboratories.   Urine Culture Aerobic Bacterial - lab collect     Status: Abnormal    Specimen: Urine, Midstream   Result Value Ref Range    Culture >100,000 CFU/mL Escherichia coli (A)        Susceptibility    Escherichia coli - DRE     Ampicillin <=2 Susceptible ug/mL     Ampicillin/ Sulbactam <=2 Susceptible ug/mL     Piperacillin/Tazobactam <=4 Susceptible ug/mL     Cefazolin* <=4 Susceptible ug/mL      * Cefazolin DRE breakpoints are for the treatment of uncomplicated urinary tract infections. For the treatment of systemic infections, please contact the laboratory for additional testing.     Cefoxitin <=4 Susceptible ug/mL     Ceftazidime <=1 Susceptible ug/mL     Ceftriaxone <=1 Susceptible ug/mL     Cefepime <=1 Susceptible ug/mL     Gentamicin <=1 Susceptible ug/mL     Tobramycin <=1 Susceptible ug/mL     Ciprofloxacin <=0.25 Susceptible ug/mL     Levofloxacin <=0.12 Susceptible ug/mL     Nitrofurantoin <=16 Susceptible ug/mL     Trimethoprim/Sulfamethoxazole <=1/19 Susceptible ug/mL

## 2022-12-13 LAB
ANION GAP SERPL CALCULATED.3IONS-SCNC: 12 MMOL/L (ref 7–15)
BUN SERPL-MCNC: 28 MG/DL (ref 8–23)
CALCIUM SERPL-MCNC: 9.6 MG/DL (ref 8.8–10.2)
CHLORIDE SERPL-SCNC: 100 MMOL/L (ref 98–107)
CREAT SERPL-MCNC: 1.42 MG/DL (ref 0.51–0.95)
DEPRECATED HCO3 PLAS-SCNC: 27 MMOL/L (ref 22–29)
GFR SERPL CREATININE-BSD FRML MDRD: 37 ML/MIN/1.73M2
GLUCOSE SERPL-MCNC: 101 MG/DL (ref 70–99)
POTASSIUM SERPL-SCNC: 4.4 MMOL/L (ref 3.4–5.3)
PTH-INTACT SERPL-MCNC: 21 PG/ML (ref 15–65)
SODIUM SERPL-SCNC: 139 MMOL/L (ref 136–145)

## 2022-12-14 LAB — BACTERIA UR CULT: ABNORMAL

## 2022-12-16 NOTE — RESULT ENCOUNTER NOTE
Dear Aruna,    Here is a summary of your recent test results:    -Kidney function (GFR) is decreased.  ADVISE: rechecking this in 1 months  -Glucose is mildly elevated but you were nonfasting and this is okay..  -Urine culture is abnormal and grew out bacteria that are sensitive to the antibiotic you have been given.  Complete the medication as prescribed and if you experience new, worsening or persistent symptoms, you should call or return for a recheck.    For additional lab test information, labtestsonline.org is an excellent reference.    In addition, here is a list of due or overdue Health Maintenance reminders:    Annual Wellness Visit due on 12/06/2022    Please call us at [phone] (or use [employer]) to address the above recommendations if needed.    Thank you for choosing [facility].  It was an honor and a privilege to participate in your care.       Healthy regards,    Nichol Raknin, WINTER  [facility]

## 2022-12-28 RX ORDER — ROSUVASTATIN CALCIUM 20 MG/1
20 TABLET, COATED ORAL DAILY
Qty: 90 TABLET | Refills: 3 | Status: SHIPPED | OUTPATIENT
Start: 2022-12-28 | End: 2023-11-08

## 2023-04-02 ENCOUNTER — HEALTH MAINTENANCE LETTER (OUTPATIENT)
Age: 83
End: 2023-04-02

## 2023-05-23 ENCOUNTER — OFFICE VISIT (OUTPATIENT)
Dept: DERMATOLOGY | Facility: CLINIC | Age: 83
End: 2023-05-23
Payer: COMMERCIAL

## 2023-05-23 DIAGNOSIS — Z85.828 HISTORY OF SKIN CANCER: ICD-10-CM

## 2023-05-23 DIAGNOSIS — D22.9 NEVUS: Primary | ICD-10-CM

## 2023-05-23 DIAGNOSIS — L82.1 SEBORRHEIC KERATOSIS: ICD-10-CM

## 2023-05-23 DIAGNOSIS — L81.4 LENTIGO: ICD-10-CM

## 2023-05-23 DIAGNOSIS — D18.01 ANGIOMA OF SKIN: ICD-10-CM

## 2023-05-23 PROCEDURE — 99213 OFFICE O/P EST LOW 20 MIN: CPT | Performed by: PHYSICIAN ASSISTANT

## 2023-05-23 NOTE — PROGRESS NOTES
HPI:   chief complaint: Aruna Santos is a 82 year old female who presents for Full skin cancer screening to rule out skin cancer.  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 Son had a carcinoid tumor and passed away 3/2021.   On review of systems, there are no further skin complaints, patient is feeling otherwise well.  See patient intake sheet.  ROS of the following were done and are negative: Constitutional, Eyes, Ears, Nose,   Mouth, Throat, Cardiovascular, Respiratory, GI, Genitourinary, Musculoskeletal,   Psychiatric, Endocrine, Allergic/Immunologic.        PHYSICAL EXAM:   There were no vitals taken for this visit.  Skin exam performed as follows: Type 2 skin. Mood appropriate  Alert and Oriented X 3. Well developed, well nourished in no distress.  General appearance: Normal  Head including face: Normal  Eyes: conjunctiva and lids: Normal  Mouth: Lips, teeth, gums: Normal  Neck: Normal  Chest-breast/axillae: Normal  Back: Normal  Spleen and liver: Normal  Cardiovascular: Exam of peripheral vascular system by observation for swelling, varicosities, edema: Normal  Genitalia: groin, buttocks: Normal  Extremities: digits/nails (clubbing): Normal  Eccrine and Apocrine glands: Normal  Right upper extremity: Normal  Left upper extremity: Normal  Right lower extremity: Normal  Left lower extremity: Normal  Skin: Scalp and body hair: See below    Pt deferred exam of breasts, groin, buttocks: No    Other physical findings:  1. Multiple pigmented macules on extremities and trunk  2. Multiple pigmented macules on face, trunk and extremities  3. Multiple vascular papules on  trunk, arms and legs  4. Multiple scattered keratotic plaques        Except as noted above, no other signs of skin cancer or melanoma.     ASSESSMENT/PLAN:   Benign Full skin cancer screening today.    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    Multiple benign appearing nevi on arms, legs and trunk. Discussed ABCDEs of melanoma and sunscreen.   Multiple lentigos on arms, legs and trunk. Advised benign, no treatment needed.  Multiple scattered angiomas. Advised benign, no treatment needed.   Seborrheic keratosis on arms, legs and trunk. Advised benign, no treatment needed.  History of adenocarcinoma on the scalp 1/2018. No lymphadenopathy palpated today - advised for pt to do this once monthly at home. More than 50% of reported cases are located in the tete-orbital region and the hair-bearing scalp.  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          Follow-up:Q 6-12 months FSE/PRN sooner     1.) Patient was asked about new and changing moles. YES  2.) Patient received a complete physical skin examination: YES  3.) Patient was counseled to perform a monthly self skin examination: YES  Scribed By: Mary Miranda MS, PANHAN

## 2023-05-23 NOTE — LETTER
5/23/2023         RE: Aruna Santos  [address] [facility] [id number]        Dear Colleague,    Thank you for referring your patient, Aruna Santos, to the [facility]. Please see a copy of my visit note below.    HPI:   chief complaint: Aruna Santos is a 82 year old female who presents for Full skin cancer screening to rule out skin cancer.  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 Son had a carcinoid tumor and passed away 3/2021.   On review of systems, there are no further skin complaints, patient is feeling otherwise well.  See patient intake sheet.  ROS of the following were done and are negative: Constitutional, Eyes, Ears, Nose,   Mouth, Throat, Cardiovascular, Respiratory, GI, Genitourinary, Musculoskeletal,   Psychiatric, Endocrine, Allergic/Immunologic.        PHYSICAL EXAM:   There were no vitals taken for this visit.  Skin exam performed as follows: Type 2 skin. Mood appropriate  Alert and Oriented X 3. Well developed, well nourished in no distress.  General appearance: Normal  Head including face: Normal  Eyes: conjunctiva and lids: Normal  Mouth: Lips, teeth, gums: Normal  Neck: Normal  Chest-breast/axillae: Normal  Back: Normal  Spleen and liver: Normal  Cardiovascular: Exam of peripheral vascular system by observation for swelling, varicosities, edema: Normal  Genitalia: groin, buttocks: Normal  Extremities: digits/nails (clubbing): Normal  Eccrine and Apocrine glands: Normal  Right upper extremity: Normal  Left upper extremity: Normal  Right lower extremity: Normal  Left lower extremity: Normal  Skin: Scalp  and body hair: See below    Pt deferred exam of breasts, groin, buttocks: No    Other physical findings:  1. Multiple pigmented macules on extremities and trunk  2. Multiple pigmented macules on face, trunk and extremities  3. Multiple vascular papules on trunk, arms and legs  4. Multiple scattered keratotic plaques        Except as noted above, no other signs of skin cancer or melanoma.     ASSESSMENT/PLAN:   Benign Full skin cancer screening today.    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    Multiple benign appearing nevi on arms, legs and trunk. Discussed ABCDEs of melanoma and sunscreen.   Multiple lentigos on arms, legs and trunk. Advised benign, no treatment needed.  Multiple scattered angiomas. Advised benign, no treatment needed.   Seborrheic keratosis on arms, legs and trunk. Advised benign, no treatment needed.  History of adenocarcinoma on the scalp 1/2018. No lymphadenopathy palpated today - advised for pt to do this once monthly at home. More than 50% of reported cases are located in the tete-orbital region and the hair-bearing scalp.  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          Follow-up:Q 6-12 months FSE/PRN sooner     1.) Patient was asked about new and changing moles. YES  2.) Patient received a complete physical skin examination: YES  3.) Patient was counseled to perform a monthly self skin examination: YES  Scribed By: Mary Miranda MS, PANHAN        Again, thank you for allowing me to participate in the care of your patient.        Sincerely,        Mary Miranda PA-C

## 2023-05-24 ENCOUNTER — TRANSFERRED RECORDS (OUTPATIENT)
Dept: HEALTH INFORMATION MANAGEMENT | Facility: CLINIC | Age: 83
End: 2023-05-24
Payer: COMMERCIAL

## 2023-05-30 ENCOUNTER — OFFICE VISIT (OUTPATIENT)
Dept: FAMILY MEDICINE | Facility: CLINIC | Age: 83
End: 2023-05-30
Payer: COMMERCIAL

## 2023-05-30 VITALS
HEART RATE: 72 BPM | SYSTOLIC BLOOD PRESSURE: 112 MMHG | WEIGHT: 161 LBS | BODY MASS INDEX: 25.22 KG/M2 | DIASTOLIC BLOOD PRESSURE: 56 MMHG | RESPIRATION RATE: 14 BRPM | OXYGEN SATURATION: 96 % | TEMPERATURE: 98.1 F

## 2023-05-30 DIAGNOSIS — N18.31 STAGE 3A CHRONIC KIDNEY DISEASE (H): ICD-10-CM

## 2023-05-30 DIAGNOSIS — Z01.818 PREOP GENERAL PHYSICAL EXAM: Primary | ICD-10-CM

## 2023-05-30 DIAGNOSIS — E03.8 OTHER SPECIFIED HYPOTHYROIDISM: ICD-10-CM

## 2023-05-30 DIAGNOSIS — D12.6 TUBULAR ADENOMA OF COLON: ICD-10-CM

## 2023-05-30 DIAGNOSIS — I10 ESSENTIAL HYPERTENSION WITH GOAL BLOOD PRESSURE LESS THAN 140/90: ICD-10-CM

## 2023-05-30 PROBLEM — C49.0: Status: RESOLVED | Noted: 2019-11-29 | Resolved: 2023-05-30

## 2023-05-30 PROCEDURE — 99214 OFFICE O/P EST MOD 30 MIN: CPT | Performed by: FAMILY MEDICINE

## 2023-05-30 PROCEDURE — 36415 COLL VENOUS BLD VENIPUNCTURE: CPT | Performed by: FAMILY MEDICINE

## 2023-05-30 PROCEDURE — 80048 BASIC METABOLIC PNL TOTAL CA: CPT | Performed by: FAMILY MEDICINE

## 2023-05-30 NOTE — PATIENT INSTRUCTIONS
For informational purposes only. Not to replace the advice of your health care provider. Copyright   2003,  [facility]. All rights reserved. Clinically reviewed by Vanessa Nelson MD. [employer] [id number] - REV .  Preparing for Your Surgery  Getting started  A nurse will call you to review your health history and instructions. They will give you an arrival time based on your scheduled surgery time. Please be ready to share:    Your doctor's clinic name and phone number    Your medical, surgical, and anesthesia history    A list of allergies and sensitivities    A list of medicines, including herbal treatments and over-the-counter drugs    Whether the patient has a legal guardian (ask how to send us the papers in advance)  Please tell us if you're pregnant--or if there's any chance you might be pregnant. Some surgeries may injure a fetus (unborn baby), so they require a pregnancy test. Surgeries that are safe for a fetus don't always need a test, and you can choose whether to have one.   If you have a child who's having surgery, please ask for a copy of Preparing for Your Child's Surgery.    Preparing for surgery    Within 10 to 30 days of surgery: Have a pre-op exam (sometimes called an H&P, or History and Physical). This can be done at a clinic or pre-operative center.  ? If you're having a , you may not need this exam. Talk to your care team.    At your pre-op exam, talk to your care team about all medicines you take. If you need to stop any medicines before surgery, ask when to start taking them again.  ? We do this for your safety. Many medicines can make you bleed too much during surgery. Some change how well surgery (anesthesia) drugs work.    Call your insurance company to let them know you're having surgery. (If you don't have insurance, call [phone].)    Call your clinic if there's any change in your health. This includes signs of a cold or flu (sore throat, runny nose,  cough, rash, fever). It also includes a scrape or scratch near the surgery site.    If you have questions on the day of surgery, call your hospital or surgery center.  Eating and drinking guidelines  For your safety: Unless your surgeon tells you otherwise, follow the guidelines below.    Eat and drink as usual until 8 hours before you arrive for surgery. After that, no food or milk.    Drink clear liquids until 2 hours before you arrive. These are liquids you can see through, like water, Gatorade, and Propel Water. They also include plain black coffee and tea (no cream or milk), candy, and breath mints. You can spit out gum when you arrive.    If you drink alcohol: Stop drinking it the night before surgery.    If your care team tells you to take medicine on the morning of surgery, it's okay to take it with a sip of water.  Preventing infection    Shower or bathe the night before and morning of your surgery. Follow the instructions your clinic gave you. (If no instructions, use regular soap.)    Don't shave or clip hair near your surgery site. We'll remove the hair if needed.    Don't smoke or vape the morning of surgery. You may chew nicotine gum up to 2 hours before surgery. A nicotine patch is okay.  ? Note: Some surgeries require you to completely quit smoking and nicotine. Check with your surgeon.    Your care team will make every effort to keep you safe from infection. We will:  ? Clean our hands often with soap and water (or an alcohol-based hand rub).  ? Clean the skin at your surgery site with a special soap that kills germs.  ? Give you a special gown to keep you warm. (Cold raises the risk of infection.)  ? Wear special hair covers, masks, gowns and gloves during surgery.  ? Give antibiotic medicine, if prescribed. Not all surgeries need antibiotics.  What to bring on the day of surgery    Photo ID and insurance card    Copy of your health care directive, if you have one    Glasses and hearing aids (bring  cases)  ? You can't wear contacts during surgery    Inhaler and eye drops, if you use them (tell us about these when you arrive)    CPAP machine or breathing device, if you use them    A few personal items, if spending the night    If you have . . .  ? A pacemaker, ICD (cardiac defibrillator) or other implant: Bring the ID card.  ? An implanted stimulator: Bring the remote control.  ? A legal guardian: Bring a copy of the certified (court-stamped) guardianship papers.  Please remove any jewelry, including body piercings. Leave jewelry and other valuables at home.  If you're going home the day of surgery    You must have a responsible adult drive you home. They should stay with you overnight as well.    If you don't have someone to stay with you, and you aren't safe to go home alone, we may keep you overnight. Insurance often won't pay for this.  After surgery  If it's hard to control your pain or you need more pain medicine, please call your surgeon's office.  Questions?   If you have any questions for your care team, list them here: _________________________________________________________________________________________________________________________________________________________________________ ____________________________________ ____________________________________ ____________________________________

## 2023-05-30 NOTE — PROGRESS NOTES
[address] [address]  Phone: [phone]  Primary Provider: Lisbet Simmons  Pre-op Performing Provider: STEPHEN MAHAN      PREOPERATIVE EVALUATION:  Today's date: 5/30/2023    Aruna Santos is a 82 year old female who presents for a preoperative evaluation.    Surgical Information:  Surgery/Procedure: Colonoscopy  Surgery Location: [facility]  Surgeon: Dr. Van Cavanaugh  Surgery Date: 06/19/2023  Time of Surgery: 9:20 AM  Where patient plans to recover: At home with family  Fax number for surgical facility: Note does not need to be faxed, will be available electronically in Epic.    Assessment & Plan     The proposed surgical procedure is considered LOW risk.    Preop general physical exam      Tubular adenomas of colon- 2014 - repeat in 5/2018 s/p skin ca = 2 more tubular adenomas - repeat in 2023       Essential hypertension with goal blood pressure less than 140/90- well controlled  Controlled - continue medication. Will hold diuretic     Other specified hypothyroidism  Controlled - continue medication.     Stage 3a chronic kidney disease (H)  Stable - blood pressure controlled.      Antiplatelet or Anticoagulation Medication Instructions:   - Patient is on no antiplatelet or anticoagulation medications.    Additional Medication Instructions:  Patient is to take all scheduled medications on the day of surgery EXCEPT for modifications listed below:   - ACE/ARB: take on the day of surgery   - Beta Blockers: Continue taking on the day of surgery.   - Diuretics: HOLD on the day of surgery.      RECOMMENDATION:  APPROVAL GIVEN to proceed with proposed procedure, without further diagnostic evaluation.      Johnathon Mahan MD     [address] [address]  Office: [phone]  [email or web address]         Subjective       HPI related to  upcoming procedure: Prior history of polyps and colonoscopy recommended.        5/30/2023     1:48 PM   Preop Questions   1. Have you ever had a heart attack or stroke? No   2. Have you ever had surgery on your heart or blood vessels, such as a stent placement, a coronary artery bypass, or surgery on an artery in your head, neck, heart, or legs? No   3. Do you have chest pain with activity? No   4. Do you have a history of  heart failure? No   5. Do you currently have a cold, bronchitis or symptoms of other infection? No   6. Do you have a cough, shortness of breath, or wheezing? No   7. Do you or anyone in your family have previous history of blood clots? No   8. Do you or does anyone in your family have a serious bleeding problem such as prolonged bleeding following surgeries or cuts? No   9. Have you ever had problems with anemia or been told to take iron pills? No   10. Have you had any abnormal blood loss such as black, tarry or bloody stools, or abnormal vaginal bleeding? No   11. Have you ever had a blood transfusion? YES - with childbearing in 1981   11a. Have you ever had a transfusion reaction? No   12. Are you willing to have a blood transfusion if it is medically needed before, during, or after your surgery? Yes   13. Have you or any of your relatives ever had problems with anesthesia? No   14. Do you have sleep apnea, excessive snoring or daytime drowsiness? H/o mild obstructive sleep apnea and used a dental device and has not used in 3-4 years    15. Do you have any artifical heart valves or other implanted medical devices like a pacemaker, defibrillator, or continuous glucose monitor? No   16. Do you have artificial joints? No   17. Are you allergic to latex? No       Health Care Directive:  Patient has a Health Care Directive on file    Preoperative Review of :   reviewed - no record of controlled substances prescribed.    Status of Chronic Conditions:  HYPERTENSION - Patient has longstanding  history of HTN , currently denies any symptoms referable to elevated blood pressure. Specifically denies chest pain, palpitations, dyspnea, orthopnea, PND or peripheral edema. Blood pressure readings have been in normal range. Current medication regimen is as listed below. Patient denies any side effects of medication.     Hypothyroidism history  TSH   Date Value Ref Range Status   2022 2.48 0.30 - 4.20 uIU/mL Final   2022 2.05 0.40 - 4.00 mU/L Final   2021 2.03 0.40 - 4.00 mU/L Final         Review of Systems  Constitutional, HEENT, cardiovascular, pulmonary, GI, , musculoskeletal, neuro, skin, endocrine and psych systems are negative, except as otherwise noted.      Patient Active Problem List    Diagnosis Date Noted     CKD (chronic kidney disease) stage 3, GFR 30-59 ml/min (H) 2018     Priority: High     Hyperlipidemia LDL goal <130 10/31/2010     Priority: High     Other specified hypothyroidism 2008     Priority: High     Essential hypertension with goal blood pressure less than 140/90- well controlled 2005     Priority: High     Mixed stress and urge urinary incontinence- oxybutynin 10mg daily has improved symptoms significantly  2022     Priority: Medium     Dupuytren's contracture of both hands - s/p surgery on the left hand 3rd and 4th MCP joint areas in 2021     Priority: Medium     Vitamin D deficiency 2021     Priority: Medium     Grief- crying quite often -  @ 55 on 3/24/2021with pt by his side - 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 - was  on warfarin - never  - lived  with pt and  2021     Priority: Medium     Asymptomatic menopausal state 2021     Priority: Medium     Right buttock pain- hx of sciatica in the past - not radiating further right now - no new bowel or bladder control problems  2021     Priority: Medium     Dysuria- recurrent with urgency and frequency - not always infection -  seeing BRE[facility]  2019     Priority: Medium     Rectal prolapse 2019     Priority: Medium     Sebaceous cyst- left medial upper breast  2019     Priority: Medium     Myalgia of pelvic floor 2019     Priority: Medium     Vaginal vault prolapse 2019     Priority: Medium     Personal history of urinary tract infection 2019     Priority: Medium     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 Hansen - [facility] q6months  2018     Priority: Medium     Seeing Dr. Hansen , dermatology n and oncology - Dr Selby for primary adnexal carcinoma of skin tomorrow. Had a PET scan = negative 3/2018.        Environmental allergies 2016     Priority: Medium     Tubular adenomas of colon-  - repeat in 2018 s/p skin ca = 2 more tubular adenomas - repeat in        Priority: Medium     Anxiety 2014     Priority: Medium     Glaucoma 2014     Priority: Medium     Vulvar itching - ? early lichen sclerosis vs. other - superior vulva 2014     Priority: Medium     Osteopenia, unspecified location - was on fosamax, then Boniva secondary to hot flashes - off boniva since 2013     Priority: Medium     Family history of colon cancer- mother in her 40's-colonoscopies every 5 years- next one       Priority: Medium     mother  of colon cancer in her 40's       Hematuria 2005     Priority: Medium     Problem list name updated by automated process. Provider to review and confirm  [employer] Update utility       Dyspnea and respiratory abnormality 2005     Priority: Medium     Problem list name updated by automated process. Provider to review       Advanced directives, counseling/discussion 10/24/2011     Priority: Low     Advance Directive Problem List Overview:   Name Relationship Phone    Primary Health Care Agent            Alternative Health Care Agent           Discussed advance care planning with patient; information given to patient to review. However pt did decline at this time. 10/24/2011           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     Skin cancer      Spider veins      Tubular adenoma of colon      - repeat in 2019 - q 5 years      Urgency incontinence      Past Surgical History:   Procedure Laterality Date     ARTHROSCOPY SHOULDER  2013    [facility]     CATARACT IOL, RT/LT Left 2018    LEFT side in 2018- the RIGHT 2020- Both Dr.Carter Pierson - ophthalmology      COLONOSCOPY  89,     Colonoscopies q 5 year -next 2019     CYSTOCELE REPAIR  10/31/1984     CYSTOSCOPY       HYSTERECTOMY, PAP NO LONGER INDICATED  10/31/84    Hysterectomy, Total Abdominal w ovaries left intact     RELEASE TRIGGER FINGER Left 2021     SURGICAL HISTORY OF -   75    Cholecystectomy & Incidental appendectomy     wide excision -of primary Cutaneous adnexal Ca. 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 Hansen - [facility] q6months      Current Outpatient Medications   Medication Sig Dispense Refill     CALCIUM /VITAMIN D TABS   OR 2 qd       clobetasol (TEMOVATE) 0.05 % external ointment APPLY SPARINGLY TO AFFECTED AREA OF GENITALS TWICE WEEKLY AT NIGHTTIME 60 g 1     levobunolol (BETAGAN) 0.5 % ophthalmic solution        levothyroxine (SYNTHROID/LEVOTHROID) 88 MCG tablet TAKE 1 TABLET EVERY MORNING 90 tablet 3     lisinopril (ZESTRIL) 30 MG tablet TAKE 1 TABLET EVERY DAY 90 tablet 3      metoprolol succinate ER (TOPROL XL) 50 MG 24 hr tablet TAKE 1 TABLET EVERY DAY 90 tablet 3     MULTI-VITAMIN OR TABS 1 qd       oxybutynin ER (DITROPAN XL) 10 MG 24 hr tablet Take 1-2 tablets (10-20 mg) by mouth daily 180 tablet 3     rosuvastatin (CRESTOR) 20 MG tablet Take 1 tablet (20 mg) by mouth daily 90 tablet 3     triamterene-HCTZ (MAXZIDE-25) 37.5-25 MG tablet TAKE 1 TABLET EVERY DAY 90 tablet 3     LORazepam (ATIVAN) 0.5 MG tablet Take 1 tablet (0.5 mg) by mouth 2 times daily Take 30 minutes prior to departure.  Do not operate a vehicle after taking this medication (Patient not taking: Reported on 5/30/2023) 10 tablet 0       Allergies   Allergen Reactions     Ciprofloxacin GI Disturbance     Bad stomach pain      Nitrofurantoin GI Disturbance     Other reaction(s): Stomach upset, Stomach upset     Prednisone      Prednisone      Other reaction(s): flushing     Sulfa Antibiotics Rash     Other reaction(s): rash        Social History     Tobacco Use     Smoking status: Never     Smokeless tobacco: Never   Vaping Use     Vaping status: Never Used   Substance Use Topics     Alcohol use: No       History   Drug Use No         Objective     /56 (BP Location: Left arm, Patient Position: Sitting, Cuff Size: Adult Regular)   Pulse 72   Temp 98.1  F (36.7  C) (Tympanic)   Resp 14   Wt 73 kg (161 lb)   SpO2 96%   BMI 25.22 kg/m      Physical Exam    GENERAL APPEARANCE: healthy, alert and no distress     EYES: EOMI, PERRL     HENT: ear canals and TM's normal and nose and mouth without ulcers or lesions     NECK: no adenopathy, no asymmetry, masses, or scars and thyroid normal to palpation     RESP: lungs clear to auscultation - no rales, rhonchi or wheezes     CV: regular rates and rhythm, normal S1 S2, no S3 or S4 and no murmur, click or rub     ABDOMEN:  soft, nontender, no HSM or masses and bowel sounds normal     MS: extremities normal- no gross deformities noted, no evidence of inflammation in  joints, FROM in all extremities.     SKIN: no suspicious lesions or rashes     NEURO: Normal strength and tone, sensory exam grossly normal, mentation intact and speech normal     PSYCH: mentation appears normal. and affect normal/bright     LYMPHATICS: No cervical adenopathy    Recent Labs   Lab Test 12/12/22  1713 12/05/22  0931 06/06/22  0925   HGB  --  13.9 13.5   PLT  --  239 217    139 136   POTASSIUM 4.4 4.5 3.8   CR 1.42* 1.21* 1.14*        Diagnostics:  Labs pending at this time.  Results will be reviewed when available.   No EKG required, no history of coronary heart disease, significant arrhythmia, peripheral arterial disease or other structural heart disease.    Revised Cardiac Risk Index (RCRI):  The patient has the following serious cardiovascular risks for perioperative complications:   - No serious cardiac risks = 0 points     RCRI Interpretation: 0 points: Class I (very low risk - 0.4% complication rate)           Signed Electronically by: Tomas Mahan MD  Copy of this evaluation report is provided to requesting physician.

## 2023-05-31 LAB
ANION GAP SERPL CALCULATED.3IONS-SCNC: 14 MMOL/L (ref 7–15)
BUN SERPL-MCNC: 28.3 MG/DL (ref 8–23)
CALCIUM SERPL-MCNC: 10.2 MG/DL (ref 8.8–10.2)
CHLORIDE SERPL-SCNC: 102 MMOL/L (ref 98–107)
CREAT SERPL-MCNC: 1.49 MG/DL (ref 0.51–0.95)
DEPRECATED HCO3 PLAS-SCNC: 23 MMOL/L (ref 22–29)
GFR SERPL CREATININE-BSD FRML MDRD: 35 ML/MIN/1.73M2
GLUCOSE SERPL-MCNC: 108 MG/DL (ref 70–99)
POTASSIUM SERPL-SCNC: 4.6 MMOL/L (ref 3.4–5.3)
SODIUM SERPL-SCNC: 139 MMOL/L (ref 136–145)

## 2023-06-01 NOTE — RESULT ENCOUNTER NOTE
Dear Aruna,    Here is a summary of your recent test results:  -Kidney function (GFR) is decreased.  ADVISE: rechecking this in 6 months  -Sodium is normal.  -Potassium is normal.  -Calcium is normal.  -Glucose is mildly elevated but you were nonfasting and this is okay..    For additional lab test information, [email or web address] is a very good reference.           Thank you very much for trusting me and [facility].     Have a peaceful day.    Healthy regards,  Johnathon Mahan MD

## 2023-06-02 ENCOUNTER — OFFICE VISIT (OUTPATIENT)
Dept: FAMILY MEDICINE | Facility: CLINIC | Age: 83
End: 2023-06-02
Payer: COMMERCIAL

## 2023-06-02 VITALS
TEMPERATURE: 97.5 F | WEIGHT: 158.8 LBS | OXYGEN SATURATION: 98 % | RESPIRATION RATE: 16 BRPM | BODY MASS INDEX: 24.92 KG/M2 | HEIGHT: 67 IN | DIASTOLIC BLOOD PRESSURE: 70 MMHG | HEART RATE: 63 BPM | SYSTOLIC BLOOD PRESSURE: 110 MMHG

## 2023-06-02 DIAGNOSIS — H40.10X4 OPEN-ANGLE GLAUCOMA OF BOTH EYES, INDETERMINATE STAGE, UNSPECIFIED OPEN-ANGLE GLAUCOMA TYPE: ICD-10-CM

## 2023-06-02 DIAGNOSIS — E55.9 VITAMIN D DEFICIENCY: ICD-10-CM

## 2023-06-02 DIAGNOSIS — C80.1 ADENOCARCINOMA (H): ICD-10-CM

## 2023-06-02 DIAGNOSIS — Z12.31 VISIT FOR SCREENING MAMMOGRAM: ICD-10-CM

## 2023-06-02 DIAGNOSIS — N18.32 STAGE 3B CHRONIC KIDNEY DISEASE (H): ICD-10-CM

## 2023-06-02 DIAGNOSIS — E78.5 HYPERLIPIDEMIA LDL GOAL <130: ICD-10-CM

## 2023-06-02 DIAGNOSIS — Z23 HIGH PRIORITY FOR COVID-19 VACCINATION: ICD-10-CM

## 2023-06-02 DIAGNOSIS — D12.6 TUBULAR ADENOMA OF COLON: ICD-10-CM

## 2023-06-02 DIAGNOSIS — Z00.00 ENCOUNTER FOR MEDICARE ANNUAL WELLNESS EXAM: Primary | ICD-10-CM

## 2023-06-02 DIAGNOSIS — E03.8 OTHER SPECIFIED HYPOTHYROIDISM: ICD-10-CM

## 2023-06-02 DIAGNOSIS — I10 ESSENTIAL HYPERTENSION WITH GOAL BLOOD PRESSURE LESS THAN 140/90: ICD-10-CM

## 2023-06-02 PROBLEM — F43.21 GRIEF: Status: ACTIVE | Noted: 2021-12-06

## 2023-06-02 LAB
ALBUMIN SERPL BCG-MCNC: 4.7 G/DL (ref 3.5–5.2)
ALP SERPL-CCNC: 44 U/L (ref 35–104)
ALT SERPL W P-5'-P-CCNC: 22 U/L (ref 10–35)
AST SERPL W P-5'-P-CCNC: 27 U/L (ref 10–35)
BILIRUB DIRECT SERPL-MCNC: 0.2 MG/DL (ref 0–0.3)
BILIRUB SERPL-MCNC: 1.1 MG/DL
CHOLEST SERPL-MCNC: 203 MG/DL
CREAT UR-MCNC: 58.7 MG/DL
ERYTHROCYTE [DISTWIDTH] IN BLOOD BY AUTOMATED COUNT: 12.3 % (ref 10–15)
HCT VFR BLD AUTO: 44.8 % (ref 35–47)
HDLC SERPL-MCNC: 59 MG/DL
HGB BLD-MCNC: 14.4 G/DL (ref 11.7–15.7)
LDLC SERPL CALC-MCNC: 111 MG/DL
MCH RBC QN AUTO: 30.1 PG (ref 26.5–33)
MCHC RBC AUTO-ENTMCNC: 32.1 G/DL (ref 31.5–36.5)
MCV RBC AUTO: 94 FL (ref 78–100)
MICROALBUMIN UR-MCNC: <12 MG/L
MICROALBUMIN/CREAT UR: NORMAL MG/G{CREAT}
NONHDLC SERPL-MCNC: 144 MG/DL
PHOSPHATE SERPL-MCNC: 4.1 MG/DL (ref 2.5–4.5)
PLATELET # BLD AUTO: 132 10E3/UL (ref 150–450)
PROT SERPL-MCNC: 8.1 G/DL (ref 6.4–8.3)
RBC # BLD AUTO: 4.78 10E6/UL (ref 3.8–5.2)
T3 SERPL-MCNC: 83 NG/DL (ref 85–202)
T4 FREE SERPL-MCNC: 1.76 NG/DL (ref 0.9–1.7)
TRIGL SERPL-MCNC: 164 MG/DL
TSH SERPL DL<=0.005 MIU/L-ACNC: 2.1 UIU/ML (ref 0.3–4.2)
WBC # BLD AUTO: 7.9 10E3/UL (ref 4–11)

## 2023-06-02 PROCEDURE — 82306 VITAMIN D 25 HYDROXY: CPT | Performed by: FAMILY MEDICINE

## 2023-06-02 PROCEDURE — 91312 COVID-19 BIVALENT 12+ (PFIZER): CPT | Performed by: FAMILY MEDICINE

## 2023-06-02 PROCEDURE — 82043 UR ALBUMIN QUANTITATIVE: CPT | Performed by: FAMILY MEDICINE

## 2023-06-02 PROCEDURE — 84443 ASSAY THYROID STIM HORMONE: CPT | Performed by: FAMILY MEDICINE

## 2023-06-02 PROCEDURE — 84439 ASSAY OF FREE THYROXINE: CPT | Performed by: FAMILY MEDICINE

## 2023-06-02 PROCEDURE — 84480 ASSAY TRIIODOTHYRONINE (T3): CPT | Performed by: FAMILY MEDICINE

## 2023-06-02 PROCEDURE — 82570 ASSAY OF URINE CREATININE: CPT | Performed by: FAMILY MEDICINE

## 2023-06-02 PROCEDURE — 80061 LIPID PANEL: CPT | Performed by: FAMILY MEDICINE

## 2023-06-02 PROCEDURE — 99214 OFFICE O/P EST MOD 30 MIN: CPT | Mod: 25 | Performed by: FAMILY MEDICINE

## 2023-06-02 PROCEDURE — G0439 PPPS, SUBSEQ VISIT: HCPCS | Performed by: FAMILY MEDICINE

## 2023-06-02 PROCEDURE — 84100 ASSAY OF PHOSPHORUS: CPT | Performed by: FAMILY MEDICINE

## 2023-06-02 PROCEDURE — 0121A COVID-19 BIVALENT 12+ (PFIZER): CPT | Performed by: FAMILY MEDICINE

## 2023-06-02 PROCEDURE — 80076 HEPATIC FUNCTION PANEL: CPT | Performed by: FAMILY MEDICINE

## 2023-06-02 PROCEDURE — 36415 COLL VENOUS BLD VENIPUNCTURE: CPT | Performed by: FAMILY MEDICINE

## 2023-06-02 PROCEDURE — 85027 COMPLETE CBC AUTOMATED: CPT | Performed by: FAMILY MEDICINE

## 2023-06-02 RX ORDER — ANTIOX #8/OM3/DHA/EPA/LUT/ZEAX 250-2.5 MG
1 CAPSULE ORAL DAILY
COMMUNITY
Start: 2023-06-02

## 2023-06-02 ASSESSMENT — ANXIETY QUESTIONNAIRES
7. FEELING AFRAID AS IF SOMETHING AWFUL MIGHT HAPPEN: NOT AT ALL
GAD7 TOTAL SCORE: 0
GAD7 TOTAL SCORE: 0
6. BECOMING EASILY ANNOYED OR IRRITABLE: NOT AT ALL
5. BEING SO RESTLESS THAT IT IS HARD TO SIT STILL: NOT AT ALL
4. TROUBLE RELAXING: NOT AT ALL
8. IF YOU CHECKED OFF ANY PROBLEMS, HOW DIFFICULT HAVE THESE MADE IT FOR YOU TO DO YOUR WORK, TAKE CARE OF THINGS AT HOME, OR GET ALONG WITH OTHER PEOPLE?: NOT DIFFICULT AT ALL
GAD7 TOTAL SCORE: 0
2. NOT BEING ABLE TO STOP OR CONTROL WORRYING: NOT AT ALL
1. FEELING NERVOUS, ANXIOUS, OR ON EDGE: NOT AT ALL
3. WORRYING TOO MUCH ABOUT DIFFERENT THINGS: NOT AT ALL
7. FEELING AFRAID AS IF SOMETHING AWFUL MIGHT HAPPEN: NOT AT ALL
IF YOU CHECKED OFF ANY PROBLEMS ON THIS QUESTIONNAIRE, HOW DIFFICULT HAVE THESE PROBLEMS MADE IT FOR YOU TO DO YOUR WORK, TAKE CARE OF THINGS AT HOME, OR GET ALONG WITH OTHER PEOPLE: NOT DIFFICULT AT ALL

## 2023-06-02 ASSESSMENT — ENCOUNTER SYMPTOMS
DIZZINESS: 0
PARESTHESIAS: 0
BREAST MASS: 0
FREQUENCY: 0
HEMATOCHEZIA: 0
SHORTNESS OF BREATH: 0
WEAKNESS: 0
NERVOUS/ANXIOUS: 0
CONSTIPATION: 0
HEARTBURN: 0
HEADACHES: 0
HEMATURIA: 0
PALPITATIONS: 0
JOINT SWELLING: 0
FEVER: 0
COUGH: 0
MYALGIAS: 0
ABDOMINAL PAIN: 0
DIARRHEA: 0
DYSURIA: 0
NAUSEA: 0
EYE PAIN: 0
SORE THROAT: 0
CHILLS: 0
ARTHRALGIAS: 0

## 2023-06-02 ASSESSMENT — PATIENT HEALTH QUESTIONNAIRE - PHQ9
SUM OF ALL RESPONSES TO PHQ QUESTIONS 1-9: 1
SUM OF ALL RESPONSES TO PHQ QUESTIONS 1-9: 1
10. IF YOU CHECKED OFF ANY PROBLEMS, HOW DIFFICULT HAVE THESE PROBLEMS MADE IT FOR YOU TO DO YOUR WORK, TAKE CARE OF THINGS AT HOME, OR GET ALONG WITH OTHER PEOPLE: NOT DIFFICULT AT ALL

## 2023-06-02 ASSESSMENT — ACTIVITIES OF DAILY LIVING (ADL): CURRENT_FUNCTION: NO ASSISTANCE NEEDED

## 2023-06-02 NOTE — PATIENT INSTRUCTIONS
[facility]  [address] [place] [id number]  Office: [phone]   Fax:    [phone]      Consider EGD (upper GI endoscopy) and/or PPI - such as protonix- if mid epigastric pain recurs or becomes more frequent.        Patient Education   Personalized Prevention Plan  You are due for the preventive services outlined below.  Your care team is available to assist you in scheduling these services.  If you have already completed any of these items, please share that information with your care team to update in your medical record.  Health Maintenance Due   Topic Date Due    Phosphorous Lab  Never done    Annual Wellness Visit  12/06/2022    COVID-19 Vaccine (6 - Pfizer series) 02/17/2023    Kidney Microalbumin Urine Test  06/05/2023     Preventive Health Recommendations    See your health care provider every year to  Review health changes.   Discuss preventive care.    Review your medicines if your doctor has prescribed any.  You no longer need a yearly Pap test unless you've had an abnormal Pap test in the past 10 years. If you have vaginal symptoms, such as bleeding or discharge, be sure to talk with your provider about a Pap test.  Every 1 to 2 years, have a mammogram.  If you are over 69, talk with your health care provider about whether or not you want to continue having screening mammograms.  Every 10 years, have a colonoscopy. Or, have a yearly FIT test (stool test). These exams will check for colon cancer.   Have a cholesterol test every 5 years, or more often if your doctor advises it.   Have a diabetes test (fasting glucose) every three years. If you are at risk for diabetes, you should have this test more often.   At age 65, have a bone density scan (DEXA) to check for osteoporosis (brittle bone disease).    Shots:  Get a flu shot each year.  Get a tetanus shot every 10 years.  Talk to your doctor about your pneumonia vaccines. There are now two you should receive -  Pneumovax (PPSV 23) and Prevnar (PCV 13).  Talk to your pharmacist about the shingles vaccine.  Talk to your doctor about the hepatitis B vaccine.    Nutrition:   Eat at least 5 servings of fruits and vegetables each day.  Eat whole-grain bread, whole-wheat pasta and brown rice instead of white grains and rice.  Get adequate Calcium and Vitamin D.     Lifestyle  Exercise at least 150 minutes a week (30 minutes a day, 5 days a week). This will help you control your weight and prevent disease.  Limit alcohol to one drink per day.  No smoking.   Wear sunscreen to prevent skin cancer.   See your dentist twice a year for an exam and cleaning.  See your eye doctor every 1 to 2 years to screen for conditions such as glaucoma, macular degeneration and cataracts.    Personalized Prevention Plan  You are due for the preventive services outlined below.  Your care team is available to assist you in scheduling these services.  If you have already completed any of these items, please share that information with your care team to update in your medical record.  Health Maintenance   Topic Date Due    PHOSPHORUS  Never done    MEDICARE ANNUAL WELLNESS VISIT  12/06/2022    COVID-19 Vaccine (6 - Pfizer series) 02/17/2023    MICROALBUMIN  06/05/2023    ANNUAL REVIEW OF HM ORDERS  10/17/2023    MAMMO SCREENING  11/15/2023    LIPID  12/05/2023    TSH W/FREE T4 REFLEX  12/05/2023    HEMOGLOBIN  12/05/2023    BMP  05/30/2024    FALL RISK ASSESSMENT  06/02/2024    ADVANCE CARE PLANNING  06/02/2028    DTAP/TDAP/TD IMMUNIZATION (3 - Td or Tdap) 10/05/2032    DEXA  03/07/2037    PARATHYROID  Completed    PHQ-2 (once per calendar year)  Completed    INFLUENZA VACCINE  Completed    Pneumococcal Vaccine: 65+ Years  Completed    URINALYSIS  Completed    ALK PHOS  Completed    ZOSTER IMMUNIZATION  Completed    IPV IMMUNIZATION  Aged Out    MENINGITIS IMMUNIZATION  Aged Out       Exercise for a Healthier Heart  You may wonder how you can improve the  health of your heart. If you re thinking about exercise, you re on the right track. You don t need to become an athlete. But you do need a certain amount of brisk exercise to help strengthen your heart. If you have been diagnosed with a heart condition, your healthcare provider may advise exercise to help your condition. To help make exercise a habit, choose safe, fun activities.      Exercise with a friend. When activity is fun, you're more likely to stick with it.     Before you start  Check with your healthcare provider before starting an exercise program. This is especially important if you haven't been active for a while. It's also important if you have a long-term (chronic) health problem such as heart disease, diabetes, or obesity. Also check with your provider if you're at high risk for having these problems.   Why exercise?  Exercising regularly offers many healthy rewards. It can help you do all of these:   Improve your blood cholesterol level to help prevent further heart trouble.  Lower your blood pressure to help prevent a stroke or heart attack.  Control diabetes or reduce your risk of getting this disease.  Improve your heart and lung function.  Reach and stay at a healthy weight.  Make your muscles stronger so you can stay active.  Prevent falls and fractures by slowing the loss of bone mass (osteoporosis).  Manage stress better.  Improve your sense of self and your body image.  Exercise tips    Ease into your routine. Set small goals. Then build on them. Talk with your healthcare provider first before starting an exercise routine if you're not sure what your activity level should be.  Exercise on most days. Aim for a total of at least 150 minutes (2 hours and 30 minutes) or more of moderate-intensity aerobic activity each week. You could also do 75 minutes (1 hour and 15 minutes) or more of vigorous-intensity aerobic activity each week. Or try for a combination of both. Moderate activity means that  you breathe heavier and your heart rate increases, but you can still talk. Think about doing at least 30 minutes of moderate exercise, 5 times a week. It's OK to work up to the 30-minute period over time. Examples of moderate-intensity activity are brisk walking, gardening, and water aerobics.  Step up your daily activity level.  Along with your exercise program, try being more active the whole day. Walk instead of drive. Or park further away so that you take more steps each day. Do more household tasks or yard work. You may not be able to meet the advised amount of physical activity. But doing some moderate- or vigorous-intensity aerobic activity can help reduce your risk for heart disease. Your healthcare provider can help you figure out what is best for you.  Choose 1 or more activities you enjoy.  Walking is one of the easiest things you can do. You can also try swimming, riding a bike, dancing, or taking an exercise class.    Call 911  Call 911 right away if any of these occur:   Chest pain that doesn't go away quickly with rest  New burning, tightness, pressure, or heaviness in your chest, neck, shoulders, back, or arms  Abnormal or severe shortness of breath  A very fast or irregular heartbeat (palpitations)  Fainting  When to call your healthcare provider  Call your healthcare provider if you have any of these:   Dizziness or lightheadedness  Mild shortness of breath or chest pain  Increased or new joint or muscle pain    Jose Armando last reviewed this educational content on 7/1/2022 2000-2022 [employer]. All rights reserved. This information is not intended as a substitute for professional medical care. Always follow your healthcare professional's instructions.          Urinary Incontinence, Female (Adult)   Urinary incontinence means loss of bladder control. This problem affects many women, especially as they get older. If you have incontinence, you may be embarrassed to ask for help. But  know that this problem can be treated.   Types of Incontinence  There are different types of incontinence. Two of the main types are described here. You can have more than one type.   Stress incontinence. With this type, urine leaks when pressure (stress) is put on the bladder. This may happen when you cough, sneeze, or laugh. Stress incontinence most often occurs because the pelvic floor muscles that support the bladder and urethra are weak. This can happen after pregnancy and vaginal childbirth or a hysterectomy. It can also be due to excess body weight or hormone changes.  Urge incontinence (also called overactive bladder). With this type, a sudden urge to urinate is felt often. This may happen even though there may not be much urine in the bladder. The need to urinate often during the night is common. Urge incontinence most often occurs because of bladder spasms. This may be due to bladder irritation or infection. Damage to bladder nerves or pelvic muscles, constipation, and certain medicines can also lead to urge incontinence.  Treatment depends on the cause. Further evaluation is needed to find the type you have. This will likely include an exam and certain tests. Based on the results, you and your healthcare provider can then plan treatment. Until a diagnosis is made, the home care tips below can help ease symptoms.   Home care  Do pelvic floor muscle exercises, if they are prescribed. The pelvic floor muscles help support the bladder and urethra. Many women find that their symptoms improve when doing special exercises that strengthen these muscles. To do the exercises, contract the muscles you would use to stop your stream of urine. But do this when you re not urinating. Hold for 10 seconds, then relax. Repeat 10 to 20 times in a row, at least 3 times a day. Your healthcare provider may give you other instructions for how to do the exercises and how often.  Keep a bladder diary. This helps track how often  and how much you urinate over a set period of time. Bring this diary with you to your next visit with the provider. The information can help your provider learn more about your bladder problem.  Lose weight, if advised to by your provider. Extra weight puts pressure on the bladder. Your provider can help you create a weight-loss plan that s right for you. This may include exercising more and making certain diet changes.  Don't have foods and drinks that may irritate the bladder. These can include alcohol and caffeinated drinks.  Quit smoking. Smoking and other tobacco use can lead to a long-term (chronic) cough that strains the pelvic floor muscles. Smoking may also damage the bladder and urethra. Talk with your provider about treatments or methods you can use to quit smoking.  If drinking large amounts of fluid makes you have symptoms, you may be advised to limit your fluid intake. You may also be advised to drink most of your fluids during the day and to limit fluids at night.  If you re worried about urine leakage or accidents, you may wear absorbent pads to catch urine. Change the pads often. This helps reduce discomfort. It may also reduce the risk of skin or bladder infections.    Follow-up care  Follow up with your healthcare provider, or as directed. It may take some to find the right treatment for your problem. But healthy lifestyle changes can be made right away. These include such things as exercising on a regular basis, eating a healthy diet, losing weight (if needed), and quitting smoking. Your treatment plan may include special therapies or medicines. Certain procedures or surgery may also be options. Talk about any questions you have with your provider.   When to seek medical advice  Call the healthcare provider right away if any of these occur:  Fever of 100.4 F (38 C) or higher, or as directed by your provider  Bladder pain or fullness  Belly swelling  Nausea or vomiting  Back pain  Weakness,  dizziness, or fainting  [employer] last reviewed this educational content on 1/1/2020 2000-2022 [employer]. All rights reserved. This information is not intended as a substitute for professional medical care. Always follow your healthcare professional's instructions.          Treating Incontinence in Women: Nonsurgical Methods   The best treatment for you will depend on the type of incontinence you have. Your symptoms, age, and any underlying problems that are found also affect your treatment. Some types of incontinence may over time require surgery. But nonsurgical treatments may be effective in many cases. Nonsurgical treatments include lifestyle changes, muscle-strengthening exercises, and medicines.   Nonsurgical treatments  Treatment for stress urinary incontinence includes:   Bladder training  Lifestyle changes, such as weight loss and increased activity if incontinence is due to being overweight  Medicines, if bladder training has not helped  Pelvic floor muscle exercises  Lifestyle changes  Losing weight. Excess weight puts extra pressure on the pelvic floor muscles. Exercising and eating right can help you lose weight. This helps other treatments work better.  Making certain changes in your diet. Some foods may make you need to urinate more, so it may be good not to have them. These include caffeinated drinks and alcohol. Ask your healthcare provider if these or other changes in your diet might be helpful.  Quitting smoking. Smoking can lead to a long-term (chronic) cough that strains pelvic floor muscles. Smoking may also damage the bladder and urethra.    Pelvic floor muscle exercises  There are exercises you can do to help strengthen your pelvic floor muscles. The pelvic floor muscles act as a sling to help hold the bladder and urethra in place. These muscles also help keep the urethra closed. Weak pelvic floor muscles may allow urine to leak. To strengthen the pelvic floor muscles, do  the exercises daily. In a few months, the muscles will be stronger and tighter. This can help prevent urine leakage.     [employer] last reviewed this educational content on 3/1/2022    8803-4024 [employer]. All rights reserved. This information is not intended as a substitute for professional medical care. Always follow your healthcare professional's instructions.          Treating Incontinence in Women: Special Therapies   Your healthcare provider will discuss your choices for treating your urinary incontinence. These depend on the cause of your problem and any other health issues you have. Often behavioral changes are tried first, followed by various medicines. If these methods are unsuccessful, 1 or more of the therapies described below may be part of your treatment plan.   Biofeedback  This method is taught by a nurse or physical therapist. During the therapy, a small sensor is placed in your vagina or rectum. Another sensor is placed on your stomach. Other types of sensors are also available. These sensors read signals from the pelvic floor muscles. When you contract or relax your muscles, these signals are shown as images on a computer screen. Using the images, you can learn to relax or contract certain muscles. This can help you strengthen and better control these muscles. And it can help you learn pelvic floor muscle exercises.      During biofeedback, sensors send signals from your pelvic floor muscles to a computer screen.     Electrical stimulation  This is a painless therapy that uses a tiny amount of electric current. It helps strengthen very weak or damaged pelvic floor muscles. The electric current is sent through the muscles of the pelvic floor and bladder. This causes the muscles to contract. In time, this helps make the muscles stronger.   Stimulator implants  This method is used to treat urge incontinence. A small device is implanted under the skin near the upper buttocks. This  device gives off mild electrical signals. These block extra signals that are being sent to the bladder muscle. This helps the bladder work more normally.   [employer] last reviewed this educational content on 7/1/2022 2000-2022 [employer]. All rights reserved. This information is not intended as a substitute for professional medical care. Always follow your healthcare professional's instructions.          Treating Incontinence in Women with Medicine  Urinary incontinence is the leaking of urine from the bladder. In some cases, medicine can reduce or stop the leaking. It's mainly given for urge incontinence, a sudden need to urinate that is hard to delay Your healthcare provider will talk with you about your choices. Make sure to ask what side effects to expect.     Below are some types of medicines that may help with urge incontinence.   Types of medicine  Anticholinergics or beta-3 adrenergics.  These may increase how much urine the bladder can hold. They may also help relax bladder muscles.  Estrogen. This may help improve muscle tone in the urethra and bladder.  Antibiotics. These are used to treat urinary tract infections.  Botulinum toxin. Injection of botulinum toxin into the bladder muscle is an option when other medicines are not effective.    Tips for taking medicine  Take your medicine on time and as your healthcare provider tell you to.  Tell your healthcare provider if you have any side effects, your dosage may be adjusted if needed.  Be patient. It may take time to find the right dose for you.  Keep a list of the medicines you take. Show it to your healthcare provider and pharmacist before you buy over-the-counter medicines.    [employer] last reviewed this educational content on 3/1/2022    [id number] [employer]. All rights reserved. This information is not intended as a substitute for professional medical care. Always follow your healthcare professional's  instructions.          Kegel Exercises  Kegel exercises are done to help strengthen the muscles in your pelvic floor. They re easy to learn and simple to do. And if you do them right, no one can tell you re doing them. You can do them almost anywhere. Your healthcare provider, nurse, or physical therapist can answer any questions you have and help you get started.   A weak pelvic floor  The pelvic floor muscles may weaken. This can happen due to any of these:   Aging  Pregnancy  Vaginal childbirth  Injury  Surgery  Chronic cough  Lack of exercise  If the pelvic floor is weak, your bladder and other pelvic organs may sag out of place. The urethra may open too easily. This can allow urine to leak out. Kegel exercises can help you strengthen your pelvic floor muscles. They can better support your pelvic organs and control your urine flow.     How to do Kegel exercises  Try each of the Kegel exercises below. When you re doing them, try not to move your leg, buttock, or stomach muscles:   Squeeze as if you are stopping your urine stream. But do it when you re not urinating.  Tighten your rectum as if trying not to pass gas. Squeeze your anus, but don t move your buttocks.  Tip: Place 1 or 2 fingers in the vagina. Squeeze your finger with your vagina. This will help you to learn which muscles to tighten.   Try to hold each Kegel for a slow count to 5. You probably won t be able to hold it for that long at first. But keep practicing. It will get easier as your pelvic floor gets stronger. At some point, your healthcare provider may advise you to use special weights. You place these in your vagina before you do the Kegels. This can help make the Kegels even more effective. Talk to your healthcare provider if you have trouble doing Kegel exercises.   Helpful tips  Here are some tips to follow:  Do Kegels as often as you can. The more you do them, the faster you ll feel the results.  Pick an activity you do often as a  reminder. For instance, do your Kegels every time you sit down.  Tighten your pelvic floor before you sneeze, get up from a chair, cough, laugh, or lift. This can help prevent urine, gas, or stool leakage.  [employer] last reviewed this educational content on 8/1/2020 2000-2022 [employer]. All rights reserved. This information is not intended as a substitute for professional medical care. Always follow your healthcare professional's instructions.

## 2023-06-02 NOTE — PROGRESS NOTES
"SUBJECTIVE:   Aruna is a 82 year old who presents for Preventive Visit and the following other medical problems:      1. Encounter for Medicare annual wellness exam    2. Other specified hypothyroidism    3. Tubular adenomas of colon- 2014 - repeat in 5/2018 s/p skin ca = 2 more tubular adenomas - repeat in 2023 - scheduled for 6/19/2023    4. Hyperlipidemia LDL goal <130    5. Essential hypertension with goal blood pressure less than 140/90- well controlled    6. Visit for screening mammogram    7. Vitamin D deficiency    8. 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 Open-angle glaucoma of both eyes, indeterminate stage, unspecified open-angle glaucoma type    10. Stage 3b chronic kidney disease (H)    11. High priority for COVID-19 vaccination            6/2/2023     8:29 AM   Additional Questions   Roomed by Caren Marie CMA   Accompanied by Self     Are you in the first 12 months of your Medicare coverage?  No    Healthy Habits:     In general, how would you rate your overall health?  Good    Frequency of exercise:  None    Do you usually eat at least 4 servings of fruit and vegetables a day, include whole grains    & fiber and avoid regularly eating high fat or \"junk\" foods?  Yes    Taking medications regularly:  Yes    Medication side effects:  Not applicable    Ability to successfully perform activities of daily living:  No assistance needed    Home Safety:  No safety concerns identified    Hearing Impairment:  No hearing concerns    In the past 6 months, have you been bothered by leaking of urine? Yes    In general, how would you rate your overall mental or emotional health?  Good      PHQ-2 Total Score: 0    Additional concerns today:  No    's dementia is worsening and needs more care from patient.  He needs to be watched almost 24hrs a day now.      The last 6 months she's had 2 episodes of 10-20min duration of deep achey pain in the infra sternal/midepigastric area - " "feels like a \"gallbladder attack\"  -but she is s/p cholecystectomy - last time was about 3-4 weeks ago at least. No nausea/vomiting or diarrhea. No yellow or chalky stools. No melena or hematochezia.  No heartburn or reflux/GERD symptoms at all assoc with that, but occasionally does get some heartburn or reflux  at night when she lies down - takes a TUMS at night every few weeks as needed.  Last time she had symptoms like this , she had EGD 10/9/2006 which showed some gastritis and duodenitis then.  Consider EGD and/or PPI if pain recurs or becomes more frequent.      CKD - avoids Ibuprofen and NSAIDS- only takes tylenol. BP well controlled. Need to recheck lipids today - is fasting. No problems with Crestor 20mg daily.      Recent Labs   Lab Test 12/05/22  0931 06/06/22  0925 07/25/16  0730 07/23/15  1016   CHOL 274* 195   < > 194   HDL 56 49*   < > 46*   * 98   < > 91   TRIG 193* 242*   < > 285*   CHOLHDLRATIO  --   --   --  4.2    < > = values in this interval not displayed.        Have you ever done Advance Care Planning? (For example, a Health Directive, POLST, or a discussion with a medical provider or your loved ones about your wishes): Yes, advance care planning is on file.       Fall risk  Fallen 2 or more times in the past year?: No  Any fall with injury in the past year?: No    Cognitive Screening   1) Repeat 3 items (Leader, Season, Table)    2) Clock draw: NORMAL  3) 3 item recall: Recalls 3 objects  Results: 3 items recalled: COGNITIVE IMPAIRMENT LESS LIKELY    Mini-CogTM Copyright S Alfredito. Licensed by the author for use in [facility]; reprinted with permission (octavia@.[facility]). All rights reserved.      Do you have sleep apnea, excessive snoring or daytime drowsiness?: yes    Reviewed and updated as needed this visit by clinical staff   Tobacco  Allergies  Meds  Problems  Med Hx  Surg Hx  Fam Hx          Reviewed and updated as needed this visit by Provider   Tobacco  " Allergies  Meds  Problems  Med Hx  Surg Hx  Fam Hx         Social History     Tobacco Use     Smoking status: Never     Smokeless tobacco: Never   Vaping Use     Vaping status: Never Used   Substance Use Topics     Alcohol use: No             6/2/2023     8:26 AM   Alcohol Use   Prescreen: >3 drinks/day or >7 drinks/week? Not Applicable         11/29/2019    10:13 AM   AUDIT - Alcohol Use Disorders Identification Test - Reproduced from the World Health Organization Audit 2001 (Second Edition)   1.  How often do you have a drink containing alcohol? Never     Do you have a current opioid prescription? No  Do you use any other controlled substances or medications that are not prescribed by a provider? None    Hyperlipidemia Follow-Up: needs recheck on labs :       Are you regularly taking any medication or supplement to lower your cholesterol?   Yes- Rosuvastatin 20mg    Are you having muscle aches or other side effects that you think could be caused by your cholesterol lowering medication?  No    Hypertension Follow-up: well controlled today - needs labs and refills :       Do you check your blood pressure regularly outside of the clinic? No     Are you following a low salt diet? No    Are your blood pressures ever more than 140 on the top number (systolic) OR more   than 90 on the bottom number (diastolic), for example 140/90? No    Anxiety Follow-Up- not on regular medication at all.  Sister diagnosed with a leukemia type illness.     How are you doing with your anxiety since your last visit? Improved     Are you having other symptoms that might be associated with anxiety? No    Have you had a significant life event? OTHER: Husbands dementia     Are you feeling depressed? No    Do you have any concerns with your use of alcohol or other drugs? No    Social History     Tobacco Use     Smoking status: Never     Smokeless tobacco: Never   Vaping Use     Vaping status: Never Used   Substance Use Topics     Alcohol  use: No     Drug use: No         11/29/2019    11:45 AM 12/6/2021     9:22 AM 6/2/2023     8:23 AM   RISA-7 SCORE   Total Score   0 (minimal anxiety)   Total Score 0 1 0         11/29/2019    11:45 AM 12/6/2021     9:22 AM 6/2/2023     8:22 AM   PHQ   PHQ-9 Total Score 1 4 1   Q9: Thoughts of better off dead/self-harm past 2 weeks Not at all Not at all Not at all         6/2/2023     8:22 AM   Last PHQ-9   1.  Little interest or pleasure in doing things 0   2.  Feeling down, depressed, or hopeless 0   3.  Trouble falling or staying asleep, or sleeping too much 1   4.  Feeling tired or having little energy 0   5.  Poor appetite or overeating 0   6.  Feeling bad about yourself 0   7.  Trouble concentrating 0   8.  Moving slowly or restless 0   Q9: Thoughts of better off dead/self-harm past 2 weeks 0   PHQ-9 Total Score 1         6/2/2023     8:23 AM   RISA-7    1. Feeling nervous, anxious, or on edge 0   2. Not being able to stop or control worrying 0   3. Worrying too much about different things 0   4. Trouble relaxing 0   5. Being so restless that it is hard to sit still 0   6. Becoming easily annoyed or irritable 0   7. Feeling afraid, as if something awful might happen 0   RISA-7 Total Score 0   If you checked any problems, how difficult have they made it for you to do your work, take care of things at home, or get along with other people? Not difficult at all       Hypothyroidism Follow-up      Since last visit, patient describes the following symptoms: constipation and anxiety    TSH   Date Value Ref Range Status   12/05/2022 2.48 0.30 - 4.20 uIU/mL Final   06/06/2022 2.05 0.40 - 4.00 mU/L Final   06/04/2021 2.03 0.40 - 4.00 mU/L Final     T4 Free   Date Value Ref Range Status   06/04/2021 1.17 0.76 - 1.46 ng/dL Final     Free T4   Date Value Ref Range Status   12/05/2022 1.61 0.90 - 1.70 ng/dL Final       Current providers sharing in care for this patient include:   Patient Care Team:  Lisbet Simmons MD  as PCP - General (Family Practice)  Mary Miranda PA-C as Assigned Surgical Provider  Lisbet Simmons MD as Assigned PCP    The following health maintenance items are reviewed in Epic and correct as of today:  Health Maintenance   Topic Date Due     PHOSPHORUS  Never done     COVID-19 Vaccine (6 - Pfizer series) 02/17/2023     MICROALBUMIN  06/05/2023     ANNUAL REVIEW OF HM ORDERS  10/17/2023     MAMMO SCREENING  11/15/2023     LIPID  12/05/2023     TSH W/FREE T4 REFLEX  12/05/2023     HEMOGLOBIN  12/05/2023     BMP  05/30/2024     MEDICARE ANNUAL WELLNESS VISIT  06/02/2024     FALL RISK ASSESSMENT  06/02/2024     ADVANCE CARE PLANNING  06/02/2028     DTAP/TDAP/TD IMMUNIZATION (3 - Td or Tdap) 10/05/2032     DEXA  03/07/2037     PARATHYROID  Completed     PHQ-2 (once per calendar year)  Completed     INFLUENZA VACCINE  Completed     Pneumococcal Vaccine: 65+ Years  Completed     URINALYSIS  Completed     ALK PHOS  Completed     ZOSTER IMMUNIZATION  Completed     IPV IMMUNIZATION  Aged Out     MENINGITIS IMMUNIZATION  Aged Out       Lab work is in process  Labs reviewed in EPIC  BP Readings from Last 3 Encounters:   06/02/23 110/70   05/30/23 112/56   12/12/22 120/70    Wt Readings from Last 3 Encounters:   06/02/23 72 kg (158 lb 12.8 oz)   05/30/23 73 kg (161 lb)   12/12/22 74.4 kg (164 lb)                  Patient Active Problem List   Diagnosis     Hematuria     Dyspnea and respiratory abnormality     Hyperlipidemia LDL goal <130     Advanced directives, counseling/discussion     Essential hypertension with goal blood pressure less than 140/90- well controlled     Family history of colon cancer- mother in her 40's-colonoscopies every 5 years- next one 2023     Osteopenia, unspecified location - was on fosamax, then Boniva secondary to hot flashes - off boniva since 2012     Vulvar itching - ? early lichen sclerosis vs. 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 Hansen - [facility] q6months      Myalgia of pelvic floor     Vaginal vault prolapse     Personal history of urinary tract infection     Dysuria- recurrent with urgency and frequency - not always infection - seeing Dr. Karyn Luevano, [facility]      Rectal prolapse     Sebaceous cyst- left medial upper breast      Dupuytren's contracture of both hands - s/p surgery on the left hand 3rd and 4th MCP joint areas in 2021      Vitamin D deficiency     Grief- crying quite often - son  @ 53 on 3/24/2021with pt by his side - 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 - was  on warfarin - never  - lived  with pt and      Asymptomatic menopausal state     Right buttock pain- hx of sciatica in the past - not radiating further right now - no new bowel or bladder control problems      Mixed stress and urge urinary incontinence- oxybutynin 10mg daily has improved symptoms significantly      Past Surgical History:   Procedure Laterality Date     ARTHROSCOPY SHOULDER  2013    [facility]     CATARACT IOL, RT/LT Left 2018    LEFT side in 2018- the RIGHT 2020- Both Dr.Carter Pierson - ophthalmology      COLONOSCOPY  89,     Colonoscopies q 5 year -next 2019     CYSTOCELE REPAIR  10/31/1984     CYSTOSCOPY       HYSTERECTOMY, PAP NO LONGER INDICATED  10/31/84    Hysterectomy, Total Abdominal w ovaries left intact     RELEASE TRIGGER FINGER Left 2021     SURGICAL HISTORY OF -   75    Cholecystectomy & Incidental appendectomy     wide excision -of primary Cutaneous adnexal Ca. 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 Hansen - [facility] q6months        Social History     Tobacco Use     Smoking status: Never     Smokeless tobacco: Never   Vaping Use     Vaping status: Never Used   Substance Use Topics     Alcohol use: No     Family History   Problem Relation Age of Onset     Colon Cancer Mother      Hypertension Father      Leukemia Sister         doesn't know type     Cerebrovascular Disease Maternal Grandfather      Cerebrovascular Disease Paternal Grandmother      Cancer Son 44         @ 55 on 3/24/2021with pt by his side - 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 - was  on warfarin - never  - lived  with pt and      Family History Negative Son          Current Outpatient Medications   Medication Sig Dispense Refill     CALCIUM /VITAMIN D TABS   OR 2 qd       clobetasol (TEMOVATE) 0.05 % external ointment APPLY SPARINGLY TO AFFECTED AREA OF GENITALS TWICE WEEKLY AT NIGHTTIME 60 g 1     levobunolol (BETAGAN) 0.5 % ophthalmic solution        levothyroxine (SYNTHROID/LEVOTHROID) 88 MCG tablet TAKE 1 TABLET EVERY MORNING 90 tablet 3     lisinopril (ZESTRIL) 30 MG tablet TAKE 1 TABLET EVERY DAY 90 tablet 3     metoprolol succinate ER (TOPROL XL) 50 MG 24 hr tablet TAKE 1 TABLET EVERY DAY 90 tablet 3     Multiple Vitamins-Minerals (PRESERVISION AREDS 2) CAPS Take 1 capsule by mouth daily       oxybutynin ER (DITROPAN XL) 10 MG 24 hr tablet Take 1-2 tablets (10-20 mg) by mouth daily 180 tablet 3     rosuvastatin (CRESTOR) 20 MG tablet Take 1 tablet (20 mg) by mouth daily 90 tablet 3     triamterene-HCTZ (MAXZIDE-25) 37.5-25 MG tablet TAKE 1 TABLET EVERY DAY 90 tablet 3     Allergies   Allergen Reactions     Ciprofloxacin GI Disturbance     Bad stomach pain      Nitrofurantoin GI Disturbance     Other reaction(s): Stomach upset, Stomach upset     Prednisone      Prednisone      Other  reaction(s): flushing     Sulfa Antibiotics Rash     Other reaction(s): rash     Recent Labs   Lab Test 05/30/23  1512 12/12/22  1713 12/05/22  0931 06/06/22  0925 06/06/22  0925 12/06/21  0919 09/16/21  1233 06/04/21  0818 12/04/20  0926   LDL  --   --  179*  --  98 116*  --  110* 125*   HDL  --   --  56  --  49* 53  --  53 50   TRIG  --   --  193*  --  242* 222*  --  178* 312*   ALT  --   --  16  --  22 25  --  24 29   CR 1.49* 1.42* 1.21*  --  1.14* 1.22*   < > 1.39* 1.12*   GFRESTIMATED 35* 37* 45*  --  49* 42*   < > 36* 47*   GFRESTBLACK  --   --   --   --   --   --   --  42* 54*   POTASSIUM 4.6 4.4 4.5   < > 3.8 4.2   < > 4.5 4.4   TSH  --   --  2.48  --  2.05 1.77   < > 2.03 2.26    < > = values in this interval not displayed.       Mammogram Screening: Mammogram Screening - Patient over age 75, has elected to continue with screening.  History of abnormal Pap smear: Status post benign hysterectomy. Health Maintenance and Surgical History updated.      Pertinent mammograms are reviewed under the imaging tab.    Review of Systems   Constitutional: Negative for chills and fever.   HENT: Negative for congestion, ear pain, hearing loss and sore throat.    Eyes: Negative for pain and visual disturbance.   Respiratory: Negative for cough and shortness of breath.    Cardiovascular: Negative for chest pain, palpitations and peripheral edema.   Gastrointestinal: Negative for abdominal pain, constipation, diarrhea, heartburn, hematochezia and nausea.   Breasts:  Negative for tenderness, breast mass and discharge.   Genitourinary: Positive for urgency. Negative for dysuria, frequency, genital sores, hematuria, pelvic pain, vaginal bleeding and vaginal discharge.   Musculoskeletal: Negative for arthralgias, joint swelling and myalgias.   Skin: Negative for rash.   Neurological: Negative for dizziness, weakness, headaches and paresthesias.   Psychiatric/Behavioral: Negative for mood changes. The patient is not  "nervous/anxious.        OBJECTIVE:   /70   Pulse 63   Temp 97.5  F (36.4  C) (Tympanic)   Resp 16   Ht 1.702 m (5' 7\")   Wt 72 kg (158 lb 12.8 oz)   SpO2 98%   BMI 24.87 kg/m   Estimated body mass index is 24.87 kg/m  as calculated from the following:    Height as of this encounter: 1.702 m (5' 7\").    Weight as of this encounter: 72 kg (158 lb 12.8 oz).  Physical Exam  GENERAL APPEARANCE: healthy, alert and no distress  EYES: Eyes grossly normal to inspection, PERRL and conjunctivae and sclerae normal  HENT: ear canals and TM's normal, nose and mouth without ulcers or lesions, oropharynx clear and oral mucous membranes moist  NECK: no adenopathy, no asymmetry, masses, or scars and thyroid normal to palpation  RESP: lungs clear to auscultation - no rales, rhonchi or wheezes  BREAST: normal without masses, tenderness or nipple discharge and no palpable axillary masses or adenopathy  CV: regular rate and rhythm, normal S1 S2, no S3 or S4, no murmur, click or rub, no peripheral edema and peripheral pulses strong  ABDOMEN: soft, nontender, no hepatosplenomegaly, no masses and bowel sounds normal  MS: no musculoskeletal defects are noted and gait is age appropriate without ataxia  SKIN: no suspicious lesions or rashes  NEURO: Normal strength and tone, sensory exam grossly normal, mentation intact and speech normal  PSYCH: mentation appears normal and affect normal/bright    Diagnostic Test Results:  Labs reviewed in Epic    ASSESSMENT / PLAN:       ICD-10-CM    1. Encounter for Medicare annual wellness exam  Z00.00 PRIMARY CARE FOLLOW-UP SCHEDULING      2. Other specified hypothyroidism  E03.8 TSH     T4 free     T3 total      3. Tubular adenomas of colon- 2014 - repeat in 5/2018 s/p skin ca = 2 more tubular adenomas - repeat in 2023 - scheduled for 6/19/2023  D12.6       4. Hyperlipidemia LDL goal <130  E78.5 Lipid panel reflex to direct LDL Fasting     HEPATIC PANEL     Albumin Random Urine Quantitative " "with Creat Ratio      5. Essential hypertension with goal blood pressure less than 140/90- well controlled  I10 CBC with platelets     Albumin Random Urine Quantitative with Creat Ratio      6. Visit for screening mammogram  Z12.31 MA Screening Bilateral w/ [facility]      7. Vitamin D deficiency  E55.9 25 Hydroxyvitamin D2 and D3      8. Adenocarcinoma (H) -history of adenocarcinoma on the scalp in 2018  C80.1       9. Open-angle glaucoma of both eyes, indeterminate stage, unspecified open-angle glaucoma type  H40.10X4 Multiple Vitamins-Minerals (PRESERVISION AREDS 2) CAPS      10. Stage 3b chronic kidney disease (H)  N18.32 Phosphorus      11. High priority for COVID-19 vaccination  Z23 COVID-19 BIVALENT 12+ (PFIZER)          Patient has been advised of split billing requirements and indicates understanding: Yes    Has refills on all her meds until 12/5/2023.       COUNSELING:  Reviewed preventive health counseling, as reflected in patient instructions     Consider EGD (upper GI endoscopy) and/or PPI - such as protonix- if mid epigastric pain recurs or becomes more frequent.      BMI:   Estimated body mass index is 24.87 kg/m  as calculated from the following:    Height as of this encounter: 1.702 m (5' 7\").    Weight as of this encounter: 72 kg (158 lb 12.8 oz).         She reports that she has never smoked. She has never used smokeless tobacco.      Appropriate preventive services were discussed with this patient, including applicable screening as appropriate for cardiovascular disease, diabetes, osteopenia/osteoporosis, and glaucoma.  As appropriate for age/gender, discussed screening for colorectal cancer, prostate cancer, breast cancer, and cervical cancer. Checklist reviewing preventive services available has been given to the patient.    Reviewed patients plan of care and provided an AVS. The Complex Care Plan (for patients with higher acuity and needing more deliberate coordination of services) for Aruna meets " the Care Plan requirement. This Care Plan has been established and reviewed with the Patient.           Lisbet Simmons MD  [facility] [place]    Identified Health Risks:    I have reviewed Opioid Use Disorder and Substance Use Disorder risk factors and made any needed referrals.     Answers for HPI/ROS submitted by the patient on 6/2/2023  If you checked off any problems, how difficult have these problems made it for you to do your work, take care of things at home, or get along with other people?: Not difficult at all  PHQ9 TOTAL SCORE: 1  RISA 7 TOTAL SCORE: 0        She is at risk for lack of exercise and has been provided with information to increase physical activity for the benefit of her well-being.  Information on urinary incontinence and treatment options given to patient.

## 2023-06-08 LAB
DEPRECATED CALCIDIOL+CALCIFEROL SERPL-MC: <56 UG/L (ref 20–75)
VITAMIN D2 SERPL-MCNC: <5 UG/L
VITAMIN D3 SERPL-MCNC: 51 UG/L

## 2023-06-19 ENCOUNTER — ANESTHESIA (OUTPATIENT)
Dept: SURGERY | Facility: CLINIC | Age: 83
End: 2023-06-19
Payer: COMMERCIAL

## 2023-06-19 ENCOUNTER — HOSPITAL ENCOUNTER (OUTPATIENT)
Facility: CLINIC | Age: 83
Discharge: HOME | End: 2023-06-19
Attending: INTERNAL MEDICINE | Admitting: INTERNAL MEDICINE
Payer: COMMERCIAL

## 2023-06-19 ENCOUNTER — ANESTHESIA EVENT (OUTPATIENT)
Dept: SURGERY | Facility: CLINIC | Age: 83
End: 2023-06-19
Payer: COMMERCIAL

## 2023-06-19 VITALS
TEMPERATURE: 97.1 F | RESPIRATION RATE: 14 BRPM | SYSTOLIC BLOOD PRESSURE: 138 MMHG | HEART RATE: 55 BPM | BODY MASS INDEX: 24.61 KG/M2 | HEIGHT: 67 IN | DIASTOLIC BLOOD PRESSURE: 46 MMHG | OXYGEN SATURATION: 98 % | WEIGHT: 156.8 LBS

## 2023-06-19 LAB — COLONOSCOPY: NORMAL

## 2023-06-19 PROCEDURE — 710N000012 HC RECOVERY PHASE 2, PER MINUTE: Performed by: INTERNAL MEDICINE

## 2023-06-19 PROCEDURE — 272N000001 HC OR GENERAL SUPPLY STERILE: Performed by: INTERNAL MEDICINE

## 2023-06-19 PROCEDURE — 250N000009 HC RX 250: Performed by: NURSE ANESTHETIST, CERTIFIED REGISTERED

## 2023-06-19 PROCEDURE — 258N000003 HC RX IP 258 OP 636: Performed by: ANESTHESIOLOGY

## 2023-06-19 PROCEDURE — 360N000075 HC SURGERY LEVEL 2, PER MIN: Performed by: INTERNAL MEDICINE

## 2023-06-19 PROCEDURE — 250N000011 HC RX IP 250 OP 636: Performed by: NURSE ANESTHETIST, CERTIFIED REGISTERED

## 2023-06-19 PROCEDURE — 370N000017 HC ANESTHESIA TECHNICAL FEE, PER MIN: Performed by: INTERNAL MEDICINE

## 2023-06-19 PROCEDURE — 88305 TISSUE EXAM BY PATHOLOGIST: CPT | Mod: 26 | Performed by: PATHOLOGY

## 2023-06-19 PROCEDURE — 999N000141 HC STATISTIC PRE-PROCEDURE NURSING ASSESSMENT: Performed by: INTERNAL MEDICINE

## 2023-06-19 PROCEDURE — L8699 PROSTHETIC IMPLANT NOS: HCPCS | Performed by: INTERNAL MEDICINE

## 2023-06-19 PROCEDURE — 258N000003 HC RX IP 258 OP 636: Performed by: NURSE ANESTHETIST, CERTIFIED REGISTERED

## 2023-06-19 PROCEDURE — 88305 TISSUE EXAM BY PATHOLOGIST: CPT | Mod: TC | Performed by: INTERNAL MEDICINE

## 2023-06-19 DEVICE — IMPLANTABLE DEVICE: Type: IMPLANTABLE DEVICE | Site: COLON | Status: FUNCTIONAL

## 2023-06-19 RX ORDER — PROPOFOL 10 MG/ML
INJECTION, EMULSION INTRAVENOUS CONTINUOUS PRN
Status: DISCONTINUED | OUTPATIENT
Start: 2023-06-19 | End: 2023-06-19

## 2023-06-19 RX ORDER — NALOXONE HYDROCHLORIDE 0.4 MG/ML
0.2 INJECTION, SOLUTION INTRAMUSCULAR; INTRAVENOUS; SUBCUTANEOUS
Status: DISCONTINUED | OUTPATIENT
Start: 2023-06-19 | End: 2023-06-19 | Stop reason: HOSPADM

## 2023-06-19 RX ORDER — ONDANSETRON 4 MG/1
4 TABLET, ORALLY DISINTEGRATING ORAL EVERY 30 MIN PRN
Status: DISCONTINUED | OUTPATIENT
Start: 2023-06-19 | End: 2023-06-19 | Stop reason: HOSPADM

## 2023-06-19 RX ORDER — LIDOCAINE 40 MG/G
CREAM TOPICAL
Status: DISCONTINUED | OUTPATIENT
Start: 2023-06-19 | End: 2023-06-19 | Stop reason: HOSPADM

## 2023-06-19 RX ORDER — ONDANSETRON 2 MG/ML
4 INJECTION INTRAMUSCULAR; INTRAVENOUS EVERY 30 MIN PRN
Status: DISCONTINUED | OUTPATIENT
Start: 2023-06-19 | End: 2023-06-19 | Stop reason: HOSPADM

## 2023-06-19 RX ORDER — ONDANSETRON 2 MG/ML
4 INJECTION INTRAMUSCULAR; INTRAVENOUS
Status: DISCONTINUED | OUTPATIENT
Start: 2023-06-19 | End: 2023-06-19 | Stop reason: HOSPADM

## 2023-06-19 RX ORDER — NALOXONE HYDROCHLORIDE 0.4 MG/ML
0.4 INJECTION, SOLUTION INTRAMUSCULAR; INTRAVENOUS; SUBCUTANEOUS
Status: DISCONTINUED | OUTPATIENT
Start: 2023-06-19 | End: 2023-06-19 | Stop reason: HOSPADM

## 2023-06-19 RX ORDER — SODIUM CHLORIDE, SODIUM LACTATE, POTASSIUM CHLORIDE, CALCIUM CHLORIDE 600; 310; 30; 20 MG/100ML; MG/100ML; MG/100ML; MG/100ML
INJECTION, SOLUTION INTRAVENOUS CONTINUOUS PRN
Status: DISCONTINUED | OUTPATIENT
Start: 2023-06-19 | End: 2023-06-19

## 2023-06-19 RX ORDER — OXYCODONE HYDROCHLORIDE 5 MG/1
10 TABLET ORAL
Status: DISCONTINUED | OUTPATIENT
Start: 2023-06-19 | End: 2023-06-19 | Stop reason: HOSPADM

## 2023-06-19 RX ORDER — PROPOFOL 10 MG/ML
INJECTION, EMULSION INTRAVENOUS PRN
Status: DISCONTINUED | OUTPATIENT
Start: 2023-06-19 | End: 2023-06-19

## 2023-06-19 RX ORDER — FLUMAZENIL 0.1 MG/ML
0.2 INJECTION, SOLUTION INTRAVENOUS
Status: DISCONTINUED | OUTPATIENT
Start: 2023-06-19 | End: 2023-06-19 | Stop reason: HOSPADM

## 2023-06-19 RX ORDER — ONDANSETRON 4 MG/1
4 TABLET, ORALLY DISINTEGRATING ORAL EVERY 6 HOURS PRN
Status: DISCONTINUED | OUTPATIENT
Start: 2023-06-19 | End: 2023-06-19 | Stop reason: HOSPADM

## 2023-06-19 RX ORDER — FENTANYL CITRATE 50 UG/ML
25 INJECTION, SOLUTION INTRAMUSCULAR; INTRAVENOUS
Status: DISCONTINUED | OUTPATIENT
Start: 2023-06-19 | End: 2023-06-19 | Stop reason: HOSPADM

## 2023-06-19 RX ORDER — ONDANSETRON 2 MG/ML
INJECTION INTRAMUSCULAR; INTRAVENOUS PRN
Status: DISCONTINUED | OUTPATIENT
Start: 2023-06-19 | End: 2023-06-19

## 2023-06-19 RX ORDER — PROCHLORPERAZINE MALEATE 5 MG
5 TABLET ORAL EVERY 6 HOURS PRN
Status: DISCONTINUED | OUTPATIENT
Start: 2023-06-19 | End: 2023-06-19 | Stop reason: HOSPADM

## 2023-06-19 RX ORDER — ONDANSETRON 2 MG/ML
4 INJECTION INTRAMUSCULAR; INTRAVENOUS EVERY 6 HOURS PRN
Status: DISCONTINUED | OUTPATIENT
Start: 2023-06-19 | End: 2023-06-19 | Stop reason: HOSPADM

## 2023-06-19 RX ORDER — SODIUM CHLORIDE, SODIUM LACTATE, POTASSIUM CHLORIDE, CALCIUM CHLORIDE 600; 310; 30; 20 MG/100ML; MG/100ML; MG/100ML; MG/100ML
INJECTION, SOLUTION INTRAVENOUS CONTINUOUS
Status: DISCONTINUED | OUTPATIENT
Start: 2023-06-19 | End: 2023-06-19 | Stop reason: HOSPADM

## 2023-06-19 RX ORDER — LIDOCAINE HYDROCHLORIDE 20 MG/ML
INJECTION, SOLUTION INFILTRATION; PERINEURAL PRN
Status: DISCONTINUED | OUTPATIENT
Start: 2023-06-19 | End: 2023-06-19

## 2023-06-19 RX ORDER — OXYCODONE HYDROCHLORIDE 5 MG/1
5 TABLET ORAL
Status: DISCONTINUED | OUTPATIENT
Start: 2023-06-19 | End: 2023-06-19 | Stop reason: HOSPADM

## 2023-06-19 RX ADMIN — PROPOFOL 50 MG: 10 INJECTION, EMULSION INTRAVENOUS at 09:32

## 2023-06-19 RX ADMIN — SODIUM CHLORIDE, POTASSIUM CHLORIDE, SODIUM LACTATE AND CALCIUM CHLORIDE: 600; 310; 30; 20 INJECTION, SOLUTION INTRAVENOUS at 08:35

## 2023-06-19 RX ADMIN — PROPOFOL 150 MCG/KG/MIN: 10 INJECTION, EMULSION INTRAVENOUS at 09:31

## 2023-06-19 RX ADMIN — LIDOCAINE HYDROCHLORIDE 40 MG: 20 INJECTION, SOLUTION INFILTRATION; PERINEURAL at 09:30

## 2023-06-19 RX ADMIN — ONDANSETRON 4 MG: 2 INJECTION INTRAMUSCULAR; INTRAVENOUS at 09:39

## 2023-06-19 RX ADMIN — SODIUM CHLORIDE, SODIUM LACTATE, POTASSIUM CHLORIDE, CALCIUM CHLORIDE: 600; 310; 30; 20 INJECTION, SOLUTION INTRAVENOUS at 09:27

## 2023-06-19 ASSESSMENT — ACTIVITIES OF DAILY LIVING (ADL): ADLS_ACUITY_SCORE: 35

## 2023-06-19 NOTE — ANESTHESIA POSTPROCEDURE EVALUATION
Patient: Aruna Santos    Procedure: Procedure(s):  Colonoscopy with polypectomies       Anesthesia Type:  MAC    Note:  Disposition: Outpatient   Postop Pain Control: Uneventful            Sign Out: Well controlled pain   PONV: No   Neuro/Psych: Uneventful            Sign Out: Acceptable/Baseline neuro status   Airway/Respiratory: Uneventful            Sign Out: Acceptable/Baseline resp. status   CV/Hemodynamics: Uneventful            Sign Out: Acceptable CV status; No obvious hypovolemia; No obvious fluid overload   Other NRE: NONE   DID A NON-ROUTINE EVENT OCCUR? No           Last vitals:  Vitals Value Taken Time   /65 06/19/23 1005   Temp 97  F (36.1  C) 06/19/23 1005   Pulse 45 06/19/23 1005   Resp 16 06/19/23 1005   SpO2 98 % 06/19/23 1007   Vitals shown include unvalidated device data.    Electronically Signed By: Stefan Starr MD  June 19, 2023  10:39 AM

## 2023-06-19 NOTE — ANESTHESIA CARE TRANSFER NOTE
Patient: Aruna Santos    Procedure: Procedure(s):  Colonoscopy with polypectomies       Diagnosis: Screen for colon cancer [Z12.11]  Diagnosis Additional Information: No value filed.    Anesthesia Type:   MAC     Note:    Oropharynx: oropharynx clear of all foreign objects  Level of Consciousness: drowsy      Independent Airway: airway patency satisfactory and stable  Dentition: dentition unchanged  Vital Signs Stable: post-procedure vital signs reviewed and stable  Report to RN Given: handoff report given  Patient transferred to: PACU    Handoff Report: Identifed the Patient, Identified the Reponsible Provider, Reviewed the pertinent medical history, Discussed the surgical course, Reviewed Intra-OP anesthesia mangement and issues during anesthesia, Set expectations for post-procedure period and Allowed opportunity for questions and acknowledgement of understanding      Vitals:  Vitals Value Taken Time   BP     Temp     Pulse     Resp     SpO2         Electronically Signed By: BEE Saavedra CRNA  June 19, 2023  9:54 AM

## 2023-06-19 NOTE — DISCHARGE INSTRUCTIONS
[facility] ADULT DISCHARGE INSTRUCTIONS   SPECIAL PRECAUTIONS FOR 24 HOURS AFTER SURGERY    IT IS NOT UNUSUAL TO FEEL LIGHT-HEADED OR FAINT, UP TO 24 HOURS AFTER SURGERY OR WHILE TAKING PAIN MEDICATION.  IF YOU HAVE THESE SYMPTOMS; SIT FOR A FEW MINUTES BEFORE STANDING AND HAVE SOMEONE ASSIST YOU WHEN YOU GET UP TO WALK OR USE THE BATHROOM.    YOU SHOULD REST AND RELAX FOR THE NEXT 24 HOURS AND YOU MUST MAKE ARRANGEMENTS TO HAVE SOMEONE STAY WITH YOU FOR AT LEAST 24 HOURS AFTER YOUR DISCHARGE.  AVOID HAZARDOUS AND STRENUOUS ACTIVITIES.  DO NOT MAKE IMPORTANT DECISIONS FOR 24 HOURS.    DO NOT DRIVE ANY VEHICLE OR OPERATE MECHANICAL EQUIPMENT FOR 24 HOURS FOLLOWING THE END OF YOUR SURGERY.  EVEN THOUGH YOU MAY FEEL NORMAL, YOUR REACTIONS MAY BE AFFECTED BY THE MEDICATION YOU HAVE RECEIVED.    DO NOT DRINK ALCOHOLIC BEVERAGES FOR 24 HOURS FOLLOWING YOUR SURGERY.    DRINK CLEAR LIQUIDS (APPLE JUICE, GINGER ALE, 7-UP, BROTH, ETC.).  PROGRESS TO YOUR REGULAR DIET AS YOU FEEL ABLE.    YOU MAY HAVE A DRY MOUTH, A SORE THROAT, MUSCLES ACHES OR TROUBLE SLEEPING.  THESE SHOULD GO AWAY AFTER 24 HOURS.    CALL YOUR DOCTOR FOR ANY OF THE FOLLOWING:  SIGNS OF INFECTION (FEVER, GROWING TENDERNESS AT THE SURGERY SITE, A LARGE AMOUNT OF DRAINAGE OR BLEEDING, SEVERE PAIN, FOUL-SMELLING DRAINAGE, REDNESS OR SWELLING.    IT HAS BEEN OVER 8 TO 10 HOURS SINCE SURGERY AND YOU ARE STILL NOT ABLE TO URINATE (PASS WATER).

## 2023-06-19 NOTE — ANESTHESIA PREPROCEDURE EVALUATION
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 Not using now     Spider veins      Tubular adenoma of colon      - repeat in 2019 - q 5 years      Urgency incontinence       Past Surgical History:   Procedure Laterality Date     ARTHROSCOPY SHOULDER  2013    [facility]     CATARACT IOL, RT/LT Left 2018    LEFT side in 2018- the RIGHT 2020- Both Dr.Carter Pierson - ophthalmology      COLONOSCOPY  94,     Colonoscopies q 5 year -next      CYSTOCELE REPAIR  10/31/1984     CYSTOSCOPY       HYSTERECTOMY, PAP NO LONGER INDICATED  10/31/84    Hysterectomy, Total Abdominal w ovaries left intact     RELEASE TRIGGER FINGER Left 2021     SURGICAL HISTORY OF -   75    Cholecystectomy & Incidental appendectomy     wide excision -of primary Cutaneous adnexal Ca. 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 Hansen - [facility] q6months       Allergies   Allergen Reactions     Ciprofloxacin GI Disturbance     Bad stomach pain      Nitrofurantoin GI Disturbance     Other reaction(s): Stomach upset, Stomach upset     Prednisone      Prednisone      Other reaction(s): flushing     Sulfa Antibiotics Rash     Other reaction(s): rash      Social History     Tobacco Use      Smoking status: Never     Smokeless tobacco: Never   Vaping Use     Vaping status: Never Used   Substance Use Topics     Alcohol use: No      Wt Readings from Last 1 Encounters:   06/19/23 71.1 kg (156 lb 12.8 oz)        Anesthesia Evaluation   Pt has had prior anesthetic. Type: General and MAC.    History of anesthetic complications  - PONV.      ROS/MED HX  ENT/Pulmonary:     (+) sleep apnea, doesn't use CPAP,     Neurologic:  - neg neurologic ROS     Cardiovascular:     (+) hypertension-----    METS/Exercise Tolerance:     Hematologic: Comments: Lab Test        06/02/23 12/05/22 06/06/22                       1012          0931          0925          WBC          7.9          7.2          6.4           HGB          14.4         13.9         13.5          MCV          94           91           90            PLT          132*         239          217            Lab Test        05/30/23 12/12/22 12/05/22                       1512          1713          0931          NA           139          139          139           POTASSIUM    4.6          4.4          4.5           CHLORIDE     102          100          102           CO2          23           27           26            BUN          28.3*        28.0*        25.3*         CR           1.49*        1.42*        1.21*         ANIONGAP     14           12           11            KHALIDA          10.2         9.6          10.4*         GLC          108*         101*         105*                Musculoskeletal:  - neg musculoskeletal ROS     GI/Hepatic: Comment: Screen for colon cancer      Renal/Genitourinary:     (+) renal disease,     Endo:     (+) thyroid problem, hypothyroidism,     Psychiatric/Substance Use:  - neg psychiatric ROS     Infectious Disease:  - neg infectious disease ROS     Malignancy:  - neg malignancy ROS     Other:  - neg other ROS          Physical Exam    Airway        Mallampati: II   TM distance: > 3 FB   Neck ROM: full   Mouth  opening: > 3 cm    Respiratory Devices and Support         Dental       (+) Minor Abnormalities - some fillings, tiny chips      Cardiovascular   cardiovascular exam normal          Pulmonary   pulmonary exam normal                OUTSIDE LABS:  CBC:   Lab Results   Component Value Date    WBC 7.9 06/02/2023    WBC 7.2 12/05/2022    HGB 14.4 06/02/2023    HGB 13.9 12/05/2022    HCT 44.8 06/02/2023    HCT 41.4 12/05/2022     (L) 06/02/2023     12/05/2022     BMP:   Lab Results   Component Value Date     05/30/2023     12/12/2022    POTASSIUM 4.6 05/30/2023    POTASSIUM 4.4 12/12/2022    CHLORIDE 102 05/30/2023    CHLORIDE 100 12/12/2022    CO2 23 05/30/2023    CO2 27 12/12/2022    BUN 28.3 (H) 05/30/2023    BUN 28.0 (H) 12/12/2022    CR 1.49 (H) 05/30/2023    CR 1.42 (H) 12/12/2022     (H) 05/30/2023     (H) 12/12/2022     COAGS: No results found for: PTT, INR, FIBR  POC: No results found for: BGM, HCG, HCGS  HEPATIC:   Lab Results   Component Value Date    ALBUMIN 4.7 06/02/2023    PROTTOTAL 8.1 06/02/2023    ALT 22 06/02/2023    AST 27 06/02/2023    ALKPHOS 44 06/02/2023    BILITOTAL 1.1 06/02/2023     OTHER:   Lab Results   Component Value Date    LACT 1.6 07/29/2016    KHALIDA 10.2 05/30/2023    PHOS 4.1 06/02/2023    MAG 2.2 06/04/2021    TSH 2.10 06/02/2023    T4 1.76 (H) 06/02/2023    T3 83 (L) 06/02/2023       Anesthesia Plan    ASA Status:  2      Anesthesia Type: MAC.   Induction: Propofol.   Maintenance: TIVA.        Consents    Anesthesia Plan(s) and associated risks, benefits, and realistic alternatives discussed. Questions answered and patient/representative(s) expressed understanding.    - Discussed:     - Discussed with:  Patient      - Extended Intubation/Ventilatory Support Discussed: No.      - Patient is DNR/DNI Status: No    Use of blood products discussed: No .     Postoperative Care    Pain management: IV analgesics.   PONV prophylaxis: Ondansetron (or other  5HT-3), Dexamethasone or Solumedrol     Comments:                Stefan Starr MD

## 2023-06-20 LAB
PATH REPORT.COMMENTS IMP SPEC: NORMAL
PATH REPORT.COMMENTS IMP SPEC: NORMAL
PATH REPORT.FINAL DX SPEC: NORMAL
PATH REPORT.GROSS SPEC: NORMAL
PATH REPORT.MICROSCOPIC SPEC OTHER STN: NORMAL
PATH REPORT.RELEVANT HX SPEC: NORMAL
PHOTO IMAGE: NORMAL

## 2023-09-08 ENCOUNTER — OFFICE VISIT (OUTPATIENT)
Dept: FAMILY MEDICINE | Facility: CLINIC | Age: 83
End: 2023-09-08
Payer: COMMERCIAL

## 2023-09-08 VITALS
DIASTOLIC BLOOD PRESSURE: 64 MMHG | HEART RATE: 81 BPM | RESPIRATION RATE: 13 BRPM | BODY MASS INDEX: 24.75 KG/M2 | OXYGEN SATURATION: 98 % | SYSTOLIC BLOOD PRESSURE: 132 MMHG | TEMPERATURE: 97.3 F | WEIGHT: 158 LBS

## 2023-09-08 DIAGNOSIS — M79.662 PAIN OF LEFT LOWER LEG: Primary | ICD-10-CM

## 2023-09-08 PROCEDURE — 99213 OFFICE O/P EST LOW 20 MIN: CPT | Performed by: FAMILY MEDICINE

## 2023-09-08 ASSESSMENT — ENCOUNTER SYMPTOMS: LEG PAIN: 1

## 2023-09-08 NOTE — PROGRESS NOTES
Assessment & Plan   Pain of left lower leg  Leg exam is normal and most likely related to her low back issues with some radicular symptoms.  No signs of weakness or reflex changes.  Back exercises given as well as stretches for the trochanteric bursal region.  Could consider formal physical therapy or possible MRI of the lumbar area at epidural injections as needed.      Return in about 1 month (around 10/8/2023) for symptoms failing to improve or sooner if worsening.          Johnathon Mahan MD      [address] [address]  [email or web address]   Office: [phone]       Williams Valdovinos is a 83 year old, presenting for the following health issues:  Leg Pain (Left)      History of Present Illness       Reason for visit:  Pain in my leg  Symptom onset:  3-4 weeks ago    She eats 2-3 servings of fruits and vegetables daily.She consumes 1 sweetened beverage(s) daily.She exercises with enough effort to increase her heart rate 20 to 29 minutes per day.  She exercises with enough effort to increase her heart rate 3 or less days per week.   She is taking medications regularly.     Left Leg pain - starts in the upper leg hip and can radiate down into the knee and back up into her hip x 1-2 months. Progressively getting worse, patient denies weakness and denies any known injury.  Occasional low back pains. Worse with prolonged standing or being on her feet a lot.     Review of Systems       Objective    /64 (BP Location: Left arm, Patient Position: Sitting, Cuff Size: Adult Regular)   Pulse 81   Temp 97.3  F (36.3  C) (Tympanic)   Resp 13   Wt 71.7 kg (158 lb)   SpO2 98%   BMI 24.75 kg/m    Body mass index is 24.75 kg/m .  Physical Exam   GENERAL: healthy, alert and no distress  NECK: no adenopathy, no asymmetry, masses, or scars and thyroid normal to palpation  RESP: lungs clear to auscultation - no rales, rhonchi or wheezes  CV: regular rate and rhythm,  normal S1 S2, no S3 or S4, no murmur, click or rub, no peripheral edema and peripheral pulses strong  ABDOMEN: soft, nontender, no hepatosplenomegaly, no masses and bowel sounds normal  MS: no gross musculoskeletal defects noted, no edema  SKIN: no suspicious lesions or rashes  NEURO: Normal strength and tone, mentation intact and speech normal  BACK: no CVA tenderness, no paralumbar tenderness, negative straight leg raising

## 2023-10-16 ENCOUNTER — PATIENT OUTREACH (OUTPATIENT)
Dept: CARE COORDINATION | Facility: CLINIC | Age: 83
End: 2023-10-16
Payer: COMMERCIAL

## 2023-11-08 DIAGNOSIS — E78.5 HYPERLIPIDEMIA LDL GOAL <130: ICD-10-CM

## 2023-11-08 RX ORDER — ROSUVASTATIN CALCIUM 20 MG/1
20 TABLET, COATED ORAL DAILY
Qty: 90 TABLET | Refills: 0 | Status: SHIPPED | OUTPATIENT
Start: 2023-11-08 | End: 2024-01-11

## 2023-11-16 ENCOUNTER — HOSPITAL ENCOUNTER (OUTPATIENT)
Dept: MAMMOGRAPHY | Facility: CLINIC | Age: 83
Discharge: HOME | End: 2023-11-16
Attending: FAMILY MEDICINE | Admitting: FAMILY MEDICINE
Payer: COMMERCIAL

## 2023-11-16 DIAGNOSIS — Z12.31 VISIT FOR SCREENING MAMMOGRAM: ICD-10-CM

## 2023-11-16 PROCEDURE — 77067 SCR MAMMO BI INCL CAD: CPT

## 2023-11-30 DIAGNOSIS — E03.8 OTHER SPECIFIED HYPOTHYROIDISM: ICD-10-CM

## 2023-11-30 DIAGNOSIS — E55.9 VITAMIN D DEFICIENCY: ICD-10-CM

## 2023-11-30 DIAGNOSIS — N18.32 STAGE 3B CHRONIC KIDNEY DISEASE (H): ICD-10-CM

## 2023-11-30 DIAGNOSIS — E78.5 HYPERLIPIDEMIA LDL GOAL <130: Primary | ICD-10-CM

## 2023-11-30 DIAGNOSIS — I10 ESSENTIAL HYPERTENSION WITH GOAL BLOOD PRESSURE LESS THAN 140/90: ICD-10-CM

## 2023-12-03 RX ORDER — TRIAMTERENE/HYDROCHLOROTHIAZID 37.5-25 MG
TABLET ORAL
Qty: 90 TABLET | Refills: 3 | Status: SHIPPED | OUTPATIENT
Start: 2023-12-03 | End: 2024-01-11

## 2023-12-03 RX ORDER — LISINOPRIL 30 MG/1
TABLET ORAL
Qty: 90 TABLET | Refills: 3 | Status: SHIPPED | OUTPATIENT
Start: 2023-12-03 | End: 2024-01-11

## 2023-12-03 RX ORDER — METOPROLOL SUCCINATE 50 MG/1
TABLET, EXTENDED RELEASE ORAL
Qty: 90 TABLET | Refills: 3 | Status: SHIPPED | OUTPATIENT
Start: 2023-12-03 | End: 2024-01-11

## 2023-12-03 NOTE — TELEPHONE ENCOUNTER
Creatinine   Date Value Ref Range Status   05/30/2023 1.49 (H) 0.51 - 0.95 mg/dL Final   06/04/2021 1.39 (H) 0.52 - 1.04 mg/dL Final      Pt due for  NONFASTING labs to recheck kidney function prior to visit with me 1/11//2024.  WE CAN DO HER FASTING LABS ANOTHER DAY.  I'D LIKE TO SEE WHAT HER RENAL FUNCTION IS LIKE WELL NOURISHED AND WELL HYDRATED.     Future orders placed in Epic (our computer record)  for nonfasting labs.  We've entered future orders for this and she  can do this as a lab only appointment at our clinic.  Please call [phone] and press #2 to speak with your care team to schedule this or you can schedule it on [employer] at your convenience.  You can also have this done at any [facility] without appointment during regular outpatient lab hours.   [facility] outpatient lab hours 7am-7pm Monday-Friday, and 9am -noon Saturdays and Sundays .  They close promptly at 7pm weekdays and 12 noon on the weekends.     Please inform patient.       Future Appointments 12/3/2023 - 5/31/2024        Date Visit Type Length Department Provider     12/5/2023 11:45 AM RETURN 15 min [facility] Mary Miranda PA-C    Location Instructions:     [address] [place] [id number]              1/11/2024  1:00 PM OFFICE VISIT 20 min  FAMILY PRACTICE Lsibet Simmons MD    Location Instructions:     [facility] is located at [address] [place] [address], along [address]. Free parking is available; access the lot by turning north from [address] onto [address], then west onto [facility].

## 2023-12-05 ENCOUNTER — OFFICE VISIT (OUTPATIENT)
Dept: DERMATOLOGY | Facility: CLINIC | Age: 83
End: 2023-12-05
Payer: COMMERCIAL

## 2023-12-05 DIAGNOSIS — L82.1 SEBORRHEIC KERATOSIS: ICD-10-CM

## 2023-12-05 DIAGNOSIS — D22.9 NEVUS: Primary | ICD-10-CM

## 2023-12-05 DIAGNOSIS — Z85.828 HISTORY OF SKIN CANCER: ICD-10-CM

## 2023-12-05 DIAGNOSIS — D18.01 ANGIOMA OF SKIN: ICD-10-CM

## 2023-12-05 DIAGNOSIS — L81.4 LENTIGO: ICD-10-CM

## 2023-12-05 PROCEDURE — 99213 OFFICE O/P EST LOW 20 MIN: CPT | Performed by: PHYSICIAN ASSISTANT

## 2023-12-05 NOTE — LETTER
12/5/2023         RE: Aruna Santos  [address] [facility] [address]        Dear Colleague,    Thank you for referring your patient, Aruna Santos, to the [facility]. Please see a copy of my visit note below.    HPI:   chief complaint: Aruna Santos is a 83 year old female who presents for Full skin cancer screening to rule out skin cancer.  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 Son had a carcinoid tumor and passed away 3/2021. Her other son thankfully lives with her and helps with her .   On review of systems, there are no further skin complaints, patient is feeling otherwise well.  See patient intake sheet.  ROS of the following were done and are negative: Constitutional, Eyes, Ears, Nose,   Mouth, Throat, Cardiovascular, Respiratory, GI, Genitourinary, Musculoskeletal,   Psychiatric, Endocrine, Allergic/Immunologic.        PHYSICAL EXAM:   There were no vitals taken for this visit.  Skin exam performed as follows: Type 2 skin. Mood appropriate  Alert and Oriented X 3. Well developed, well nourished in no distress.  General appearance: Normal  Head including face: Normal  Eyes: conjunctiva and lids: Normal  Mouth: Lips, teeth, gums: Normal  Neck: Normal  Chest-breast/axillae: Normal  Back: Normal  Spleen and liver: Normal  Cardiovascular: Exam of peripheral vascular system by observation for swelling, varicosities, edema: Normal  Genitalia: groin, buttocks: Normal  Extremities: digits/nails (clubbing): Normal  Eccrine and Apocrine glands: Normal  Right upper extremity: Normal  Left upper extremity: Normal  Right  lower extremity: Normal  Left lower extremity: Normal  Skin: Scalp and body hair: See below    Pt deferred exam of breasts, groin, buttocks: No    Other physical findings:  1. Multiple pigmented macules on extremities and trunk  2. Multiple pigmented macules on face, trunk and extremities  3. Multiple vascular papules on trunk, arms and legs  4. Multiple scattered keratotic plaques        Except as noted above, no other signs of skin cancer or melanoma.     ASSESSMENT/PLAN:   Benign Full skin cancer screening today.    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    Multiple benign appearing nevi on arms, legs and trunk. Discussed ABCDEs of melanoma and sunscreen.   Multiple lentigos on arms, legs and trunk. Advised benign, no treatment needed.  Multiple scattered angiomas. Advised benign, no treatment needed.   Seborrheic keratosis on arms, legs and trunk. Advised benign, no treatment needed.  History of adenocarcinoma on the scalp 1/2018. No lymphadenopathy palpated today - advised for pt to do this once monthly at home. More than 50% of reported cases are located in the tete-orbital region and the hair-bearing scalp.  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          Follow-up: yearly FSE/PRN sooner     1.) Patient was asked about new and changing moles. YES  2.) Patient received a complete physical skin examination: YES  3.) Patient was counseled to perform a monthly self skin examination: YES  Scribed By: Mary Miranda, MS, PAMiahC      Again, thank you for allowing me to participate in the care of your patient.        Sincerely,        Mary Miranda PA-C

## 2023-12-05 NOTE — PROGRESS NOTES
HPI:   chief complaint: Aruna Santos is a 83 year old female who presents for Full skin cancer screening to rule out skin cancer.  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 Son had a carcinoid tumor and passed away 3/2021. Her other son thankfully lives with her and helps with her .   On review of systems, there are no further skin complaints, patient is feeling otherwise well.  See patient intake sheet.  ROS of the following were done and are negative: Constitutional, Eyes, Ears, Nose,   Mouth, Throat, Cardiovascular, Respiratory, GI, Genitourinary, Musculoskeletal,   Psychiatric, Endocrine, Allergic/Immunologic.        PHYSICAL EXAM:   There were no vitals taken for this visit.  Skin exam performed as follows: Type 2 skin. Mood appropriate  Alert and Oriented X 3. Well developed, well nourished in no distress.  General appearance: Normal  Head including face: Normal  Eyes: conjunctiva and lids: Normal  Mouth: Lips, teeth, gums: Normal  Neck: Normal  Chest-breast/axillae: Normal  Back: Normal  Spleen and liver: Normal  Cardiovascular: Exam of peripheral vascular system by observation for swelling, varicosities, edema: Normal  Genitalia: groin, buttocks: Normal  Extremities: digits/nails (clubbing): Normal  Eccrine and Apocrine glands: Normal  Right upper extremity: Normal  Left upper extremity: Normal  Right lower extremity: Normal  Left lower extremity: Normal  Skin: Scalp and body hair: See below    Pt deferred exam of breasts, groin, buttocks: No    Other physical findings:  1. Multiple pigmented macules on extremities and trunk  2. Multiple pigmented  macules on face, trunk and extremities  3. Multiple vascular papules on trunk, arms and legs  4. Multiple scattered keratotic plaques        Except as noted above, no other signs of skin cancer or melanoma.     ASSESSMENT/PLAN:   Benign Full skin cancer screening today.    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    Multiple benign appearing nevi on arms, legs and trunk. Discussed ABCDEs of melanoma and sunscreen.   Multiple lentigos on arms, legs and trunk. Advised benign, no treatment needed.  Multiple scattered angiomas. Advised benign, no treatment needed.   Seborrheic keratosis on arms, legs and trunk. Advised benign, no treatment needed.  History of adenocarcinoma on the scalp 1/2018. No lymphadenopathy palpated today - advised for pt to do this once monthly at home. More than 50% of reported cases are located in the tete-orbital region and the hair-bearing scalp.  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          Follow-up: yearly FSE/PRN sooner     1.) Patient was asked about new and changing moles. YES  2.) Patient received a complete physical skin examination: YES  3.) Patient was counseled to perform a monthly self skin examination: YES  Scribed By: Mary Miranda, MS, PANHAN

## 2023-12-05 NOTE — TELEPHONE ENCOUNTER
Called # [phone]   Advised Pt to come in for non fasting labs. Pt will look at her calendar and try to make an appointment soon.     Anton Rand RN  [facility]

## 2023-12-11 DIAGNOSIS — E03.8 OTHER SPECIFIED HYPOTHYROIDISM: ICD-10-CM

## 2023-12-11 RX ORDER — LEVOTHYROXINE SODIUM 88 UG/1
TABLET ORAL
Qty: 30 TABLET | Refills: 0 | Status: SHIPPED | OUTPATIENT
Start: 2023-12-11 | End: 2024-01-11

## 2023-12-20 ENCOUNTER — LAB (OUTPATIENT)
Dept: LAB | Facility: CLINIC | Age: 83
End: 2023-12-20
Payer: COMMERCIAL

## 2023-12-20 DIAGNOSIS — I10 ESSENTIAL HYPERTENSION WITH GOAL BLOOD PRESSURE LESS THAN 140/90: ICD-10-CM

## 2023-12-20 DIAGNOSIS — E03.8 OTHER SPECIFIED HYPOTHYROIDISM: ICD-10-CM

## 2023-12-20 DIAGNOSIS — N18.32 STAGE 3B CHRONIC KIDNEY DISEASE (H): ICD-10-CM

## 2023-12-20 DIAGNOSIS — E55.9 VITAMIN D DEFICIENCY: ICD-10-CM

## 2023-12-20 LAB
ALBUMIN SERPL BCG-MCNC: 4.2 G/DL (ref 3.5–5.2)
ALP SERPL-CCNC: 47 U/L (ref 40–150)
ALT SERPL W P-5'-P-CCNC: 16 U/L (ref 0–50)
ANION GAP SERPL CALCULATED.3IONS-SCNC: 10 MMOL/L (ref 7–15)
AST SERPL W P-5'-P-CCNC: 20 U/L (ref 0–45)
BASOPHILS # BLD AUTO: 0 10E3/UL (ref 0–0.2)
BASOPHILS NFR BLD AUTO: 1 %
BILIRUB SERPL-MCNC: 0.6 MG/DL
BUN SERPL-MCNC: 30.3 MG/DL (ref 8–23)
CALCIUM SERPL-MCNC: 9.7 MG/DL (ref 8.8–10.2)
CHLORIDE SERPL-SCNC: 103 MMOL/L (ref 98–107)
CREAT SERPL-MCNC: 1.37 MG/DL (ref 0.51–0.95)
DEPRECATED HCO3 PLAS-SCNC: 27 MMOL/L (ref 22–29)
EGFRCR SERPLBLD CKD-EPI 2021: 39 ML/MIN/1.73M2
EOSINOPHIL # BLD AUTO: 0.1 10E3/UL (ref 0–0.7)
EOSINOPHIL NFR BLD AUTO: 2 %
ERYTHROCYTE [DISTWIDTH] IN BLOOD BY AUTOMATED COUNT: 12.2 % (ref 10–15)
GLUCOSE SERPL-MCNC: 103 MG/DL (ref 70–99)
HCT VFR BLD AUTO: 38.2 % (ref 35–47)
HGB BLD-MCNC: 12.6 G/DL (ref 11.7–15.7)
IMM GRANULOCYTES # BLD: 0 10E3/UL
IMM GRANULOCYTES NFR BLD: 0 %
LYMPHOCYTES # BLD AUTO: 1.7 10E3/UL (ref 0.8–5.3)
LYMPHOCYTES NFR BLD AUTO: 29 %
MCH RBC QN AUTO: 30.7 PG (ref 26.5–33)
MCHC RBC AUTO-ENTMCNC: 33 G/DL (ref 31.5–36.5)
MCV RBC AUTO: 93 FL (ref 78–100)
MONOCYTES # BLD AUTO: 0.6 10E3/UL (ref 0–1.3)
MONOCYTES NFR BLD AUTO: 10 %
NEUTROPHILS # BLD AUTO: 3.4 10E3/UL (ref 1.6–8.3)
NEUTROPHILS NFR BLD AUTO: 58 %
PLATELET # BLD AUTO: 215 10E3/UL (ref 150–450)
POTASSIUM SERPL-SCNC: 4.5 MMOL/L (ref 3.4–5.3)
PROT SERPL-MCNC: 6.8 G/DL (ref 6.4–8.3)
RBC # BLD AUTO: 4.1 10E6/UL (ref 3.8–5.2)
SODIUM SERPL-SCNC: 140 MMOL/L (ref 135–145)
T3 SERPL-MCNC: 93 NG/DL (ref 85–202)
T4 FREE SERPL-MCNC: 1.69 NG/DL (ref 0.9–1.7)
TSH SERPL DL<=0.005 MIU/L-ACNC: 1.59 UIU/ML (ref 0.3–4.2)
WBC # BLD AUTO: 5.8 10E3/UL (ref 4–11)

## 2023-12-20 PROCEDURE — 82306 VITAMIN D 25 HYDROXY: CPT

## 2023-12-20 PROCEDURE — 84439 ASSAY OF FREE THYROXINE: CPT

## 2023-12-20 PROCEDURE — 84443 ASSAY THYROID STIM HORMONE: CPT

## 2023-12-20 PROCEDURE — 84480 ASSAY TRIIODOTHYRONINE (T3): CPT

## 2023-12-20 PROCEDURE — 85025 COMPLETE CBC W/AUTO DIFF WBC: CPT

## 2023-12-20 PROCEDURE — 80053 COMPREHEN METABOLIC PANEL: CPT

## 2023-12-20 PROCEDURE — 36415 COLL VENOUS BLD VENIPUNCTURE: CPT

## 2023-12-30 LAB
DEPRECATED CALCIDIOL+CALCIFEROL SERPL-MC: <62 UG/L (ref 20–75)
VITAMIN D2 SERPL-MCNC: <5 UG/L
VITAMIN D3 SERPL-MCNC: 57 UG/L

## 2024-01-11 ENCOUNTER — OFFICE VISIT (OUTPATIENT)
Dept: FAMILY MEDICINE | Facility: CLINIC | Age: 84
End: 2024-01-11
Payer: COMMERCIAL

## 2024-01-11 VITALS
HEIGHT: 67 IN | HEART RATE: 74 BPM | WEIGHT: 159 LBS | OXYGEN SATURATION: 98 % | RESPIRATION RATE: 16 BRPM | DIASTOLIC BLOOD PRESSURE: 70 MMHG | SYSTOLIC BLOOD PRESSURE: 122 MMHG | BODY MASS INDEX: 24.96 KG/M2 | TEMPERATURE: 98 F

## 2024-01-11 DIAGNOSIS — E78.5 HYPERLIPIDEMIA LDL GOAL <130: ICD-10-CM

## 2024-01-11 DIAGNOSIS — N89.8 ITCHING IN THE VAGINAL AREA: ICD-10-CM

## 2024-01-11 DIAGNOSIS — R30.0 DYSURIA: ICD-10-CM

## 2024-01-11 DIAGNOSIS — Z23 NEED FOR VACCINATION AGAINST STREPTOCOCCUS PNEUMONIAE: ICD-10-CM

## 2024-01-11 DIAGNOSIS — E03.8 OTHER SPECIFIED HYPOTHYROIDISM: ICD-10-CM

## 2024-01-11 DIAGNOSIS — L29.2 VULVAR ITCHING: ICD-10-CM

## 2024-01-11 DIAGNOSIS — Z29.11 NEED FOR VACCINATION AGAINST RESPIRATORY SYNCYTIAL VIRUS: ICD-10-CM

## 2024-01-11 DIAGNOSIS — Z63.6 CAREGIVER STRESS: ICD-10-CM

## 2024-01-11 DIAGNOSIS — N39.46 MIXED STRESS AND URGE URINARY INCONTINENCE: ICD-10-CM

## 2024-01-11 DIAGNOSIS — I10 ESSENTIAL HYPERTENSION WITH GOAL BLOOD PRESSURE LESS THAN 140/90: ICD-10-CM

## 2024-01-11 DIAGNOSIS — N18.32 STAGE 3B CHRONIC KIDNEY DISEASE (H): Primary | ICD-10-CM

## 2024-01-11 PROCEDURE — 99215 OFFICE O/P EST HI 40 MIN: CPT | Performed by: FAMILY MEDICINE

## 2024-01-11 RX ORDER — OXYBUTYNIN CHLORIDE 10 MG/1
10 TABLET, EXTENDED RELEASE ORAL 2 TIMES DAILY
Qty: 180 TABLET | Refills: 3 | Status: SHIPPED | OUTPATIENT
Start: 2024-01-11

## 2024-01-11 RX ORDER — RESPIRATORY SYNCYTIAL VIRUS VACCINE 120MCG/0.5
0.5 KIT INTRAMUSCULAR ONCE
Qty: 1 EACH | Refills: 0 | Status: SHIPPED | OUTPATIENT
Start: 2024-01-11 | End: 2024-01-11

## 2024-01-11 RX ORDER — CLOBETASOL PROPIONATE 0.5 MG/G
OINTMENT TOPICAL
Qty: 60 G | Refills: 1 | Status: SHIPPED | OUTPATIENT
Start: 2024-01-11

## 2024-01-11 RX ORDER — METOPROLOL SUCCINATE 50 MG/1
TABLET, EXTENDED RELEASE ORAL
Qty: 90 TABLET | Refills: 3 | Status: SHIPPED | OUTPATIENT
Start: 2024-01-11

## 2024-01-11 RX ORDER — ROSUVASTATIN CALCIUM 20 MG/1
20 TABLET, COATED ORAL DAILY
Qty: 90 TABLET | Refills: 3 | Status: SHIPPED | OUTPATIENT
Start: 2024-01-11

## 2024-01-11 RX ORDER — LEVOTHYROXINE SODIUM 88 UG/1
TABLET ORAL
Qty: 90 TABLET | Refills: 3 | Status: SHIPPED | OUTPATIENT
Start: 2024-01-11

## 2024-01-11 RX ORDER — TRIAMTERENE/HYDROCHLOROTHIAZID 37.5-25 MG
TABLET ORAL
Qty: 90 TABLET | Refills: 3 | Status: SHIPPED | OUTPATIENT
Start: 2024-01-11

## 2024-01-11 RX ORDER — PNEUMOCOCCAL 20-VALENT CONJUGATE VACCINE 2.2; 2.2; 2.2; 2.2; 2.2; 2.2; 2.2; 2.2; 2.2; 2.2; 2.2; 2.2; 2.2; 2.2; 2.2; 2.2; 4.4; 2.2; 2.2; 2.2 UG/.5ML; UG/.5ML; UG/.5ML; UG/.5ML; UG/.5ML; UG/.5ML; UG/.5ML; UG/.5ML; UG/.5ML; UG/.5ML; UG/.5ML; UG/.5ML; UG/.5ML; UG/.5ML; UG/.5ML; UG/.5ML; UG/.5ML; UG/.5ML; UG/.5ML; UG/.5ML
0.5 INJECTION, SUSPENSION INTRAMUSCULAR ONCE
Qty: 0.5 ML | Refills: 0 | Status: SHIPPED | OUTPATIENT
Start: 2024-01-11 | End: 2024-01-11

## 2024-01-11 RX ORDER — LISINOPRIL 30 MG/1
TABLET ORAL
Qty: 90 TABLET | Refills: 3 | Status: SHIPPED | OUTPATIENT
Start: 2024-01-11

## 2024-01-11 SDOH — SOCIAL STABILITY - SOCIAL INSECURITY: DEPENDENT RELATIVE NEEDING CARE AT HOME: Z63.6

## 2024-01-11 NOTE — PATIENT INSTRUCTIONS
" [facility]  [address] [place] [address]  Office: [phone]   Fax:    [phone]     Ask Dr. Mahan for a care coordination referral for your  [facility] since [facility] is a .       Return in about 5 months (around 6/11/2024) for Wellness/Preventative Visit, blood pressure, cholesterol, thyroid, w/ Dr Simmons.     Thank you so much or choosing [facility]  for your Health Care. It was a pleasure seeing you at your visit today! Please contact us with any questions or concerns you may have.                   Lisbet Simmons MD                              To reach your [facility] care team after hours call:   [phone] press #2 \"to speak with your care team\".  This will get you to our clinic instead of routing to central [facility]  scheduling.     PLEASE NOTE OUR HOURS HAVE CHANGED secondary to COVID-19 coronavirus pandemic, as we are trying to minimize patient exposure to the virus,  which is now widespread in the Critical access hospital.  These hours may change with very little notice.  We apologize for any inconvenience.       Our current clinic hours are:          Monday- Thursday   7:00am - 6:00pm  in person.      Friday  7:00am- 5:00pm                       Saturday and Sunday : Closed to in person and virtual visits        We have telephone and virtual visit times available between    7:00am - 6pm on Monday-Friday as well.                                                Phone:  [phone]      Our pharmacy hours: Monday through Friday 8:00am to 5:00pm                        Saturday - 9:00 am to 12 noon       Sunday : Closed.              Phone:  [phone]              ###  Please note: at this time we are not accepting any walk-in visits. ###      There is also information available at our web site:  [email or web address]    If your provider ordered any lab tests and you " do not receive the results within 10 business days, please call the clinic.    If you need a medication refill please contact your pharmacy.  Please allow 3 business days for your refill to be completed.    Our clinic offers telephone visits and e visits.  Please ask one of your team members to explain more.      Use [email or web address] (secure email communication and access to your chart) to send your primary care provider a message or make an appointment. Ask someone on your Team how to sign up for [email or web address].

## 2024-01-11 NOTE — PROGRESS NOTES
Assessment & Plan       ICD-10-CM    1. Stage 3b chronic kidney disease (H)-reviewed labs from 12/20/2023 - latest ones - improved from previous.  N18.32 Albumin Random Urine Quantitative with Creat Ratio     lisinopril (ZESTRIL) 30 MG tablet      2. Need for vaccination against respiratory syncytial virus  Z29.11 respiratory syncytial virus vaccine, bivalent (ABRYSVO) injection      3. Itching in the vaginal area- using clobetasol twice weekly - needs refills  N89.8 clobetasol (TEMOVATE) 0.05 % external ointment      4. Caregiver stress-  with severe dementia - cannot be left alone  Z63.6       5. Vulvar itching - ? early lichen sclerosis vs. other - superior vulva  L29.2 clobetasol (TEMOVATE) 0.05 % external ointment      6. Other specified hypothyroidism -- needs lab follow up and refills today   E03.8 levothyroxine (SYNTHROID/LEVOTHROID) 88 MCG tablet      7. Essential hypertension with goal blood pressure less than 140/90- reviewed  lab follow up and refills today  I10 lisinopril (ZESTRIL) 30 MG tablet     metoprolol succinate ER (TOPROL XL) 50 MG 24 hr tablet     triamterene-HCTZ (MAXZIDE-25) 37.5-25 MG tablet      8. Dysuria- recurrent with urgency and frequency - not always infection - seeing Dr. Karyn Luevano, [facility] - has some urge incontinence as well - regularly- needs to make appt    R30.0 oxyBUTYnin ER (DITROPAN XL) 10 MG 24 hr tablet      9. Mixed stress and urge urinary incontinence- oxybutynin 10mg daily has improved symptoms significantly   N39.46 oxyBUTYnin ER (DITROPAN XL) 10 MG 24 hr tablet      10. Hyperlipidemia LDL goal <130 - needs lab follow up in 6/2024  and refills today - reviewed previous labs today  E78.5 rosuvastatin (CRESTOR) 20 MG tablet      11. Need for vaccination against Streptococcus pneumoniae  Z23 pneumococcal (PREVNAR 20) 0.5 ML MUNDO injection        Ok to recheck fasting lipids in 6/2024 .   Return in about 5 months (around 6/11/2024) for  Wellness/Preventative Visit, blood pressure, cholesterol, thyroid, w/ Dr Simmons.     Future Appointments 1/11/2024 - 7/9/2024        Date Visit Type Length Department Provider     6/11/2024  8:40 AM ANNUAL WELLNESS 40 min  FAMILY PRACTICE Lisbet Simmons MD    Location Instructions:     [facility] is located at [address] [place] [address], along [address] Free parking is available; access the lot by turning north from [address] onto [facility] [address] [address], then west onto [facility] [address] [facility] [address].                    45 minutes face to face time today.    Ordering of each unique test  Prescription drug management  48 minutes spent by me on the date of the encounter doing chart review, history and exam, documentation and further activities per the note       MEDICATIONS:  Continue current medications without change  Regular exercise  See Patient Instructions         Lisbet Simmons MD  [facility] PRIOR ANNI Valdovinos is a 83 year old, presenting for the following health issues:   1. Stage 3b chronic kidney disease (H)    2. Need for vaccination against respiratory syncytial virus    3. Vulvar itching - ? early lichen sclerosis vs. other - superior vulva    4. Itching in the vaginal area- using clobetasol twice weekly     5. Caregiver stress-  with severe dementia - cannot be left alone    6. Other specified hypothyroidism -- needs lab follow up and refills today     7. Essential hypertension with goal blood pressure less than 140/90- needs lab follow up and refills today     8. Dysuria- recurrent with urgency and frequency - not always infection - seeing Dr. Karyn Luevano, [facility] - has some urge incontinence as well - regularly- needs to make appt      9. Mixed stress and urge urinary incontinence- oxybutynin 10mg daily has improved symptoms significantly     10. Hyperlipidemia LDL goal <130 - needs lab follow up and refills today     11. Need  for vaccination against Streptococcus pneumoniae        Recheck Medication      1/11/2024    12:39 PM   Additional Questions   Roomed by nichol rush       History of Present Illness       Hyperlipidemia:  She presents for follow up of hyperlipidemia.   She is taking medication to lower cholesterol. She is not having myalgia or other side effects to statin medications.    Hypertension: She presents for follow up of hypertension.  She does not check blood pressure  regularly outside of the clinic. Outpatient blood pressures have not been over 140/90. She does not follow a low salt diet.     Hypothyroidism:     Since last visit, patient describes the following symptoms::  None    She eats 2-3 servings of fruits and vegetables daily.She consumes 1 sweetened beverage(s) daily.She exercises with enough effort to increase her heart rate 9 or less minutes per day.  She exercises with enough effort to increase her heart rate 3 or less days per week.   She is taking medications regularly.     Under a lot of stress -    Caregiver stress- , Jose Maria,  with severe dementia - cannot be left alone . Son with him right now    Discussed Asking  Dr. Mahan , Jose Maria's PCP, for a care coordination referral for your  [facility] since [facility] is a  and they have excellent memory care home care and facility services in our area. My assistant will send message to Dr. Mahan re: this.     Both of them had COVID-19 in 11/2023 . Recovered well.  Husb had hiccups for a week straight.   No shortness of breath , no residual cough - pt doing well.     Will get CTC - consent to communicate -  for pt for her son, Nemesio Santos, and Daughter, Nichol Sotomayor. Removed  Jose Maria from pt's emergency contact list today per her request.     Getting massages to help with her back pain - left low back pain with sciatica. Hasn't been doing her back stretches - discussed doing them when she is able one at a time and doing deep  breathing when she is doing that.  She is doing some    has been falling a bit.  He's 200+lbs.  Son, Nemesio , lives with them.     Lab Results   Component Value Date    TSH 1.59 12/20/2023    TSH 2.05 06/06/2022    TSH 2.03 06/04/2021      T4 Free   Date Value Ref Range Status   06/04/2021 1.17 0.76 - 1.46 ng/dL Final   12/04/2020 1.26 0.76 - 1.46 ng/dL Final   05/21/2020 1.30 0.76 - 1.46 ng/dL Final   11/29/2019 1.19 0.76 - 1.46 ng/dL Final   09/06/2019 1.21 0.76 - 1.46 ng/dL Final     Free T4   Date Value Ref Range Status   12/20/2023 1.69 0.90 - 1.70 ng/dL Final   06/02/2023 1.76 (H) 0.90 - 1.70 ng/dL Final   12/05/2022 1.61 0.90 - 1.70 ng/dL Final   06/06/2022 1.31 0.76 - 1.46 ng/dL Final   12/06/2021 1.26 0.76 - 1.46 ng/dL Final      Creatinine   Date Value Ref Range Status   12/20/2023 1.37 (H) 0.51 - 0.95 mg/dL Final   05/30/2023 1.49 (H) 0.51 - 0.95 mg/dL Final   12/12/2022 1.42 (H) 0.51 - 0.95 mg/dL Final   12/05/2022 1.21 (H) 0.51 - 0.95 mg/dL Final   06/06/2022 1.14 (H) 0.52 - 1.04 mg/dL Final       Recent Labs   Lab Test 06/02/23  1012 12/05/22  0931   CHOL 203* 274*   HDL 59 56   * 179*   TRIG 164* 193*      AST   Date Value Ref Range Status   12/20/2023 20 0 - 45 U/L Final     Comment:     Reference intervals for this test were updated on 6/12/2023 to more accurately reflect our healthy population. There may be differences in the flagging of prior results with similar values performed with this method. Interpretation of those prior results can be made in the context of the updated reference intervals.   06/04/2021 17 0 - 45 U/L Final      Lab Results   Component Value Date    ALT 16 12/20/2023    ALT 24 06/04/2021     Patient Active Problem List   Diagnosis    Hematuria    Dyspnea and respiratory abnormality    Hyperlipidemia LDL goal <130    Advanced directives, counseling/discussion    Essential hypertension with goal blood pressure less than 140/90- well controlled    Family history  of colon cancer- mother in her 40's-colonoscopies every 5 years- next one     Osteopenia, unspecified location - was on fosamax, then Boniva secondary to hot flashes - off boniva since     Vulvar itching - ? early lichen sclerosis vs. 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 Hansen - [facility] q6months     Myalgia of pelvic floor    Vaginal vault prolapse    Personal history of urinary tract infection    Dysuria- recurrent with urgency and frequency - not always infection - seeing Dr. Karyn Luevano [facility]     Rectal prolapse    Sebaceous cyst- left medial upper breast     Dupuytren's contracture of both hands - s/p surgery on the left hand 3rd and 4th MCP joint areas in 2021     Vitamin D deficiency    Grief- crying quite often - son  @ 53 on 3/24/2021with pt by his side - 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 - was  on warfarin - never  - lived  with pt and     Asymptomatic menopausal state    Right buttock pain- hx of sciatica in the past - not radiating further right now - no new bowel or bladder control problems     Mixed stress and urge urinary incontinence- oxybutynin 10mg daily has improved symptoms significantly        Current Outpatient Medications   Medication Sig Dispense Refill    CALCIUM /VITAMIN D TABS   OR Take 1 tablet by mouth daily      clobetasol (TEMOVATE) 0.05 % external ointment APPLY SPARINGLY TO AFFECTED AREA OF GENITALS TWICE WEEKLY AT NIGHTTIME 60 g 1    levobunolol (BETAGAN) 0.5 % ophthalmic solution Place 1 drop into both eyes daily      levothyroxine (SYNTHROID/LEVOTHROID) 88 MCG tablet TAKE 1 TABLET EVERY MORNING 90  "tablet 3    lisinopril (ZESTRIL) 30 MG tablet TAKE 1 TABLET DAILY 90 tablet 3    metoprolol succinate ER (TOPROL XL) 50 MG 24 hr tablet TAKE 1 TABLET DAILY 90 tablet 3    Multiple Vitamins-Minerals (PRESERVISION AREDS 2) CAPS Take 1 capsule by mouth daily      oxyBUTYnin ER (DITROPAN XL) 10 MG 24 hr tablet Take 1 tablet (10 mg) by mouth 2 times daily For bladder urgency 180 tablet 3    pneumococcal (PREVNAR 20) 0.5 ML MUNDO injection Inject 0.5 mLs into the muscle once for 1 dose 0.5 mL 0    respiratory syncytial virus vaccine, bivalent (ABRYSVO) injection Inject 0.5 mLs into the muscle once for 1 dose 1 each 0    rosuvastatin (CRESTOR) 20 MG tablet Take 1 tablet (20 mg) by mouth daily 90 tablet 3    triamterene-HCTZ (MAXZIDE-25) 37.5-25 MG tablet TAKE 1 TABLET DAILY 90 tablet 3          Allergies   Allergen Reactions    Ciprofloxacin GI Disturbance     Bad stomach pain     Nitrofurantoin GI Disturbance     Other reaction(s): Stomach upset, Stomach upset    Prednisone     Prednisone      Other reaction(s): flushing    Sulfa Antibiotics Rash     Other reaction(s): rash              Review of Systems   Constitutional, HEENT, cardiovascular, pulmonary, GI, , musculoskeletal, neuro, skin, endocrine and psych systems are negative, except as otherwise noted.      Objective    /70   Pulse 74   Temp 98  F (36.7  C)   Resp 16   Ht 1.702 m (5' 7\")   Wt 72.1 kg (159 lb)   SpO2 98%   BMI 24.90 kg/m    Body mass index is 24.9 kg/m .  Physical Exam   GENERAL: healthy, alert and no distress  EYES: Eyes grossly normal to inspection, PERRL and conjunctivae and sclerae normal  HENT: ear canals and TM's normal, nose and mouth without ulcers or lesions  NECK: no adenopathy, no asymmetry, masses, or scars and thyroid normal to palpation  RESP: lungs clear to auscultation - no rales, rhonchi or wheezes  CV: regular rate and rhythm, normal S1 S2, no S3 or S4, no murmur, click or rub, no peripheral edema and peripheral pulses " strong  ABDOMEN: soft, nontender, no hepatosplenomegaly, no masses and bowel sounds normal  MS: no gross musculoskeletal defects noted, no edema  SKIN: no suspicious lesions or rashes  NEURO: Normal strength and tone, mentation intact and speech normal  PSYCH: mentation appears normal, affect normal/bright    Lab on 12/20/2023   Component Date Value Ref Range Status    TSH 12/20/2023 1.59  0.30 - 4.20 uIU/mL Final    Free T4 12/20/2023 1.69  0.90 - 1.70 ng/dL Final    T3 Total 12/20/2023 93  85 - 202 ng/dL Final    25 OH Vitamin D2 12/20/2023 <5  ug/L Final    25 OH Vitamin D3 12/20/2023 57  ug/L Final    25 OH Vit D Total 12/20/2023 <62  20 - 75 ug/L Final    Season, race, dietary intake, and treatment affect the concentration of 25-hydroxy-Vitamin D. Values may decrease during winter months and increase during summer months. Values 20-29 ug/L may indicate Vitamin D insufficiency and values <20 ug/L may indicate Vitamin D deficiency.    Sodium 12/20/2023 140  135 - 145 mmol/L Final    Reference intervals for this test were updated on 09/26/2023 to more accurately reflect our healthy population. There may be differences in the flagging of prior results with similar values performed with this method. Interpretation of those prior results can be made in the context of the updated reference intervals.     Potassium 12/20/2023 4.5  3.4 - 5.3 mmol/L Final    Carbon Dioxide (CO2) 12/20/2023 27  22 - 29 mmol/L Final    Anion Gap 12/20/2023 10  7 - 15 mmol/L Final    Urea Nitrogen 12/20/2023 30.3 (H)  8.0 - 23.0 mg/dL Final    Creatinine 12/20/2023 1.37 (H)  0.51 - 0.95 mg/dL Final    GFR Estimate 12/20/2023 39 (L)  >60 mL/min/1.73m2 Final    Calcium 12/20/2023 9.7  8.8 - 10.2 mg/dL Final    Chloride 12/20/2023 103  98 - 107 mmol/L Final    Glucose 12/20/2023 103 (H)  70 - 99 mg/dL Final    Alkaline Phosphatase 12/20/2023 47  40 - 150 U/L Final    Reference intervals for this test were updated on 11/14/2023 to more  accurately reflect our healthy population. There may be differences in the flagging of prior results with similar values performed with this method. Interpretation of those prior results can be made in the context of the updated reference intervals.    AST 12/20/2023 20  0 - 45 U/L Final    Reference intervals for this test were updated on 6/12/2023 to more accurately reflect our healthy population. There may be differences in the flagging of prior results with similar values performed with this method. Interpretation of those prior results can be made in the context of the updated reference intervals.    ALT 12/20/2023 16  0 - 50 U/L Final    Reference intervals for this test were updated on 6/12/2023 to more accurately reflect our healthy population. There may be differences in the flagging of prior results with similar values performed with this method. Interpretation of those prior results can be made in the context of the updated reference intervals.      Protein Total 12/20/2023 6.8  6.4 - 8.3 g/dL Final    Albumin 12/20/2023 4.2  3.5 - 5.2 g/dL Final    Bilirubin Total 12/20/2023 0.6  <=1.2 mg/dL Final    WBC Count 12/20/2023 5.8  4.0 - 11.0 10e3/uL Final    RBC Count 12/20/2023 4.10  3.80 - 5.20 10e6/uL Final    Hemoglobin 12/20/2023 12.6  11.7 - 15.7 g/dL Final    Hematocrit 12/20/2023 38.2  35.0 - 47.0 % Final    MCV 12/20/2023 93  78 - 100 fL Final    MCH 12/20/2023 30.7  26.5 - 33.0 pg Final    MCHC 12/20/2023 33.0  31.5 - 36.5 g/dL Final    RDW 12/20/2023 12.2  10.0 - 15.0 % Final    Platelet Count 12/20/2023 215  150 - 450 10e3/uL Final    % Neutrophils 12/20/2023 58  % Final    % Lymphocytes 12/20/2023 29  % Final    % Monocytes 12/20/2023 10  % Final    % Eosinophils 12/20/2023 2  % Final    % Basophils 12/20/2023 1  % Final    % Immature Granulocytes 12/20/2023 0  % Final    Absolute Neutrophils 12/20/2023 3.4  1.6 - 8.3 10e3/uL Final    Absolute Lymphocytes 12/20/2023 1.7  0.8 - 5.3 10e3/uL Final     Absolute Monocytes 12/20/2023 0.6  0.0 - 1.3 10e3/uL Final    Absolute Eosinophils 12/20/2023 0.1  0.0 - 0.7 10e3/uL Final    Absolute Basophils 12/20/2023 0.0  0.0 - 0.2 10e3/uL Final    Absolute Immature Granulocytes 12/20/2023 0.0  <=0.4 10e3/uL Final       Discussed the above in detail today with patient.

## 2024-05-01 ENCOUNTER — TRANSFERRED RECORDS (OUTPATIENT)
Dept: HEALTH INFORMATION MANAGEMENT | Facility: CLINIC | Age: 84
End: 2024-05-01
Payer: COMMERCIAL

## 2024-08-11 ENCOUNTER — HEALTH MAINTENANCE LETTER (OUTPATIENT)
Age: 84
End: 2024-08-11

## 2024-08-29 ENCOUNTER — OFFICE VISIT (OUTPATIENT)
Dept: FAMILY MEDICINE | Facility: CLINIC | Age: 84
End: 2024-08-29
Payer: COMMERCIAL

## 2024-08-29 VITALS
WEIGHT: 159.9 LBS | HEART RATE: 68 BPM | RESPIRATION RATE: 18 BRPM | OXYGEN SATURATION: 98 % | BODY MASS INDEX: 25.1 KG/M2 | TEMPERATURE: 97.9 F | HEIGHT: 67 IN

## 2024-08-29 DIAGNOSIS — Z12.11 SCREEN FOR COLON CANCER: ICD-10-CM

## 2024-08-29 DIAGNOSIS — C44.90 PRIMARY ADNEXAL CARCINOMA OF SKIN: ICD-10-CM

## 2024-08-29 DIAGNOSIS — R31.1 BENIGN ESSENTIAL MICROSCOPIC HEMATURIA: ICD-10-CM

## 2024-08-29 DIAGNOSIS — E03.8 OTHER SPECIFIED HYPOTHYROIDISM: ICD-10-CM

## 2024-08-29 DIAGNOSIS — D12.6 TUBULAR ADENOMA OF COLON: ICD-10-CM

## 2024-08-29 DIAGNOSIS — Q52.4 VAGINAL ANOMALY: ICD-10-CM

## 2024-08-29 DIAGNOSIS — N18.32 STAGE 3B CHRONIC KIDNEY DISEASE (H): ICD-10-CM

## 2024-08-29 DIAGNOSIS — Z00.00 ENCOUNTER FOR MEDICARE ANNUAL WELLNESS EXAM: Primary | ICD-10-CM

## 2024-08-29 DIAGNOSIS — E78.5 HYPERLIPIDEMIA LDL GOAL <130: ICD-10-CM

## 2024-08-29 DIAGNOSIS — Z12.31 VISIT FOR SCREENING MAMMOGRAM: ICD-10-CM

## 2024-08-29 DIAGNOSIS — Z29.11 NEED FOR VACCINATION AGAINST RESPIRATORY SYNCYTIAL VIRUS: ICD-10-CM

## 2024-08-29 DIAGNOSIS — N18.32 CKD STAGE 3B, GFR 30-44 ML/MIN (H): ICD-10-CM

## 2024-08-29 DIAGNOSIS — L29.2 VULVAR ITCHING: ICD-10-CM

## 2024-08-29 DIAGNOSIS — N39.46 MIXED STRESS AND URGE URINARY INCONTINENCE: ICD-10-CM

## 2024-08-29 DIAGNOSIS — N90.7 LABIAL CYST: ICD-10-CM

## 2024-08-29 DIAGNOSIS — D48.5 NEOPLASM OF UNCERTAIN BEHAVIOR OF SKIN: ICD-10-CM

## 2024-08-29 DIAGNOSIS — B37.31 YEAST INFECTION OF THE VAGINA: ICD-10-CM

## 2024-08-29 DIAGNOSIS — Z80.0 FAMILY HISTORY OF COLON CANCER: ICD-10-CM

## 2024-08-29 DIAGNOSIS — I10 ESSENTIAL HYPERTENSION WITH GOAL BLOOD PRESSURE LESS THAN 140/90: ICD-10-CM

## 2024-08-29 DIAGNOSIS — E55.9 VITAMIN D DEFICIENCY: ICD-10-CM

## 2024-08-29 PROBLEM — Z63.6 CAREGIVER STRESS: Status: RESOLVED | Noted: 2024-01-11 | Resolved: 2024-08-29

## 2024-08-29 LAB
ALBUMIN SERPL BCG-MCNC: 4.4 G/DL (ref 3.5–5.2)
ALP SERPL-CCNC: 47 U/L (ref 40–150)
ALT SERPL W P-5'-P-CCNC: 17 U/L (ref 0–50)
ANION GAP SERPL CALCULATED.3IONS-SCNC: 9 MMOL/L (ref 7–15)
AST SERPL W P-5'-P-CCNC: 25 U/L (ref 0–45)
BILIRUB SERPL-MCNC: 0.7 MG/DL
BUN SERPL-MCNC: 25.1 MG/DL (ref 8–23)
CALCIUM SERPL-MCNC: 9.7 MG/DL (ref 8.8–10.4)
CHLORIDE SERPL-SCNC: 101 MMOL/L (ref 98–107)
CHOLEST SERPL-MCNC: 223 MG/DL
CREAT SERPL-MCNC: 1.31 MG/DL (ref 0.51–0.95)
CREAT UR-MCNC: 59.7 MG/DL
EGFRCR SERPLBLD CKD-EPI 2021: 41 ML/MIN/1.73M2
ERYTHROCYTE [DISTWIDTH] IN BLOOD BY AUTOMATED COUNT: 11.9 % (ref 10–15)
FASTING STATUS PATIENT QL REPORTED: YES
FASTING STATUS PATIENT QL REPORTED: YES
GLUCOSE SERPL-MCNC: 105 MG/DL (ref 70–99)
HCO3 SERPL-SCNC: 27 MMOL/L (ref 22–29)
HCT VFR BLD AUTO: 41.2 % (ref 35–47)
HDLC SERPL-MCNC: 52 MG/DL
HGB BLD-MCNC: 13.8 G/DL (ref 11.7–15.7)
LDLC SERPL CALC-MCNC: 118 MG/DL
MCH RBC QN AUTO: 31.2 PG (ref 26.5–33)
MCHC RBC AUTO-ENTMCNC: 33.5 G/DL (ref 31.5–36.5)
MCV RBC AUTO: 93 FL (ref 78–100)
MICROALBUMIN UR-MCNC: <12 MG/L
MICROALBUMIN/CREAT UR: NORMAL MG/G{CREAT}
NONHDLC SERPL-MCNC: 171 MG/DL
PLATELET # BLD AUTO: 204 10E3/UL (ref 150–450)
POTASSIUM SERPL-SCNC: 4.7 MMOL/L (ref 3.4–5.3)
PROT SERPL-MCNC: 7.2 G/DL (ref 6.4–8.3)
RBC # BLD AUTO: 4.43 10E6/UL (ref 3.8–5.2)
SODIUM SERPL-SCNC: 137 MMOL/L (ref 135–145)
T3 SERPL-MCNC: 99 NG/DL (ref 85–202)
T4 FREE SERPL-MCNC: 1.75 NG/DL (ref 0.9–1.7)
TRIGL SERPL-MCNC: 267 MG/DL
TSH SERPL DL<=0.005 MIU/L-ACNC: 2.97 UIU/ML (ref 0.3–4.2)
WBC # BLD AUTO: 6.9 10E3/UL (ref 4–11)

## 2024-08-29 PROCEDURE — 84443 ASSAY THYROID STIM HORMONE: CPT | Performed by: FAMILY MEDICINE

## 2024-08-29 PROCEDURE — 84439 ASSAY OF FREE THYROXINE: CPT | Performed by: FAMILY MEDICINE

## 2024-08-29 PROCEDURE — G0439 PPPS, SUBSEQ VISIT: HCPCS | Performed by: FAMILY MEDICINE

## 2024-08-29 PROCEDURE — 85027 COMPLETE CBC AUTOMATED: CPT | Performed by: FAMILY MEDICINE

## 2024-08-29 PROCEDURE — 82570 ASSAY OF URINE CREATININE: CPT | Performed by: FAMILY MEDICINE

## 2024-08-29 PROCEDURE — 84480 ASSAY TRIIODOTHYRONINE (T3): CPT | Performed by: FAMILY MEDICINE

## 2024-08-29 PROCEDURE — 36415 COLL VENOUS BLD VENIPUNCTURE: CPT | Performed by: FAMILY MEDICINE

## 2024-08-29 PROCEDURE — 80053 COMPREHEN METABOLIC PANEL: CPT | Performed by: FAMILY MEDICINE

## 2024-08-29 PROCEDURE — 80061 LIPID PANEL: CPT | Performed by: FAMILY MEDICINE

## 2024-08-29 PROCEDURE — 82306 VITAMIN D 25 HYDROXY: CPT | Performed by: FAMILY MEDICINE

## 2024-08-29 PROCEDURE — 82043 UR ALBUMIN QUANTITATIVE: CPT | Performed by: FAMILY MEDICINE

## 2024-08-29 PROCEDURE — 99215 OFFICE O/P EST HI 40 MIN: CPT | Mod: 25 | Performed by: FAMILY MEDICINE

## 2024-08-29 SDOH — HEALTH STABILITY: PHYSICAL HEALTH: ON AVERAGE, HOW MANY DAYS PER WEEK DO YOU ENGAGE IN MODERATE TO STRENUOUS EXERCISE (LIKE A BRISK WALK)?: 3 DAYS

## 2024-08-29 ASSESSMENT — ANXIETY QUESTIONNAIRES
4. TROUBLE RELAXING: NOT AT ALL
5. BEING SO RESTLESS THAT IT IS HARD TO SIT STILL: NOT AT ALL
7. FEELING AFRAID AS IF SOMETHING AWFUL MIGHT HAPPEN: NOT AT ALL
1. FEELING NERVOUS, ANXIOUS, OR ON EDGE: NOT AT ALL
2. NOT BEING ABLE TO STOP OR CONTROL WORRYING: NOT AT ALL
7. FEELING AFRAID AS IF SOMETHING AWFUL MIGHT HAPPEN: NOT AT ALL
GAD7 TOTAL SCORE: 0
6. BECOMING EASILY ANNOYED OR IRRITABLE: NOT AT ALL
GAD7 TOTAL SCORE: 0
GAD7 TOTAL SCORE: 0
3. WORRYING TOO MUCH ABOUT DIFFERENT THINGS: NOT AT ALL
8. IF YOU CHECKED OFF ANY PROBLEMS, HOW DIFFICULT HAVE THESE MADE IT FOR YOU TO DO YOUR WORK, TAKE CARE OF THINGS AT HOME, OR GET ALONG WITH OTHER PEOPLE?: NOT DIFFICULT AT ALL
IF YOU CHECKED OFF ANY PROBLEMS ON THIS QUESTIONNAIRE, HOW DIFFICULT HAVE THESE PROBLEMS MADE IT FOR YOU TO DO YOUR WORK, TAKE CARE OF THINGS AT HOME, OR GET ALONG WITH OTHER PEOPLE: NOT DIFFICULT AT ALL

## 2024-08-29 ASSESSMENT — PATIENT HEALTH QUESTIONNAIRE - PHQ9
10. IF YOU CHECKED OFF ANY PROBLEMS, HOW DIFFICULT HAVE THESE PROBLEMS MADE IT FOR YOU TO DO YOUR WORK, TAKE CARE OF THINGS AT HOME, OR GET ALONG WITH OTHER PEOPLE: NOT DIFFICULT AT ALL
SUM OF ALL RESPONSES TO PHQ QUESTIONS 1-9: 2
SUM OF ALL RESPONSES TO PHQ QUESTIONS 1-9: 2

## 2024-08-29 ASSESSMENT — SOCIAL DETERMINANTS OF HEALTH (SDOH): HOW OFTEN DO YOU GET TOGETHER WITH FRIENDS OR RELATIVES?: TWICE A WEEK

## 2024-08-29 NOTE — PROGRESS NOTES
Preventive Care Visit  [facility]  Lisbet Simmons MD, Family Medicine  Aug 29, 2024      Assessment & Plan :       ICD-10-CM    1. Encounter for Medicare annual wellness exam  Z00.00       2. Need for vaccination against respiratory syncytial virus - pt will get in pharmacy  Z29.11       3. Hyperlipidemia LDL goal <130  E78.5 Lipid panel reflex to direct LDL Fasting     Lipid panel reflex to direct LDL Fasting     Comprehensive metabolic panel     Albumin Random Urine Quantitative with Creat Ratio      4. CKD stage 3b, GFR 30-44 ml/min (H)  N18.32       5. Essential hypertension with goal blood pressure less than 140/90- well controlled  I10 CBC with platelets     Albumin Random Urine Quantitative with Creat Ratio      6. Other specified hypothyroidism  E03.8 TSH     T4 free     T3 total      7. Benign essential microscopic hematuria  R31.1 CBC with platelets     Albumin Random Urine Quantitative with Creat Ratio      8. Stage 3b chronic kidney disease (H)  N18.32       9. Vitamin D deficiency  E55.9 25 Hydroxyvitamin D2 and D3      10. Vulvar itching - ? lichen sclerosis  L29.2 Ob/Gyn  Referral      11. Neoplasm of uncertain behavior of skin- left upper abdomen  D48.5       12. Screen for colon cancer- last colonoscopy 6/2023 = 2 small adenomatous polyps - 3mm & 4mm  Z12.11       13. Family history of colon cancer- mother in her 40's-colonoscopies every 5 years- 6/2023= 2 small adenomatous polyps  Z80.0       14. Mixed stress and urge urinary incontinence- oxybutynin 10mg daily has improved symptoms significantly   N39.46       15. 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 Hansen - [facility] q6months   C44.90       16. Tubular adenomas of colon- 2014 - repeat in 5/2018 s/p skin ca = 2 more tubular adenomas - repeat in 6/2023 - 2 small tubular adenomas  D12.6       17. Visit for screening mammogram  Z12.31  "MA Screening Bilateral w/ [facility]      18. Labial cysts - multiple bilateral on inner labia  N90.7 Ob/Gyn  Referral      19. Vaginal anomaly- distal posterior vaginal mucosal pallor  Q52.4 Ob/Gyn  Referral      20. Yeast infection of the vagina/vulva ?  = vulvar redness  B37.31         Please, call our clinic or go to the ER immediately if signs or symptoms worsen or fail to improve as anticipated.     Fir possible  symptoms of vaginal yeast, try   Clotrimazole vaginal cream over the counter the 7 day treatment (generic for Monistat-7) - 1 applicator at bedtime  x 7 nights.       Return in about 6 months (around 2/28/2025) for cholesterol, blood pressure, thyroid, w/ Dr. BOLAND for 40 min appt.     Patient has been advised of split billing requirements and indicates understanding: Yes        BMI  Estimated body mass index is 25.04 kg/m  as calculated from the following:    Height as of this encounter: 1.702 m (5' 7\").    Weight as of this encounter: 72.5 kg (159 lb 14.4 oz).   Weight management plan: Discussed healthy diet and exercise guidelines    Counseling  Appropriate preventive services were addressed with this patient via screening, questionnaire, or discussion as appropriate for fall prevention, nutrition, physical activity, Tobacco-use cessation, social engagement, weight loss and cognition.  Checklist reviewing preventive services available has been given to the patient.  Reviewed patient's diet, addressing concerns and/or questions.   She is at risk for lack of exercise and has been provided with information to increase physical activity for the benefit of her well-being.   She is at risk for psychosocial distress and has been provided with information to reduce risk.   Information on urinary incontinence and treatment options given to patient.       MEDICATIONS:   No orders of the defined types were placed in this encounter.  Pt has enough refills till 1/11/2025.         - Continue other " medications without change  Work on weight loss  Regular exercise  See Patient Instructions    Face to face time 53 minutes.   54 minutes spent by me on the date of the encounter doing chart review, history and exam, documentation and further activities per the note .  42 minutes spent on nonpreventative care.          Lisbet Simmons MD      Williams Valdovinos is a 83 year old, presenting for the following:  Physical  and the following other medical problems:      1. Encounter for Medicare annual wellness exam    2. Need for vaccination against respiratory syncytial virus - pt will get in pharmacy    3. Hyperlipidemia LDL goal <130    4. CKD stage 3b, GFR 30-44 ml/min (H)    5. Essential hypertension with goal blood pressure less than 140/90- well controlled    6. Other specified hypothyroidism    7. Benign essential microscopic hematuria    8. Stage 3b chronic kidney disease (H)    9. Vitamin D deficiency    10. Vulvar itching - ? lichen sclerosis    11. Neoplasm of uncertain behavior of skin- left upper abdomen    12. Screen for colon cancer- last colonoscopy 6/2023 = 2 small adenomatous polyps - 3mm & 4mm    13. Family history of colon cancer- mother in her 40's-colonoscopies every 5 years- 6/2023= 2 small adenomatous polyps    14. Mixed stress and urge urinary incontinence- oxybutynin 10mg daily has improved symptoms significantly     15. 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 Hansen - [facility] q6months     16. Tubular adenomas of colon- 2014 - repeat in 5/2018 s/p skin ca = 2 more tubular adenomas - repeat in 6/2023 - 2 small tubular adenomas    17. Visit for screening mammogram    18. Labial cysts - multiple bilateral on inner labia    19. Vaginal anomaly- distal posterior vaginal mucosal pallor    20. Yeast infection of the vagina/vulva ?  = vulvar redness            8/29/2024     8:48 AM   Additional Questions   Roomed by Monica  VF   Accompanied by self   Health Care Directive  Patient has a Health Care Directive on file  Advance care planning document is on file but is outdated.  Patient encouraged to update.    HPI  , Jose Maria,  at age 88 in 3/2024 from complications of ? Lewy-Body dementia . Had hospice care at home for the last week or so. Very stressful for patient.   Still mourning.  Just had their wedding anniversary and his birthday this past month.  Her son, Ronen,  from complications from carcinoid in 3/2022, in a hospital bed in same spot in their dining room that her  did  Pt declines any medication or referral for counseling at this time. Sister , Zahida , with cancer as well - she's doing a little better.      Left low back pain with left lumbar radiculopathy - seeing [facility] for this. Has done 8 weeks of PT.  Just had xrays done with that.  Left leg pain bothers her quite a bit. Doing home exercises and tylenol 2 extra strength up to TID helps a bit.  Doesn't hurt with sitting or lying down.      Hyperlipidemia Follow-Up: is fasting today.     Are you regularly taking any medication or supplement to lower your cholesterol?   Yes- Rosuvastatin 20 mg  Are you having muscle aches or other side effects that you think could be caused by your cholesterol lowering medication?  Yes- back is sore goes down leg  Recent Labs   Lab Test 23  1012 22  0931   CHOL 203* 274*   HDL 59 56   * 179*   TRIG 164* 193*        Hypertension Follow-up:   BP Readings from Last 3 Encounters:   24 122/70   23 132/64   23 138/46       Do you check your blood pressure regularly outside of the clinic? No   Are you following a low salt diet? No  Are your blood pressures ever more than 140 on the top number (systolic) OR more   than 90 on the bottom number (diastolic), for example 140/90? No    CKD : stage 3b.  Avoiding NSAIDS.   Creatinine   Date Value Ref Range Status   2023 1.37 (H) 0.51 - 0.95 mg/dL  Final   05/30/2023 1.49 (H) 0.51 - 0.95 mg/dL Final   12/12/2022 1.42 (H) 0.51 - 0.95 mg/dL Final   12/05/2022 1.21 (H) 0.51 - 0.95 mg/dL Final   06/06/2022 1.14 (H) 0.52 - 1.04 mg/dL Final     GFR Estimate   Date Value Ref Range Status   12/20/2023 39 (L) >60 mL/min/1.73m2 Final   05/30/2023 35 (L) >60 mL/min/1.73m2 Final     Comment:     eGFR calculated using 2021 CKD-EPI equation.   12/12/2022 37 (L) >60 mL/min/1.73m2 Final     Comment:     Effective December 21, 2021 eGFRcr in adults is calculated using the 2021 CKD-EPI creatinine equation which includes age and gender (Danie et al., [place], DOI: 10.1056/GUNUxn7189808)   06/04/2021 36 (L) >60 mL/min/[1.73_m2] Final       Anxiety :   How are you doing with your anxiety since your last visit? Worsened slightly since 's death.   Are you having other symptoms that might be associated with anxiety? No  Have you had a significant life event? Grief or Loss   passed in March   Are you feeling depressed? Yes:     Do you have any concerns with your use of alcohol or other drugs? No    Social History     Tobacco Use    Smoking status: Never    Smokeless tobacco: Never   Vaping Use    Vaping status: Never Used   Substance Use Topics    Alcohol use: No    Drug use: No         12/6/2021     9:22 AM 6/2/2023     8:23 AM 8/29/2024     8:36 AM   RISA-7 SCORE   Total Score  0 (minimal anxiety) 0 (minimal anxiety)   Total Score 1 0 0         12/6/2021     9:22 AM 6/2/2023     8:22 AM 8/29/2024     8:35 AM   PHQ   PHQ-9 Total Score 4 1 2   Q9: Thoughts of better off dead/self-harm past 2 weeks Not at all Not at all Not at all         8/29/2024     8:35 AM   Last PHQ-9   1.  Little interest or pleasure in doing things 0   2.  Feeling down, depressed, or hopeless 1   3.  Trouble falling or staying asleep, or sleeping too much 1   4.  Feeling tired or having little energy 0   5.  Poor appetite or overeating 0   6.  Feeling bad about yourself 0   7.  Trouble concentrating 0    8.  Moving slowly or restless 0   Q9: Thoughts of better off dead/self-harm past 2 weeks 0   PHQ-9 Total Score 2         8/29/2024     8:36 AM   RISA-7    1. Feeling nervous, anxious, or on edge 0   2. Not being able to stop or control worrying 0   3. Worrying too much about different things 0   4. Trouble relaxing 0   5. Being so restless that it is hard to sit still 0   6. Becoming easily annoyed or irritable 0   7. Feeling afraid, as if something awful might happen 0   RISA-7 Total Score 0   If you checked any problems, how difficult have they made it for you to do your work, take care of things at home, or get along with other people? Not difficult at all     Hypothyroidism Follow-up    Since last visit, patient describes the following symptoms: Weight stable, no hair loss, no skin changes, no constipation, no loose stools  TSH   Date Value Ref Range Status   12/20/2023 1.59 0.30 - 4.20 uIU/mL Final   06/02/2023 2.10 0.30 - 4.20 uIU/mL Final   12/05/2022 2.48 0.30 - 4.20 uIU/mL Final   06/06/2022 2.05 0.40 - 4.00 mU/L Final   12/06/2021 1.77 0.40 - 4.00 mU/L Final   06/04/2021 2.03 0.40 - 4.00 mU/L Final   12/04/2020 2.26 0.40 - 4.00 mU/L Final   05/21/2020 1.90 0.40 - 4.00 mU/L Final   02/10/2020 2.46 0.40 - 4.00 mU/L Final   11/29/2019 1.86 0.40 - 4.00 mU/L Final          6/2/2023   General Health   How would you rate your overall physical health? Good            6/2/2023   Nutrition   At least 4 servings of fruits and vegetables/day Yes            6/2/2023   Exercise   Frequency of exercise: None            1/11/2024   Social Factors   Worry food won't last until get money to buy more No   Food not last or not have enough money for food? No   Do you have housing? (Housing is defined as stable permanent housing and does not include staying ouside in a car, in a tent, in an abandoned building, in an overnight shelter, or couch-surfing.) Yes   Are you worried about losing your housing? No   Lack of  transportation? No   Unable to get utilities (heat,electricity)? No            6/2/2023   Activities of Daily Living- Home Safety   Needs help with the following daily activites NO assistance is needed   Safety concerns in the home None of the above                           6/2/2023   Hearing Screening   Hearing concerns? No concerns                                Today's PHQ-2 Score:       8/29/2024     8:41 AM   PHQ-2 ( 1999 [employer])   Q1: Little interest or pleasure in doing things 0   Q2: Feeling down, depressed or hopeless 1   PHQ-2 Score 1   Q1: Little interest or pleasure in doing things Not at all   Q2: Feeling down, depressed or hopeless Several days   PHQ-2 Score 1         6/2/2023   Substance Use   Alcohol more than 3/day or more than 7/wk Not Applicable        Social History     Tobacco Use    Smoking status: Never    Smokeless tobacco: Never   Vaping Use    Vaping status: Never Used   Substance Use Topics    Alcohol use: No    Drug use: No         11/16/2023   LAST FHS-7 RESULTS   1st degree relative breast or ovarian cancer No   Any relative bilateral breast cancer No   Any male have breast cancer No   Any ONE woman have BOTH breast AND ovarian cancer No   Any woman with breast cancer before 50yrs No   2 or more relatives with breast AND/OR ovarian cancer No   2 or more relatives with breast AND/OR bowel cancer No    Mammogram Screening - After age 74- determine frequency with patient based on health status, life expectancy and patient goals- pt would like to continue with mammograms.       Reviewed and updated as needed this visit by Provider.                     Past Medical History:   Diagnosis Date    Acquired cyst of kidney     Adenocarcinoma (H)- of scalp in 2018 - no evidence of recurrence - sees Dermatology at least yearly      Caregiver stress-  with severe dementia - cannot be left alone- sonNemesio lives with them now - needs additional services - Jose Maria benites = combat   - 3/2024 2024    Diverticulosis of colon (without mention of hemorrhage)     Family history of colon cancer     mother  of colon cancer in her 40's    Hematuria     History of blood transfusion     afterr childbirth    Hypertension goal BP (blood pressure) < 140/90     difficult to control in past     Osteoporosis, unspecified     Primary adenocarcinoma of skin - scalp - s/p excision 2018 07/10/2018    Skin cancer     Sleep apnea     years ago had demtal appliance. Not using now    Spider veins     Tubular adenoma of colon      - repeat in 2019 - q 5 years     Urgency incontinence      Past Surgical History:   Procedure Laterality Date    ARTHROSCOPY SHOULDER  2013    [facility]    CATARACT IOL, RT/LT Left 2018    LEFT side in 2018- the RIGHT 2020- Both Dr.Carter Pierson - ophthalmology     COLONOSCOPY  94,     Colonoscopies q 5 year -next     COLONOSCOPY N/A 2023    Procedure: Colonoscopy with polypectomies;  Surgeon: Van Cavanaugh MD;  Location: [facility] OR    CYSTOCELE REPAIR  10/31/1984    CYSTOSCOPY      HYSTERECTOMY, PAP NO LONGER INDICATED  10/31/84    Hysterectomy, Total Abdominal w ovaries left intact    RELEASE TRIGGER FINGER Left 2021    SURGICAL HISTORY OF -   75    Cholecystectomy & Incidental appendectomy    wide excision -of primary Cutaneous adnexal Ca. 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 Hansen - [facility] q6months      OB History    Para Term  AB Living   3 3 0 0 0 0   SAB IAB Ectopic Multiple Live Births   0 0 0 0 0      # Outcome Date GA Lbr Owen/2nd Weight Sex Type Anes PTL Lv   3 Para            2 Para            1 Para              Lab work is in process  Labs reviewed in EPIC  BP Readings from Last 3 Encounters:   24 122/70   23 132/64   23 138/46    Wt Readings from Last 3 Encounters:    24 72.5 kg (159 lb 14.4 oz)   24 72.1 kg (159 lb)   23 71.7 kg (158 lb)                  Patient Active Problem List   Diagnosis    Hematuria    Dyspnea and respiratory abnormality    Hyperlipidemia LDL goal <130    Essential hypertension with goal blood pressure less than 140/90- well controlled    Family history of colon cancer- mother in her 40's-colonoscopies every 5 years- 2023= 2 small adenomatous polyps    Osteopenia, unspecified location - was on fosamax, then Boniva secondary to hot flashes - off boniva since     Vulvar itching - ? early lichen sclerosis vs. 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 Hansen - [facility] q6months     Myalgia of pelvic floor    Vaginal vault prolapse    Personal history of urinary tract infection    Dysuria- recurrent with urgency and frequency - not always infection - seeing Dr. Karyn Luevano [facility]     Rectal prolapse    Sebaceous cyst- left medial upper breast     Dupuytren's contracture of both hands - s/p surgery on the left hand 3rd and 4th MCP joint areas in 2021     Vitamin D deficiency    Grief- crying quite often - son  @ 55 on 3/24/2021with pt by his side - 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 - was  on warfarin - never  - lived  with pt and     Asymptomatic menopausal state    Right buttock pain- hx of sciatica in the past - not radiating further right now - no new bowel or bladder control problems     Mixed stress and urge urinary incontinence- oxybutynin 10mg daily has improved symptoms significantly     Screen for colon cancer- last colonoscopy  2023 = 2 small adenomatous polyps - 3mm & 4mm     Past Surgical History:   Procedure Laterality Date    ARTHROSCOPY SHOULDER  2013    [facility]    CATARACT IOL, RT/LT Left 2018    LEFT side in 2018- the RIGHT 2020- Both Dr.Carter Pierson - ophthalmology     COLONOSCOPY  89,     Colonoscopies q 5 year -next     COLONOSCOPY N/A 2023    Procedure: Colonoscopy with polypectomies;  Surgeon: Van Cavanaugh MD;  Location: [facility] OR    CYSTOCELE REPAIR  10/31/1984    CYSTOSCOPY      HYSTERECTOMY, PAP NO LONGER INDICATED  10/31/84    Hysterectomy, Total Abdominal w ovaries left intact    RELEASE TRIGGER FINGER Left 2021    SURGICAL HISTORY OF -   75    Cholecystectomy & Incidental appendectomy    wide excision -of primary Cutaneous adnexal Ca. 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 Hansen - [facility] q6months        Social History     Tobacco Use    Smoking status: Never    Smokeless tobacco: Never   Substance Use Topics    Alcohol use: No     Family History   Problem Relation Age of Onset    Colon Cancer Mother     Hypertension Father     Myelodysplastic syndrome Sister 77        chemo right now - noted 2024    Liver Disease Sister 76        primary biliary cholangitis    Lymphoma Sister 77    Cerebrovascular Disease Maternal Grandfather     Cerebrovascular Disease Paternal Grandmother     Cancer Son 44         @ 55 on 3/24/2021with pt by his side - 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 - was  on warfarin - never  - lived  with pt and     Family History Negative Son          Current Outpatient Medications   Medication Sig Dispense Refill    CALCIUM /VITAMIN D TABS   OR Take 1 tablet by mouth daily      clobetasol (TEMOVATE) 0.05 % external ointment APPLY SPARINGLY TO AFFECTED AREA OF GENITALS TWICE WEEKLY AT NIGHTTIME 60 g 1    levobunolol  (BETAGAN) 0.5 % ophthalmic solution Place 1 drop into both eyes daily      levothyroxine (SYNTHROID/LEVOTHROID) 88 MCG tablet TAKE 1 TABLET EVERY MORNING 90 tablet 3    lisinopril (ZESTRIL) 30 MG tablet TAKE 1 TABLET DAILY 90 tablet 3    metoprolol succinate ER (TOPROL XL) 50 MG 24 hr tablet TAKE 1 TABLET DAILY 90 tablet 3    Multiple Vitamins-Minerals (PRESERVISION AREDS 2) CAPS Take 1 capsule by mouth daily      oxyBUTYnin ER (DITROPAN XL) 10 MG 24 hr tablet Take 1 tablet (10 mg) by mouth 2 times daily For bladder urgency 180 tablet 3    rosuvastatin (CRESTOR) 20 MG tablet Take 1 tablet (20 mg) by mouth daily 90 tablet 3    triamterene-HCTZ (MAXZIDE-25) 37.5-25 MG tablet TAKE 1 TABLET DAILY 90 tablet 3     Allergies   Allergen Reactions    Ciprofloxacin GI Disturbance     Bad stomach pain     Nitrofurantoin GI Disturbance     Other reaction(s): Stomach upset, Stomach upset    Prednisone     Prednisone      Other reaction(s): flushing    Sulfa Antibiotics Rash     Other reaction(s): rash     Recent Labs   Lab Test 12/20/23  1015 06/02/23  1012 05/30/23  1512 12/12/22  1713 12/05/22  0931 06/06/22  0925 06/06/22  0925 09/16/21  1233 06/04/21  0818 12/04/20  0926   LDL  --  111*  --   --  179*  --  98   < > 110* 125*   HDL  --  59  --   --  56  --  49*   < > 53 50   TRIG  --  164*  --   --  193*  --  242*   < > 178* 312*   ALT 16 22  --   --  16  --  22   < > 24 29   CR 1.37*  --  1.49*   < > 1.21*  --  1.14*   < > 1.39* 1.12*   GFRESTIMATED 39*  --  35*   < > 45*  --  49*   < > 36* 47*   GFRESTBLACK  --   --   --   --   --   --   --   --  42* 54*   POTASSIUM 4.5  --  4.6   < > 4.5  --  3.8   < > 4.5 4.4   TSH 1.59 2.10  --   --  2.48   < > 2.05   < > 2.03 2.26    < > = values in this interval not displayed.      Current providers sharing in care for this patient include:  Patient Care Team:  Lisbet Simmons MD as PCP - General (Family Practice)  Mary Miranda PA-C as Assigned Surgical  "Provider  Lisbet Simmons MD as Assigned PCP    The following health maintenance items are reviewed in Epic and correct as of today:  Health Maintenance   Topic Date Due    RSV VACCINE (Pregnancy & 60+) (1 - 1-dose 60+ series) Never done    MICROALBUMIN  12/02/2023    COVID-19 Vaccine (8 - 2023-24 season) 03/13/2024    MEDICARE ANNUAL WELLNESS VISIT  06/02/2024    LIPID  06/02/2024    INFLUENZA VACCINE (1) 09/01/2024    MAMMO SCREENING  11/16/2024    BMP  12/20/2024    TSH W/FREE T4 REFLEX  12/20/2024    HEMOGLOBIN  12/20/2024    ANNUAL REVIEW OF HM ORDERS  01/11/2025    FALL RISK ASSESSMENT  08/29/2025    ADVANCE CARE PLANNING  06/02/2028    DTAP/TDAP/TD IMMUNIZATION (3 - Td or Tdap) 10/05/2032    DEXA  03/07/2037    PARATHYROID  Completed    PHOSPHORUS  Completed    PHQ-2 (once per calendar year)  Completed    Pneumococcal Vaccine: 65+ Years  Completed    URINALYSIS  Completed    ALK PHOS  Completed    ZOSTER IMMUNIZATION  Completed    HPV IMMUNIZATION  Aged Out    MENINGITIS IMMUNIZATION  Aged Out    RSV MONOCLONAL ANTIBODY  Aged Out         Review of Systems  Constitutional, HEENT, cardiovascular, pulmonary, GI, , musculoskeletal, neuro, skin, endocrine and psych systems are negative, except as otherwise noted.     Objective    Exam  There were no vitals taken for this visit.   Estimated body mass index is 24.9 kg/m  as calculated from the following:    Height as of 1/11/24: 1.702 m (5' 7\").    Weight as of 1/11/24: 72.1 kg (159 lb).    Physical Exam  GENERAL: alert and no distress  EYES: Eyes grossly normal to inspection, PERRL and conjunctivae and sclerae normal  HENT: ear canals and TM's normal, nose and mouth without ulcers or lesions  NECK: no adenopathy, no asymmetry, masses, or scars  RESP: lungs clear to auscultation - no rales, rhonchi or wheezes  BREAST: normal without masses, tenderness or nipple discharge and no palpable axillary masses or adenopathy  CV: regular rate and rhythm, normal S1 " S2, no S3 or S4, no murmur, click or rub, no peripheral edema  ABDOMEN: soft, nontender, no hepatosplenomegaly, no masses and bowel sounds normal   (female): abnormal female external genitalia = Vaginal anomaly- distal posterior vaginal mucosal pallor with vulvar redness and many labial cysts , normal urethral meatus,   MS: no gross musculoskeletal defects noted, no edema   SKIN: no suspicious lesions or rashes  NEURO: Normal strength and tone, mentation intact and speech normal  PSYCH: mentation appears normal, affect normal/bright        8/29/2024   Mini Cog   Clock Draw Score 2 Normal    2 Normal   3 Item Recall 2 objects recalled    2 objects recalled   Mini Cog Total Score 4    4       Multiple values from one day are sorted in reverse-chronological order        Signed Electronically by: Lisbet Simmons MD    Answers submitted by the patient for this visit:  Patient Health Questionnaire (Submitted on 8/29/2024)  If you checked off any problems, how difficult have these problems made it for you to do your work, take care of things at home, or get along with other people?: Not difficult at all  PHQ9 TOTAL SCORE: 2  Patient Health Questionnaire (G7) (Submitted on 8/29/2024)  RISA 7 TOTAL SCORE: 0

## 2024-08-29 NOTE — PATIENT INSTRUCTIONS
"Patient Education     Please call     Currently [facility] are providing and scheduling RSV vaccines at our pharmacies /  The pharmacy is the recommended location for RSV vaccination for patients on Medicare insurance plans.  We are providing RSV vaccines for the following patients:    You can do your vaccinations/immunizations and/or BP checks in any [facility] pharmacy:     To make a pharmacy only appointment online, please go to [email or web address] and select \"Click here to schedule a Vaccination or Blood Pressure Check at available [facility]acies \" at the bottom of the first page.  You can then select a location, then date and time bubbles that best fits your schedule.        If you have any problems:   call [employer] help desk: Access Services [phone].      RSV vaccine  Patients 60+ years*  Pregnant patients at 32 to 36 weeks' gestation    *It is recommended that patients on Medicare insurance plans receive their RSV vaccine in the pharmacy.    Additional Information about RSV, RSV vaccines, and RSV monoclonal antibodies   Respiratory Syncytial Virus (RSV) is a common respiratory illness that typically causes mild, cold-like symptoms. However, RSV can be serious for infants and older adults. RSV causes approximately 60,000-160,000 hospitalizations and 6,000-10,000 deaths among adults 65 years and older. RSV is also the leading cause of hospitalization for infants. Nearly all children are infected with RSV by the age of 2 years.    Prevention of RSV through vaccination (adults) or monoclonal antibody (infants) is essential as there is no treatment (medications) for RSV after someone is infected.    RSV vaccine 60+ years  Patients 60 years old are eligible for RSV that desire vaccination  Medical conditions that are associated with increased risk of severe RSV disease are:  Chronic lung disease (e.g. COPD, asthma)  Heart disease (e.g. CHD, CAD)  Chronic or progressive neurological " conditions  Chronic kidney disease  Chronic liver disease  Diabetes  Moderate or severe immunocompromise  Chronic blood disorders  Frailty  Living in a nursing home or long term care facility     RSV vaccine in pregnancy  Shown to reduce risk of RSV hospitalization by 57% for the baby in the first six months after birth  One dose is recommended between weeks 32 and 36 of pregnancy   Preventive Care Advice   This is general advice given by our system to help you stay healthy. However, your care team may have specific advice just for you. Please talk to your care team about your preventive care needs.  Nutrition  Eat 5 or more servings of fruits and vegetables each day.  Try wheat bread, brown rice and whole grain pasta (instead of white bread, rice, and pasta).  Get enough calcium and vitamin D. Check the label on foods and aim for 100% of the RDA (recommended daily allowance).  Lifestyle  Exercise at least 150 minutes each week  (30 minutes a day, 5 days a week).  Do muscle strengthening activities 2 days a week. These help control your weight and prevent disease.  No smoking.  Wear sunscreen to prevent skin cancer.  Have a dental exam and cleaning every 6 months.  Yearly exams  See your health care team every year to talk about:  Any changes in your health.  Any medicines your care team has prescribed.  Preventive care, family planning, and ways to prevent chronic diseases.  Shots (vaccines)   HPV shots (up to age 26), if you've never had them before.  Hepatitis B shots (up to age 59), if you've never had them before.  COVID-19 shot: Get this shot when it's due.  Flu shot: Get a flu shot every year.  Tetanus shot: Get a tetanus shot every 10 years.  Pneumococcal, hepatitis A, and RSV shots: Ask your care team if you need these based on your risk.  Shingles shot (for age 50 and up)  General health tests  Diabetes screening:  Starting at age 35, Get screened for diabetes at least every 3 years.  If you are younger  than age 35, ask your care team if you should be screened for diabetes.  Cholesterol test: At age 39, start having a cholesterol test every 5 years, or more often if advised.  Bone density scan (DEXA): At age 50, ask your care team if you should have this scan for osteoporosis (brittle bones).  Hepatitis C: Get tested at least once in your life.  STIs (sexually transmitted infections)  Before age 24: Ask your care team if you should be screened for STIs.  After age 24: Get screened for STIs if you're at risk. You are at risk for STIs (including HIV) if:  You are sexually active with more than one person.  You don't use condoms every time.  You or a partner was diagnosed with a sexually transmitted infection.  If you are at risk for HIV, ask about PrEP medicine to prevent HIV.  Get tested for HIV at least once in your life, whether you are at risk for HIV or not.  Cancer screening tests  Cervical cancer screening: If you have a cervix, begin getting regular cervical cancer screening tests starting at age 21.  Breast cancer scan (mammogram): If you've ever had breasts, begin having regular mammograms starting at age 40. This is a scan to check for breast cancer.  Colon cancer screening: It is important to start screening for colon cancer at age 45.  Have a colonoscopy test every 10 years (or more often if you're at risk) Or, ask your provider about stool tests like a FIT test every year or Cologuard test every 3 years.  To learn more about your testing options, visit:   .  For help making a decision, visit:   https://bit.ly/jq39618.  Prostate cancer screening test: If you have a prostate, ask your care team if a prostate cancer screening test (PSA) at age 55 is right for you.  Lung cancer screening: If you are a current or former smoker ages 50 to 80, ask your care team if ongoing lung cancer screenings are right for you.  For informational purposes only. Not to replace the advice of your health care provider.  "Copyright   2023 [facility]. All rights reserved. Clinically reviewed by the [facility] Transitions Program. [employer] 672162 - REV 01/24.  Eating Healthy Foods: Care Instructions  With every meal, you can make healthy food choices. Try to eat a variety of fruits, vegetables, whole grains, lean proteins, and low-fat dairy products. This can help you get the right balance of nutrients, including vitamins and minerals. Small changes add up over time. You can start by adding one healthy food to your meals each day.    Try to make half your plate fruits and vegetables, one-fourth whole grains, and one-fourth lean proteins. Try including dairy with your meals.   Eat more fruits and vegetables. Try to have them with most meals and snacks.   Foods for healthy eating    Fruits    These can be fresh, frozen, canned, or dried.  Try to choose whole fruit rather than fruit juice.  Eat a variety of colors.    Vegetables    These can be fresh, frozen, canned, or dried.  Beans, peas, and lentils count too.    Whole grains    Choose whole-grain breads, cereals, and noodles.  Try brown rice.    Lean proteins    These can include lean meat, poultry, fish, and eggs.  You can also have tofu, beans, peas, lentils, nuts, and seeds.    Dairy    Try milk, yogurt, and cheese.  Choose low-fat or fat-free when you can.  If you need to, use lactose-free milk or fortified plant-based milk products, such as soy milk.    Water    Drink water when you're thirsty.  Limit sugar-sweetened drinks, including soda, fruit drinks, and sports drinks.  Where can you learn more?  Go to https://www.[employer].net/patiented  Enter T756 in the search box to learn more about \"Eating Healthy Foods: Care Instructions.\"  Current as of: September 20, 2023               Content Version: 14.0    9170-1043 [employer].   Care instructions adapted under license by your healthcare professional. If you have questions about a medical " condition or this instruction, always ask your healthcare professional. Healthwise, [facility] disclaims any warranty or liability for your use of this information.      Nutrition for Older Adults: Care Instructions  Overview     Good nutrition is important at any age. But it is especially important for older adults. Eating healthy foods helps keep your body strong. And it can help lower your risk for disease.  As you get older, your body needs more of certain nutrients. These include vitamin B12, calcium, and vitamin D. But it may be harder for you to get these and other important nutrients. This could be for many reasons. You may not feel as hungry as you used to. Or you could have problems with your teeth or mouth that make it hard to chew. Or you may not enjoy planning and preparing meals, especially if you live alone.  Talk with your doctor if you want help getting the most nutrition from what you eat. They may have you work with a dietitian to help you plan meals.  Follow-up care is a key part of your treatment and safety. Be sure to make and go to all appointments, and call your doctor if you are having problems. It's also a good idea to know your test results and keep a list of the medicines you take.  How can you care for yourself at home?  To stay healthy  Eat a variety of foods. The more you vary the foods you eat, the more vitamins, minerals, and other nutrients you get.  Ask your doctor if you should take a multivitamin. Choose one with about 100% of the daily value (DV) for vitamins and minerals. Do not take more than 100% of the daily value for any vitamin or mineral unless your doctor tells you to. Talk with your doctor if you are not sure which multivitamin is right for you.  Try to eat lots of fruits and vegetables. Fresh or frozen vegetables and fruits are healthy choices. Choose canned vegetables that have no salt added and fruits that are canned in their own juice or light syrup.  Include  foods that are high in vitamin B12 in your diet. Good choices are fortified breakfast cereal, nonfat or low-fat milk and other dairy products, meat, poultry, fish, and eggs.  Get enough calcium and vitamin D. Good choices include nonfat or low-fat milk, cheese, and yogurt. Other good options are tofu, orange juice with added calcium, and some leafy green vegetables, such as kellen greens and kale. If you don't use milk products, talk to your doctor about calcium and vitamin D supplements.  Try to eat protein foods every day. Good choices include lean meat, fish, poultry, eggs, and cheese. Other good options are cooked beans, peanut butter, and nuts and seeds.  Choose whole grains for half of the grains you eat. Look for 100% whole wheat bread, whole-grain cereals, brown rice, and other whole grains.  If you have constipation  Eat high-fiber foods every day if you can. These include fruits, vegetables, cooked dried beans, and whole grains.  Drink plenty of fluids. If you have kidney, heart, or liver disease and have to limit fluids, talk with your doctor before you increase the amount of fluids you drink.  Ask your doctor if stool softeners may help keep your bowels regular.  If you have mouth problems that make chewing hard  Pick canned or cooked fruits and vegetables. These are often softer.  Chop or shred meat, poultry, and fish. Add sauce or gravy to the meat to help keep it moist.  Pick other protein foods that are soft. These include cheese, peanut butter, cooked beans, cottage cheese, and eggs.  If you have trouble shopping for yourself  Ask a local food store to deliver groceries to your home.  Contact your local area agency on aging and ask about resources that can help.  Ask a family member or neighbor to help you.  If you have trouble preparing meals  Try easier cooking methods such as using a slow cooker or microwave oven.  Let the grocery store do some of the work for you. Look for precut, washed, and  "ready-to-eat foods.  Take part in group meal programs. You can find these through senior citizen programs.  Have meals brought to your home. Your community may offer programs that deliver meals, such as Meals on Wheels. Or you could use an online meal delivery service.  If you are able, take a cooking class.  If your appetite is poor  Try to eat meals on a regular schedule. It may help to eat smaller meals more often throughout the day.  If you can, eat some meals with other people. You could ask family or friends to eat with you. Or you could take part in group meal programs offered in your community.  Ask your doctor if your medicines could cause appetite or taste problems. If so, ask about changing medicines.  Add spices and herbs to increase the flavor of food.  If you think you are depressed, ask your doctor for help. Depression can affect your appetite. And it can make it hard to do everyday activities like grocery shopping and making meals. Treatment can help.  When should you call for help?  Watch closely for changes in your health, and be sure to contact your doctor if you have any problems.  Where can you learn more?  Go to https://www.[employer].net/patiented  Enter L643 in the search box to learn more about \"Nutrition for Older Adults: Care Instructions.\"  Current as of: September 25, 2023               Content Version: 14.0    4937-5972 [employer].   Care instructions adapted under license by your healthcare professional. If you have questions about a medical condition or this instruction, always ask your healthcare professional. [employer] disclaims any warranty or liability for your use of this information.      Bladder Training: Care Instructions  Your Care Instructions     Bladder training is used to treat urge incontinence and stress incontinence. Urge incontinence means that the need to urinate comes on so fast that you can't get to a toilet in time. Stress incontinence " means that you leak urine because of pressure on your bladder. For example, it may happen when you laugh, cough, or lift something heavy.  Bladder training can increase how long you can wait before you have to urinate. It can also help your bladder hold more urine. And it can give you better control over the urge to urinate.  It is important to remember that bladder training takes a few weeks to a few months to make a difference. You may not see results right away, but don't give up.  Follow-up care is a key part of your treatment and safety. Be sure to make and go to all appointments, and call your doctor if you are having problems. It's also a good idea to know your test results and keep a list of the medicines you take.  How can you care for yourself at home?  Work with your doctor to come up with a bladder training program that is right for you. You may use one or more of the following methods.  Delayed urination  In the beginning, try to keep from urinating for 5 minutes after you first feel the need to go.  While you wait, take deep, slow breaths to relax. Kegel exercises can also help you delay the need to go to the bathroom.  After some practice, when you can easily wait 5 minutes to urinate, try to wait 10 minutes before you urinate.  Slowly increase the waiting period until you are able to control when you have to urinate.  Scheduled urination  Empty your bladder when you first wake up in the morning.  Schedule times throughout the day when you will urinate.  Start by going to the bathroom every hour, even if you don't need to go.  Slowly increase the time between trips to the bathroom.  When you have found a schedule that works well for you, keep doing it.  If you wake up during the night and have to urinate, do it. Apply your schedule to waking hours only.  Kegel exercises  These tighten and strengthen pelvic muscles, which can help you control the flow of urine. (If doing these exercises causes pain,  "stop doing them and talk with your doctor.) To do Kegel exercises:  Squeeze your muscles as if you were trying not to pass gas. Or squeeze your muscles as if you were stopping the flow of urine. Your belly, legs, and buttocks shouldn't move.  Hold the squeeze for 3 seconds, then relax for 5 to 10 seconds.  Start with 3 seconds, then add 1 second each week until you are able to squeeze for 10 seconds.  Repeat the exercise 10 times a session. Do 3 to 8 sessions a day.  When should you call for help?  Watch closely for changes in your health, and be sure to contact your doctor if:    Your incontinence is getting worse.     You do not get better as expected.   Where can you learn more?  Go to https://www.[employer].net/patiented  Enter V684 in the search box to learn more about \"Bladder Training: Care Instructions.\"  Current as of: November 15, 2023               Content Version: 14.0    4771-1628 [employer]   Care instructions adapted under license by your healthcare professional. If you have questions about a medical condition or this instruction, always ask your healthcare professional. [employer] disclaims any warranty or liability for your use of this information.    Thank you so much or choosing [facility]  for your Health Care. It was a pleasure seeing you at your visit today! Please contact us with any questions or concerns you may have.                   Lisbet Simmons MD                              To reach your [facility] care team after hours call:   [phone] press #2 \"to speak with your care team\".  This will get you to our clinic instead of routing to central [facility]  scheduling.     PLEASE NOTE OUR HOURS HAVE CHANGED secondary to COVID-19 coronavirus pandemic, as we are trying to minimize patient exposure to the virus,  which is now widespread in the [facility].  These hours may change with very little " notice.  We apologize for any inconvenience.       Our current clinic hours are:          Monday- Thursday   7:00am - 6:00pm  in person.      Friday  7:00am- 5:00pm                       Saturday and Sunday : Closed to in person and virtual visits        We have telephone and virtual visit times available between    7:00am - 6pm on Monday-Friday as well.                                                Phone:  [phone]      Our pharmacy hours: Monday through Friday 8:00am to 5:00pm                        Saturday - 9:00 am to 12 noon       Sunday : Closed.              Phone:  [phone]              ###  Please note: at this time we are not accepting any walk-in visits. ###      There is also information available at our web site:  [email or web address]    If your provider ordered any lab tests and you do not receive the results within 10 business days, please call the clinic.    If you need a medication refill please contact your pharmacy.  Please allow 3 business days for your refill to be completed.    Our clinic offers telephone visits and e visits.  Please ask one of your team members to explain more.      Use [employer] (secure email communication and access to your chart) to send your primary care provider a message or make an appointment. Ask someone on your Team how to sign up for [employer].                   If symptoms of vaginal yeast, try   Clotrimazole vaginal cream over the counter the 7 day treatment (generic for Monistat-7) - 1 applicator at bedtime  x 7 nights.

## 2024-09-02 LAB
DEPRECATED CALCIDIOL+CALCIFEROL SERPL-MC: <48 UG/L (ref 20–75)
VITAMIN D2 SERPL-MCNC: <5 UG/L
VITAMIN D3 SERPL-MCNC: 43 UG/L

## 2024-09-19 ENCOUNTER — TELEPHONE (OUTPATIENT)
Dept: FAMILY MEDICINE | Facility: CLINIC | Age: 84
End: 2024-09-19
Payer: COMMERCIAL

## 2024-09-19 NOTE — TELEPHONE ENCOUNTER
Order/Referral Request    Who is requesting: Physical Therapy Order - [facility] Functional Health - Please choose one or multiple of the following choices:    Orders being requested: above    Reason service is needed/diagnosis: na    When are orders needed by: asap    Has this been discussed with Provider: No    Does patient have a preference on a Group/Provider/Facility? above    Does patient have an appointment scheduled?: No    Where to send orders:  NA     Could we [facility] this information to you [facility] or would you prefer to receive a phone call?:   na    Put in providers in box    Zahida K

## 2024-09-23 NOTE — TELEPHONE ENCOUNTER
Talked to pt.  Stated we always have her bday wrong?  Checked and confirmed with her.  She will come in today and see if there is a PT referral in house.  Looked and do not see one (recently)   Will continue to check and pt will come in to get this..    Zahida HERNANDEZ

## 2024-09-23 NOTE — TELEPHONE ENCOUNTER
Not seeing the referral / order for PT for pt?   Yes I approve the PFT.   If she is willing to have the COVID test then yes I would like the PFT, if she is unwilling to do the COVID test then I will see her without and discuss it with her.     thanks

## 2024-09-23 NOTE — TELEPHONE ENCOUNTER
completed/ signed previously- at [facility] coordinator's file box.  ---Lisbet Simmons MD

## 2024-09-24 ENCOUNTER — MEDICAL CORRESPONDENCE (OUTPATIENT)
Dept: HEALTH INFORMATION MANAGEMENT | Facility: CLINIC | Age: 84
End: 2024-09-24
Payer: COMMERCIAL

## 2024-10-24 ENCOUNTER — OFFICE VISIT (OUTPATIENT)
Dept: FAMILY MEDICINE | Facility: CLINIC | Age: 84
End: 2024-10-24
Payer: COMMERCIAL

## 2024-10-24 VITALS
OXYGEN SATURATION: 98 % | RESPIRATION RATE: 18 BRPM | BODY MASS INDEX: 25.06 KG/M2 | DIASTOLIC BLOOD PRESSURE: 62 MMHG | HEART RATE: 85 BPM | TEMPERATURE: 96.9 F | WEIGHT: 160 LBS | SYSTOLIC BLOOD PRESSURE: 130 MMHG

## 2024-10-24 DIAGNOSIS — R30.0 DYSURIA: ICD-10-CM

## 2024-10-24 DIAGNOSIS — I10 ESSENTIAL HYPERTENSION WITH GOAL BLOOD PRESSURE LESS THAN 140/90: ICD-10-CM

## 2024-10-24 DIAGNOSIS — N30.00 ACUTE CYSTITIS WITHOUT HEMATURIA: Primary | ICD-10-CM

## 2024-10-24 DIAGNOSIS — E78.5 HYPERLIPIDEMIA LDL GOAL <130: ICD-10-CM

## 2024-10-24 DIAGNOSIS — N39.46 MIXED STRESS AND URGE URINARY INCONTINENCE: ICD-10-CM

## 2024-10-24 DIAGNOSIS — F41.9 ANXIETY: ICD-10-CM

## 2024-10-24 DIAGNOSIS — Z91.09 ENVIRONMENTAL ALLERGIES: ICD-10-CM

## 2024-10-24 DIAGNOSIS — N18.32 STAGE 3B CHRONIC KIDNEY DISEASE (H): ICD-10-CM

## 2024-10-24 DIAGNOSIS — Z51.81 MEDICATION MONITORING ENCOUNTER: ICD-10-CM

## 2024-10-24 LAB
ALBUMIN UR-MCNC: 30 MG/DL
APPEARANCE UR: CLEAR
BACTERIA #/AREA URNS HPF: ABNORMAL /HPF
BILIRUB UR QL STRIP: NEGATIVE
COLOR UR AUTO: YELLOW
GLUCOSE UR STRIP-MCNC: NEGATIVE MG/DL
HGB UR QL STRIP: ABNORMAL
KETONES UR STRIP-MCNC: NEGATIVE MG/DL
LEUKOCYTE ESTERASE UR QL STRIP: ABNORMAL
NITRATE UR QL: NEGATIVE
PH UR STRIP: >=9 [PH] (ref 5–7)
RBC #/AREA URNS AUTO: ABNORMAL /HPF
SP GR UR STRIP: 1.01 (ref 1–1.03)
UROBILINOGEN UR STRIP-ACNC: 0.2 E.U./DL
WBC #/AREA URNS AUTO: ABNORMAL /HPF

## 2024-10-24 PROCEDURE — 87186 SC STD MICRODIL/AGAR DIL: CPT | Mod: 59 | Performed by: FAMILY MEDICINE

## 2024-10-24 PROCEDURE — 81001 URINALYSIS AUTO W/SCOPE: CPT | Performed by: FAMILY MEDICINE

## 2024-10-24 PROCEDURE — 87086 URINE CULTURE/COLONY COUNT: CPT | Performed by: FAMILY MEDICINE

## 2024-10-24 PROCEDURE — G2211 COMPLEX E/M VISIT ADD ON: HCPCS | Performed by: FAMILY MEDICINE

## 2024-10-24 PROCEDURE — 96127 BRIEF EMOTIONAL/BEHAV ASSMT: CPT | Performed by: FAMILY MEDICINE

## 2024-10-24 PROCEDURE — 99214 OFFICE O/P EST MOD 30 MIN: CPT | Performed by: FAMILY MEDICINE

## 2024-10-24 RX ORDER — CEFDINIR 300 MG/1
300 CAPSULE ORAL 2 TIMES DAILY
Qty: 14 CAPSULE | Refills: 0 | Status: SHIPPED | OUTPATIENT
Start: 2024-10-24 | End: 2024-10-31

## 2024-10-24 ASSESSMENT — ANXIETY QUESTIONNAIRES
6. BECOMING EASILY ANNOYED OR IRRITABLE: NOT AT ALL
7. FEELING AFRAID AS IF SOMETHING AWFUL MIGHT HAPPEN: NOT AT ALL
3. WORRYING TOO MUCH ABOUT DIFFERENT THINGS: NOT AT ALL
5. BEING SO RESTLESS THAT IT IS HARD TO SIT STILL: NOT AT ALL
2. NOT BEING ABLE TO STOP OR CONTROL WORRYING: NOT AT ALL
GAD7 TOTAL SCORE: 0
4. TROUBLE RELAXING: NOT AT ALL
IF YOU CHECKED OFF ANY PROBLEMS ON THIS QUESTIONNAIRE, HOW DIFFICULT HAVE THESE PROBLEMS MADE IT FOR YOU TO DO YOUR WORK, TAKE CARE OF THINGS AT HOME, OR GET ALONG WITH OTHER PEOPLE: NOT DIFFICULT AT ALL
7. FEELING AFRAID AS IF SOMETHING AWFUL MIGHT HAPPEN: NOT AT ALL
GAD7 TOTAL SCORE: 0
GAD7 TOTAL SCORE: 0
8. IF YOU CHECKED OFF ANY PROBLEMS, HOW DIFFICULT HAVE THESE MADE IT FOR YOU TO DO YOUR WORK, TAKE CARE OF THINGS AT HOME, OR GET ALONG WITH OTHER PEOPLE?: NOT DIFFICULT AT ALL
1. FEELING NERVOUS, ANXIOUS, OR ON EDGE: NOT AT ALL

## 2024-10-24 ASSESSMENT — PATIENT HEALTH QUESTIONNAIRE - PHQ9
SUM OF ALL RESPONSES TO PHQ QUESTIONS 1-9: 2
SUM OF ALL RESPONSES TO PHQ QUESTIONS 1-9: 2
10. IF YOU CHECKED OFF ANY PROBLEMS, HOW DIFFICULT HAVE THESE PROBLEMS MADE IT FOR YOU TO DO YOUR WORK, TAKE CARE OF THINGS AT HOME, OR GET ALONG WITH OTHER PEOPLE: NOT DIFFICULT AT ALL

## 2024-10-24 NOTE — PROGRESS NOTES
Assessment & Plan     Acute cystitis without hematuria  Patient has had one week of dysuria and increased urinary frequency. On exam, she has no suprapubic or CVA tenderness. UA was positive for leukocyte esterase and urine microscopy showed bacteria. The likely diagnosis is acute cystitis. Patient was treated with cefdenir for her most recent UTI in December 2022 which was tolerated well. Will prescribe a course of 300 mg cefdinir BID for 7 days.  - cefdinir (OMNICEF) 300 MG capsule  Dispense: 14 capsule; Refill: 0    Dysuria  - UA Macroscopic with reflex to Microscopic and Culture - Clinic Collect  - Urine Microscopic Exam  - Urine Culture  - cefdinir (OMNICEF) 300 MG capsule  Dispense: 14 capsule; Refill: 0    Mixed stress and urge urinary incontinence- oxybutynin 10mg daily has improved symptoms significantly   Symptoms well-managed outside of current cystitis.    Stage 3b chronic kidney disease (H)  No changes today.    Essential hypertension with goal blood pressure less than 140/90- well controlled  Well-controlled.    Anxiety  Well-controlled.    Hyperlipidemia LDL goal <130  No changes today.    Environmental allergies  No changes today.    Medication monitoring encounter    Regular exercise    Plan:    1) Medications: Omnicef    2) Labs: UA reviewed, UC pending    3) Immunizations: reviewed    4) Imaging/Diagnostics: pending    5) Consults: NA    Return in about 1 week (around 10/31/2024) for Follow Up Acute, as needed.    23 minutes spent by myself on the date of the encounter doing chart review, history and exam, documentation and further activities per the note.    The longitudinal plan of care for the diagnosis(es)/condition(s) as documented were addressed during this visit. Due to the added complexity in care, I will continue to support Pat in the subsequent management and with ongoing continuity of care.    Williams Valdovinos is a 84 year old, presenting for the following health issues:  Urinary  Problem        10/24/2024     8:42 AM   Additional Questions   Roomed by Zena IZAGUIRRE   Accompanied by self     History of Present Illness       Reason for visit:  Uti  Symptom onset:  3-7 days ago  Symptoms include:  Frequent burning urinating  Symptom intensity:  Moderate  Symptom progression:  Worsening  Had these symptoms before:  Yes  Has tried/received treatment for these symptoms:  Yes  Previous treatment was successful:  Yes  Prior treatment description:  Antibiots  What makes it worse:  No  What makes it better:  Medication   She is taking medications regularly.     Aruna is an 84 year old female who presents with 1 week of dysuria and increased urinary frequency.    Aruna has had several UTIs and one bout of pyelonephritis in the past. She states that her symptoms feel similar to when she has had UTIs in the past. She describes a burning pain with urination. She is also urinating more frequently, stating that she got up several times last night to pee. The symptoms started one week ago and seem to be getting slightly worse. She has not noticed any changes in her urine appearance, denying blood in the urine or cloudy appearance.    Her last diagnosed UTI was in December of 2022. This UTI was treated with a course of cefdenir and her symptoms resolved within a few days.      Review of Systems  Constitutional, neuro, ENT, endocrine, pulmonary, cardiac, gastrointestinal, genitourinary, musculoskeletal, integument and psychiatric systems are negative, except as otherwise noted.      Objective    /62   Pulse 85   Temp 96.9  F (36.1  C) (Tympanic)   Resp 18   Wt 72.6 kg (160 lb)   SpO2 98%   BMI 25.06 kg/m    Body mass index is 25.06 kg/m .  Physical Exam   GENERAL: alert and no distress  EYES: Eyes grossly normal to inspection, PERRL and conjunctivae and sclerae normal  HENT: ear canals and TM's normal, nose and mouth without ulcers or lesions  NECK: no adenopathy, no asymmetry, masses, or scars  RESP:  lungs clear to auscultation - no rales, rhonchi or wheezes  CV: regular rate and rhythm, normal S1 S2, no S3 or S4, no murmur, click or rub, no peripheral edema  ABDOMEN: soft, nontender, no hepatosplenomegaly, no masses, no suprapubic tenderness  MS: no CVA tenderness, no gross musculoskeletal defects noted, no edema  SKIN: no suspicious lesions or rashes on exposed skin  NEURO: Normal strength and tone, mentation intact and speech normal  PSYCH: mentation appears normal, affect normal        UA RESULTS:  Recent Labs   Lab Test 10/24/24  0849   COLOR Yellow   APPEARANCE Clear   URINEGLC Negative   URINEBILI Negative   URINEKETONE Negative   SG 1.015   UBLD Trace*   URINEPH >=9.0*   PROTEIN 30*   UROBILINOGEN 0.2   NITRITE Negative   LEUKEST Large*   RBCU 5-10*   WBCU 10-25*     Tho Kim MS3,  has participated in the care of this patient.     Provider Disclosure:  I agree with above History, Review of Systems, Physical exam and Plan.  I have reviewed the content of the documentation and have edited it as needed. I have personally performed the services documented here and the documentation accurately represents those services and the decisions I have made.      Electronically signed by:        Michael Santos M.D.            Signed Electronically by: Michael Santos MD

## 2024-10-26 LAB
BACTERIA UR CULT: ABNORMAL
BACTERIA UR CULT: ABNORMAL

## 2024-11-18 ENCOUNTER — HOSPITAL ENCOUNTER (OUTPATIENT)
Dept: MAMMOGRAPHY | Facility: CLINIC | Age: 84
Discharge: HOME | End: 2024-11-18
Attending: FAMILY MEDICINE | Admitting: FAMILY MEDICINE
Payer: COMMERCIAL

## 2024-11-18 DIAGNOSIS — Z12.31 VISIT FOR SCREENING MAMMOGRAM: ICD-10-CM

## 2024-11-18 PROCEDURE — 77063 BREAST TOMOSYNTHESIS BI: CPT

## 2024-11-18 PROCEDURE — 77067 SCR MAMMO BI INCL CAD: CPT

## 2024-12-10 ENCOUNTER — OFFICE VISIT (OUTPATIENT)
Dept: DERMATOLOGY | Facility: CLINIC | Age: 84
End: 2024-12-10
Payer: COMMERCIAL

## 2024-12-10 DIAGNOSIS — D18.01 ANGIOMA OF SKIN: ICD-10-CM

## 2024-12-10 DIAGNOSIS — L72.0 MILIA: ICD-10-CM

## 2024-12-10 DIAGNOSIS — L82.1 SEBORRHEIC KERATOSIS: ICD-10-CM

## 2024-12-10 DIAGNOSIS — D22.9 NEVUS: Primary | ICD-10-CM

## 2024-12-10 DIAGNOSIS — L81.4 LENTIGO: ICD-10-CM

## 2024-12-10 DIAGNOSIS — Z85.828 HISTORY OF SKIN CANCER: ICD-10-CM

## 2024-12-10 PROCEDURE — 99213 OFFICE O/P EST LOW 20 MIN: CPT | Performed by: PHYSICIAN ASSISTANT

## 2024-12-10 PROCEDURE — G2211 COMPLEX E/M VISIT ADD ON: HCPCS | Performed by: PHYSICIAN ASSISTANT

## 2024-12-10 NOTE — LETTER
12/10/2024      Aruna Santos  [address] [facility] [id number]      Dear Colleague,    Thank you for referring your patient, Aruna Santos, to the [facility]. Please see a copy of my visit note below.    HPI:   chief complaint: Aruna Santos is a 84 year old female who presents for Full skin cancer screening to rule out skin cancer.  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 Son had a carcinoid tumor and passed away 3/2021. Her other son thankfully lives with her at the moment.   On review of systems, there are no further skin complaints, patient is feeling otherwise well.  See patient intake sheet.  ROS of the following were done and are negative: Constitutional, Eyes, Ears, Nose,   Mouth, Throat, Cardiovascular, Respiratory, GI, Genitourinary, Musculoskeletal,   Psychiatric, Endocrine, Allergic/Immunologic.        PHYSICAL EXAM:   There were no vitals taken for this visit.  Skin exam performed as follows: Type 2 skin. Mood appropriate  Alert and Oriented X 3. Well developed, well nourished in no distress.  General appearance: Normal  Head including face: Normal  Eyes: conjunctiva and lids: Normal  Mouth: Lips, teeth, gums: Normal  Neck: Normal  Chest-breast/axillae: Normal  Back: Normal  Spleen and liver: Normal  Cardiovascular: Exam of peripheral vascular system by observation for swelling, varicosities, edema: Normal  Genitalia: groin, buttocks: Normal  Extremities: digits/nails (clubbing): Normal  Eccrine and Apocrine glands: Normal  Right upper extremity: Normal  Left upper extremity: Normal  Right lower  extremity: Normal  Left lower extremity: Normal  Skin: Scalp and body hair: See below    Pt deferred exam of breasts, groin, buttocks: No    Other physical findings:  1. Multiple pigmented macules on extremities and trunk  2. Multiple pigmented macules on face, trunk and extremities  3. Multiple vascular papules on trunk, arms and legs  4. Multiple scattered keratotic plaques   5. Small white papules on the occipital scalp       Except as noted above, no other signs of skin cancer or melanoma.     ASSESSMENT/PLAN:   Benign Full skin cancer screening today.    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    Multiple benign appearing nevi on arms, legs and trunk. Discussed ABCDEs of melanoma and sunscreen.   Multiple lentigos on arms, legs and trunk. Advised benign, no treatment needed.  Multiple scattered angiomas. Advised benign, no treatment needed.   Seborrheic keratosis on arms, legs and trunk. Advised benign, no treatment needed.  Milia on the occipital scalp x 3 - advised benign   History of adenocarcinoma on the scalp 1/2018. No lymphadenopathy palpated today - advised for pt to do this once monthly at home. More than 50% of reported cases are located in the tete-orbital region and the hair-bearing scalp.  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          Follow-up: yearly FSE/PRN sooner     1.) Patient was asked about new and changing moles. YES  2.) Patient received a complete physical skin examination: YES  3.) Patient was counseled to perform a monthly self skin examination: YES  Scribed By: Mary Miranda, MS, PA-C      Again, thank you for allowing me to participate in the care of your patient.        Sincerely,        Mary HERNANDEZ  SHAYLA Youssef

## 2024-12-10 NOTE — PROGRESS NOTES
HPI:   chief complaint: Aruna Santos is a 84 year old female who presents for Full skin cancer screening to rule out skin cancer.  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 Son had a carcinoid tumor and passed away 3/2021. Her other son thankfully lives with her at the moment.   On review of systems, there are no further skin complaints, patient is feeling otherwise well.  See patient intake sheet.  ROS of the following were done and are negative: Constitutional, Eyes, Ears, Nose,   Mouth, Throat, Cardiovascular, Respiratory, GI, Genitourinary, Musculoskeletal,   Psychiatric, Endocrine, Allergic/Immunologic.        PHYSICAL EXAM:   There were no vitals taken for this visit.  Skin exam performed as follows: Type 2 skin. Mood appropriate  Alert and Oriented X 3. Well developed, well nourished in no distress.  General appearance: Normal  Head including face: Normal  Eyes: conjunctiva and lids: Normal  Mouth: Lips, teeth, gums: Normal  Neck: Normal  Chest-breast/axillae: Normal  Back: Normal  Spleen and liver: Normal  Cardiovascular: Exam of peripheral vascular system by observation for swelling, varicosities, edema: Normal  Genitalia: groin, buttocks: Normal  Extremities: digits/nails (clubbing): Normal  Eccrine and Apocrine glands: Normal  Right upper extremity: Normal  Left upper extremity: Normal  Right lower extremity: Normal  Left lower extremity: Normal  Skin: Scalp and body hair: See below    Pt deferred exam of breasts, groin, buttocks: No    Other physical findings:  1. Multiple pigmented macules on extremities and trunk  2. Multiple pigmented  macules on face, trunk and extremities  3. Multiple vascular papules on trunk, arms and legs  4. Multiple scattered keratotic plaques   5. Small white papules on the occipital scalp       Except as noted above, no other signs of skin cancer or melanoma.     ASSESSMENT/PLAN:   Benign Full skin cancer screening today.    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    Multiple benign appearing nevi on arms, legs and trunk. Discussed ABCDEs of melanoma and sunscreen.   Multiple lentigos on arms, legs and trunk. Advised benign, no treatment needed.  Multiple scattered angiomas. Advised benign, no treatment needed.   Seborrheic keratosis on arms, legs and trunk. Advised benign, no treatment needed.  Milia on the occipital scalp x 3 - advised benign   History of adenocarcinoma on the scalp 1/2018. No lymphadenopathy palpated today - advised for pt to do this once monthly at home. More than 50% of reported cases are located in the tete-orbital region and the hair-bearing scalp.  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          Follow-up: yearly FSE/PRN sooner     1.) Patient was asked about new and changing moles. YES  2.) Patient received a complete physical skin examination: YES  3.) Patient was counseled to perform a monthly self skin examination: YES  Scribed By: Mary Miranda, MS, PANHAN

## 2024-12-26 ENCOUNTER — E-VISIT (OUTPATIENT)
Dept: FAMILY MEDICINE | Facility: CLINIC | Age: 84
End: 2024-12-26
Payer: COMMERCIAL

## 2024-12-26 ENCOUNTER — TELEPHONE (OUTPATIENT)
Dept: FAMILY MEDICINE | Facility: CLINIC | Age: 84
End: 2024-12-26
Payer: COMMERCIAL

## 2024-12-26 ENCOUNTER — LAB (OUTPATIENT)
Dept: LAB | Facility: CLINIC | Age: 84
End: 2024-12-26
Payer: COMMERCIAL

## 2024-12-26 DIAGNOSIS — R30.0 DYSURIA: ICD-10-CM

## 2024-12-26 DIAGNOSIS — R30.0 DYSURIA: Primary | ICD-10-CM

## 2024-12-26 LAB
ALBUMIN UR-MCNC: NEGATIVE MG/DL
APPEARANCE UR: CLEAR
BACTERIA #/AREA URNS HPF: ABNORMAL /HPF
BILIRUB UR QL STRIP: NEGATIVE
COLOR UR AUTO: YELLOW
GLUCOSE UR STRIP-MCNC: NEGATIVE MG/DL
HGB UR QL STRIP: NEGATIVE
KETONES UR STRIP-MCNC: NEGATIVE MG/DL
LEUKOCYTE ESTERASE UR QL STRIP: ABNORMAL
NITRATE UR QL: NEGATIVE
PH UR STRIP: 8.5 [PH] (ref 5–7)
RBC #/AREA URNS AUTO: ABNORMAL /HPF
SP GR UR STRIP: 1.01 (ref 1–1.03)
UROBILINOGEN UR STRIP-ACNC: 0.2 E.U./DL
WBC #/AREA URNS AUTO: ABNORMAL /HPF

## 2024-12-26 NOTE — TELEPHONE ENCOUNTER
Reason for Call:  Appointment Request    Patient requesting this type of appt:  UTI    Requested provider:  Any provider who is available at the [place] location     Reason patient unable to be scheduled: Not within requested timeframe    When does patient want to be seen/preferred time: 1-2 days    Comments: Samday apt or in 1-2 days    Could we send this information to you in [employer] or would you prefer to receive a phone call?:   Patient would prefer a phone call   Okay to leave a detailed message?: Yes at Cell number on file:    No relevant phone numbers on file.       Call taken on 12/26/2024 at 10:07 AM by Albina King

## 2024-12-26 NOTE — TELEPHONE ENCOUNTER
Placed call to patient.    No availabilities today - checked multiple locations. Advised of e-visit. Pt presented understanding and will submit e-visit.

## 2025-01-30 DIAGNOSIS — E78.5 HYPERLIPIDEMIA LDL GOAL <130: ICD-10-CM

## 2025-01-30 RX ORDER — ROSUVASTATIN CALCIUM 20 MG/1
20 TABLET, COATED ORAL DAILY
Qty: 90 TABLET | Refills: 0 | Status: SHIPPED | OUTPATIENT
Start: 2025-01-30

## 2025-02-03 ENCOUNTER — TELEPHONE (OUTPATIENT)
Dept: FAMILY MEDICINE | Facility: CLINIC | Age: 85
End: 2025-02-03
Payer: COMMERCIAL

## 2025-02-03 DIAGNOSIS — F41.9 ANXIETY: ICD-10-CM

## 2025-02-03 NOTE — TELEPHONE ENCOUNTER
Patient  asking for something for flight anxiety, has been given Lorazepam in the past.  She will be flying in 2 weeks and again in July, 2 flights each trip.  Last used Lorazepam in 2018, she will discuss further at upcoming appointment but that will not be until after the first trip.   ELIZABETH Newell R.N.

## 2025-02-05 RX ORDER — LORAZEPAM 0.5 MG/1
0.5 TABLET ORAL 2 TIMES DAILY
Qty: 10 TABLET | Refills: 0 | Status: SHIPPED | OUTPATIENT
Start: 2025-02-05

## 2025-02-05 NOTE — TELEPHONE ENCOUNTER
PDMP Review         Value Time User    State PDMP site checked  Yes 2/5/2025  8:08 AM Lisbet Simmons MD          No suspicious activity noted. No other  controlled substances rx;d in the last year. --Lisbet Simmons MD

## 2025-02-27 PROBLEM — N89.8 ITCHING IN THE VAGINAL AREA: Status: ACTIVE | Noted: 2025-02-27

## 2025-02-27 PROBLEM — R73.01 ELEVATED FASTING GLUCOSE: Status: ACTIVE | Noted: 2025-02-27

## 2025-02-27 PROBLEM — M54.50 CHRONIC LEFT-SIDED LOW BACK PAIN WITHOUT SCIATICA: Status: ACTIVE | Noted: 2025-02-27

## 2025-02-27 PROBLEM — M54.2 NECK PAIN ON RIGHT SIDE: Status: ACTIVE | Noted: 2025-02-27

## 2025-02-27 PROBLEM — G89.29 CHRONIC LEFT-SIDED LOW BACK PAIN WITHOUT SCIATICA: Status: ACTIVE | Noted: 2025-02-27

## 2025-03-11 ENCOUNTER — TELEPHONE (OUTPATIENT)
Dept: FAMILY MEDICINE | Facility: CLINIC | Age: 85
End: 2025-03-11
Payer: COMMERCIAL

## 2025-03-11 DIAGNOSIS — R30.0 DYSURIA: ICD-10-CM

## 2025-03-11 DIAGNOSIS — N30.00 ACUTE CYSTITIS WITHOUT HEMATURIA: ICD-10-CM

## 2025-03-11 RX ORDER — CEFDINIR 300 MG/1
300 CAPSULE ORAL 2 TIMES DAILY
Qty: 10 CAPSULE | Refills: 0 | Status: SHIPPED | OUTPATIENT
Start: 2025-03-11 | End: 2025-03-16

## 2025-03-11 NOTE — TELEPHONE ENCOUNTER
My sincerest apologies! I don't know why I didn't send this in on 3/3/2025 when the UC results were noted.  Rx for Cefdinir to our pharmacy.  Please inform patient.     UA RESULTS:  Recent Labs   Lab Test 02/27/25  1000   COLOR Yellow   APPEARANCE Clear   URINEGLC Negative   URINEBILI Negative   URINEKETONE Negative   SG 1.015   UBLD Trace*   URINEPH 5.5   PROTEIN Negative   UROBILINOGEN 0.2   NITRITE Positive*   LEUKEST Small*   RBCU 0-2   WBCU 10-25*      Allergies   Allergen Reactions    Ciprofloxacin GI Disturbance     Bad stomach pain     Nitrofurantoin GI Disturbance     Other reaction(s): Stomach upset, Stomach upset    Prednisone     Prednisone      Other reaction(s): flushing    Sulfa Antibiotics Rash     Other reaction(s): rash       UC:   Culture >100,000 CFU/mL Escherichia coli Abnormal         Resulting Agency: [facility]     Susceptibility     Escherichia coli     DRE     Ampicillin >=32 ug/mL Resistant     Ampicillin/ Sulbactam >=32 ug/mL Resistant     Cefazolin 4 ug/mL Susceptible     Cefepime <=0.12 ug/mL Susceptible     Ceftazidime <=0.5 ug/mL Susceptible     Ceftriaxone <=0.25 ug/mL Susceptible     Ciprofloxacin 0.5 ug/mL Intermediate     Gentamicin <=1 ug/mL Susceptible     Levofloxacin 1 ug/mL Intermediate     Nitrofurantoin 32 ug/mL Susceptible     Piperacillin/Tazobactam <=4 ug/mL Susceptible     Trimethoprim/Sulfamethoxazole <=1/19 ug/mL Susceptible

## 2025-03-11 NOTE — TELEPHONE ENCOUNTER
"Patient calls.     Was reviewing notes from recent labs and visits. Notes that she had a bladder infection and abx was sent in. But states nothing was ever sent. And she hasn't taken anything for it. Reports no symptoms.     \"-Urine shows infection signs. UC confirms this.  -Urine culture is abnormal and grew out bacteria that are sensitive to an oral 3rd generation cephalosporin such as Cefdinir, which you've been on before.  I've sent an rx for this to your pharmacy.\"    Routing to provider to review and advise.     SHERITA WEATHERS RN on 3/11/2025 at 11:18 AM   [facility]     "

## 2025-03-19 ENCOUNTER — LAB (OUTPATIENT)
Dept: LAB | Facility: CLINIC | Age: 85
End: 2025-03-19
Payer: COMMERCIAL

## 2025-03-19 DIAGNOSIS — E83.52 SERUM CALCIUM ELEVATED: ICD-10-CM

## 2025-03-19 DIAGNOSIS — R73.03 PREDIABETES: ICD-10-CM

## 2025-03-19 PROCEDURE — 36415 COLL VENOUS BLD VENIPUNCTURE: CPT

## 2025-03-20 LAB
1,25(OH)2D SERPL-MCNC: 21.2 PG/ML (ref 19.9–79.3)
ANION GAP SERPL CALCULATED.3IONS-SCNC: 12 MMOL/L (ref 7–15)
BUN SERPL-MCNC: 29.3 MG/DL (ref 8–23)
CALCIUM SERPL-MCNC: 10.1 MG/DL (ref 8.8–10.4)
CHLORIDE SERPL-SCNC: 102 MMOL/L (ref 98–107)
CREAT SERPL-MCNC: 1.38 MG/DL (ref 0.51–0.95)
EGFRCR SERPLBLD CKD-EPI 2021: 38 ML/MIN/1.73M2
GLUCOSE SERPL-MCNC: 80 MG/DL (ref 70–99)
HCO3 SERPL-SCNC: 25 MMOL/L (ref 22–29)
POTASSIUM SERPL-SCNC: 4.3 MMOL/L (ref 3.4–5.3)
PTH-INTACT SERPL-MCNC: 22 PG/ML (ref 15–65)
SODIUM SERPL-SCNC: 139 MMOL/L (ref 135–145)

## 2025-03-23 ENCOUNTER — HEALTH MAINTENANCE LETTER (OUTPATIENT)
Age: 85
End: 2025-03-23

## 2025-03-23 LAB
DEPRECATED CALCIDIOL+CALCIFEROL SERPL-MC: <45 UG/L (ref 20–75)
VITAMIN D2 SERPL-MCNC: <5 UG/L
VITAMIN D3 SERPL-MCNC: 40 UG/L

## 2025-05-28 ENCOUNTER — LAB (OUTPATIENT)
Dept: LAB | Facility: CLINIC | Age: 85
End: 2025-05-28
Payer: COMMERCIAL

## 2025-05-28 DIAGNOSIS — R30.0 DYSURIA: ICD-10-CM

## 2025-05-28 LAB
ALBUMIN UR-MCNC: ABNORMAL MG/DL
APPEARANCE UR: ABNORMAL
BACTERIA #/AREA URNS HPF: ABNORMAL /HPF
BILIRUB UR QL STRIP: NEGATIVE
COLOR UR AUTO: YELLOW
GLUCOSE UR STRIP-MCNC: NEGATIVE MG/DL
HGB UR QL STRIP: ABNORMAL
KETONES UR STRIP-MCNC: NEGATIVE MG/DL
LEUKOCYTE ESTERASE UR QL STRIP: ABNORMAL
NITRATE UR QL: NEGATIVE
PH UR STRIP: 6 [PH] (ref 5–7)
RBC #/AREA URNS AUTO: ABNORMAL /HPF
SP GR UR STRIP: 1.02 (ref 1–1.03)
SQUAMOUS #/AREA URNS AUTO: ABNORMAL /LPF
UROBILINOGEN UR STRIP-ACNC: 0.2 E.U./DL
WBC #/AREA URNS AUTO: >100 /HPF

## 2025-05-28 PROCEDURE — 87086 URINE CULTURE/COLONY COUNT: CPT

## 2025-05-28 PROCEDURE — 81001 URINALYSIS AUTO W/SCOPE: CPT

## 2025-05-29 LAB — BACTERIA UR CULT: NORMAL

## 2025-05-30 ENCOUNTER — RESULTS FOLLOW-UP (OUTPATIENT)
Dept: FAMILY MEDICINE | Facility: CLINIC | Age: 85
End: 2025-05-30

## 2025-05-30 DIAGNOSIS — R30.0 DYSURIA: Primary | ICD-10-CM

## 2025-05-30 NOTE — TELEPHONE ENCOUNTER
Plan as above   Your recent urine wasn't entirely a clean catch, which is sometimes difficult to obtain, but with your urine culture being negative, there is no sign of true infection.     Needs cath'd specimen if symptomatic. Ok for Urgent Care if needed.   May also be a bacterial vaginosis.  Please offer pt wet prep - self collect as well. Future orders placed in Epic (our computer record)  for labs for that and cath UA/UC as well.     Please inform patient.

## 2025-06-23 ENCOUNTER — LAB (OUTPATIENT)
Dept: LAB | Facility: CLINIC | Age: 85
End: 2025-06-23
Payer: COMMERCIAL

## 2025-06-23 ENCOUNTER — TELEPHONE (OUTPATIENT)
Dept: FAMILY MEDICINE | Facility: CLINIC | Age: 85
End: 2025-06-23

## 2025-06-23 DIAGNOSIS — R30.0 DYSURIA: ICD-10-CM

## 2025-06-23 LAB
ALBUMIN UR-MCNC: 100 MG/DL
APPEARANCE UR: ABNORMAL
BACTERIA #/AREA URNS HPF: ABNORMAL /HPF
BILIRUB UR QL STRIP: NEGATIVE
COLOR UR AUTO: ABNORMAL
GLUCOSE UR STRIP-MCNC: 100 MG/DL
HGB UR QL STRIP: ABNORMAL
KETONES UR STRIP-MCNC: NEGATIVE MG/DL
LEUKOCYTE ESTERASE UR QL STRIP: ABNORMAL
NITRATE UR QL: POSITIVE
PH UR STRIP: 5.5 [PH] (ref 5–7)
RBC #/AREA URNS AUTO: ABNORMAL /HPF
SP GR UR STRIP: 1.01 (ref 1–1.03)
UROBILINOGEN UR STRIP-ACNC: 1 E.U./DL
WBC #/AREA URNS AUTO: >100 /HPF

## 2025-06-23 PROCEDURE — 87088 URINE BACTERIA CULTURE: CPT

## 2025-06-23 PROCEDURE — 87086 URINE CULTURE/COLONY COUNT: CPT

## 2025-06-23 PROCEDURE — 81001 URINALYSIS AUTO W/SCOPE: CPT

## 2025-06-23 PROCEDURE — 87186 SC STD MICRODIL/AGAR DIL: CPT

## 2025-06-23 NOTE — TELEPHONE ENCOUNTER
Patient calls.   Believes she has a UTI.   Sx started Friday night  Burning with urination  Frequency  Heavy feeling in pelvis,   No fever/chills/blood in urine.   No flank pain    Put on for lab appt today    SHERITA WEATHERS RN on 6/23/2025 at 10:06 AM   [facility]

## 2025-06-24 ENCOUNTER — TRANSFERRED RECORDS (OUTPATIENT)
Dept: HEALTH INFORMATION MANAGEMENT | Facility: CLINIC | Age: 85
End: 2025-06-24
Payer: COMMERCIAL

## 2025-06-25 LAB — BACTERIA UR CULT: ABNORMAL

## 2025-06-27 ENCOUNTER — RESULTS FOLLOW-UP (OUTPATIENT)
Dept: FAMILY MEDICINE | Facility: CLINIC | Age: 85
End: 2025-06-27

## 2025-06-27 DIAGNOSIS — N39.0 RECURRENT UTI: Primary | ICD-10-CM

## 2025-06-27 RX ORDER — LEVOFLOXACIN 500 MG/1
500 TABLET, FILM COATED ORAL DAILY
Qty: 5 TABLET | Refills: 0 | Status: SHIPPED | OUTPATIENT
Start: 2025-06-27 | End: 2025-07-02

## 2025-06-30 ENCOUNTER — PATIENT OUTREACH (OUTPATIENT)
Dept: CARE COORDINATION | Facility: CLINIC | Age: 85
End: 2025-06-30
Payer: COMMERCIAL

## 2025-07-02 ENCOUNTER — PATIENT OUTREACH (OUTPATIENT)
Dept: CARE COORDINATION | Facility: CLINIC | Age: 85
End: 2025-07-02
Payer: COMMERCIAL

## 2025-07-10 ENCOUNTER — TRANSFERRED RECORDS (OUTPATIENT)
Dept: HEALTH INFORMATION MANAGEMENT | Facility: CLINIC | Age: 85
End: 2025-07-10
Payer: COMMERCIAL

## 2025-08-05 ENCOUNTER — E-VISIT (OUTPATIENT)
Dept: FAMILY MEDICINE | Facility: CLINIC | Age: 85
End: 2025-08-05
Payer: COMMERCIAL

## 2025-08-05 ENCOUNTER — LAB (OUTPATIENT)
Dept: LAB | Facility: CLINIC | Age: 85
End: 2025-08-05
Payer: COMMERCIAL

## 2025-08-05 DIAGNOSIS — R30.0 DYSURIA: ICD-10-CM

## 2025-08-05 DIAGNOSIS — N18.32 STAGE 3B CHRONIC KIDNEY DISEASE (H): Primary | ICD-10-CM

## 2025-08-05 DIAGNOSIS — N39.0 RECURRENT UTI: Primary | ICD-10-CM

## 2025-08-05 LAB
ALBUMIN UR-MCNC: 30 MG/DL
APPEARANCE UR: ABNORMAL
BACTERIA #/AREA URNS HPF: ABNORMAL /HPF
BILIRUB UR QL STRIP: NEGATIVE
COLOR UR AUTO: YELLOW
GLUCOSE UR STRIP-MCNC: NEGATIVE MG/DL
HGB UR QL STRIP: ABNORMAL
KETONES UR STRIP-MCNC: NEGATIVE MG/DL
LEUKOCYTE ESTERASE UR QL STRIP: ABNORMAL
NITRATE UR QL: POSITIVE
PH UR STRIP: 6 [PH] (ref 5–7)
RBC #/AREA URNS AUTO: ABNORMAL /HPF
SP GR UR STRIP: 1.01 (ref 1–1.03)
UROBILINOGEN UR STRIP-ACNC: 0.2 E.U./DL
WBC #/AREA URNS AUTO: >100 /HPF

## 2025-08-05 PROCEDURE — 82043 UR ALBUMIN QUANTITATIVE: CPT

## 2025-08-05 PROCEDURE — 82570 ASSAY OF URINE CREATININE: CPT

## 2025-08-05 PROCEDURE — 81001 URINALYSIS AUTO W/SCOPE: CPT

## 2025-08-05 PROCEDURE — 87086 URINE CULTURE/COLONY COUNT: CPT

## 2025-08-05 RX ORDER — LEVOFLOXACIN 250 MG/1
250 TABLET, FILM COATED ORAL DAILY
Qty: 5 TABLET | Refills: 0 | Status: SHIPPED | OUTPATIENT
Start: 2025-08-05 | End: 2025-08-10

## 2025-08-06 LAB
BACTERIA UR CULT: NORMAL
CREAT UR-MCNC: 38.8 MG/DL
MICROALBUMIN UR-MCNC: 79 MG/L
MICROALBUMIN/CREAT UR: 203.61 MG/G CR (ref 0–25)

## 2025-08-27 ENCOUNTER — OFFICE VISIT (OUTPATIENT)
Dept: UROLOGY | Facility: CLINIC | Age: 85
End: 2025-08-27
Attending: FAMILY MEDICINE
Payer: COMMERCIAL

## 2025-08-27 VITALS — OXYGEN SATURATION: 98 % | SYSTOLIC BLOOD PRESSURE: 169 MMHG | DIASTOLIC BLOOD PRESSURE: 76 MMHG | HEART RATE: 61 BPM

## 2025-08-27 DIAGNOSIS — R82.81 STERILE PYURIA: Primary | ICD-10-CM

## 2025-08-27 DIAGNOSIS — R31.29 MICROSCOPIC HEMATURIA: ICD-10-CM

## 2025-08-27 LAB — T WHIPPLEI DNA BLD QL NAA+PROBE: 180

## 2025-08-27 PROCEDURE — 3077F SYST BP >= 140 MM HG: CPT | Performed by: UROLOGY

## 2025-08-27 PROCEDURE — 51798 US URINE CAPACITY MEASURE: CPT | Performed by: UROLOGY

## 2025-08-27 PROCEDURE — 99204 OFFICE O/P NEW MOD 45 MIN: CPT | Performed by: UROLOGY

## 2025-08-27 PROCEDURE — 3078F DIAST BP <80 MM HG: CPT | Performed by: UROLOGY

## 2025-09-02 ENCOUNTER — OFFICE VISIT (OUTPATIENT)
Dept: FAMILY MEDICINE | Facility: CLINIC | Age: 85
End: 2025-09-02
Payer: COMMERCIAL

## 2025-09-02 ENCOUNTER — ALLIED HEALTH/NURSE VISIT (OUTPATIENT)
Dept: UROLOGY | Facility: CLINIC | Age: 85
End: 2025-09-02
Payer: COMMERCIAL

## 2025-09-02 VITALS
HEART RATE: 72 BPM | BODY MASS INDEX: 25.74 KG/M2 | HEIGHT: 67 IN | DIASTOLIC BLOOD PRESSURE: 84 MMHG | TEMPERATURE: 97.5 F | RESPIRATION RATE: 16 BRPM | OXYGEN SATURATION: 99 % | SYSTOLIC BLOOD PRESSURE: 132 MMHG | WEIGHT: 164 LBS

## 2025-09-02 DIAGNOSIS — R82.71 BACTERIURIA WITH PYURIA: ICD-10-CM

## 2025-09-02 DIAGNOSIS — Z00.00 ENCOUNTER FOR MEDICARE ANNUAL WELLNESS EXAM: Primary | ICD-10-CM

## 2025-09-02 DIAGNOSIS — C44.90 PRIMARY ADNEXAL CARCINOMA OF SKIN: ICD-10-CM

## 2025-09-02 DIAGNOSIS — Z78.0 ASYMPTOMATIC MENOPAUSAL STATE: ICD-10-CM

## 2025-09-02 DIAGNOSIS — N89.8 ITCHING IN THE VAGINAL AREA: ICD-10-CM

## 2025-09-02 DIAGNOSIS — Z23 NEEDS FLU SHOT: ICD-10-CM

## 2025-09-02 DIAGNOSIS — E55.9 VITAMIN D DEFICIENCY: ICD-10-CM

## 2025-09-02 DIAGNOSIS — F41.9 ANXIETY: ICD-10-CM

## 2025-09-02 DIAGNOSIS — I10 ESSENTIAL HYPERTENSION WITH GOAL BLOOD PRESSURE LESS THAN 140/90: ICD-10-CM

## 2025-09-02 DIAGNOSIS — N18.32 STAGE 3B CHRONIC KIDNEY DISEASE (H): ICD-10-CM

## 2025-09-02 DIAGNOSIS — R73.01 ELEVATED FASTING GLUCOSE: ICD-10-CM

## 2025-09-02 DIAGNOSIS — E03.8 OTHER SPECIFIED HYPOTHYROIDISM: ICD-10-CM

## 2025-09-02 DIAGNOSIS — L29.2 VULVAR ITCHING: ICD-10-CM

## 2025-09-02 DIAGNOSIS — R82.81 BACTERIURIA WITH PYURIA: ICD-10-CM

## 2025-09-02 DIAGNOSIS — Z12.11 SCREEN FOR COLON CANCER: ICD-10-CM

## 2025-09-02 DIAGNOSIS — Z12.31 VISIT FOR SCREENING MAMMOGRAM: ICD-10-CM

## 2025-09-02 DIAGNOSIS — R31.29 MICROSCOPIC HEMATURIA: Primary | ICD-10-CM

## 2025-09-02 DIAGNOSIS — E78.5 HYPERLIPIDEMIA LDL GOAL <130: ICD-10-CM

## 2025-09-02 DIAGNOSIS — R73.03 PREDIABETES: ICD-10-CM

## 2025-09-02 LAB
ALBUMIN SERPL BCG-MCNC: 4.3 G/DL (ref 3.5–5.2)
ALBUMIN UR-MCNC: NEGATIVE MG/DL
ALP SERPL-CCNC: 47 U/L (ref 40–150)
ALT SERPL W P-5'-P-CCNC: 16 U/L (ref 0–50)
ANION GAP SERPL CALCULATED.3IONS-SCNC: 11 MMOL/L (ref 7–15)
APPEARANCE UR: CLEAR
AST SERPL W P-5'-P-CCNC: 27 U/L (ref 0–45)
BILIRUB SERPL-MCNC: 1 MG/DL
BILIRUB UR QL STRIP: NEGATIVE
BUN SERPL-MCNC: 29 MG/DL (ref 8–23)
CALCIUM SERPL-MCNC: 10.1 MG/DL (ref 8.8–10.4)
CHLORIDE SERPL-SCNC: 102 MMOL/L (ref 98–107)
CHOLEST SERPL-MCNC: 187 MG/DL
COLOR UR AUTO: YELLOW
CREAT SERPL-MCNC: 1.15 MG/DL (ref 0.51–0.95)
CREAT UR-MCNC: 35.2 MG/DL
EGFRCR SERPLBLD CKD-EPI 2021: 47 ML/MIN/1.73M2
ERYTHROCYTE [DISTWIDTH] IN BLOOD BY AUTOMATED COUNT: 11.8 % (ref 10–15)
EST. AVERAGE GLUCOSE BLD GHB EST-MCNC: 126 MG/DL
FASTING STATUS PATIENT QL REPORTED: YES
FASTING STATUS PATIENT QL REPORTED: YES
GLUCOSE SERPL-MCNC: 109 MG/DL (ref 70–99)
GLUCOSE UR STRIP-MCNC: 100 MG/DL
HBA1C MFR BLD: 6 % (ref 0–5.6)
HCO3 SERPL-SCNC: 25 MMOL/L (ref 22–29)
HCT VFR BLD AUTO: 38.3 % (ref 35–47)
HDLC SERPL-MCNC: 53 MG/DL
HGB BLD-MCNC: 13 G/DL (ref 11.7–15.7)
HGB UR QL STRIP: NEGATIVE
KETONES UR STRIP-MCNC: NEGATIVE MG/DL
LDLC SERPL CALC-MCNC: 91 MG/DL
LEUKOCYTE ESTERASE UR QL STRIP: NEGATIVE
MCH RBC QN AUTO: 31.3 PG (ref 26.5–33)
MCHC RBC AUTO-ENTMCNC: 33.9 G/DL (ref 31.5–36.5)
MCV RBC AUTO: 92.3 FL (ref 78–100)
MICROALBUMIN UR-MCNC: <12 MG/L
MICROALBUMIN/CREAT UR: NORMAL MG/G{CREAT}
NITRATE UR QL: NEGATIVE
NONHDLC SERPL-MCNC: 134 MG/DL
PH UR STRIP: 7 [PH] (ref 5–7)
PLATELET # BLD AUTO: 226 10E3/UL (ref 150–450)
POTASSIUM SERPL-SCNC: 4.7 MMOL/L (ref 3.4–5.3)
PROT SERPL-MCNC: 7.1 G/DL (ref 6.4–8.3)
RBC # BLD AUTO: 4.15 10E6/UL (ref 3.8–5.2)
SODIUM SERPL-SCNC: 138 MMOL/L (ref 135–145)
SP GR UR STRIP: 1.01 (ref 1–1.03)
T4 FREE SERPL-MCNC: 1.54 NG/DL (ref 0.9–1.7)
TRIGL SERPL-MCNC: 216 MG/DL
TSH SERPL DL<=0.005 MIU/L-ACNC: 2.28 UIU/ML (ref 0.3–4.2)
UROBILINOGEN UR STRIP-ACNC: 0.2 E.U./DL
WBC # BLD AUTO: 5.78 10E3/UL (ref 4–11)

## 2025-09-02 PROCEDURE — 82570 ASSAY OF URINE CREATININE: CPT | Performed by: FAMILY MEDICINE

## 2025-09-02 PROCEDURE — 87076 CULTURE ANAEROBE IDENT EACH: CPT

## 2025-09-02 PROCEDURE — 81003 URINALYSIS AUTO W/O SCOPE: CPT | Mod: QW

## 2025-09-02 PROCEDURE — 80061 LIPID PANEL: CPT | Performed by: FAMILY MEDICINE

## 2025-09-02 PROCEDURE — 83036 HEMOGLOBIN GLYCOSYLATED A1C: CPT | Performed by: FAMILY MEDICINE

## 2025-09-02 PROCEDURE — 80053 COMPREHEN METABOLIC PANEL: CPT | Performed by: FAMILY MEDICINE

## 2025-09-02 PROCEDURE — 85027 COMPLETE CBC AUTOMATED: CPT | Performed by: FAMILY MEDICINE

## 2025-09-02 PROCEDURE — 84244 ASSAY OF RENIN: CPT | Mod: 90 | Performed by: FAMILY MEDICINE

## 2025-09-02 PROCEDURE — 87086 URINE CULTURE/COLONY COUNT: CPT

## 2025-09-02 PROCEDURE — 84443 ASSAY THYROID STIM HORMONE: CPT | Performed by: FAMILY MEDICINE

## 2025-09-02 PROCEDURE — 99000 SPECIMEN HANDLING OFFICE-LAB: CPT | Performed by: FAMILY MEDICINE

## 2025-09-02 PROCEDURE — 84439 ASSAY OF FREE THYROXINE: CPT | Performed by: FAMILY MEDICINE

## 2025-09-02 PROCEDURE — 99207 PR NO CHARGE NURSE ONLY: CPT

## 2025-09-02 PROCEDURE — 36415 COLL VENOUS BLD VENIPUNCTURE: CPT | Performed by: FAMILY MEDICINE

## 2025-09-02 PROCEDURE — 82043 UR ALBUMIN QUANTITATIVE: CPT | Performed by: FAMILY MEDICINE

## 2025-09-02 RX ORDER — CLOBETASOL PROPIONATE 0.5 MG/G
OINTMENT TOPICAL
Qty: 60 G | Refills: 1 | Status: SHIPPED | OUTPATIENT
Start: 2025-09-02

## 2025-09-02 SDOH — HEALTH STABILITY: PHYSICAL HEALTH: ON AVERAGE, HOW MANY DAYS PER WEEK DO YOU ENGAGE IN MODERATE TO STRENUOUS EXERCISE (LIKE A BRISK WALK)?: 0 DAYS

## 2025-09-02 ASSESSMENT — ANXIETY QUESTIONNAIRES
1. FEELING NERVOUS, ANXIOUS, OR ON EDGE: NOT AT ALL
5. BEING SO RESTLESS THAT IT IS HARD TO SIT STILL: NOT AT ALL
GAD7 TOTAL SCORE: 0
7. FEELING AFRAID AS IF SOMETHING AWFUL MIGHT HAPPEN: NOT AT ALL
GAD7 TOTAL SCORE: 0
4. TROUBLE RELAXING: NOT AT ALL
3. WORRYING TOO MUCH ABOUT DIFFERENT THINGS: NOT AT ALL
2. NOT BEING ABLE TO STOP OR CONTROL WORRYING: NOT AT ALL
7. FEELING AFRAID AS IF SOMETHING AWFUL MIGHT HAPPEN: NOT AT ALL
IF YOU CHECKED OFF ANY PROBLEMS ON THIS QUESTIONNAIRE, HOW DIFFICULT HAVE THESE PROBLEMS MADE IT FOR YOU TO DO YOUR WORK, TAKE CARE OF THINGS AT HOME, OR GET ALONG WITH OTHER PEOPLE: NOT DIFFICULT AT ALL
6. BECOMING EASILY ANNOYED OR IRRITABLE: NOT AT ALL
8. IF YOU CHECKED OFF ANY PROBLEMS, HOW DIFFICULT HAVE THESE MADE IT FOR YOU TO DO YOUR WORK, TAKE CARE OF THINGS AT HOME, OR GET ALONG WITH OTHER PEOPLE?: NOT DIFFICULT AT ALL
GAD7 TOTAL SCORE: 0

## 2025-09-02 ASSESSMENT — PATIENT HEALTH QUESTIONNAIRE - PHQ9
SUM OF ALL RESPONSES TO PHQ QUESTIONS 1-9: 2
10. IF YOU CHECKED OFF ANY PROBLEMS, HOW DIFFICULT HAVE THESE PROBLEMS MADE IT FOR YOU TO DO YOUR WORK, TAKE CARE OF THINGS AT HOME, OR GET ALONG WITH OTHER PEOPLE: NOT DIFFICULT AT ALL
SUM OF ALL RESPONSES TO PHQ QUESTIONS 1-9: 2

## 2025-09-04 LAB
BACTERIA UR CULT: ABNORMAL
RENIN PLAS-CCNC: 7.2 NG/ML/HR

## (undated) DEVICE — KIT PROCEDURE W/CLEAN-A-SCOPE LINERS V2 200800

## (undated) DEVICE — PAD CHUX UNDERPAD 30X36" P3036C

## (undated) DEVICE — ENDO FORCEP SPIKED SERRATED SHAFT JUMBO 239CM G56998

## (undated) DEVICE — BAG CLEAR TRASH 1.3M 39X33" P4040C

## (undated) DEVICE — ENDO SNARE EXACTO COLD 9MM LOOP 2.4MMX230CM 00711115

## (undated) DEVICE — TUBING SUCTION MEDI-VAC SOFT 3/16"X20' N520A

## (undated) DEVICE — SUCTION MANIFOLD NEPTUNE 2 SYS 4 PORT 0702-020-000

## (undated) RX ORDER — PROPOFOL 10 MG/ML
INJECTION, EMULSION INTRAVENOUS
Status: DISPENSED
Start: 2023-06-19